# Patient Record
Sex: MALE | Race: BLACK OR AFRICAN AMERICAN | NOT HISPANIC OR LATINO | URBAN - METROPOLITAN AREA
[De-identification: names, ages, dates, MRNs, and addresses within clinical notes are randomized per-mention and may not be internally consistent; named-entity substitution may affect disease eponyms.]

---

## 2018-09-30 ENCOUNTER — EMERGENCY (EMERGENCY)
Facility: HOSPITAL | Age: 78
LOS: 1 days | Discharge: HOME | End: 2018-09-30
Attending: EMERGENCY MEDICINE
Payer: MEDICARE

## 2018-09-30 VITALS
DIASTOLIC BLOOD PRESSURE: 109 MMHG | HEART RATE: 54 BPM | OXYGEN SATURATION: 100 % | SYSTOLIC BLOOD PRESSURE: 137 MMHG | RESPIRATION RATE: 18 BRPM | TEMPERATURE: 99 F | WEIGHT: 147.05 LBS | HEIGHT: 67 IN
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
## 2018-09-30 DIAGNOSIS — T14.8XXA OTHER INJURY OF UNSPECIFIED BODY REGION, INITIAL ENCOUNTER: ICD-10-CM

## 2018-09-30 DIAGNOSIS — Z79.82 LONG TERM (CURRENT) USE OF ASPIRIN: ICD-10-CM

## 2018-09-30 DIAGNOSIS — M25.552 PAIN IN LEFT HIP: ICD-10-CM

## 2018-09-30 DIAGNOSIS — M25.532 PAIN IN LEFT WRIST: ICD-10-CM
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
## 2018-09-30 DIAGNOSIS — I10 ESSENTIAL (PRIMARY) HYPERTENSION: ICD-10-CM

## 2018-09-30 PROCEDURE — 73110 X-RAY EXAM OF WRIST: CPT | Mod: 26,LT

## 2018-09-30 PROCEDURE — 73502 X-RAY EXAM HIP UNI 2-3 VIEWS: CPT | Mod: 26,LT

## 2018-09-30 PROCEDURE — 99283 EMERGENCY DEPT VISIT LOW MDM: CPT

## 2018-09-30 RX ORDER — IBUPROFEN 200 MG
1 TABLET ORAL
Qty: 28 | Refills: 0 | OUTPATIENT
Start: 2018-09-30 | End: 2018-10-06

## 2018-09-30 NOTE — ED PROCEDURE NOTE - CPROC ED POST PROC CARE GUIDE1
Elevate the injured extremity as instructed./Instructed patient/caregiver regarding signs and symptoms of infection./Verbal/written post procedure instructions were given to patient/caregiver./Instructed patient/caregiver to follow-up with primary care physician./Keep the cast/splint/dressing clean and dry.

## 2018-09-30 NOTE — ED ADULT NURSE NOTE - NSIMPLEMENTINTERV_GEN_ALL_ED
Implemented All Fall with Harm Risk Interventions:  [place] to call system. Call bell, personal items and telephone within reach. Instruct patient to call for assistance. Room bathroom lighting operational. Non-slip footwear when patient is off stretcher. Physically safe environment: no spills, clutter or unnecessary equipment. Stretcher in lowest position, wheels locked, appropriate side rails in place. Provide visual cue, wrist band, yellow gown, etc. Monitor gait and stability. Monitor for mental status changes and reorient to person, place, and time. Review medications for side effects contributing to fall risk. Reinforce activity limits and safety measures with patient and family. Provide visual clues: red socks.
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
## 2018-11-29 ENCOUNTER — EMERGENCY (EMERGENCY)
Facility: HOSPITAL | Age: 78
LOS: 0 days | Discharge: HOME | End: 2018-11-29
Attending: EMERGENCY MEDICINE
Payer: MEDICARE

## 2018-11-29 VITALS
OXYGEN SATURATION: 99 % | HEART RATE: 62 BPM | HEIGHT: 67 IN | DIASTOLIC BLOOD PRESSURE: 65 MMHG | TEMPERATURE: 98 F | RESPIRATION RATE: 17 BRPM | WEIGHT: 145.95 LBS | SYSTOLIC BLOOD PRESSURE: 143 MMHG

## 2018-11-29 DIAGNOSIS — Z04.3 ENCOUNTER FOR EXAMINATION AND OBSERVATION FOLLOWING OTHER ACCIDENT: ICD-10-CM

## 2018-11-29 DIAGNOSIS — Z79.82 LONG TERM (CURRENT) USE OF ASPIRIN: ICD-10-CM

## 2018-11-29 DIAGNOSIS — M79.671 PAIN IN RIGHT FOOT: ICD-10-CM
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
## 2018-11-29 DIAGNOSIS — Z79.1 LONG TERM (CURRENT) USE OF NON-STEROIDAL ANTI-INFLAMMATORIES (NSAID): ICD-10-CM

## 2018-11-29 DIAGNOSIS — S92.354A NONDISPLACED FRACTURE OF FIFTH METATARSAL BONE, RIGHT FOOT, INITIAL ENCOUNTER FOR CLOSED FRACTURE: ICD-10-CM
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
## 2018-11-29 PROBLEM — I10 ESSENTIAL (PRIMARY) HYPERTENSION: Chronic | Status: ACTIVE | Noted: 2018-09-30

## 2018-11-29 PROCEDURE — 73630 X-RAY EXAM OF FOOT: CPT | Mod: 26,RT

## 2018-11-29 PROCEDURE — 28470 CLTX METATARSAL FX WO MNP EA: CPT | Mod: 54

## 2018-11-29 PROCEDURE — 99284 EMERGENCY DEPT VISIT MOD MDM: CPT | Mod: 25

## 2018-11-29 RX ORDER — ACETAMINOPHEN 500 MG
650 TABLET ORAL ONCE
Qty: 0 | Refills: 0 | Status: COMPLETED | OUTPATIENT
Start: 2018-11-29 | End: 2018-11-29

## 2018-11-29 RX ORDER — OXYCODONE AND ACETAMINOPHEN 5; 325 MG/1; MG/1
1 TABLET ORAL ONCE
Qty: 0 | Refills: 0 | Status: DISCONTINUED | OUTPATIENT
Start: 2018-11-29 | End: 2018-11-29

## 2018-11-29 RX ADMIN — Medication 650 MILLIGRAM(S): at 12:55

## 2018-11-29 RX ADMIN — Medication 650 MILLIGRAM(S): at 12:54

## 2018-11-29 NOTE — ED PROVIDER NOTE - MEDICAL DECISION MAKING DETAILS
patient pw foot pain sp fall. patient pw foot pain sp fall. xray shows 5th metatarsal fx. not [place] by location. will need to compress and have him follow up with podiatriy.

## 2018-11-29 NOTE — ED ADULT NURSE NOTE - NSIMPLEMENTINTERV_GEN_ALL_ED
Implemented All Fall with Harm Risk Interventions:  [place] to call system. Call bell, personal items and telephone within reach. Instruct patient to call for assistance. Room bathroom lighting operational. Non-slip footwear when patient is off stretcher. Physically safe environment: no spills, clutter or unnecessary equipment. Stretcher in lowest position, wheels locked, appropriate side rails in place. Provide visual cue, wrist band, yellow gown, etc. Monitor gait and stability. Monitor for mental status changes and reorient to person, place, and time. Review medications for side effects contributing to fall risk. Reinforce activity limits and safety measures with patient and family. Provide visual clues: red socks.

## 2018-11-29 NOTE — ED ADULT TRIAGE NOTE - CHIEF COMPLAINT QUOTE
pt states "  I had cataract surgery 6 weeks ago on my right eye and my vision in that eye is slightly blurry. However, last night I tripped and fall injuring my the lateral  right side of my foot and now I'm in pain.' PT DENIES HITTING HIS HEAD

## 2018-11-29 NOTE — ED PROVIDER NOTE - OBJECTIVE STATEMENT
Pertinent PMH/PSH/FHx/SHx and Review of Systems contained within:  78m hx htn pw right foot pain. patient notes he had a mech trip and fall due to decreased depth perception from cataract surgery. this caused him to the hit the right side of food on concrete. he has no fall or head injury. patient has swelling to the right outside of foot. he did not take anything for pain yet. there is no bleeding, redness, bruising, decreased rom.   Fh and Sh not otherwise contributory  ROS otherwise negative

## 2018-11-29 NOTE — ED PROVIDER NOTE - PHYSICAL EXAMINATION
Gen: Alert, NAD  Head: NC, AT   Eyes: PERRL, EOMI, normal lids/conjunctiva  ENT: normal hearing, patent oropharynx without erythema/exudate, uvula midline  Neck: supple, no tenderness, Trachea midline  Pulm: Bilateral BS, normal resp effort, no wheeze/stridor/retractions  CV: RRR, no M/R/G, 2+ radial and dp pulses bl, no edema  Abd: soft, NT/ND, +BS, no hepatosplenomegaly  Mskel: right lateral foot pain. no ctl spine ttp.   Skin: no rash, no bruising   Neuro: AAOx3, no sensory/motor deficits, CN 2-12 intact

## 2018-11-29 NOTE — ED ADULT NURSE NOTE - OBJECTIVE STATEMENT
patient A&Ox3 in no acute distress , c/o of R foot pain , patient had a fall last night , denied hitting head , denied LOC , denied difficulty breathing , denied chest pain denied headache denied N/V

## 2019-02-14 ENCOUNTER — INPATIENT (INPATIENT)
Facility: HOSPITAL | Age: 79
LOS: 7 days | Discharge: NURSING, REHAB OR LONG-TERM CARE | End: 2019-02-22
Attending: SURGERY | Admitting: SURGERY
Payer: MEDICARE

## 2019-02-14 ENCOUNTER — EMERGENCY (EMERGENCY)
Facility: HOSPITAL | Age: 79
LOS: 0 days | Discharge: ANOTHER HOSPITAL | End: 2019-02-14
Attending: EMERGENCY MEDICINE
Payer: MEDICARE

## 2019-02-14 ENCOUNTER — TRANSCRIPTION ENCOUNTER (OUTPATIENT)
Age: 79
End: 2019-02-14

## 2019-02-14 VITALS
OXYGEN SATURATION: 99 % | SYSTOLIC BLOOD PRESSURE: 152 MMHG | RESPIRATION RATE: 19 BRPM | HEIGHT: 67 IN | TEMPERATURE: 99 F | WEIGHT: 145.95 LBS | DIASTOLIC BLOOD PRESSURE: 97 MMHG | HEART RATE: 92 BPM

## 2019-02-14 VITALS
HEART RATE: 83 BPM | OXYGEN SATURATION: 96 % | TEMPERATURE: 98 F | DIASTOLIC BLOOD PRESSURE: 77 MMHG | SYSTOLIC BLOOD PRESSURE: 192 MMHG | RESPIRATION RATE: 18 BRPM

## 2019-02-14 DIAGNOSIS — I10 ESSENTIAL (PRIMARY) HYPERTENSION: ICD-10-CM

## 2019-02-14 DIAGNOSIS — M79.605 PAIN IN LEFT LEG: ICD-10-CM

## 2019-02-14 DIAGNOSIS — E78.5 HYPERLIPIDEMIA, UNSPECIFIED: ICD-10-CM

## 2019-02-14 DIAGNOSIS — I77.1 STRICTURE OF ARTERY: ICD-10-CM

## 2019-02-14 DIAGNOSIS — I99.8 OTHER DISORDER OF CIRCULATORY SYSTEM: ICD-10-CM

## 2019-02-14 DIAGNOSIS — I74.3 EMBOLISM AND THROMBOSIS OF ARTERIES OF THE LOWER EXTREMITIES: ICD-10-CM

## 2019-02-14 DIAGNOSIS — Z79.82 LONG TERM (CURRENT) USE OF ASPIRIN: ICD-10-CM

## 2019-02-14 DIAGNOSIS — Z79.899 OTHER LONG TERM (CURRENT) DRUG THERAPY: ICD-10-CM

## 2019-02-14 LAB
ALBUMIN SERPL ELPH-MCNC: 4 G/DL — SIGNIFICANT CHANGE UP (ref 3.3–5)
ALBUMIN SERPL ELPH-MCNC: 4.6 G/DL — SIGNIFICANT CHANGE UP (ref 3.3–5)
ALP SERPL-CCNC: 71 U/L — SIGNIFICANT CHANGE UP (ref 40–120)
ALP SERPL-CCNC: 72 U/L — SIGNIFICANT CHANGE UP (ref 40–120)
ALT FLD-CCNC: 20 U/L — SIGNIFICANT CHANGE UP (ref 4–41)
ALT FLD-CCNC: 23 U/L — SIGNIFICANT CHANGE UP (ref 12–78)
ANION GAP SERPL CALC-SCNC: 11 MMOL/L — SIGNIFICANT CHANGE UP (ref 5–17)
ANION GAP SERPL CALC-SCNC: 15 MMO/L — HIGH (ref 7–14)
APTT BLD: 36.3 SEC — SIGNIFICANT CHANGE UP (ref 28.5–37)
APTT BLD: > 200 SEC — CRITICAL HIGH (ref 27.5–36.3)
AST SERPL-CCNC: 28 U/L — SIGNIFICANT CHANGE UP (ref 15–37)
AST SERPL-CCNC: 29 U/L — SIGNIFICANT CHANGE UP (ref 4–40)
BASOPHILS # BLD AUTO: 0.03 K/UL — SIGNIFICANT CHANGE UP (ref 0–0.2)
BASOPHILS # BLD AUTO: 0.04 K/UL — SIGNIFICANT CHANGE UP (ref 0–0.2)
BASOPHILS NFR BLD AUTO: 0.4 % — SIGNIFICANT CHANGE UP (ref 0–2)
BASOPHILS NFR BLD AUTO: 0.5 % — SIGNIFICANT CHANGE UP (ref 0–2)
BILIRUB SERPL-MCNC: 0.5 MG/DL — SIGNIFICANT CHANGE UP (ref 0.2–1.2)
BILIRUB SERPL-MCNC: 0.6 MG/DL — SIGNIFICANT CHANGE UP (ref 0.2–1.2)
BLD GP AB SCN SERPL QL: NEGATIVE — SIGNIFICANT CHANGE UP
BLD GP AB SCN SERPL QL: SIGNIFICANT CHANGE UP
BUN SERPL-MCNC: 21 MG/DL — SIGNIFICANT CHANGE UP (ref 7–23)
BUN SERPL-MCNC: 24 MG/DL — HIGH (ref 7–23)
CALCIUM SERPL-MCNC: 8.9 MG/DL — SIGNIFICANT CHANGE UP (ref 8.5–10.1)
CALCIUM SERPL-MCNC: 9.4 MG/DL — SIGNIFICANT CHANGE UP (ref 8.4–10.5)
CHLORIDE SERPL-SCNC: 100 MMOL/L — SIGNIFICANT CHANGE UP (ref 98–107)
CHLORIDE SERPL-SCNC: 105 MMOL/L — SIGNIFICANT CHANGE UP (ref 96–108)
CO2 SERPL-SCNC: 22 MMOL/L — SIGNIFICANT CHANGE UP (ref 22–31)
CO2 SERPL-SCNC: 24 MMOL/L — SIGNIFICANT CHANGE UP (ref 22–31)
CREAT SERPL-MCNC: 1.21 MG/DL — SIGNIFICANT CHANGE UP (ref 0.5–1.3)
CREAT SERPL-MCNC: 1.37 MG/DL — HIGH (ref 0.5–1.3)
EOSINOPHIL # BLD AUTO: 0 K/UL — SIGNIFICANT CHANGE UP (ref 0–0.5)
EOSINOPHIL # BLD AUTO: 0.03 K/UL — SIGNIFICANT CHANGE UP (ref 0–0.5)
EOSINOPHIL NFR BLD AUTO: 0 % — SIGNIFICANT CHANGE UP (ref 0–6)
EOSINOPHIL NFR BLD AUTO: 0.4 % — SIGNIFICANT CHANGE UP (ref 0–6)
GLUCOSE SERPL-MCNC: 124 MG/DL — HIGH (ref 70–99)
GLUCOSE SERPL-MCNC: 134 MG/DL — HIGH (ref 70–99)
HCT VFR BLD CALC: 34.9 % — LOW (ref 39–50)
HCT VFR BLD CALC: 40.9 % — SIGNIFICANT CHANGE UP (ref 39–50)
HGB BLD-MCNC: 11.7 G/DL — LOW (ref 13–17)
HGB BLD-MCNC: 13.6 G/DL — SIGNIFICANT CHANGE UP (ref 13–17)
IMM GRANULOCYTES NFR BLD AUTO: 0.4 % — SIGNIFICANT CHANGE UP (ref 0–1.5)
IMM GRANULOCYTES NFR BLD AUTO: 0.4 % — SIGNIFICANT CHANGE UP (ref 0–1.5)
INR BLD: 1.22 RATIO — HIGH (ref 0.88–1.16)
INR BLD: 1.27 — HIGH (ref 0.88–1.17)
LACTATE SERPL-SCNC: 1.2 MMOL/L — SIGNIFICANT CHANGE UP (ref 0.7–2)
LYMPHOCYTES # BLD AUTO: 0.89 K/UL — LOW (ref 1–3.3)
LYMPHOCYTES # BLD AUTO: 1.07 K/UL — SIGNIFICANT CHANGE UP (ref 1–3.3)
LYMPHOCYTES # BLD AUTO: 11.5 % — LOW (ref 13–44)
LYMPHOCYTES # BLD AUTO: 14.2 % — SIGNIFICANT CHANGE UP (ref 13–44)
MCHC RBC-ENTMCNC: 30.2 PG — SIGNIFICANT CHANGE UP (ref 27–34)
MCHC RBC-ENTMCNC: 30.9 PG — SIGNIFICANT CHANGE UP (ref 27–34)
MCHC RBC-ENTMCNC: 33.3 % — SIGNIFICANT CHANGE UP (ref 32–36)
MCHC RBC-ENTMCNC: 33.5 GM/DL — SIGNIFICANT CHANGE UP (ref 32–36)
MCV RBC AUTO: 90.9 FL — SIGNIFICANT CHANGE UP (ref 80–100)
MCV RBC AUTO: 92.1 FL — SIGNIFICANT CHANGE UP (ref 80–100)
MONOCYTES # BLD AUTO: 0.33 K/UL — SIGNIFICANT CHANGE UP (ref 0–0.9)
MONOCYTES # BLD AUTO: 0.64 K/UL — SIGNIFICANT CHANGE UP (ref 0–0.9)
MONOCYTES NFR BLD AUTO: 4.3 % — SIGNIFICANT CHANGE UP (ref 2–14)
MONOCYTES NFR BLD AUTO: 8.5 % — SIGNIFICANT CHANGE UP (ref 2–14)
NEUTROPHILS # BLD AUTO: 5.7 K/UL — SIGNIFICANT CHANGE UP (ref 1.8–7.4)
NEUTROPHILS # BLD AUTO: 6.45 K/UL — SIGNIFICANT CHANGE UP (ref 1.8–7.4)
NEUTROPHILS NFR BLD AUTO: 76 % — SIGNIFICANT CHANGE UP (ref 43–77)
NEUTROPHILS NFR BLD AUTO: 83.4 % — HIGH (ref 43–77)
NRBC # BLD: 0 /100 WBCS — SIGNIFICANT CHANGE UP (ref 0–0)
NRBC # FLD: 0 K/UL — LOW (ref 25–125)
PLATELET # BLD AUTO: 146 K/UL — LOW (ref 150–400)
PLATELET # BLD AUTO: 163 K/UL — SIGNIFICANT CHANGE UP (ref 150–400)
PMV BLD: 10.1 FL — SIGNIFICANT CHANGE UP (ref 7–13)
POTASSIUM SERPL-MCNC: 3.8 MMOL/L — SIGNIFICANT CHANGE UP (ref 3.5–5.3)
POTASSIUM SERPL-MCNC: 4.4 MMOL/L — SIGNIFICANT CHANGE UP (ref 3.5–5.3)
POTASSIUM SERPL-SCNC: 3.8 MMOL/L — SIGNIFICANT CHANGE UP (ref 3.5–5.3)
POTASSIUM SERPL-SCNC: 4.4 MMOL/L — SIGNIFICANT CHANGE UP (ref 3.5–5.3)
PROT SERPL-MCNC: 7.3 G/DL — SIGNIFICANT CHANGE UP (ref 6–8.3)
PROT SERPL-MCNC: 7.8 GM/DL — SIGNIFICANT CHANGE UP (ref 6–8.3)
PROTHROM AB SERPL-ACNC: 13.7 SEC — HIGH (ref 10–12.9)
PROTHROM AB SERPL-ACNC: 14.2 SEC — HIGH (ref 9.8–13.1)
RBC # BLD: 3.79 M/UL — LOW (ref 4.2–5.8)
RBC # BLD: 4.5 M/UL — SIGNIFICANT CHANGE UP (ref 4.2–5.8)
RBC # FLD: 13.9 % — SIGNIFICANT CHANGE UP (ref 10.3–14.5)
RBC # FLD: 14 % — SIGNIFICANT CHANGE UP (ref 10.3–14.5)
RH IG SCN BLD-IMP: POSITIVE — SIGNIFICANT CHANGE UP
RH IG SCN BLD-IMP: POSITIVE — SIGNIFICANT CHANGE UP
SODIUM SERPL-SCNC: 137 MMOL/L — SIGNIFICANT CHANGE UP (ref 135–145)
SODIUM SERPL-SCNC: 140 MMOL/L — SIGNIFICANT CHANGE UP (ref 135–145)
WBC # BLD: 7.51 K/UL — SIGNIFICANT CHANGE UP (ref 3.8–10.5)
WBC # BLD: 7.73 K/UL — SIGNIFICANT CHANGE UP (ref 3.8–10.5)
WBC # FLD AUTO: 7.51 K/UL — SIGNIFICANT CHANGE UP (ref 3.8–10.5)
WBC # FLD AUTO: 7.73 K/UL — SIGNIFICANT CHANGE UP (ref 3.8–10.5)

## 2019-02-14 PROCEDURE — 99285 EMERGENCY DEPT VISIT HI MDM: CPT

## 2019-02-14 PROCEDURE — 71045 X-RAY EXAM CHEST 1 VIEW: CPT | Mod: 26

## 2019-02-14 PROCEDURE — 93926 LOWER EXTREMITY STUDY: CPT | Mod: 26,LT

## 2019-02-14 PROCEDURE — 75635 CT ANGIO ABDOMINAL ARTERIES: CPT | Mod: 26

## 2019-02-14 PROCEDURE — 99291 CRITICAL CARE FIRST HOUR: CPT

## 2019-02-14 RX ORDER — HEPARIN SODIUM 5000 [USP'U]/ML
2500 INJECTION INTRAVENOUS; SUBCUTANEOUS EVERY 6 HOURS
Qty: 0 | Refills: 0 | Status: DISCONTINUED | OUTPATIENT
Start: 2019-02-14 | End: 2019-02-14

## 2019-02-14 RX ORDER — HEPARIN SODIUM 5000 [USP'U]/ML
INJECTION INTRAVENOUS; SUBCUTANEOUS
Qty: 25000 | Refills: 0 | Status: DISCONTINUED | OUTPATIENT
Start: 2019-02-14 | End: 2019-02-14

## 2019-02-14 RX ORDER — SODIUM CHLORIDE 9 MG/ML
1000 INJECTION INTRAMUSCULAR; INTRAVENOUS; SUBCUTANEOUS
Qty: 0 | Refills: 0 | Status: DISCONTINUED | OUTPATIENT
Start: 2019-02-14 | End: 2019-02-15

## 2019-02-14 RX ORDER — HEPARIN SODIUM 5000 [USP'U]/ML
5500 INJECTION INTRAVENOUS; SUBCUTANEOUS EVERY 6 HOURS
Qty: 0 | Refills: 0 | Status: DISCONTINUED | OUTPATIENT
Start: 2019-02-14 | End: 2019-02-14

## 2019-02-14 RX ORDER — HEPARIN SODIUM 5000 [USP'U]/ML
5500 INJECTION INTRAVENOUS; SUBCUTANEOUS ONCE
Qty: 0 | Refills: 0 | Status: COMPLETED | OUTPATIENT
Start: 2019-02-14 | End: 2019-02-14

## 2019-02-14 RX ORDER — MORPHINE SULFATE 50 MG/1
2 CAPSULE, EXTENDED RELEASE ORAL ONCE
Qty: 0 | Refills: 0 | Status: DISCONTINUED | OUTPATIENT
Start: 2019-02-14 | End: 2019-02-14

## 2019-02-14 RX ORDER — ALTEPLASE 100 MG
1 KIT INTRAVENOUS
Qty: 25 | Refills: 0 | Status: DISCONTINUED | OUTPATIENT
Start: 2019-02-14 | End: 2019-02-15

## 2019-02-14 RX ORDER — HEPARIN SODIUM 5000 [USP'U]/ML
INJECTION INTRAVENOUS; SUBCUTANEOUS
Qty: 25000 | Refills: 0 | Status: DISCONTINUED | OUTPATIENT
Start: 2019-02-14 | End: 2019-02-15

## 2019-02-14 RX ADMIN — HEPARIN SODIUM 1200 UNIT(S)/HR: 5000 INJECTION INTRAVENOUS; SUBCUTANEOUS at 20:09

## 2019-02-14 RX ADMIN — HEPARIN SODIUM 5500 UNIT(S): 5000 INJECTION INTRAVENOUS; SUBCUTANEOUS at 18:37

## 2019-02-14 RX ADMIN — MORPHINE SULFATE 2 MILLIGRAM(S): 50 CAPSULE, EXTENDED RELEASE ORAL at 21:43

## 2019-02-14 RX ADMIN — HEPARIN SODIUM 1200 UNIT(S)/HR: 5000 INJECTION INTRAVENOUS; SUBCUTANEOUS at 18:41

## 2019-02-14 RX ADMIN — MORPHINE SULFATE 2 MILLIGRAM(S): 50 CAPSULE, EXTENDED RELEASE ORAL at 18:30

## 2019-02-14 RX ADMIN — MORPHINE SULFATE 2 MILLIGRAM(S): 50 CAPSULE, EXTENDED RELEASE ORAL at 21:58

## 2019-02-14 NOTE — ED PROVIDER NOTE - OBJECTIVE STATEMENT
79yo male with pmh HTN, HL, presents with cold, pulseless, pain, pallor left leg. 79yo male with pmh HTN, HL, h/o stent to left leg, presents with cold, pulseless, pain, pale left leg at 3pm s/p massage. no fever, cp, sob, palpitations.    ROS: No fever/chills. No photophobia/eye pain/changes in vision, No ear pain/sore throat/dysphagia, No chest pain/palpitations. No SOB/cough/stridor. No abdominal pain, N/V/D, no black/bloody bm. No dysuria/frequency/discharge, No headache. No Dizziness.  + leg pain  No numbness/tingling/weakness.

## 2019-02-14 NOTE — ED PROVIDER NOTE - ATTENDING CONTRIBUTION TO CARE
Dr. Barton: I have personally seen and examined this patient at the bedside. I have fully participated in the care of this patient. I have reviewed all pertinent clinical information, including history, physical exam, plan and the Resident's note and agree except as noted. HPI above as by me. PE above as by me. 78M h/o pvd with b/l fem pop stent DDX high clinical suspicion for ischemic left leg PLAN hep gtt, stat cta for operative plan DISPO OR tonight.

## 2019-02-14 NOTE — ED PROVIDER NOTE - NSBENEFITOFTRANSFER_ED_A_ED
Obtain Level of Care/Service Not Available at this Facility/Worsening of Condition, Death, or Disability if Patient Does Not Transfer

## 2019-02-14 NOTE — ED PROVIDER NOTE - PROGRESS NOTE DETAILS
d/w dr edwards, states to transfer pt. d/w miguel a, d/w transfer, dr molina accept pt for lights and sirens. henri at bedside doing sono

## 2019-02-14 NOTE — ED PROVIDER NOTE - LOCATION
Distal to left calf cool and pale compared to RLE. Left foot cold and decr sensation. DUsky nailbeds. Cap refill > 2 seconds

## 2019-02-14 NOTE — ED PROVIDER NOTE - PHYSICAL EXAMINATION
Gen: Alert, Well appearing. NAD    Head: NC, AT, PERRL, EOMI, normal lids/conjunctiva   ENT: Bilateral TM WNL, normal hearing, patent oropharynx without erythema/exudate, uvula midline  Neck: supple, no tenderness/meningismus/JVD   Pulm: Bilateral clear BS, normal resp effort, no wheeze/stridor/retractions  CV: RRR, no M/R/G, +dist pulses   Abd: soft, NT/ND, +BS, no guarding/rebound tenderness  Mskel: + pale, cold, left leg from distal thigh down to foot. cr very slow, no PT/DP pulses, no popliteal pulses, able to move left ankle/toes but with difficulty  Skin: no rash   Neuro: AAOx3, no sensory/motor deficits, CN 2-12 intact

## 2019-02-14 NOTE — H&P ADULT - ASSESSMENT
78M with acute limb ischemia of left lower extremity  - Admit to vascular surgery  - Booked and consented for OR  - Heparin gtt started when patient was at [facility]  - Seen and examined with fellow, Dr. Tacos Jacobo PGY 3  [id number]

## 2019-02-14 NOTE — ED CLERICAL - NS ED CLERK NOTE PRE-ARRIVAL INFORMATION; ADDITIONAL PRE-ARRIVAL INFORMATION
Left foot pain, cool, neg popliteal, tibial pulse after a massage. Positive femoral pulse.  Treated with MS, heparin-gtt at 12ml/hr started.  Accepted by vascular surgery.  Hx HTN, hyperlipidemia.

## 2019-02-14 NOTE — ED PROVIDER NOTE - CLINICAL SUMMARY MEDICAL DECISION MAKING FREE TEXT BOX
78M h/o pvd with b/l fem pop stent DDX high clinical suspicion for ischemic left leg PLAN hep gtt, stat cta for operative plan DISPO OR tonight

## 2019-02-14 NOTE — ED ADULT NURSE NOTE - NURSING ED PERIPHERAL VASCULAR DISTAL EXTREMITY DETAIL
Under satisfactory skin prep, catheter was inserted and the bladder was emptied of 50 cc of clear urine. 50 cc of freshly prepared BCG was then instilled into the bladder with no complications. He will return in 1 week for installation #2. left lower...

## 2019-02-14 NOTE — ED PROVIDER NOTE - OBJECTIVE STATEMENT
20:08 [place] att: 78M h/o bilateral fem pop bypass (left in 80s) transfer from [place] for cold left foot. Past few days sedentary. Today patient had a massage. Today approx 15:00 sudden onset left calf pain and cold left foot. At [place] pre-ops collected and heparin gtt started. Transfer to [facility] for Vascular eval. 20:00 Patient seen by [facility], request ct ap with run off to both legs. At present patient left calf pain, 6-7/10. PMH htn, hld, pvd with b/l stent, no cad stents, bph PSH MED asa 81 mg qd ALL x SMOKE PCP PHARMACY 20:08 [place] att: 78M h/o bilateral fem pop bypass (left in 80s) transfer from [place] for cold left foot. Past few days sedentary. Today patient had a massage. Today approx 15:00 sudden onset left calf pain and cold left foot. At [place] pre-ops collected and heparin gtt started. Transfer to [facility] for Vascular eval. 20:00 Patient received in ER room 13, hep gtt running. Patient seen by [facility] at ER bedside, request ct ap with run off to both legs, plan for OR. At present patient left calf pain, 6-7/10. PMH htn, hld, pvd with b/l stent, no cad stents, bph PSH MED asa 81 mg qd ALL x SMOKE PCP PHARMACY

## 2019-02-14 NOTE — H&P ADULT - NSHPLABSRESULTS_GEN_ALL_CORE
Vital Signs Last 24 Hrs  T(C): 36.7 (02-14-19 @ 21:42), Max: 37.1 (02-14-19 @ 17:37)  T(F): 98 (02-14-19 @ 21:42), Max: 98.7 (02-14-19 @ 17:37)  HR: 74 (02-14-19 @ 21:42) (72 - 92)  BP: 185/75 (02-14-19 @ 21:42) (152/97 - 192/77)  BP(mean): --  RR: 17 (02-14-19 @ 21:42) (17 - 19)  SpO2: 100% (02-14-19 @ 21:42) (96% - 100%)  I&O's Detail                          13.6   7.73  )-----------( 163      ( 14 Feb 2019 21:36 )             40.9     02-14    137  |  100  |  21  ----------------------------<  124<H>  4.4   |  22  |  1.21    Ca    9.4      14 Feb 2019 21:36    TPro  7.3  /  Alb  4.6  /  TBili  0.5  /  DBili  x   /  AST  29  /  ALT  20  /  AlkPhos  71  02-14    PT/INR - ( 14 Feb 2019 21:36 )   PT: 14.2 SEC;   INR: 1.27          PTT - ( 14 Feb 2019 21:36 )  PTT:> 200.0 SEC  CAPILLARY BLOOD GLUCOSE          MEDICATIONS  (STANDING):  alteplase    Infusion 1 mG/Hr (10 mL/Hr) IV Continuous <Continuous>  heparin  Infusion.  Unit(s)/Hr (12 mL/Hr) IV Continuous <Continuous>    MEDICATIONS  (PRN):  heparin  Injectable 5500 Unit(s) IV Push every 6 hours PRN For aPTT less than 40  heparin  Injectable 2500 Unit(s) IV Push every 6 hours PRN For aPTT between 40 - 57  < from: CT Angio Abd Aorta w/run-off w/ IV Cont (02.14.19 @ 21:02) >      Impression:    Abdomen/pelvis:  No acute emergent findings.    Right lower extremity: A right femoral-popliteal graft is patent with two   vessel runoff to the level of the foot. The right proximal anterior   tibial artery is occluded.    Left lower extremity: A left femoral popliteal graft is occluded. No flow   is visualized in the anterior tibial artery, posterior tibial artery and   peroneal artery on arterial phase imaging and Trickle flow is visualized   on delayed images predominantly within the anterior tibial and peroneal   arteries.    < end of copied text >

## 2019-02-14 NOTE — ED PROVIDER NOTE - PROGRESS NOTE DETAILS
[place] att: CT expedited with CT tech. [place] Cr from today 18:30 1.37 with GFR 48. Will go straight to CT. [place] att: Ptt> 200. Per protocol hep gtt held for one hour. CTA performed. Vascular paged to check on status of OR and admission.

## 2019-02-14 NOTE — H&P ADULT - HISTORY OF PRESENT ILLNESS
78M with prior history of bilateral lower extremity bypasses (pt does not remember details) done in 1980s presents today with LLE pain and mild sensory and motor loss since 3:15pm. He got a massage prior to the start of symptoms and then noticed that his foot felt cold and he had pain in his calf. He also felt that he could not move his toes very well. He has no complaints in his RLE.   He otherwise is ambulatory at home and denies any symptoms of claudication. He has HTN and HLD at home. He denies any history of atrial fibrillation. He is a current pack a day smoker and has done so for the past 25 years.

## 2019-02-14 NOTE — ED ADULT NURSE NOTE - CHIEF COMPLAINT QUOTE
pt states " my left leg hurts, it feels cold and I cant move the toes in that foot." pt states symptoms started at  3 am. denies trauma.

## 2019-02-14 NOTE — H&P ADULT - NSHPPHYSICALEXAM_GEN_ALL_CORE
Gen: WD, WN, in no acute distress.  Lungs: CTA B/L.  Cor: RRR, S1 S2, No M/G/R.  Abd: Soft, ND, NT,No HSM, +BS.  Neuro: A/Ox3. No focal deficit.  Vascular: bilateral femoral pulses palpable. RLE with palpable PT and DP signal. LLE with no DP/PT pulses or signals.

## 2019-02-14 NOTE — ED PROVIDER NOTE - NSRISKOFTRANSFER_ED_A_ED
Increased Pain/Transportation Risk (There is always a risk of traffic delays resulting in deterioration of condition.)

## 2019-02-14 NOTE — ED ADULT TRIAGE NOTE - CHIEF COMPLAINT QUOTE
pt states " my left leg hurts, it feels cold and I cant move the toes in that foot." pt states symptoms started at  3 am. denies trauma. pt states " my left leg hurts, it feels cold and I cant move the toes in that foot." pt states symptoms started at  3 am. denies trauma. right leg cool to touch. unable to palpate peripheral pulses. pt states " my left leg hurts, it feels cold and I cant move the toes in that foot." pt states symptoms started at  3 am. denies trauma. left leg cool to touch. unable to palpate peripheral pulses.

## 2019-02-14 NOTE — ED ADULT NURSE NOTE - NSIMPLEMENTINTERV_GEN_ALL_ED
Implemented All Fall Risk Interventions:  [place] to call system. Call bell, personal items and telephone within reach. Instruct patient to call for assistance. Room bathroom lighting operational. Non-slip footwear when patient is off stretcher. Physically safe environment: no spills, clutter or unnecessary equipment. Stretcher in lowest position, wheels locked, appropriate side rails in place. Provide visual cue, wrist band, yellow gown, etc. Monitor gait and stability. Monitor for mental status changes and reorient to person, place, and time. Review medications for side effects contributing to fall risk. Reinforce activity limits and safety measures with patient and family.

## 2019-02-14 NOTE — ED ADULT NURSE NOTE - OBJECTIVE STATEMENT
patient alert ox3 came in as transfer from [facility] with left leg pain and occlusion left femoral artery. patient was getting a massage and started to feel cold in his left leg. denies injury. h/o filter to left groin in the past. denies cp/sob. patient had a doppler that shows occluded femoral artery. patient came in with heparin drip started at 1200units per hour and as per EMS heparin was started at 1845 prior to transport. left leg feels cold, c/o pain to left calf with mild cyanosis of left toe nailbed noted. MD made aware. awaiting re eval by ER MD.

## 2019-02-15 LAB
ANION GAP SERPL CALC-SCNC: 13 MMO/L — SIGNIFICANT CHANGE UP (ref 7–14)
ANION GAP SERPL CALC-SCNC: 14 MMO/L — SIGNIFICANT CHANGE UP (ref 7–14)
APTT BLD: 38.5 SEC — HIGH (ref 27.5–36.3)
APTT BLD: 42.7 SEC — HIGH (ref 27.5–36.3)
BASE EXCESS BLDA CALC-SCNC: -1.6 MMOL/L — SIGNIFICANT CHANGE UP
BASOPHILS # BLD AUTO: 0.03 K/UL — SIGNIFICANT CHANGE UP (ref 0–0.2)
BASOPHILS NFR BLD AUTO: 0.4 % — SIGNIFICANT CHANGE UP (ref 0–2)
BUN SERPL-MCNC: 21 MG/DL — SIGNIFICANT CHANGE UP (ref 7–23)
BUN SERPL-MCNC: 26 MG/DL — HIGH (ref 7–23)
CA-I BLDA-SCNC: 1.19 MMOL/L — SIGNIFICANT CHANGE UP (ref 1.15–1.29)
CALCIUM SERPL-MCNC: 7.7 MG/DL — LOW (ref 8.4–10.5)
CALCIUM SERPL-MCNC: 8.2 MG/DL — LOW (ref 8.4–10.5)
CHLORIDE SERPL-SCNC: 103 MMOL/L — SIGNIFICANT CHANGE UP (ref 98–107)
CHLORIDE SERPL-SCNC: 104 MMOL/L — SIGNIFICANT CHANGE UP (ref 98–107)
CK MB BLD-MCNC: 1.7 — SIGNIFICANT CHANGE UP (ref 0–2.5)
CK MB BLD-MCNC: 7.65 NG/ML — HIGH (ref 1–6.6)
CK MB BLD-MCNC: 7.75 NG/ML — HIGH (ref 1–6.6)
CK SERPL-CCNC: 444 U/L — HIGH (ref 30–200)
CK SERPL-CCNC: 467 U/L — HIGH (ref 30–200)
CO2 SERPL-SCNC: 18 MMOL/L — LOW (ref 22–31)
CO2 SERPL-SCNC: 21 MMOL/L — LOW (ref 22–31)
CREAT SERPL-MCNC: 1.4 MG/DL — HIGH (ref 0.5–1.3)
CREAT SERPL-MCNC: 1.62 MG/DL — HIGH (ref 0.5–1.3)
EOSINOPHIL # BLD AUTO: 0.02 K/UL — SIGNIFICANT CHANGE UP (ref 0–0.5)
EOSINOPHIL NFR BLD AUTO: 0.3 % — SIGNIFICANT CHANGE UP (ref 0–6)
GLUCOSE BLDA-MCNC: 105 MG/DL — HIGH (ref 70–99)
GLUCOSE SERPL-MCNC: 112 MG/DL — HIGH (ref 70–99)
GLUCOSE SERPL-MCNC: 165 MG/DL — HIGH (ref 70–99)
HCO3 BLDA-SCNC: 23 MMOL/L — SIGNIFICANT CHANGE UP (ref 22–26)
HCT VFR BLD CALC: 24.6 % — LOW (ref 39–50)
HCT VFR BLD CALC: 25 % — LOW (ref 39–50)
HCT VFR BLD CALC: 29.5 % — LOW (ref 39–50)
HCT VFR BLDA CALC: 32.3 % — LOW (ref 39–51)
HGB BLD-MCNC: 8 G/DL — LOW (ref 13–17)
HGB BLD-MCNC: 8.3 G/DL — LOW (ref 13–17)
HGB BLD-MCNC: 9.7 G/DL — LOW (ref 13–17)
HGB BLDA-MCNC: 10.5 G/DL — LOW (ref 13–17)
IMM GRANULOCYTES NFR BLD AUTO: 0.3 % — SIGNIFICANT CHANGE UP (ref 0–1.5)
INR BLD: 1.35 — HIGH (ref 0.88–1.17)
LYMPHOCYTES # BLD AUTO: 1.62 K/UL — SIGNIFICANT CHANGE UP (ref 1–3.3)
LYMPHOCYTES # BLD AUTO: 23.2 % — SIGNIFICANT CHANGE UP (ref 13–44)
MAGNESIUM SERPL-MCNC: 1.7 MG/DL — SIGNIFICANT CHANGE UP (ref 1.6–2.6)
MCHC RBC-ENTMCNC: 30.2 PG — SIGNIFICANT CHANGE UP (ref 27–34)
MCHC RBC-ENTMCNC: 30.7 PG — SIGNIFICANT CHANGE UP (ref 27–34)
MCHC RBC-ENTMCNC: 30.9 PG — SIGNIFICANT CHANGE UP (ref 27–34)
MCHC RBC-ENTMCNC: 32.5 % — SIGNIFICANT CHANGE UP (ref 32–36)
MCHC RBC-ENTMCNC: 32.9 % — SIGNIFICANT CHANGE UP (ref 32–36)
MCHC RBC-ENTMCNC: 33.2 % — SIGNIFICANT CHANGE UP (ref 32–36)
MCV RBC AUTO: 92.8 FL — SIGNIFICANT CHANGE UP (ref 80–100)
MCV RBC AUTO: 92.9 FL — SIGNIFICANT CHANGE UP (ref 80–100)
MCV RBC AUTO: 93.4 FL — SIGNIFICANT CHANGE UP (ref 80–100)
MONOCYTES # BLD AUTO: 0.74 K/UL — SIGNIFICANT CHANGE UP (ref 0–0.9)
MONOCYTES NFR BLD AUTO: 10.6 % — SIGNIFICANT CHANGE UP (ref 2–14)
NEUTROPHILS # BLD AUTO: 4.56 K/UL — SIGNIFICANT CHANGE UP (ref 1.8–7.4)
NEUTROPHILS NFR BLD AUTO: 65.2 % — SIGNIFICANT CHANGE UP (ref 43–77)
NRBC # FLD: 0 K/UL — LOW (ref 25–125)
PCO2 BLDA: 47 MMHG — SIGNIFICANT CHANGE UP (ref 35–48)
PH BLDA: 7.33 PH — LOW (ref 7.35–7.45)
PHOSPHATE SERPL-MCNC: 4.2 MG/DL — SIGNIFICANT CHANGE UP (ref 2.5–4.5)
PLATELET # BLD AUTO: 124 K/UL — LOW (ref 150–400)
PLATELET # BLD AUTO: 136 K/UL — LOW (ref 150–400)
PLATELET # BLD AUTO: 148 K/UL — LOW (ref 150–400)
PMV BLD: 10.3 FL — SIGNIFICANT CHANGE UP (ref 7–13)
PMV BLD: 10.6 FL — SIGNIFICANT CHANGE UP (ref 7–13)
PMV BLD: 9.9 FL — SIGNIFICANT CHANGE UP (ref 7–13)
PO2 BLDA: 209 MMHG — HIGH (ref 83–108)
POTASSIUM BLDA-SCNC: 4.3 MMOL/L — SIGNIFICANT CHANGE UP (ref 3.4–4.5)
POTASSIUM SERPL-MCNC: 4.4 MMOL/L — SIGNIFICANT CHANGE UP (ref 3.5–5.3)
POTASSIUM SERPL-MCNC: 4.8 MMOL/L — SIGNIFICANT CHANGE UP (ref 3.5–5.3)
POTASSIUM SERPL-SCNC: 4.4 MMOL/L — SIGNIFICANT CHANGE UP (ref 3.5–5.3)
POTASSIUM SERPL-SCNC: 4.8 MMOL/L — SIGNIFICANT CHANGE UP (ref 3.5–5.3)
PROTHROM AB SERPL-ACNC: 15.1 SEC — HIGH (ref 9.8–13.1)
RBC # BLD: 2.65 M/UL — LOW (ref 4.2–5.8)
RBC # BLD: 2.69 M/UL — LOW (ref 4.2–5.8)
RBC # BLD: 3.16 M/UL — LOW (ref 4.2–5.8)
RBC # FLD: 14 % — SIGNIFICANT CHANGE UP (ref 10.3–14.5)
RBC # FLD: 14.1 % — SIGNIFICANT CHANGE UP (ref 10.3–14.5)
RBC # FLD: 14.3 % — SIGNIFICANT CHANGE UP (ref 10.3–14.5)
SAO2 % BLDA: 99 % — SIGNIFICANT CHANGE UP (ref 95–99)
SODIUM BLDA-SCNC: 134 MMOL/L — LOW (ref 136–146)
SODIUM SERPL-SCNC: 135 MMOL/L — SIGNIFICANT CHANGE UP (ref 135–145)
SODIUM SERPL-SCNC: 138 MMOL/L — SIGNIFICANT CHANGE UP (ref 135–145)
TROPONIN T, HIGH SENSITIVITY: 30 NG/L — SIGNIFICANT CHANGE UP (ref ?–14)
TROPONIN T, HIGH SENSITIVITY: 35 NG/L — SIGNIFICANT CHANGE UP (ref ?–14)
WBC # BLD: 10.23 K/UL — SIGNIFICANT CHANGE UP (ref 3.8–10.5)
WBC # BLD: 6.99 K/UL — SIGNIFICANT CHANGE UP (ref 3.8–10.5)
WBC # BLD: 9.89 K/UL — SIGNIFICANT CHANGE UP (ref 3.8–10.5)
WBC # FLD AUTO: 10.23 K/UL — SIGNIFICANT CHANGE UP (ref 3.8–10.5)
WBC # FLD AUTO: 6.99 K/UL — SIGNIFICANT CHANGE UP (ref 3.8–10.5)
WBC # FLD AUTO: 9.89 K/UL — SIGNIFICANT CHANGE UP (ref 3.8–10.5)

## 2019-02-15 PROCEDURE — 93010 ELECTROCARDIOGRAM REPORT: CPT | Mod: 76

## 2019-02-15 PROCEDURE — 36246 INS CATH ABD/L-EXT ART 2ND: CPT

## 2019-02-15 PROCEDURE — 35665 BPG ILIOFEMORAL: CPT

## 2019-02-15 PROCEDURE — 93306 TTE W/DOPPLER COMPLETE: CPT | Mod: 26

## 2019-02-15 PROCEDURE — 99233 SBSQ HOSP IP/OBS HIGH 50: CPT

## 2019-02-15 PROCEDURE — 34201 REMOVAL OF ARTERY CLOT: CPT | Mod: 59,LT

## 2019-02-15 RX ORDER — ACETAMINOPHEN 500 MG
650 TABLET ORAL EVERY 6 HOURS
Qty: 0 | Refills: 0 | Status: DISCONTINUED | OUTPATIENT
Start: 2019-02-15 | End: 2019-02-22

## 2019-02-15 RX ORDER — HYDRALAZINE HCL 50 MG
10 TABLET ORAL ONCE
Qty: 0 | Refills: 0 | Status: COMPLETED | OUTPATIENT
Start: 2019-02-15 | End: 2019-02-15

## 2019-02-15 RX ORDER — OXYCODONE HYDROCHLORIDE 5 MG/1
5 TABLET ORAL EVERY 6 HOURS
Qty: 0 | Refills: 0 | Status: DISCONTINUED | OUTPATIENT
Start: 2019-02-15 | End: 2019-02-20

## 2019-02-15 RX ORDER — TAMSULOSIN HYDROCHLORIDE 0.4 MG/1
0.4 CAPSULE ORAL AT BEDTIME
Qty: 0 | Refills: 0 | Status: DISCONTINUED | OUTPATIENT
Start: 2019-02-15 | End: 2019-02-22

## 2019-02-15 RX ORDER — HEPARIN SODIUM 5000 [USP'U]/ML
500 INJECTION INTRAVENOUS; SUBCUTANEOUS
Qty: 25000 | Refills: 0 | Status: DISCONTINUED | OUTPATIENT
Start: 2019-02-15 | End: 2019-02-15

## 2019-02-15 RX ORDER — HEPARIN SODIUM 5000 [USP'U]/ML
500 INJECTION INTRAVENOUS; SUBCUTANEOUS
Qty: 25000 | Refills: 0 | Status: DISCONTINUED | OUTPATIENT
Start: 2019-02-15 | End: 2019-02-17

## 2019-02-15 RX ORDER — OXYCODONE HYDROCHLORIDE 5 MG/1
10 TABLET ORAL EVERY 6 HOURS
Qty: 0 | Refills: 0 | Status: DISCONTINUED | OUTPATIENT
Start: 2019-02-15 | End: 2019-02-20

## 2019-02-15 RX ORDER — OXYCODONE HYDROCHLORIDE 5 MG/1
10 TABLET ORAL EVERY 6 HOURS
Qty: 0 | Refills: 0 | Status: DISCONTINUED | OUTPATIENT
Start: 2019-02-15 | End: 2019-02-15

## 2019-02-15 RX ORDER — VALACYCLOVIR 500 MG/1
1 TABLET, FILM COATED ORAL
Qty: 0 | Refills: 0 | COMMUNITY

## 2019-02-15 RX ORDER — FOLIC ACID 0.8 MG
1 TABLET ORAL
Qty: 0 | Refills: 0 | COMMUNITY

## 2019-02-15 RX ORDER — INFLUENZA VIRUS VACCINE 15; 15; 15; 15 UG/.5ML; UG/.5ML; UG/.5ML; UG/.5ML
0.5 SUSPENSION INTRAMUSCULAR ONCE
Qty: 0 | Refills: 0 | Status: DISCONTINUED | OUTPATIENT
Start: 2019-02-15 | End: 2019-02-22

## 2019-02-15 RX ORDER — CHLORHEXIDINE GLUCONATE 213 G/1000ML
1 SOLUTION TOPICAL
Qty: 0 | Refills: 0 | Status: DISCONTINUED | OUTPATIENT
Start: 2019-02-15 | End: 2019-02-18

## 2019-02-15 RX ORDER — METOPROLOL TARTRATE 50 MG
50 TABLET ORAL DAILY
Qty: 0 | Refills: 0 | Status: DISCONTINUED | OUTPATIENT
Start: 2019-02-15 | End: 2019-02-15

## 2019-02-15 RX ADMIN — Medication 10 MILLIGRAM(S): at 17:30

## 2019-02-15 RX ADMIN — TAMSULOSIN HYDROCHLORIDE 0.4 MILLIGRAM(S): 0.4 CAPSULE ORAL at 21:31

## 2019-02-15 RX ADMIN — Medication 650 MILLIGRAM(S): at 17:38

## 2019-02-15 RX ADMIN — Medication 650 MILLIGRAM(S): at 17:08

## 2019-02-15 RX ADMIN — SODIUM CHLORIDE 125 MILLILITER(S): 9 INJECTION INTRAMUSCULAR; INTRAVENOUS; SUBCUTANEOUS at 04:40

## 2019-02-15 NOTE — CONSULT NOTE ADULT - ASSESSMENT
ASSESSMENT:  78M with prior history of B/L lower extremity bypasses (pt does not remember details, imaging shows B/L fem-pop bypass grafts) done in 1980s presented yesterday 2/14/19 with LLE pain and mild sensory and motor loss since 3:15pm. He got a massage prior to the start of symptoms and then noticed that his foot felt cold and he had pain in his calf. He also felt that he could not move his toes very well. He has no complaints in his RLE. He otherwise is ambulatory at home and denies any symptoms of claudication. He has HTN and HLD at home. He denies any history of atrial fibrillation. He is a current pack a day smoker and has done so for the past 25 years. Pt found with occluded superficial femoral artery and occluded bypass graft (fem-pop graft) on arterial duplex. CT LE with run off shows patent fem-pop graft, right proximal ant-tib artery occlusion; left fem-pop graft occluded, no flow in ant-tib, post-tib, and peroneal arteries. Pt planned to go to OR for thrombolysis of LLE fem-pop graft but, instead underwent thrombectomized old PTFE fem-pop graft and then interposition bypass from common femoral to profunda with a 5 french sheath left in the right femoral artery. Pt received heparin bolus prior to case starting. SICU consulted for Q1 vascular checks.      PLAN:       Neurologic: Sedated postop, oxycodone 5/10 for pain mgmt    Respiratory: JAVY, IS, OOB as tolerated, oxygen supplementation as needed    Cardiovascular: restarted metoprolol 50 QD, otherwise JAVY    Gastrointestinal/Nutrition: Start clears later this morning    Renal/Genitourinary: NS at 125cc/hr. Can d/c'ed once tolerating diet. Replete electrolytes PRN, Restarted home flomax 0.4mg at bedtime    Hematologic: F/U post op labs, transfuse if <7,    - HEPARIN GTT @ 500units & SLOWLY TITRATE UP ONLY AFTER SHEATH IS REMOVED LATER THIS MORNING BY VASCULAR    Infectious Disease: JAVY, will monitor CBC    Lines/Tubes: Right radial A line, kimbrough    Endocrine: JAVY    Disposition: SICU    --------------------------------------------------------------------------------------    Critical Care Diagnoses: RLE arterial occlusion and occlusion of  bypass graft ASSESSMENT:  78M with prior history of B/L lower extremity bypasses (pt does not remember details, imaging shows B/L fem-pop bypass grafts) done in 1980s presented yesterday 2/14/19 with LLE pain and mild sensory and motor loss since 3:15pm. He got a massage prior to the start of symptoms and then noticed that his foot felt cold and he had pain in his calf. He also felt that he could not move his toes very well. He has no complaints in his RLE. He otherwise is ambulatory at home and denies any symptoms of claudication. He has HTN and HLD at home. He denies any history of atrial fibrillation. He is a current pack a day smoker and has done so for the past 25 years. Pt found with occluded superficial femoral artery and occluded bypass graft (fem-pop graft) on arterial duplex. CT LE with run off shows patent fem-pop graft, right proximal ant-tib artery occlusion; left fem-pop graft occluded, no flow in ant-tib, post-tib, and peroneal arteries. Pt planned to go to OR for thrombolysis of LLE fem-pop graft but, instead underwent thrombectomized old PTFE fem-pop graft and then interposition bypass from common femoral to profunda with a 5 french sheath left in the right femoral artery. Pt received heparin bolus prior to case starting. SICU consulted for Q1 vascular checks.      PLAN:       Neurologic: Sedated postop, oxycodone 5/10 for pain mgmt    Respiratory: JAVY, IS, OOB as tolerated, oxygen supplementation as needed    Cardiovascular: holding metoprolol 50 QD (home med)  - bradycardic, + T wave inversions on lateral leads, unknown baseline hx   - trending Abigail  - echo pending     Gastrointestinal/Nutrition: Start clears later this morning    Renal/Genitourinary: NS at 125cc/hr. Can d/c'ed once tolerating diet. Replete electrolytes PRN, Restarted home flomax 0.4mg at bedtime  - kimbrough in place, will d/c     Hematologic: F/U post op labs, transfuse if <7,    - HEPARIN GTT @ 500units; PTT 45   - watch out for compartment syndrome, q1 vascular checks   - SCDs on R leg OK   - downtrending H/H, will repeat CBC    Infectious Disease: JAVY, will monitor CBC  - afebrile     Lines/Tubes: Right radial A line, kimbrough    Endocrine: JAVY    Disposition: SICU    --------------------------------------------------------------------------------------    Critical Care Diagnoses: RLE arterial occlusion and occlusion of  bypass graft

## 2019-02-15 NOTE — CONSULT NOTE ADULT - SUBJECTIVE AND OBJECTIVE BOX
SICU Consultation Note/AM note  =====================================================  HPI: 78M with prior history of B/L lower extremity bypasses (pt does not remember details, imaging shows B/L fem-pop bypass grafts) done in 1980s presented yesterday 2/14/19 with LLE pain and mild sensory and motor loss since 3:15pm. He got a massage prior to the start of symptoms and then noticed that his foot felt cold and he had pain in his calf. He also felt that he could not move his toes very well. He has no complaints in his RLE. He otherwise is ambulatory at home and denies any symptoms of claudication. He has HTN and HLD at home. He denies any history of atrial fibrillation. He is a current pack a day smoker and has done so for the past 25 years. Pt found with occluded superficial femoral artery and occluded bypass graft (fem-pop graft) on arterial duplex.     A right femoral-popliteal graft is patent with two   vessel runoff to the level of the foot. The right proximal anterior tibial   artery is occluded.     Left lower extremity: A left femoral popliteal graft is occluded. No flow is   visualized in the anterior tibial artery, posterior tibial artery and   peroneal artery on arterial phase imaging and Trickle flow is visualized on   delayed images predominantly within the anterior tibial and peroneal   arteries.         Surgery Information  OR time:      EBL:       UOP:              IV Fluids:       Blood Products:             PAST MEDICAL & SURGICAL HISTORY:  HTN (hypertension)  No significant past surgical history    Home Meds:   Allergies:   Soc:   Advanced Directives: Presumed Full Code     ROS:    General: Non-Contributory  Skin/Breast: Non-Contributory  Ophthalmologic: Non-Contributory  ENMT: Non-Contributory  Respiratory and Thorax: Non-Contributory  Cardiovascular: Non-Contributory  Gastrointestinal: Non-Contributory  Genitourinary: Non-Contributory  Musculoskeletal: Non-Contributory  Neurological: Non-Contributory  Psychiatric: Non-Contributory  Hematology/Lymphatics: Non-Contributory  Endocrine: Non-Contributory  Allergic/Immunologic: Non-Contributory    CURRENT MEDICATIONS:   --------------------------------------------------------------------------------------  Neurologic Medications    Respiratory Medications    Cardiovascular Medications  metoprolol tartrate 50 milliGRAM(s) Oral daily  tamsulosin 0.4 milliGRAM(s) Oral at bedtime    Gastrointestinal Medications  sodium chloride 0.9%. 1000 milliLiter(s) IV Continuous <Continuous>    Genitourinary Medications    Hematologic/Oncologic Medications  alteplase    Infusion 1 mG/Hr IV Continuous <Continuous>  influenza   Vaccine 0.5 milliLiter(s) IntraMuscular once    Antimicrobial/Immunologic Medications    Endocrine/Metabolic Medications    Topical/Other Medications  chlorhexidine 4% Liquid 1 Application(s) Topical <User Schedule>    --------------------------------------------------------------------------------------    VITAL SIGNS, INS/OUTS (last 24 hours):  --------------------------------------------------------------------------------------  ((Insert SICU Vitals / Is+Os here)) ***  --------------------------------------------------------------------------------------    EXAM:  General/Neuro  RASS:   GCS:   Exam: Normal, NAD, alert, oriented x 3, no focal deficits. KARLIE  ***    Respiratory  Exam: Lungs clear to auscultation, Normal expansion/effort.  ***  [] Tracheostomy   [] Intubated  Mechanical Ventilation:     Cardiovascular  Exam: S1, S2.  Regular rate and rhythm.  Peripheral edema  ***  Cardiac Rhythm: Normal Sinus Rhythm  ECHO:     GI  Exam: Abdomen soft, Non-tender, Non-distended.  Gastrostomy / Jejunostomy tube in place.  Nasogastric tube in place.  Colostomy / Ileostomy.  ***  Wound:   ***  Current Diet:  NPO***      Tubes/Lines/Drains  ***  [x] Peripheral IV  [] Central Venous Line     	[] R	[] L	[] IJ	[] Fem	[] SC        Type:	    Date Placed:   [] Arterial Line		[] R	[] L	[] Fem	[] Rad	[] Ax	Date Placed:   [] PICC:         	[] Midline		[] Mediport           [] Urinary Catheter		Date Placed:     Extremities  Exam: Extremities warm, pink, well-perfused.        Derm:  Exam: Good skin turgor, no skin breakdown.      :   Exam: Castro catheter in place.     LABS  --------------------------------------------------------------------------------------  ((Insert SICU Labs here))***  --------------------------------------------------------------------------------------    OTHER LABS    IMAGING RESULTS      ASSESSMENT:  78y Male ***    PLAN:   Neurologic:   Respiratory:   Cardiovascular:   Gastrointestinal/Nutrition:   Renal/Genitourinary:   Hematologic:   Infectious Disease:   Lines/Tubes:  Endocrine:   Disposition:     --------------------------------------------------------------------------------------    Critical Care Diagnoses: SICU Consultation Note/AM note  =====================================================  HPI: 78M with prior history of B/L lower extremity bypasses (pt does not remember details, imaging shows B/L fem-pop bypass grafts) done in 1980s presented yesterday 2/14/19 with LLE pain and mild sensory and motor loss since 3:15pm. He got a massage prior to the start of symptoms and then noticed that his foot felt cold and he had pain in his calf. He also felt that he could not move his toes very well. He has no complaints in his RLE. He otherwise is ambulatory at home and denies any symptoms of claudication. He has HTN and HLD at home. He denies any history of atrial fibrillation. He is a current pack a day smoker and has done so for the past 25 years. Pt found with occluded superficial femoral artery and occluded bypass graft (fem-pop graft) on arterial duplex. CT LE with run off shows patent fem-pop graft, right proximal ant-tib artery occlusion; left fem-pop graft occluded, no flow in ant-tib, post-tib, and peroneal arteries. Pt planned to go to OR for thrombolysis of LLE fem-pop graft but, instead underwent thrombectomized old PTFE fem-pop graft and then interposition bypass from common femoral to profunda with a 5 [place] sheath left in the right femoral artery. Pt received heparin bolus prior to case starting. SICU consulted for Q1 vascular checks.    Surgery Information    EBL: 800cc       UOP:  400mL            IV Fluids: 2.4 L       Blood Products:      None      PAST MEDICAL & SURGICAL HISTORY:  HTN (hypertension)  No significant past surgical history    Home Meds:   Allergies:   Soc:   Advanced Directives: Presumed Full Code     ROS:    General: Non-Contributory  Skin/Breast: Non-Contributory  Ophthalmologic: Non-Contributory  ENMT: Non-Contributory  Respiratory and Thorax: Non-Contributory  Cardiovascular: Non-Contributory  Gastrointestinal: Non-Contributory  Genitourinary: Non-Contributory  Musculoskeletal: Non-Contributory  Neurological: Non-Contributory  Psychiatric: Non-Contributory  Hematology/Lymphatics: Non-Contributory  Endocrine: Non-Contributory  Allergic/Immunologic: Non-Contributory    CURRENT MEDICATIONS:   --------------------------------------------------------------------------------------  Neurologic Medications    Respiratory Medications    Cardiovascular Medications  metoprolol tartrate 50 milliGRAM(s) Oral daily  tamsulosin 0.4 milliGRAM(s) Oral at bedtime    Gastrointestinal Medications  sodium chloride 0.9%. 1000 milliLiter(s) IV Continuous <Continuous>    Genitourinary Medications    Hematologic/Oncologic Medications  alteplase    Infusion 1 mG/Hr IV Continuous <Continuous>  influenza   Vaccine 0.5 milliLiter(s) IntraMuscular once    Antimicrobial/Immunologic Medications    Endocrine/Metabolic Medications    Topical/Other Medications  chlorhexidine 4% Liquid 1 Application(s) Topical <User Schedule>    --------------------------------------------------------------------------------------    VITAL SIGNS, INS/OUTS (last 24 hours):  --------------------------------------------------------------------------------------  ((Insert SICU Vitals / Is+Os here)) ***  --------------------------------------------------------------------------------------    EXAM:  General/Neuro  RASS:   GCS:   Exam: Normal, NAD, alert, oriented x 3, no focal deficits. PERRLA  ***    Respiratory  Exam: Lungs clear to auscultation, Normal expansion/effort.  ***  [] Tracheostomy   [] Intubated  Mechanical Ventilation:     Cardiovascular  Exam: S1, S2.  Regular rate and rhythm.  Peripheral edema  ***  Cardiac Rhythm: Normal Sinus Rhythm  ECHO:     GI  Exam: Abdomen soft, Non-tender, Non-distended.  Gastrostomy / Jejunostomy tube in place.  Nasogastric tube in place.  Colostomy / Ileostomy.  ***  Wound:   ***  Current Diet:  NPO***      Tubes/Lines/Drains  ***  [x] Peripheral IV  [] Central Venous Line     	[] R	[] L	[] IJ	[] Fem	[] SC        Type:	    Date Placed:   [] Arterial Line		[] R	[] L	[] Fem	[] Rad	[] Ax	Date Placed:   [] PICC:         	[] Midline		[] Mediport           [] Urinary Catheter		Date Placed:     Extremities  Exam: Extremities warm, pink, well-perfused.        Derm:  Exam: Good skin turgor, no skin breakdown.      :   Exam: Castro catheter in place.     LABS  --------------------------------------------------------------------------------------  ((Insert SICU Labs here))***  --------------------------------------------------------------------------------------    OTHER LABS    IMAGING RESULTS      ASSESSMENT:  78y Male ***    PLAN:   Neurologic:   Respiratory:   Cardiovascular:   Gastrointestinal/Nutrition:   Renal/Genitourinary:   Hematologic:   Infectious Disease:   Lines/Tubes:  Endocrine:   Disposition:     --------------------------------------------------------------------------------------    Critical Care Diagnoses: SICU Consultation Note/AM note  =====================================================  HPI: 78M with prior history of B/L lower extremity bypasses (pt does not remember details, imaging shows B/L fem-pop bypass grafts) done in 1980s presented yesterday 2/14/19 with LLE pain and mild sensory and motor loss since 3:15pm. He got a massage prior to the start of symptoms and then noticed that his foot felt cold and he had pain in his calf. He also felt that he could not move his toes very well. He has no complaints in his RLE. He otherwise is ambulatory at home and denies any symptoms of claudication. He has HTN and HLD at home. He denies any history of atrial fibrillation. He is a current pack a day smoker and has done so for the past 25 years. Pt found with occluded superficial femoral artery and occluded bypass graft (fem-pop graft) on arterial duplex. CT LE with run off shows patent fem-pop graft, right proximal ant-tib artery occlusion; left fem-pop graft occluded, no flow in ant-tib, post-tib, and peroneal arteries. Pt planned to go to OR for thrombolysis of LLE fem-pop graft but, instead underwent thrombectomized old PTFE fem-pop graft and then interposition bypass from common femoral to profunda with a 5 [place] sheath left in the right femoral artery. Pt received heparin bolus prior to case starting. SICU consulted for Q1 vascular checks.    Surgery Information    EBL: 800cc       UOP:  400mL            IV Fluids: 2.4 L       Blood Products:      None      PAST MEDICAL & SURGICAL HISTORY:  HTN (hypertension)  No significant past surgical history    Home Meds:   Allergies:   Soc:   Advanced Directives: Presumed Full Code     Pt sedated, unable to answer ROS questions. Pertinent hx was received from chart    CURRENT MEDICATIONS:   --------------------------------------------------------------------------------------  Neurologic Medications    Respiratory Medications    Cardiovascular Medications  metoprolol tartrate 50 milliGRAM(s) Oral daily  tamsulosin 0.4 milliGRAM(s) Oral at bedtime    Gastrointestinal Medications  sodium chloride 0.9%. 1000 milliLiter(s) IV Continuous <Continuous>    Genitourinary Medications    Hematologic/Oncologic Medications  alteplase    Infusion 1 mG/Hr IV Continuous <Continuous>  influenza   Vaccine 0.5 milliLiter(s) IntraMuscular once    Antimicrobial/Immunologic Medications    Endocrine/Metabolic Medications    Topical/Other Medications  chlorhexidine 4% Liquid 1 Application(s) Topical <User Schedule>    --------------------------------------------------------------------------------------  ICU Vital Signs Last 24 Hrs  T(C): 36.7 (15 Feb 2019 04:15), Max: 37.1 (14 Feb 2019 17:37)  T(F): 98.1 (15 Feb 2019 04:15), Max: 98.7 (14 Feb 2019 17:37)  HR: 58 (15 Feb 2019 05:00) (58 - 92)  BP: 114/57 (15 Feb 2019 04:45) (114/57 - 192/77)  BP(mean): 70 (15 Feb 2019 04:45) (70 - 77)  ABP: 113/52 (15 Feb 2019 05:00) (113/52 - 136/57)  ABP(mean): 73 (15 Feb 2019 05:00) (73 - 85)  RR: 11 (15 Feb 2019 05:00) (10 - 19)  SpO2: 100% (15 Feb 2019 05:00) (96% - 100%)    --------------------------------------------------------------------------------------  I&O's Detail    14 Feb 2019 07:01  -  15 Feb 2019 05:21  --------------------------------------------------------  IN:    sodium chloride 0.9%.: 375 mL  Total IN: 375 mL    OUT:    Indwelling Catheter - Urethral: 275 mL  Total OUT: 275 mL    Total NET: 100 mL    --------------------------------------------------------------------------------------    EXAM:  General/Neuro  Exam: Sedated    Respiratory  Exam: Lungs clear to auscultation, Normal expansion/effort.      Cardiovascular  Exam: S1, S2.  Regular rate and rhythm. Cardiac Rhythm: Normal Sinus Rhythm    GI  Exam: Abdomen soft, Non-tender, Non-distended. Current Diet:  NPO    Tubes/Lines/Drains  Right radial A line, kimbrough  [x] Peripheral IV    Extremities  Exam: palpable left popliteal artery, palpable/+doppler signal left DP pulse, +doppler signal left peroneal artery. Dusky toes noted on left. Left foot cool to touch, reduced cap refill. Left groin with moderately saturated dressing (serosanguinous) and right groin with 5 [place] sheath still in place     :   Exam: Kimbrough catheter in place.     LABS  --------------------------------------------------------------------------------------  CBC (02-14 @ 21:36)                              13.6                           7.73    )----------------(  163        83.4<H>% Neutrophils, 11.5<L>% Lymphocytes, ANC: 6.45                                40.9    CBC (02-14 @ 18:14)                              11.7<L>                         7.51    )----------------(  146<L>     76.0  % Neutrophils, 14.2  % Lymphocytes, ANC: 5.70                                34.9<L>    BMP (02-14 @ 21:36)             137     |  100     |  21    		Ca++ --      Ca 9.4                ---------------------------------( 124<H>		Mg --                 4.4     |  22      |  1.21  			Ph --      BMP (02-14 @ 18:14)             140     |  105     |  24<H> 		Ca++ --      Ca 8.9                ---------------------------------( 134<H>		Mg --                 3.8     |  24      |  1.37<H>			Ph --        LFTs (02-14 @ 21:36)      TPro 7.3 / Alb 4.6 / TBili 0.5 / DBili -- / AST 29 / ALT 20 / AlkPhos 71  LFTs (02-14 @ 18:14)      TPro 7.8 / Alb 4.0 / TBili 0.6 / DBili -- / AST 28 / ALT 23 / AlkPhos 72    Coags (02-14 @ 21:36)  aPTT > 200.0<HH> / INR 1.27<H> / PT 14.2<H>  Coags (02-14 @ 18:14)  aPTT 36.3 / INR 1.22<H> / PT 13.7<H>      ABG (02-15 @ 02:41)     7.33<L> / 47 / 209<H> / 23 / -1.6 / 99.0%     Lactate:    ABG (02-14 @ 18:14)      /  /  /  /  / %     Lactate:  1.2      --------------------------------------------------------------------------------------    CXR pending    ASSESSMENT:  78y Male ***    PLAN:   Neurologic:   Respiratory:   Cardiovascular:   Gastrointestinal/Nutrition:   Renal/Genitourinary:   Hematologic:   Infectious Disease:   Lines/Tubes:  Endocrine:   Disposition:     --------------------------------------------------------------------------------------    Critical Care Diagnoses: SICU Consultation Note/AM note  =====================================================  HPI: 78M with prior history of B/L lower extremity bypasses (pt does not remember details, imaging shows B/L fem-pop bypass grafts) done in 1980s presented yesterday 2/14/19 with LLE pain and mild sensory and motor loss since 3:15pm. He got a massage prior to the start of symptoms and then noticed that his foot felt cold and he had pain in his calf. He also felt that he could not move his toes very well. He has no complaints in his RLE. He otherwise is ambulatory at home and denies any symptoms of claudication. He has HTN and HLD at home. He denies any history of atrial fibrillation. He is a current pack a day smoker and has done so for the past 25 years. Pt found with occluded superficial femoral artery and occluded bypass graft (fem-pop graft) on arterial duplex. CT LE with run off shows patent fem-pop graft, right proximal ant-tib artery occlusion; left fem-pop graft occluded, no flow in ant-tib, post-tib, and peroneal arteries. Pt planned to go to OR for thrombolysis of LLE fem-pop graft but, instead underwent thrombectomized old PTFE fem-pop graft and then interposition bypass from common femoral to profunda with a 5 french sheath left in the right femoral artery. Pt received heparin bolus prior to case starting. SICU consulted for Q1 vascular checks.    Surgery Information    EBL: 800cc       UOP:  400mL            IV Fluids: 2.4 L       Blood Products:      None      PAST MEDICAL & SURGICAL HISTORY:  HTN (hypertension)  No significant past surgical history    Home Meds:   Allergies:   Soc:   Advanced Directives: Presumed Full Code     Pt sedated, unable to answer ROS questions. Pertinent hx was received from chart    CURRENT MEDICATIONS:   --------------------------------------------------------------------------------------  Neurologic Medications    Respiratory Medications    Cardiovascular Medications  metoprolol tartrate 50 milliGRAM(s) Oral daily  tamsulosin 0.4 milliGRAM(s) Oral at bedtime    Gastrointestinal Medications  sodium chloride 0.9%. 1000 milliLiter(s) IV Continuous <Continuous>    Genitourinary Medications    Hematologic/Oncologic Medications  alteplase    Infusion 1 mG/Hr IV Continuous <Continuous>  influenza   Vaccine 0.5 milliLiter(s) IntraMuscular once    Antimicrobial/Immunologic Medications    Endocrine/Metabolic Medications    Topical/Other Medications  chlorhexidine 4% Liquid 1 Application(s) Topical <User Schedule>    --------------------------------------------------------------------------------------  ICU Vital Signs Last 24 Hrs  T(C): 36.7 (15 Feb 2019 04:15), Max: 37.1 (14 Feb 2019 17:37)  T(F): 98.1 (15 Feb 2019 04:15), Max: 98.7 (14 Feb 2019 17:37)  HR: 58 (15 Feb 2019 05:00) (58 - 92)  BP: 114/57 (15 Feb 2019 04:45) (114/57 - 192/77)  BP(mean): 70 (15 Feb 2019 04:45) (70 - 77)  ABP: 113/52 (15 Feb 2019 05:00) (113/52 - 136/57)  ABP(mean): 73 (15 Feb 2019 05:00) (73 - 85)  RR: 11 (15 Feb 2019 05:00) (10 - 19)  SpO2: 100% (15 Feb 2019 05:00) (96% - 100%)    --------------------------------------------------------------------------------------  I&O's Detail    14 Feb 2019 07:01  -  15 Feb 2019 05:21  --------------------------------------------------------  IN:    sodium chloride 0.9%.: 375 mL  Total IN: 375 mL    OUT:    Indwelling Catheter - Urethral: 275 mL  Total OUT: 275 mL    Total NET: 100 mL    --------------------------------------------------------------------------------------    EXAM:  General/Neuro  Exam: Sedated    Respiratory  Exam: Lungs clear to auscultation, Normal expansion/effort.      Cardiovascular  Exam: S1, S2.  Regular rate and rhythm. Cardiac Rhythm: Normal Sinus Rhythm    GI  Exam: Abdomen soft, Non-tender, Non-distended. Current Diet:  NPO    Tubes/Lines/Drains  Right radial A line, kimbrough  [x] Peripheral IV    Extremities  Exam: palpable left popliteal artery, palpable/+doppler signal left DP pulse, +doppler signal left peroneal artery. Dusky toes noted on left. Left foot cool to touch, reduced cap refill. Left groin with moderately saturated dressing (serosanguinous) and right groin with 5 french sheath still in place     :   Exam: Kimbrough catheter in place.     LABS  --------------------------------------------------------------------------------------  CBC (02-14 @ 21:36)                              13.6                           7.73    )----------------(  163        83.4<H>% Neutrophils, 11.5<L>% Lymphocytes, ANC: 6.45                                40.9    CBC (02-14 @ 18:14)                              11.7<L>                         7.51    )----------------(  146<L>     76.0  % Neutrophils, 14.2  % Lymphocytes, ANC: 5.70                                34.9<L>    BMP (02-14 @ 21:36)             137     |  100     |  21    		Ca++ --      Ca 9.4                ---------------------------------( 124<H>		Mg --                 4.4     |  22      |  1.21  			Ph --      BMP (02-14 @ 18:14)             140     |  105     |  24<H> 		Ca++ --      Ca 8.9                ---------------------------------( 134<H>		Mg --                 3.8     |  24      |  1.37<H>			Ph --        LFTs (02-14 @ 21:36)      TPro 7.3 / Alb 4.6 / TBili 0.5 / DBili -- / AST 29 / ALT 20 / AlkPhos 71  LFTs (02-14 @ 18:14)      TPro 7.8 / Alb 4.0 / TBili 0.6 / DBili -- / AST 28 / ALT 23 / AlkPhos 72    Coags (02-14 @ 21:36)  aPTT > 200.0<HH> / INR 1.27<H> / PT 14.2<H>  Coags (02-14 @ 18:14)  aPTT 36.3 / INR 1.22<H> / PT 13.7<H>      ABG (02-15 @ 02:41)     7.33<L> / 47 / 209<H> / 23 / -1.6 / 99.0%     Lactate:    ABG (02-14 @ 18:14)      /  /  /  /  / %     Lactate:  1.2      --------------------------------------------------------------------------------------    CXR pending    ASSESSMENT:  78M with prior history of B/L lower extremity bypasses (pt does not remember details, imaging shows B/L fem-pop bypass grafts) done in 1980s presented yesterday 2/14/19 with LLE pain and mild sensory and motor loss since 3:15pm. He got a massage prior to the start of symptoms and then noticed that his foot felt cold and he had pain in his calf. He also felt that he could not move his toes very well. He has no complaints in his RLE. He otherwise is ambulatory at home and denies any symptoms of claudication. He has HTN and HLD at home. He denies any history of atrial fibrillation. He is a current pack a day smoker and has done so for the past 25 years. Pt found with occluded superficial femoral artery and occluded bypass graft (fem-pop graft) on arterial duplex. CT LE with run off shows patent fem-pop graft, right proximal ant-tib artery occlusion; left fem-pop graft occluded, no flow in ant-tib, post-tib, and peroneal arteries. Pt planned to go to OR for thrombolysis of LLE fem-pop graft but, instead underwent thrombectomized old PTFE fem-pop graft and then interposition bypass from common femoral to profunda with a 5 french sheath left in the right femoral artery. Pt received heparin bolus prior to case starting. SICU consulted for Q1 vascular checks.      PLAN:   Vascular to see pt in AM to remove sheath in right femoral artery  Neurologic: Sedated postop, oxycodone 5/10 for pain mgmt  Respiratory: JAVY, IS, OOB as tolerated, oxygen supplementation as needed  Cardiovascular: restarted metoprolol 50 QD, otherwise JAVY  Gastrointestinal/Nutrition: Start clears later this morning  Renal/Genitourinary: NS at 125cc/hr. Can d/c'ed once tolerating diet. Replete electrolytes PRN, Restarted home flomax 0.4mg at bedtime  Hematologic: F/U post op labs, transfuse if <7, START HEPARIN GTT @ 500units & SLOWLY TITRATE UP ONLY AFTER SHEATH IS REMOVED LATER THIS MORNING BY VASCULAR  Infectious Disease: JAVY, will monitor CBC  Lines/Tubes: Right radial A line, kimbrough  Endocrine: JAVY  Disposition: SICU    --------------------------------------------------------------------------------------    Critical Care Diagnoses: RLE arterial occlusion and occlusion of  bypass graft SICU Consultation Note/AM note  =====================================================  HPI: 78M with prior history of B/L lower extremity bypasses (pt does not remember details, imaging shows B/L fem-pop bypass grafts) done in 1980s presented yesterday 2/14/19 with LLE pain and mild sensory and motor loss since 3:15pm. He got a massage prior to the start of symptoms and then noticed that his foot felt cold and he had pain in his calf. He also felt that he could not move his toes very well. He has no complaints in his RLE. He otherwise is ambulatory at home and denies any symptoms of claudication. He has HTN and HLD at home. He denies any history of atrial fibrillation. He is a current pack a day smoker and has done so for the past 25 years. Pt found with occluded superficial femoral artery and occluded bypass graft (fem-pop graft) on arterial duplex. CT LE with run off shows patent fem-pop graft, right proximal ant-tib artery occlusion; left fem-pop graft occluded, no flow in ant-tib, post-tib, and peroneal arteries. Pt planned to go to OR for thrombolysis of LLE fem-pop graft but, instead underwent thrombectomized old PTFE fem-pop graft and then interposition bypass from common femoral to profunda with a 5 [place] sheath left in the right femoral artery. Pt received heparin bolus prior to case starting. SICU consulted for Q1 vascular checks.    Surgery Information    EBL: 800cc       UOP:  400mL            IV Fluids: 2.4 L       Blood Products:      None      PAST MEDICAL & SURGICAL HISTORY:  HTN (hypertension)  No significant past surgical history    Home Meds:   Allergies:   Soc:   Advanced Directives: Presumed Full Code     Pt sedated, unable to answer ROS questions. Pertinent hx was received from chart    CURRENT MEDICATIONS:   --------------------------------------------------------------------------------------  Neurologic Medications    Respiratory Medications    Cardiovascular Medications  metoprolol tartrate 50 milliGRAM(s) Oral daily  tamsulosin 0.4 milliGRAM(s) Oral at bedtime    Gastrointestinal Medications  sodium chloride 0.9%. 1000 milliLiter(s) IV Continuous <Continuous>    Genitourinary Medications    Hematologic/Oncologic Medications  alteplase    Infusion 1 mG/Hr IV Continuous <Continuous>  influenza   Vaccine 0.5 milliLiter(s) IntraMuscular once    Antimicrobial/Immunologic Medications    Endocrine/Metabolic Medications    Topical/Other Medications  chlorhexidine 4% Liquid 1 Application(s) Topical <User Schedule>    --------------------------------------------------------------------------------------  ICU Vital Signs Last 24 Hrs  T(C): 36.7 (15 Feb 2019 04:15), Max: 37.1 (14 Feb 2019 17:37)  T(F): 98.1 (15 Feb 2019 04:15), Max: 98.7 (14 Feb 2019 17:37)  HR: 58 (15 Feb 2019 05:00) (58 - 92)  BP: 114/57 (15 Feb 2019 04:45) (114/57 - 192/77)  BP(mean): 70 (15 Feb 2019 04:45) (70 - 77)  ABP: 113/52 (15 Feb 2019 05:00) (113/52 - 136/57)  ABP(mean): 73 (15 Feb 2019 05:00) (73 - 85)  RR: 11 (15 Feb 2019 05:00) (10 - 19)  SpO2: 100% (15 Feb 2019 05:00) (96% - 100%)    --------------------------------------------------------------------------------------  I&O's Detail    14 Feb 2019 07:01  -  15 Feb 2019 05:21  --------------------------------------------------------  IN:    sodium chloride 0.9%.: 375 mL  Total IN: 375 mL    OUT:    Indwelling Catheter - Urethral: 275 mL  Total OUT: 275 mL    Total NET: 100 mL    --------------------------------------------------------------------------------------    EXAM:  General/Neuro  Exam: Sedated    Respiratory  Exam: Lungs clear to auscultation, Normal expansion/effort.      Cardiovascular  Exam: S1, S2.  Regular rate and rhythm. Cardiac Rhythm: Normal Sinus Rhythm    GI  Exam: Abdomen soft, Non-tender, Non-distended. Current Diet:  NPO    Tubes/Lines/Drains  Right radial A line, kimbrough  [x] Peripheral IV    Extremities  Exam: palpable left popliteal artery, palpable/+doppler signal left DP pulse, +doppler signal left peroneal artery. Dusky toes noted on left. Left foot cool to touch, reduced cap refill. Left groin with moderately saturated dressing (serosanguinous) and right groin with 5 [place] sheath still in place     :   Exam: Kimbrough catheter in place.     LABS  --------------------------------------------------------------------------------------  CBC (02-14 @ 21:36)                              13.6                           7.73    )----------------(  163        83.4<H>% Neutrophils, 11.5<L>% Lymphocytes, ANC: 6.45                                40.9    CBC (02-14 @ 18:14)                              11.7<L>                         7.51    )----------------(  146<L>     76.0  % Neutrophils, 14.2  % Lymphocytes, ANC: 5.70                                34.9<L>    BMP (02-14 @ 21:36)             137     |  100     |  21    		Ca++ --      Ca 9.4                ---------------------------------( 124<H>		Mg --                 4.4     |  22      |  1.21  			Ph --      BMP (02-14 @ 18:14)             140     |  105     |  24<H> 		Ca++ --      Ca 8.9                ---------------------------------( 134<H>		Mg --                 3.8     |  24      |  1.37<H>			Ph --        LFTs (02-14 @ 21:36)      TPro 7.3 / Alb 4.6 / TBili 0.5 / DBili -- / AST 29 / ALT 20 / AlkPhos 71  LFTs (02-14 @ 18:14)      TPro 7.8 / Alb 4.0 / TBili 0.6 / DBili -- / AST 28 / ALT 23 / AlkPhos 72    Coags (02-14 @ 21:36)  aPTT > 200.0<HH> / INR 1.27<H> / PT 14.2<H>  Coags (02-14 @ 18:14)  aPTT 36.3 / INR 1.22<H> / PT 13.7<H>      ABG (02-15 @ 02:41)     7.33<L> / 47 / 209<H> / 23 / -1.6 / 99.0%     Lactate:    ABG (02-14 @ 18:14)      /  /  /  /  / %     Lactate:  1.2      --------------------------------------------------------------------------------------    CXR pending

## 2019-02-15 NOTE — PROGRESS NOTE ADULT - SUBJECTIVE AND OBJECTIVE BOX
Surgery Progress Note      Subjective: Patient seen and examined. Pt was found with occluded superficial femoral artery and occluded bypass graft (fem-pop graft) on arterial duplex. CT LE with run off shows patent fem-pop graft, right proximal ant-tib artery occlusion; left fem-pop graft occluded, no flow in ant-tib, post-tib, and peroneal arteries. Pt planned to go to OR for thrombolysis of LLE fem-pop graft but, instead underwent thrombectomized old PTFE fem-pop graft and then interposition bypass from common femoral to profunda with a 5 [place] sheath left in the right femoral artery. Pt received heparin bolus prior to case starting.    T(C): 36.7 (02-15-19 @ 04:15), Max: 37.1 (02-14-19 @ 17:37)  HR: 58 (02-15-19 @ 05:00) (58 - 92)  BP: 114/57 (02-15-19 @ 04:45) (114/57 - 192/77)  RR: 11 (02-15-19 @ 05:00) (10 - 19)  SpO2: 100% (02-15-19 @ 05:00) (96% - 100%)      02-14-19 @ 07:01  -  02-15-19 @ 05:58  --------------------------------------------------------  IN: 375 mL / OUT: 275 mL / NET: 100 mL        Physical Exam:   palpable left popliteal artery, palpable/+doppler signal left DP pulse, +doppler signal left peroneal artery. Dusky toes noted on left. Left foot cool to touch, reduced cap refill. Left groin with moderately saturated dressing (serosanguinous) and right groin with 5 [place]ian sheath still in place     Labs:                          13.6   7.73  )-----------( 163      ( 14 Feb 2019 21:36 )             40.9     02-14    137  |  100  |  21  ----------------------------<  124<H>  4.4   |  22  |  1.21    Ca    9.4      14 Feb 2019 21:36    TPro  7.3  /  Alb  4.6  /  TBili  0.5  /  DBili  x   /  AST  29  /  ALT  20  /  AlkPhos  71  02-14      Medications:     alteplase    Infusion 1 mG/Hr IV Continuous <Continuous>  chlorhexidine 4% Liquid 1 Application(s) Topical <User Schedule>  influenza   Vaccine 0.5 milliLiter(s) IntraMuscular once  metoprolol tartrate 50 milliGRAM(s) Oral daily  oxyCODONE    IR 5 milliGRAM(s) Oral every 6 hours PRN  oxyCODONE    IR 10 milliGRAM(s) Oral every 6 hours PRN  sodium chloride 0.9%. 1000 milliLiter(s) IV Continuous <Continuous>  tamsulosin 0.4 milliGRAM(s) Oral at bedtime      Radiographs: No new imaging

## 2019-02-15 NOTE — PROGRESS NOTE ADULT - ASSESSMENT
ASSESSMENT:  78M with prior history of B/L lower extremity bypasses (pt does not remember details, imaging shows B/L fem-pop bypass grafts) done in 1980s presented yesterday 2/14/19 with LLE pain and mild sensory and motor loss since 3:15pm. He got a massage prior to the start of symptoms and then noticed that his foot felt cold and he had pain in his calf. He also felt that he could not move his toes very well. He has no complaints in his RLE. He otherwise is ambulatory at home and denies any symptoms of claudication. He has HTN and HLD at home. He denies any history of atrial fibrillation. He is a current pack a day smoker and has done so for the past 25 years. Pt found with occluded superficial femoral artery and occluded bypass graft (fem-pop graft) on arterial duplex. CT LE with run off shows patent fem-pop graft, right proximal ant-tib artery occlusion; left fem-pop graft occluded, no flow in ant-tib, post-tib, and peroneal arteries. Pt planned to go to OR for thrombolysis of LLE fem-pop graft but, instead underwent thrombectomized old PTFE fem-pop graft and then interposition bypass from common femoral to profunda with a 5 french sheath left in the right femoral artery. Pt received heparin bolus prior to case starting. SICU consulted for Q1 vascular checks.      PLAN:       Neurologic: Sedated postop, oxycodone 5/10 for pain mgmt    Respiratory: JAVY, IS, OOB as tolerated, oxygen supplementation as needed    Cardiovascular: holding metoprolol 50 QD (home med)  - bradycardic, + T wave inversions on lateral leads, unknown baseline hx   - trending Abigail  - echo pending     Gastrointestinal/Nutrition: Start clears later this morning    Renal/Genitourinary: NS at 125cc/hr. Can d/c'ed once tolerating diet. Replete electrolytes PRN, Restarted home flomax 0.4mg at bedtime  - kimbrough in place, will d/c     Hematologic: F/U post op labs, transfuse if <7,    - HEPARIN GTT @ 500units; PTT 45   - watch out for compartment syndrome, q1 vascular checks   - SCDs on R leg OK   - downtrending H/H, will repeat CBC    Infectious Disease: JAVY, will monitor CBC  - afebrile     Lines/Tubes: Right radial A line, kimbrough    Endocrine: JAVY    Disposition: SICU    --------------------------------------------------------------------------------------    Critical Care Diagnoses: RLE arterial occlusion and occlusion of  bypass graft

## 2019-02-15 NOTE — PROGRESS NOTE ADULT - ASSESSMENT
78M s/p thrombectomized old PTFE fem-pop graft and then interposition bypass from common femoral to profunda with a 5 [place]ian sheath left in the right femoral artery for RLE arterial occlusion and occlusion of  bypass graft    - remove sheath from R femoral  - pain control  - appreciate excellent SICU care

## 2019-02-15 NOTE — PROGRESS NOTE ADULT - SUBJECTIVE AND OBJECTIVE BOX
HPI: 78M with prior history of B/L lower extremity bypasses (pt does not remember details, imaging shows B/L fem-pop bypass grafts) done in 1980s presented yesterday 2/14/19 with LLE pain and mild sensory and motor loss since 3:15pm. He got a massage prior to the start of symptoms and then noticed that his foot felt cold and he had pain in his calf. He also felt that he could not move his toes very well. He has no complaints in his RLE. He otherwise is ambulatory at home and denies any symptoms of claudication. He has HTN and HLD at home. He denies any history of atrial fibrillation. He is a current pack a day smoker and has done so for the past 25 years. Pt found with occluded superficial femoral artery and occluded bypass graft (fem-pop graft) on arterial duplex. CT LE with run off shows patent fem-pop graft, right proximal ant-tib artery occlusion; left fem-pop graft occluded, no flow in ant-tib, post-tib, and peroneal arteries. Pt planned to go to OR for thrombolysis of LLE fem-pop graft but, instead underwent thrombectomized old PTFE fem-pop graft and then interposition bypass from common femoral to profunda with a 5 Citizen of [place] sheath left in the right femoral artery. Pt received heparin bolus prior to case starting. SICU consulted for Q1 vascular checks.    SUBJECTIVE/ROS:  [x] A ten-point review of systems was otherwise negative except as noted.  [ ] Due to altered mental status/intubation, subjective information were not able to be obtained from the patient. History was obtained, to the extent possible, from review of the chart and collateral sources of information.    NEURO  RASS:     GCS:     CAM ICU:  Exam: alert & oriented  Meds: oxyCODONE    IR 5 milliGRAM(s) Oral every 6 hours PRN Moderate Pain (4 - 6)  oxyCODONE    IR 10 milliGRAM(s) Oral every 6 hours PRN Severe Pain (7 - 10)    [x] Adequacy of sedation and pain control has been assessed and adjusted      RESPIRATORY  RR: 23 (02-15-19 @ 13:00) (10 - 23)  SpO2: 100% (02-15-19 @ 13:00) (96% - 100%)  Wt(kg): --  Exam: unlabored, clear to auscultation bilaterally  Mechanical Ventilation:   ABG - ( 15 Feb 2019 02:41 )  pH: 7.33  /  pCO2: 47    /  pO2: 209   / HCO3: 23    / Base Excess: -1.6  /  SaO2: 99.0    Lactate: x                [ ] Extubation Readiness Assessed  Meds:       CARDIOVASCULAR  HR: 74 (02-15-19 @ 13:00) (44 - 92)  BP: 114/57 (02-15-19 @ 04:45) (114/57 - 192/77)  BP(mean): 70 (02-15-19 @ 04:45) (70 - 77)  ABP: 119/45 (02-15-19 @ 13:00) (111/48 - 156/63)  ABP(mean): 69 (02-15-19 @ 13:00) (68 - 96)  Wt(kg): --  CVP(cm H2O): --    Lactate, Blood: 1.2 mmol/L (02-14 @ 18:14)    Exam:  Cardiac Rhythm:  Perfusion     [ ]Adequate   [ ]Inadequate  Mentation   [ ]Normal       [ ]Reduced  Extremities  [ ]Warm         [ ]Cool  Volume Status [ ]Hypervolemic [ ]Euvolemic [ ]Hypovolemic  Meds: tamsulosin 0.4 milliGRAM(s) Oral at bedtime        GI/NUTRITION  Exam:  Diet:  Meds:     GENITOURINARY  I&O's Detail    02-14 @ 07:01  -  02-15 @ 07:00  --------------------------------------------------------  IN:    sodium chloride 0.9%.: 500 mL  Total IN: 500 mL    OUT:    Indwelling Catheter - Urethral: 375 mL  Total OUT: 375 mL    Total NET: 125 mL      02-15 @ 07:01  -  02-15 @ 13:15  --------------------------------------------------------  IN:    heparin Infusion: 20 mL    heparin Infusion: 5 mL    sodium chloride 0.9%.: 250 mL  Total IN: 275 mL    OUT:    Indwelling Catheter - Urethral: 170 mL  Total OUT: 170 mL    Total NET: 105 mL        Weight (kg): 66.224 (02-14 @ 20:05), 66.2 (02-14 @ 17:37)  02-15    138  |  104  |  21  ----------------------------<  112<H>  4.4   |  21<L>  |  1.40<H>    Ca    8.2<L>      15 Feb 2019 05:46    TPro  7.3  /  Alb  4.6  /  TBili  0.5  /  DBili  x   /  AST  29  /  ALT  20  /  AlkPhos  71  02-14    [ ] Castro catheter, indication: N/A  Meds: sodium chloride 0.9%. 1000 milliLiter(s) IV Continuous <Continuous>        HEMATOLOGIC  Meds: heparin  Infusion 500 Unit(s)/Hr IV Continuous <Continuous>    [x] VTE Prophylaxis                        9.7    6.99  )-----------( 148      ( 15 Feb 2019 05:46 )             29.5     PT/INR - ( 15 Feb 2019 05:46 )   PT: 15.1 SEC;   INR: 1.35          PTT - ( 14 Feb 2019 21:36 )  PTT:> 200.0 SEC  Transfusion     [ ] PRBC   [ ] Platelets   [ ] FFP   [ ] Cryoprecipitate      INFECTIOUS DISEASES  T(C): 35.8 (02-15-19 @ 12:00), Max: 37.1 (02-14-19 @ 17:37)  Wt(kg): --  WBC Count: 6.99 K/uL (02-15 @ 05:46)  WBC Count: 7.73 K/uL (02-14 @ 21:36)  WBC Count: 7.51 K/uL (02-14 @ 18:14)    Recent Cultures:    Meds: influenza   Vaccine 0.5 milliLiter(s) IntraMuscular once        ENDOCRINE  Capillary Blood Glucose    Meds:       ACCESS DEVICES:  [ ] Peripheral IV  [ ] Central Venous Line	[ ] R	[ ] L	[ ] IJ	        [ ] Fem	[ ] SC	Placed:   [ ] Arterial Line		        [ ] R	[ ] L	[ ] Fem	[ ] Rad	[ ] Ax	Placed:   [ ] PICC:					[ ] Mediport  [ ] Urinary Catheter, Date Placed:   [ ] Necessity of urinary, arterial, and venous catheters discussed    OTHER MEDICATIONS:  chlorhexidine 4% Liquid 1 Application(s) Topical <User Schedule>

## 2019-02-16 LAB
ANION GAP SERPL CALC-SCNC: 13 MMO/L — SIGNIFICANT CHANGE UP (ref 7–14)
APTT BLD: 38.4 SEC — HIGH (ref 27.5–36.3)
APTT BLD: 40.2 SEC — HIGH (ref 27.5–36.3)
APTT BLD: 66.4 SEC — HIGH (ref 27.5–36.3)
APTT BLD: 67.6 SEC — HIGH (ref 27.5–36.3)
BUN SERPL-MCNC: 32 MG/DL — HIGH (ref 7–23)
CALCIUM SERPL-MCNC: 7.9 MG/DL — LOW (ref 8.4–10.5)
CHLORIDE SERPL-SCNC: 102 MMOL/L — SIGNIFICANT CHANGE UP (ref 98–107)
CHLORIDE UR-SCNC: < 20 MMOL/L — SIGNIFICANT CHANGE UP
CK SERPL-CCNC: 551 U/L — HIGH (ref 30–200)
CO2 SERPL-SCNC: 20 MMOL/L — LOW (ref 22–31)
CREAT ?TM UR-MCNC: 208.3 MG/DL — SIGNIFICANT CHANGE UP
CREAT SERPL-MCNC: 1.96 MG/DL — HIGH (ref 0.5–1.3)
GLUCOSE SERPL-MCNC: 129 MG/DL — HIGH (ref 70–99)
HCT VFR BLD CALC: 22.2 % — LOW (ref 39–50)
HCT VFR BLD CALC: 26.5 % — LOW (ref 39–50)
HCT VFR BLD CALC: 27.6 % — LOW (ref 39–50)
HGB BLD-MCNC: 7.5 G/DL — LOW (ref 13–17)
HGB BLD-MCNC: 9 G/DL — LOW (ref 13–17)
HGB BLD-MCNC: 9.3 G/DL — LOW (ref 13–17)
MAGNESIUM SERPL-MCNC: 1.8 MG/DL — SIGNIFICANT CHANGE UP (ref 1.6–2.6)
MCHC RBC-ENTMCNC: 29.9 PG — SIGNIFICANT CHANGE UP (ref 27–34)
MCHC RBC-ENTMCNC: 30.1 PG — SIGNIFICANT CHANGE UP (ref 27–34)
MCHC RBC-ENTMCNC: 30.6 PG — SIGNIFICANT CHANGE UP (ref 27–34)
MCHC RBC-ENTMCNC: 33.7 % — SIGNIFICANT CHANGE UP (ref 32–36)
MCHC RBC-ENTMCNC: 33.8 % — SIGNIFICANT CHANGE UP (ref 32–36)
MCHC RBC-ENTMCNC: 34 % — SIGNIFICANT CHANGE UP (ref 32–36)
MCV RBC AUTO: 88 FL — SIGNIFICANT CHANGE UP (ref 80–100)
MCV RBC AUTO: 89.3 FL — SIGNIFICANT CHANGE UP (ref 80–100)
MCV RBC AUTO: 90.6 FL — SIGNIFICANT CHANGE UP (ref 80–100)
NRBC # FLD: 0 K/UL — LOW (ref 25–125)
PHOSPHATE SERPL-MCNC: 3.5 MG/DL — SIGNIFICANT CHANGE UP (ref 2.5–4.5)
PLATELET # BLD AUTO: 130 K/UL — LOW (ref 150–400)
PLATELET # BLD AUTO: 134 K/UL — LOW (ref 150–400)
PLATELET # BLD AUTO: 136 K/UL — LOW (ref 150–400)
PMV BLD: 10 FL — SIGNIFICANT CHANGE UP (ref 7–13)
PMV BLD: 10.1 FL — SIGNIFICANT CHANGE UP (ref 7–13)
PMV BLD: 10.4 FL — SIGNIFICANT CHANGE UP (ref 7–13)
POTASSIUM SERPL-MCNC: 4.4 MMOL/L — SIGNIFICANT CHANGE UP (ref 3.5–5.3)
POTASSIUM SERPL-SCNC: 4.4 MMOL/L — SIGNIFICANT CHANGE UP (ref 3.5–5.3)
POTASSIUM UR-SCNC: 67.4 MMOL/L — SIGNIFICANT CHANGE UP
RBC # BLD: 2.45 M/UL — LOW (ref 4.2–5.8)
RBC # BLD: 3.01 M/UL — LOW (ref 4.2–5.8)
RBC # BLD: 3.09 M/UL — LOW (ref 4.2–5.8)
RBC # FLD: 14.1 % — SIGNIFICANT CHANGE UP (ref 10.3–14.5)
RBC # FLD: 15.8 % — HIGH (ref 10.3–14.5)
RBC # FLD: 16.5 % — HIGH (ref 10.3–14.5)
SODIUM SERPL-SCNC: 135 MMOL/L — SIGNIFICANT CHANGE UP (ref 135–145)
SODIUM UR-SCNC: < 20 MMOL/L — SIGNIFICANT CHANGE UP
WBC # BLD: 12.67 K/UL — HIGH (ref 3.8–10.5)
WBC # BLD: 12.73 K/UL — HIGH (ref 3.8–10.5)
WBC # BLD: 9.86 K/UL — SIGNIFICANT CHANGE UP (ref 3.8–10.5)
WBC # FLD AUTO: 12.67 K/UL — HIGH (ref 3.8–10.5)
WBC # FLD AUTO: 12.73 K/UL — HIGH (ref 3.8–10.5)
WBC # FLD AUTO: 9.86 K/UL — SIGNIFICANT CHANGE UP (ref 3.8–10.5)

## 2019-02-16 PROCEDURE — 93010 ELECTROCARDIOGRAM REPORT: CPT

## 2019-02-16 PROCEDURE — 99233 SBSQ HOSP IP/OBS HIGH 50: CPT

## 2019-02-16 RX ORDER — SODIUM CHLORIDE 9 MG/ML
1000 INJECTION, SOLUTION INTRAVENOUS
Qty: 0 | Refills: 0 | Status: DISCONTINUED | OUTPATIENT
Start: 2019-02-16 | End: 2019-02-17

## 2019-02-16 RX ORDER — METOPROLOL TARTRATE 50 MG
25 TABLET ORAL
Qty: 0 | Refills: 0 | Status: DISCONTINUED | OUTPATIENT
Start: 2019-02-16 | End: 2019-02-22

## 2019-02-16 RX ORDER — HYDRALAZINE HCL 50 MG
10 TABLET ORAL ONCE
Qty: 0 | Refills: 0 | Status: COMPLETED | OUTPATIENT
Start: 2019-02-16 | End: 2019-02-16

## 2019-02-16 RX ORDER — METOPROLOL TARTRATE 50 MG
2.5 TABLET ORAL EVERY 6 HOURS
Qty: 0 | Refills: 0 | Status: DISCONTINUED | OUTPATIENT
Start: 2019-02-16 | End: 2019-02-16

## 2019-02-16 RX ORDER — AMLODIPINE BESYLATE 2.5 MG/1
10 TABLET ORAL DAILY
Qty: 0 | Refills: 0 | Status: DISCONTINUED | OUTPATIENT
Start: 2019-02-16 | End: 2019-02-22

## 2019-02-16 RX ORDER — POTASSIUM PHOSPHATE, MONOBASIC POTASSIUM PHOSPHATE, DIBASIC 236; 224 MG/ML; MG/ML
15 INJECTION, SOLUTION INTRAVENOUS ONCE
Qty: 0 | Refills: 0 | Status: DISCONTINUED | OUTPATIENT
Start: 2019-02-16 | End: 2019-02-16

## 2019-02-16 RX ADMIN — Medication 10 MILLIGRAM(S): at 06:12

## 2019-02-16 RX ADMIN — Medication 650 MILLIGRAM(S): at 11:30

## 2019-02-16 RX ADMIN — Medication 25 MILLIGRAM(S): at 18:32

## 2019-02-16 RX ADMIN — CHLORHEXIDINE GLUCONATE 1 APPLICATION(S): 213 SOLUTION TOPICAL at 07:00

## 2019-02-16 RX ADMIN — Medication 650 MILLIGRAM(S): at 12:30

## 2019-02-16 RX ADMIN — Medication 2.5 MILLIGRAM(S): at 12:15

## 2019-02-16 RX ADMIN — HEPARIN SODIUM 7 UNIT(S)/HR: 5000 INJECTION INTRAVENOUS; SUBCUTANEOUS at 10:30

## 2019-02-16 RX ADMIN — SODIUM CHLORIDE 50 MILLILITER(S): 9 INJECTION, SOLUTION INTRAVENOUS at 20:00

## 2019-02-16 RX ADMIN — TAMSULOSIN HYDROCHLORIDE 0.4 MILLIGRAM(S): 0.4 CAPSULE ORAL at 21:32

## 2019-02-16 RX ADMIN — HEPARIN SODIUM 8 UNIT(S)/HR: 5000 INJECTION INTRAVENOUS; SUBCUTANEOUS at 17:45

## 2019-02-16 RX ADMIN — AMLODIPINE BESYLATE 10 MILLIGRAM(S): 2.5 TABLET ORAL at 15:30

## 2019-02-16 NOTE — PROGRESS NOTE ADULT - SUBJECTIVE AND OBJECTIVE BOX
HPI: 78M with prior history of B/L lower extremity bypasses (pt does not remember details, imaging shows B/L fem-pop bypass grafts) done in 1980s presented yesterday 2/14/19 with LLE pain and mild sensory and motor loss since 3:15pm. He got a massage prior to the start of symptoms and then noticed that his foot felt cold and he had pain in his calf. He also felt that he could not move his toes very well. He has no complaints in his RLE. He otherwise is ambulatory at home and denies any symptoms of claudication. He has HTN and HLD at home. He denies any history of atrial fibrillation. He is a current pack a day smoker and has done so for the past 25 years. Pt found with occluded superficial femoral artery and occluded bypass graft (fem-pop graft) on arterial duplex. CT LE with run off shows patent fem-pop graft, right proximal ant-tib artery occlusion; left fem-pop graft occluded, no flow in ant-tib, post-tib, and peroneal arteries. Pt planned to go to OR for thrombolysis of LLE fem-pop graft but, instead underwent thrombectomized old PTFE fem-pop graft and then interposition bypass from common femoral to profunda with a 5 [place] sheath left in the right femoral artery. Pt received heparin bolus prior to case starting. SICU consulted for Q1 vascular checks.  INTERVAL EVENTS: started on hep gtt. Abnormal T wave inversions on EKG, normal Echo (EF 61%), slight H/H drop, no obvious signs of bleeding (no obvious hematoma at groin site), compartments soft; failed TOV (straight cath x 1, DTV @ 5am)    SUBJECTIVE/ROS:  [x] A ten-point review of systems was otherwise negative except as noted.  [ ] Due to altered mental status/intubation, subjective information were not able to be obtained from the patient. History was obtained, to the extent possible, from review of the chart and collateral sources of information.    NEURO  RASS:     GCS:     CAM ICU:  Exam: alert & oriented  Meds: oxyCODONE    IR 5 milliGRAM(s) Oral every 6 hours PRN Moderate Pain (4 - 6)  oxyCODONE    IR 10 milliGRAM(s) Oral every 6 hours PRN Severe Pain (7 - 10)    [x] Adequacy of sedation and pain control has been assessed and adjusted      RESPIRATORY  RR: 23 (02-15-19 @ 13:00) (10 - 23)  SpO2: 100% (02-15-19 @ 13:00) (96% - 100%)  Wt(kg): --  Exam: unlabored, clear to auscultation bilaterally  Mechanical Ventilation:   ABG - ( 15 Feb 2019 02:41 )  pH: 7.33  /  pCO2: 47    /  pO2: 209   / HCO3: 23    / Base Excess: -1.6  /  SaO2: 99.0    Lactate: x          [ ] Extubation Readiness Assessed  Meds:       CARDIOVASCULAR  HR: 74 (02-15-19 @ 13:00) (44 - 92)  BP: 114/57 (02-15-19 @ 04:45) (114/57 - 192/77)  BP(mean): 70 (02-15-19 @ 04:45) (70 - 77)  ABP: 119/45 (02-15-19 @ 13:00) (111/48 - 156/63)  ABP(mean): 69 (02-15-19 @ 13:00) (68 - 96)  Wt(kg): --  CVP(cm H2O): --    Lactate, Blood: 1.2 mmol/L (02-14 @ 18:14)    Exam:  Cardiac Rhythm:  Perfusion    [x]Adequate   [ ]Inadequate  Mentation   [x]Normal       [ ]Reduced  Extremities  [x]Warm         [ ]Cool  Volume Status [ ]Hypervolemic [ ]Euvolemic [ ]Hypovolemic  Meds: tamsulosin 0.4 milliGRAM(s) Oral at bedtime        GI/NUTRITION  Exam: soft, NT, ND  Diet: regular  Meds:     GENITOURINARY  I&O's Detail    02-14 @ 07:01  -  02-15 @ 07:00  --------------------------------------------------------  IN:    sodium chloride 0.9%.: 500 mL  Total IN: 500 mL    OUT:    Indwelling Catheter - Urethral: 375 mL  Total OUT: 375 mL    Total NET: 125 mL      02-15 @ 07:01  -  02-15 @ 13:15  --------------------------------------------------------  IN:    heparin Infusion: 20 mL    heparin Infusion: 5 mL    sodium chloride 0.9%.: 250 mL  Total IN: 275 mL    OUT:    Indwelling Catheter - Urethral: 170 mL  Total OUT: 170 mL    Total NET: 105 mL        Weight (kg): 66.224 (02-14 @ 20:05), 66.2 (02-14 @ 17:37)  02-15    138  |  104  |  21  ----------------------------<  112<H>  4.4   |  21<L>  |  1.40<H>    Ca    8.2<L>      15 Feb 2019 05:46    TPro  7.3  /  Alb  4.6  /  TBili  0.5  /  DBili  x   /  AST  29  /  ALT  20  /  AlkPhos  71  02-14    [ ] Castro catheter, indication: N/A  Meds: sodium chloride 0.9%. 1000 milliLiter(s) IV Continuous <Continuous>        HEMATOLOGIC  Meds: heparin  Infusion 500 Unit(s)/Hr IV Continuous <Continuous>    [x] VTE Prophylaxis                        9.7    6.99  )-----------( 148      ( 15 Feb 2019 05:46 )             29.5     PT/INR - ( 15 Feb 2019 05:46 )   PT: 15.1 SEC;   INR: 1.35          PTT - ( 14 Feb 2019 21:36 )  PTT:> 200.0 SEC  Transfusion     [ ] PRBC   [ ] Platelets   [ ] FFP   [ ] Cryoprecipitate      INFECTIOUS DISEASES  T(C): 35.8 (02-15-19 @ 12:00), Max: 37.1 (02-14-19 @ 17:37)  Wt(kg): --  WBC Count: 6.99 K/uL (02-15 @ 05:46)  WBC Count: 7.73 K/uL (02-14 @ 21:36)  WBC Count: 7.51 K/uL (02-14 @ 18:14)    Recent Cultures:    Meds: influenza   Vaccine 0.5 milliLiter(s) IntraMuscular once    ENDOCRINE  Capillary Blood Glucose    Meds:       ACCESS DEVICES:  [x] Peripheral IV  [ ] Central Venous Line	[ ] R	[ ] L	[ ] IJ	        [ ] Fem	[ ] SC	Placed:   [ ] Arterial Line		        [ ] R	[ ] L	[ ] Fem	[ ] Rad	[ ] Ax	Placed:   [ ] PICC:					[ ] Mediport  [ ] Urinary Catheter, Date Placed:   [ ] Necessity of urinary, arterial, and venous catheters discussed    OTHER MEDICATIONS:  chlorhexidine 4% Liquid 1 Application(s) Topical <User Schedule> HPI: 78M with prior history of B/L lower extremity bypasses (pt does not remember details, imaging shows B/L fem-pop bypass grafts) done in 1980s presented yesterday 2/14/19 with LLE pain and mild sensory and motor loss since 3:15pm. He got a massage prior to the start of symptoms and then noticed that his foot felt cold and he had pain in his calf. He also felt that he could not move his toes very well. He has no complaints in his RLE. He otherwise is ambulatory at home and denies any symptoms of claudication. He has HTN and HLD at home. He denies any history of atrial fibrillation. He is a current pack a day smoker and has done so for the past 25 years. Pt found with occluded superficial femoral artery and occluded bypass graft (fem-pop graft) on arterial duplex. CT LE with run off shows patent fem-pop graft, right proximal ant-tib artery occlusion; left fem-pop graft occluded, no flow in ant-tib, post-tib, and peroneal arteries. Pt planned to go to OR for thrombolysis of LLE fem-pop graft but, instead underwent thrombectomized old PTFE fem-pop graft and then interposition bypass from common femoral to profunda with a 5 [place] sheath left in the right femoral artery. Pt received heparin bolus prior to case starting. SICU consulted for Q1 vascular checks.  INTERVAL EVENTS: started on hep gtt. Abnormal T wave inversions on EKG, normal Echo (EF 61%), slight H/H drop, no obvious signs of bleeding (no obvious hematoma at groin site), compartments soft; failed TOV (straight cath x 1, DTV @ 5am), given 1 u PRBCs for downtrending H/h    SUBJECTIVE/ROS:  [x] A ten-point review of systems was otherwise negative except as noted.  [ ] Due to altered mental status/intubation, subjective information were not able to be obtained from the patient. History was obtained, to the extent possible, from review of the chart and collateral sources of information.    NEURO  RASS:     GCS:     CAM ICU:  Exam: alert & oriented  Meds: oxyCODONE    IR 5 milliGRAM(s) Oral every 6 hours PRN Moderate Pain (4 - 6)  oxyCODONE    IR 10 milliGRAM(s) Oral every 6 hours PRN Severe Pain (7 - 10)    [x] Adequacy of sedation and pain control has been assessed and adjusted      RESPIRATORY  RR: 23 (02-15-19 @ 13:00) (10 - 23)  SpO2: 100% (02-15-19 @ 13:00) (96% - 100%)  Wt(kg): --  Exam: unlabored, clear to auscultation bilaterally  Mechanical Ventilation:   ABG - ( 15 Feb 2019 02:41 )  pH: 7.33  /  pCO2: 47    /  pO2: 209   / HCO3: 23    / Base Excess: -1.6  /  SaO2: 99.0    Lactate: x          [ ] Extubation Readiness Assessed  Meds:       CARDIOVASCULAR  HR: 74 (02-15-19 @ 13:00) (44 - 92)  BP: 114/57 (02-15-19 @ 04:45) (114/57 - 192/77)  BP(mean): 70 (02-15-19 @ 04:45) (70 - 77)  ABP: 119/45 (02-15-19 @ 13:00) (111/48 - 156/63)  ABP(mean): 69 (02-15-19 @ 13:00) (68 - 96)  Wt(kg): --  CVP(cm H2O): --    Lactate, Blood: 1.2 mmol/L (02-14 @ 18:14)    Exam:  Cardiac Rhythm:  Perfusion    [x]Adequate   [ ]Inadequate  Mentation   [x]Normal       [ ]Reduced  Extremities  [x]Warm         [ ]Cool  Volume Status [ ]Hypervolemic [ ]Euvolemic [ ]Hypovolemic  Meds: tamsulosin 0.4 milliGRAM(s) Oral at bedtime        GI/NUTRITION  Exam: soft, NT, ND  Diet: regular  Meds:     GENITOURINARY  I&O's Detail    02-14 @ 07:01  -  02-15 @ 07:00  --------------------------------------------------------  IN:    sodium chloride 0.9%.: 500 mL  Total IN: 500 mL    OUT:    Indwelling Catheter - Urethral: 375 mL  Total OUT: 375 mL    Total NET: 125 mL      02-15 @ 07:01  -  02-15 @ 13:15  --------------------------------------------------------  IN:    heparin Infusion: 20 mL    heparin Infusion: 5 mL    sodium chloride 0.9%.: 250 mL  Total IN: 275 mL    OUT:    Indwelling Catheter - Urethral: 170 mL  Total OUT: 170 mL    Total NET: 105 mL        Weight (kg): 66.224 (02-14 @ 20:05), 66.2 (02-14 @ 17:37)  02-15    138  |  104  |  21  ----------------------------<  112<H>  4.4   |  21<L>  |  1.40<H>    Ca    8.2<L>      15 Feb 2019 05:46    TPro  7.3  /  Alb  4.6  /  TBili  0.5  /  DBili  x   /  AST  29  /  ALT  20  /  AlkPhos  71  02-14    [ ] Castro catheter, indication: N/A  Meds: sodium chloride 0.9%. 1000 milliLiter(s) IV Continuous <Continuous>        HEMATOLOGIC  Meds: heparin  Infusion 500 Unit(s)/Hr IV Continuous <Continuous>    [x] VTE Prophylaxis                        9.7    6.99  )-----------( 148      ( 15 Feb 2019 05:46 )             29.5     PT/INR - ( 15 Feb 2019 05:46 )   PT: 15.1 SEC;   INR: 1.35          PTT - ( 14 Feb 2019 21:36 )  PTT:> 200.0 SEC  Transfusion     [ ] PRBC   [ ] Platelets   [ ] FFP   [ ] Cryoprecipitate      INFECTIOUS DISEASES  T(C): 35.8 (02-15-19 @ 12:00), Max: 37.1 (02-14-19 @ 17:37)  Wt(kg): --  WBC Count: 6.99 K/uL (02-15 @ 05:46)  WBC Count: 7.73 K/uL (02-14 @ 21:36)  WBC Count: 7.51 K/uL (02-14 @ 18:14)    Recent Cultures:    Meds: influenza   Vaccine 0.5 milliLiter(s) IntraMuscular once    ENDOCRINE  Capillary Blood Glucose    Meds:       ACCESS DEVICES:  [x] Peripheral IV  [ ] Central Venous Line	[ ] R	[ ] L	[ ] IJ	        [ ] Fem	[ ] SC	Placed:   [ ] Arterial Line		        [ ] R	[ ] L	[ ] Fem	[ ] Rad	[ ] Ax	Placed:   [ ] PICC:					[ ] Mediport  [ ] Urinary Catheter, Date Placed:   [ ] Necessity of urinary, arterial, and venous catheters discussed    OTHER MEDICATIONS:  chlorhexidine 4% Liquid 1 Application(s) Topical <User Schedule> HPI: 78M with prior history of B/L lower extremity bypasses (pt does not remember details, imaging shows B/L fem-pop bypass grafts) done in 1980s presented yesterday 2/14/19 with LLE pain and mild sensory and motor loss since 3:15pm. He got a massage prior to the start of symptoms and then noticed that his foot felt cold and he had pain in his calf. He also felt that he could not move his toes very well. He has no complaints in his RLE. He otherwise is ambulatory at home and denies any symptoms of claudication. He has HTN and HLD at home. He denies any history of atrial fibrillation. He is a current pack a day smoker and has done so for the past 25 years. Pt found with occluded superficial femoral artery and occluded bypass graft (fem-pop graft) on arterial duplex. CT LE with run off shows patent fem-pop graft, right proximal ant-tib artery occlusion; left fem-pop graft occluded, no flow in ant-tib, post-tib, and peroneal arteries. Pt planned to go to OR for thrombolysis of LLE fem-pop graft but, instead underwent thrombectomized old PTFE fem-pop graft and then interposition bypass from common femoral to profunda with a 5 [place] sheath left in the right femoral artery. Pt received heparin bolus prior to case starting. SICU consulted for Q1 vascular checks.  INTERVAL EVENTS: started on hep gtt. Abnormal T wave inversions on EKG, Echo showed mild-moderate aortic regurgitation and an EF of 61%, slight H/H drop, no obvious signs of bleeding (no obvious hematoma at groin site), compartments soft; failed TOV (straight cath x 1, DTV @ 5am), given 1 u PRBCs for downtrending H/h, patient went into afib with RVR (he does not have a documented history of it)    SUBJECTIVE/ROS:  [x] A ten-point review of systems was otherwise negative except as noted.  [ ] Due to altered mental status/intubation, subjective information were not able to be obtained from the patient. History was obtained, to the extent possible, from review of the chart and collateral sources of information.    NEURO  RASS:     GCS:     CAM ICU:  Exam: alert & oriented  Meds: oxyCODONE    IR 5 milliGRAM(s) Oral every 6 hours PRN Moderate Pain (4 - 6)  oxyCODONE    IR 10 milliGRAM(s) Oral every 6 hours PRN Severe Pain (7 - 10)    [x] Adequacy of sedation and pain control has been assessed and adjusted      RESPIRATORY  RR: 23 (02-15-19 @ 13:00) (10 - 23)  SpO2: 100% (02-15-19 @ 13:00) (96% - 100%)  Wt(kg): --  Exam: unlabored, clear to auscultation bilaterally  Mechanical Ventilation:   ABG - ( 15 Feb 2019 02:41 )  pH: 7.33  /  pCO2: 47    /  pO2: 209   / HCO3: 23    / Base Excess: -1.6  /  SaO2: 99.0    Lactate: x          [ ] Extubation Readiness Assessed  Meds:       CARDIOVASCULAR  HR: 74 (02-15-19 @ 13:00) (44 - 92)  BP: 114/57 (02-15-19 @ 04:45) (114/57 - 192/77)  BP(mean): 70 (02-15-19 @ 04:45) (70 - 77)  ABP: 119/45 (02-15-19 @ 13:00) (111/48 - 156/63)  ABP(mean): 69 (02-15-19 @ 13:00) (68 - 96)  Wt(kg): --  CVP(cm H2O): --    Lactate, Blood: 1.2 mmol/L (02-14 @ 18:14)    Exam:  Cardiac Rhythm:  Perfusion    [x]Adequate   [ ]Inadequate  Mentation   [x]Normal       [ ]Reduced  Extremities  [x]Warm         [ ]Cool  Volume Status [ ]Hypervolemic [ ]Euvolemic [ ]Hypovolemic  Meds: tamsulosin 0.4 milliGRAM(s) Oral at bedtime        GI/NUTRITION  Exam: soft, NT, ND  Diet: regular  Meds:     GENITOURINARY  I&O's Detail    02-14 @ 07:01  -  02-15 @ 07:00  --------------------------------------------------------  IN:    sodium chloride 0.9%.: 500 mL  Total IN: 500 mL    OUT:    Indwelling Catheter - Urethral: 375 mL  Total OUT: 375 mL    Total NET: 125 mL      02-15 @ 07:01  -  02-15 @ 13:15  --------------------------------------------------------  IN:    heparin Infusion: 20 mL    heparin Infusion: 5 mL    sodium chloride 0.9%.: 250 mL  Total IN: 275 mL    OUT:    Indwelling Catheter - Urethral: 170 mL  Total OUT: 170 mL    Total NET: 105 mL        Weight (kg): 66.224 (02-14 @ 20:05), 66.2 (02-14 @ 17:37)  02-15    138  |  104  |  21  ----------------------------<  112<H>  4.4   |  21<L>  |  1.40<H>    Ca    8.2<L>      15 Feb 2019 05:46    TPro  7.3  /  Alb  4.6  /  TBili  0.5  /  DBili  x   /  AST  29  /  ALT  20  /  AlkPhos  71  02-14    [ ] Castro catheter, indication: N/A  Meds: sodium chloride 0.9%. 1000 milliLiter(s) IV Continuous <Continuous>        HEMATOLOGIC  Meds: heparin  Infusion 500 Unit(s)/Hr IV Continuous <Continuous>    [x] VTE Prophylaxis                        9.7    6.99  )-----------( 148      ( 15 Feb 2019 05:46 )             29.5     PT/INR - ( 15 Feb 2019 05:46 )   PT: 15.1 SEC;   INR: 1.35          PTT - ( 14 Feb 2019 21:36 )  PTT:> 200.0 SEC  Transfusion     [ ] PRBC   [ ] Platelets   [ ] FFP   [ ] Cryoprecipitate      INFECTIOUS DISEASES  T(C): 35.8 (02-15-19 @ 12:00), Max: 37.1 (02-14-19 @ 17:37)  Wt(kg): --  WBC Count: 6.99 K/uL (02-15 @ 05:46)  WBC Count: 7.73 K/uL (02-14 @ 21:36)  WBC Count: 7.51 K/uL (02-14 @ 18:14)    Recent Cultures:    Meds: influenza   Vaccine 0.5 milliLiter(s) IntraMuscular once    ENDOCRINE  Capillary Blood Glucose    Meds:       ACCESS DEVICES:  [x] Peripheral IV  [ ] Central Venous Line	[ ] R	[ ] L	[ ] IJ	        [ ] Fem	[ ] SC	Placed:   [ ] Arterial Line		        [ ] R	[ ] L	[ ] Fem	[ ] Rad	[ ] Ax	Placed:   [ ] PICC:					[ ] Mediport  [ ] Urinary Catheter, Date Placed:   [ ] Necessity of urinary, arterial, and venous catheters discussed    OTHER MEDICATIONS:  chlorhexidine 4% Liquid 1 Application(s) Topical <User Schedule> HPI: 78M with prior history of B/L lower extremity bypasses (pt does not remember details, imaging shows B/L fem-pop bypass grafts) done in 1980s presented yesterday 2/14/19 with LLE pain and mild sensory and motor loss since 3:15pm. He got a massage prior to the start of symptoms and then noticed that his foot felt cold and he had pain in his calf. He also felt that he could not move his toes very well. He has no complaints in his RLE. He otherwise is ambulatory at home and denies any symptoms of claudication. He has HTN and HLD at home. He denies any history of atrial fibrillation. He is a current pack a day smoker and has done so for the past 25 years. Pt found with occluded superficial femoral artery and occluded bypass graft (fem-pop graft) on arterial duplex. CT LE with run off shows patent fem-pop graft, right proximal ant-tib artery occlusion; left fem-pop graft occluded, no flow in ant-tib, post-tib, and peroneal arteries. Pt planned to go to OR for thrombolysis of LLE fem-pop graft but, instead underwent thrombectomized old PTFE fem-pop graft and then interposition bypass from common femoral to profunda with a 5 [place] sheath left in the right femoral artery. Pt received heparin bolus prior to case starting. SICU consulted for Q1 vascular checks.  INTERVAL EVENTS: started on hep gtt. Abnormal T wave inversions on EKG, Echo showed mild-moderate aortic regurgitation and an EF of 61%, slight H/H drop, no obvious signs of bleeding (no obvious hematoma at groin site), compartments soft; failed TOV (straight cath x 1, DTV @ 5am), given 1 u PRBCs for downtrending H/h, patient went into afib with RVR (he does not have a documented history of it)    SUBJECTIVE/ROS:  [x] A ten-point review of systems was otherwise negative except as noted.  [ ] Due to altered mental status/intubation, subjective information were not able to be obtained from the patient. History was obtained, to the extent possible, from review of the chart and collateral sources of information.    NEURO  RASS:     GCS:     CAM ICU:  Exam: alert & oriented  Meds: oxyCODONE    IR 5 milliGRAM(s) Oral every 6 hours PRN Moderate Pain (4 - 6)  oxyCODONE    IR 10 milliGRAM(s) Oral every 6 hours PRN Severe Pain (7 - 10)    [x] Adequacy of sedation and pain control has been assessed and adjusted      RESPIRATORY  RR: 18 (16 Feb 2019 10:00) (18 - 25)  SpO2: 99% (16 Feb 2019 10:00) (97% - 100%)    Exam: unlabored, clear to auscultation bilaterally  Mechanical Ventilation:   ABG - ( 15 Feb 2019 02:41 )  pH: 7.33  /  pCO2: 47    /  pO2: 209   / HCO3: 23    / Base Excess: -1.6  /  SaO2: 99.0    Lactate: x          [ ] Extubation Readiness Assessed  Meds:       CARDIOVASCULAR  HR: 94 (16 Feb 2019 10:00) (66 - 103)  BP: --  BP(mean): --  ABP: 164/53 (16 Feb 2019 10:00) (119/45 - 176/63)  ABP(mean): 86 (16 Feb 2019 10:00) (69 - 99)    Lactate, Blood: 1.2 mmol/L (02-14 @ 18:14)    Exam:  Cardiac Rhythm:  Perfusion    [x]Adequate   [ ]Inadequate  Mentation   [x]Normal       [ ]Reduced  Extremities  [x]Warm         [ ]Cool  Volume Status [ ]Hypervolemic [ ]Euvolemic [ ]Hypovolemic  Meds: tamsulosin 0.4 milliGRAM(s) Oral at bedtime        GI/NUTRITION  Exam: soft, NT, ND  Diet: regular  Meds:     GENITOURINARY  I&O's Detail      02-15 @ 07:01  -  02-16 @ 07:00  --------------------------------------------------------  IN:    heparin Infusion: 90 mL    heparin Infusion: 5 mL    Oral Fluid: 620 mL    sodium chloride 0.9%: 350 mL  Total IN: 1065 mL    OUT:    Indwelling Catheter - Urethral: 170 mL    Post-Void Residual per Intermittent Catheterization: 600 mL  Total OUT: 770 mL    Total NET: 295 mL      02-16 @ 07:01  -  02-16 @ 12:38  --------------------------------------------------------  IN:    heparin Infusion: 22 mL    lactated ringers.: 200 mL  Total IN: 222 mL    OUT:    Voided: 50 mL  Total OUT: 50 mL    Total NET: 172 mL    Weight (kg): 66.224 (02-14 @ 20:05), 66.2 (02-14 @ 17:37)    BMP (02-16 @ 02:45)             135     |  102     |  32<H> 		Ca++ --      Ca 7.9<L>             ---------------------------------( 129<H>		Mg 1.8                4.4     |  20<L>   |  1.96<H>			Ph 3.5     BMP (02-15 @ 17:40)             135     |  103     |  26<H> 		Ca++ --      Ca 7.7<L>             ---------------------------------( 165<H>		Mg 1.7                4.8     |  18<L>   |  1.62<H>			Ph 4.2           ABG (02-15 @ 02:41)     7.33<L> / 47 / 209<H> / 23 / -1.6 / 99.0%     Lactate:           [ ] Castro catheter, indication: N/A  Meds: sodium chloride 0.9%. 1000 milliLiter(s) IV Continuous <Continuous>        HEMATOLOGIC  Meds: heparin  Infusion 500 Unit(s)/Hr IV Continuous <Continuous>    [x] VTE Prophylaxis  CBC (02-16 @ 10:00)                              9.3<L>                         12.73<H>  )----------------(  134<L>     --    % Neutrophils, --    % Lymphocytes, ANC: --                                  27.6<L>              CBC (02-16 @ 02:45)                              7.5<L>                         9.86    )----------------(  130<L>     --    % Neutrophils, --    % Lymphocytes, ANC: --                                  22.2<L>                Coags (02-16 @ 10:00)  aPTT 38.4<H> / INR -- / PT --  Coags (02-16 @ 02:45)  aPTT 40.2<H> / INR -- / PT --    INFECTIOUS DISEASES  T(C): 35.8 (02-15-19 @ 12:00), Max: 37.1 (02-14-19 @ 17:37)  Wt(kg): --  WBC Count: 6.99 K/uL (02-15 @ 05:46)  WBC Count: 7.73 K/uL (02-14 @ 21:36)  WBC Count: 7.51 K/uL (02-14 @ 18:14)    Recent Cultures:    Meds: influenza   Vaccine 0.5 milliLiter(s) IntraMuscular once    ENDOCRINE  Capillary Blood Glucose    Meds:       ACCESS DEVICES:  [x] Peripheral IV  [ ] Central Venous Line	[ ] R	[ ] L	[ ] IJ	        [ ] Fem	[ ] SC	Placed:   [ ] Arterial Line		        [ ] R	[ ] L	[ ] Fem	[ ] Rad	[ ] Ax	Placed:   [ ] PICC:					[ ] Mediport  [ ] Urinary Catheter, Date Placed:   [ ] Necessity of urinary, arterial, and venous catheters discussed    OTHER MEDICATIONS:  chlorhexidine 4% Liquid 1 Application(s) Topical <User Schedule>

## 2019-02-16 NOTE — PROGRESS NOTE ADULT - ASSESSMENT
78M s/p thrombectomized old PTFE fem-pop graft and then interposition bypass from common femoral to profunda with a 5 [place] sheath left in the right femoral artery for RLE arterial occlusion and occlusion of  bypass graft    - continue heparin gtt  - trend h/h  - pain control  - appreciate excellent SICU care

## 2019-02-16 NOTE — PROCEDURE NOTE - NSURITECHNIQUE_GU_A_CORE
Proper hand hygiene was performed/Sterile gloves were worn for the duration of the procedure/A sterile drape was used to cover all adjacent areas/The catheter was secured with a securement device (e.g. StatLock)/All applicable medical record documentation is completed/The catheter was appropriately lubricated/The collection bag is below the level of the patient and urinary bladder/The urinary drainage system is closed at the end of the procedure

## 2019-02-16 NOTE — PROGRESS NOTE ADULT - SUBJECTIVE AND OBJECTIVE BOX
Surgery Progress Note      Subjective: Patient seen and examined. Heparin gtt started. Failed TOV -- required straight cath.     T(C): 37.1 (02-16-19 @ 00:00), Max: 37.1 (02-15-19 @ 16:00)  HR: 93 (02-16-19 @ 01:00) (44 - 93)  BP: 114/57 (02-15-19 @ 04:45) (114/57 - 114/57)  RR: 21 (02-16-19 @ 01:00) (10 - 25)  SpO2: 99% (02-16-19 @ 01:00) (99% - 100%)      02-14-19 @ 07:01  -  02-15-19 @ 07:00  --------------------------------------------------------  IN: 500 mL / OUT: 375 mL / NET: 125 mL    02-15-19 @ 07:01  -  02-16-19 @ 04:19  --------------------------------------------------------  IN: 1060 mL / OUT: 470 mL / NET: 590 mL        Physical Exam:   palpable left popliteal artery, palpable/+doppler signal left DP pulse, +doppler signal left peroneal artery. Dusky toes noted on left. Left foot cool to touch, reduced cap refill. Left groin with moderately saturated dressing (serosanguinous) and right groin with 5 Sinhala sheath still in place     Labs:                          7.5    9.86  )-----------( 130      ( 16 Feb 2019 02:45 )             22.2     02-16    135  |  102  |  32<H>  ----------------------------<  129<H>  4.4   |  20<L>  |  1.96<H>    Ca    7.9<L>      16 Feb 2019 02:45  Phos  3.5     02-16  Mg     1.8     02-16    TPro  7.3  /  Alb  4.6  /  TBili  0.5  /  DBili  x   /  AST  29  /  ALT  20  /  AlkPhos  71  02-14      Medications:     acetaminophen   Tablet .. 650 milliGRAM(s) Oral every 6 hours PRN  chlorhexidine 4% Liquid 1 Application(s) Topical <User Schedule>  heparin  Infusion 500 Unit(s)/Hr IV Continuous <Continuous>  influenza   Vaccine 0.5 milliLiter(s) IntraMuscular once  oxyCODONE    IR 5 milliGRAM(s) Oral every 6 hours PRN  oxyCODONE    IR 10 milliGRAM(s) Oral every 6 hours PRN  potassium phosphate IVPB 15 milliMole(s) IV Intermittent once  tamsulosin 0.4 milliGRAM(s) Oral at bedtime      Radiographs: No new imaging

## 2019-02-16 NOTE — CONSULT NOTE ADULT - ASSESSMENT
78M with PAD, s/p remote B/L fempop bypass, and HTN presents with leg pain, now s/p thrombolysis and bypass with evidence of new PAF and tachy/katey syndrome.    D/W Dr. Jurado, cardiac electrophysiologist: EKGs as noted, continue to monitor. Continue Heparin gtt for A/C and metoprolol for rate and rhythm control. Will consider possible PPM pre discharge. If patient develops persistent bradycardia can hold AVN blocking agents.

## 2019-02-16 NOTE — CONSULT NOTE ADULT - SUBJECTIVE AND OBJECTIVE BOX
Date of Admission: 2019    CHIEF COMPLAINT: L leg pain    HISTORY OF PRESENT ILLNESS: 78M with PAD, s/p B/L fem/pop bypasses (remote), former smoker, and HTN presents for L leg pain and is now s/p thrombolysis and LLE bypass. While recovering in SICU telemetry concerning for sinus bradycardia and then ? afib with RVR to 130 bpm. Patient remained hemodynamically stable. Lopressor 2.5 mg IV X 1 given with good effect and patient has remained in NSR with occ APCs. Currently on Heparin gtt post op. Electrophysiology is consulted for new PAF and possible tachy/katey.      Allergies    No Known Allergies    Intolerances    	    MEDICATIONS:  amLODIPine   Tablet 10 milliGRAM(s) Oral daily  heparin  Infusion 500 Unit(s)/Hr IV Continuous <Continuous>  metoprolol tartrate 25 milliGRAM(s) Oral two times a day  tamsulosin 0.4 milliGRAM(s) Oral at bedtime        acetaminophen   Tablet .. 650 milliGRAM(s) Oral every 6 hours PRN  oxyCODONE    IR 5 milliGRAM(s) Oral every 6 hours PRN  oxyCODONE    IR 10 milliGRAM(s) Oral every 6 hours PRN        chlorhexidine 4% Liquid 1 Application(s) Topical <User Schedule>  influenza   Vaccine 0.5 milliLiter(s) IntraMuscular once  lactated ringers. 1000 milliLiter(s) IV Continuous <Continuous>      PAST MEDICAL & SURGICAL HISTORY:  HTN (hypertension)  Remote B/L fempop bypass      FAMILY HISTORY:  No pertinent family history in first degree relatives      SOCIAL HISTORY:    Smoker  former  Alcohol denies  Drugs denies      REVIEW OF SYSTEMS:  See HPI. Otherwise, 10 point ROS done and otherwise negative.    PHYSICAL EXAM:  T(C): 36.7 (19 @ 08:00), Max: 37.1 (02-15-19 @ 16:00)  HR: 81 (19 @ 13:00) (66 - 112)  BP: --  RR: 25 (19 @ 13:00) (18 - 30)  SpO2: 97% (19 @ 13:00) (97% - 100%)  Wt(kg): --  I&O's Summary    15 Feb 2019 07:01  -  2019 07:00  --------------------------------------------------------  IN: 1065 mL / OUT: 770 mL / NET: 295 mL    2019 07:01  -  2019 15:41  --------------------------------------------------------  IN: 243 mL / OUT: 150 mL / NET: 93 mL        Appearance: Normal	  HEENT:   Normal oral mucosa, PERRL, EOMI	  Cardiovascular: S1S2 irreg  Respiratory: CTA ant  Psychiatry: A & O x 3, Mood & affect appropriate  Gastrointestinal:  Soft, Non-tender, + BS	  Skin: No rashes, No ecchymoses, No cyanosis	  Neurologic: Non-focal  Extremities: Normal range of motion, No clubbing, cyanosis or edema      LABS:	 	                        9.3    12.73 )-----------( 134      ( 2019 10:00 )             27.6       02-16    135  |  102  |  32<H>  ----------------------------<  129<H>  4.4   |  20<L>  |  1.96<H>  02-15    135  |  103  |  26<H>  ----------------------------<  165<H>  4.8   |  18<L>  |  1.62<H>    Ca    7.9<L>      2019 02:45  Ca    7.7<L>      15 Feb 2019 17:40  Phos  3.5     -16  Phos  4.2     02-15  Mg     1.8     02-16  Mg     1.7     -15    TPro  7.3  /  Alb  4.6  /  TBili  0.5  /  DBili  x   /  AST  29  /  ALT  20  /  AlkPhos  71  14  TPro  7.8  /  Alb  4.0  /  TBili  0.6  /  DBili  x   /  AST  28  /  ALT  23  /  AlkPhos  72  02-14      CARDIAC MARKERS:  Troponin T, High Sensitivity: 30 ng/L (02-15-19 @ 08:58)  Troponin T, High Sensitivity: 35 ng/L (02-15-19 @ 05:46)    Creatine Kinase, Serum: 551 u/L ( @ 02:45)  Creatine Kinase, Serum: 467 u/L (02-15 @ 08:58)  Creatine Kinase, Serum: 444 u/L (02-15 @ 05:46)    CKMB: 7.65 ng/mL (02-15 @ 08:58)  CKMB: 7.75 ng/mL (02-15 @ 05:46)    CKMB Relative Index: 1.7 (02-15 @ 05:46)    TELEMETRY: 	NSR with occ APCs      EC. sinus bradycardia/arrhythmia  2. NSR with occ APCs and LVH  3. atrial fibrillation with VR to 130s      PREVIOUS DIAGNOSTIC TESTING:    [ ] Echocardiogram:  Patient name: GIANNI MURPHY  YOB: 1940   Age: 78 (M)   MR#: [id number]  Study Date: 2/15/2019  [facility]  Study quality: Technically Fair  Referring Physician: Mack Landers MD  Blood Pressure:159/69 mmHg  Height: 170 cm  Weight: 66 kg  BSA: 1.8 m2  ------------------------------------------------------------------------  PROCEDURE: Transthoracic echocardiogram with 2-D, M-Mode  and complete spectral and color flow Doppler.  INDICATION: Abnormal electrocardiogram (ECG) (EKG) (R94.31)  ------------------------------------------------------------------------  DIMENSIONS:  Dimensions:     Normal Values:  LA:     3.5 cm    2.0 - 4.0 cm  Ao:     3.2 cm    2.0 - 3.8 cm  SEPTUM: 0.7 cm    0.6- 1.2 cm  PWT:    0.7 cm    0.6 - 1.1 cm  LVIDd:  4.6 cm    3.0 - 5.6 cm  LVIDs:  3.1 cm    1.8 - 4.0 cm  Derived Variables:  LVMI: 56 g/m2  RWT: 0.30  Fractional short: 33 %  Ejection Fraction (Teicholtz): 61 %  ------------------------------------------------------------------------  OBSERVATIONS:  Mitral Valve: Mitral annular calcification, otherwise  normal mitral valve. Minimal mitral regurgitation.  Aortic Root: Normal aortic root.  Aortic Valve: Aortic valve leaflet morphology not well  visualized. Mild-moderate aortic regurgitation.  Left Atrium: Normal left atrium.  Left Ventricle: Endocardium not well visualized; grossly  normal left ventricular systolic function. Normal left  ventricular internal dimensions and wall thicknesses.  Right Heart: Normal right atrium. The right ventricle is  not well visualized; grossly normal right ventricular  systolic function. Normal tricuspid valve. Minimal  tricuspid regurgitation. Normal pulmonic valve.  Pericardium/PleuraNormal pericardium with no pericardial  effusion.  ------------------------------------------------------------------------  CONCLUSIONS:  1. Mitral annular calcification, otherwise normal mitral  valve. Minimal mitral regurgitation.  2. Aortic valve leaflet morphology not well visualized.  Mild-moderate aortic regurgitation.  3. Normal left ventricular internal dimensions and wall  thicknesses.  4. Endocardium not well visualized; grossly normal left  ventricular systolic function.  5. The right ventricle is not well visualized; grossly  normal right ventricular systolic function.  ------------------------------------------------------------------------  Confirmed on  2/15/2019 - 18:31:17 by Reyes Ruby M.D.  ------------------------------------------------------------------------

## 2019-02-16 NOTE — PROCEDURE NOTE - NSPROCDETAILS_GEN_ALL_CORE
a urinary catheter insertion kit was used for all insertion materials/sterile technique, indwelling urinary device inserted

## 2019-02-16 NOTE — PROGRESS NOTE ADULT - ASSESSMENT
ASSESSMENT:  78M with prior history of B/L lower extremity bypasses (pt does not remember details, imaging shows B/L fem-pop bypass grafts) done in 1980s presented yesterday 2/14/19 with LLE pain and mild sensory and motor loss since 3:15pm. He got a massage prior to the start of symptoms and then noticed that his foot felt cold and he had pain in his calf. He also felt that he could not move his toes very well. He has no complaints in his RLE. He otherwise is ambulatory at home and denies any symptoms of claudication. He has HTN and HLD at home. He denies any history of atrial fibrillation. He is a current pack a day smoker and has done so for the past 25 years. Pt found with occluded superficial femoral artery and occluded bypass graft (fem-pop graft) on arterial duplex. CT LE with run off shows patent fem-pop graft, right proximal ant-tib artery occlusion; left fem-pop graft occluded, no flow in ant-tib, post-tib, and peroneal arteries. Pt planned to go to OR for thrombolysis of LLE fem-pop graft but, instead underwent thrombectomized old PTFE fem-pop graft and then interposition bypass from common femoral to profunda with a 5 french sheath left in the right femoral artery. Pt received heparin bolus prior to case starting. SICU consulted for Q1 vascular checks.      PLAN:       Neurologic: Sedated postop, oxycodone 5/10 for pain mgmt    Respiratory: JAVY, IS, OOB as tolerated, oxygen supplementation as needed    Cardiovascular: holding metoprolol 50 QD (home med)  - bradycardic, + T wave inversions on lateral leads, unknown baseline hx   - trending Abigail    Gastrointestinal/Nutrition: Start clears later this morning    Renal/Genitourinary: NS at 125cc/hr. Can d/c'ed once tolerating diet. Replete electrolytes PRN, Restarted home flomax 0.4mg at bedtime  - TOV @ 5am    Hematologic: F/U post op labs, transfuse if <7,    - HEPARIN GTT @ 500units; PTT 45   - watch out for compartment syndrome, q1 vascular checks   - SCDs on R leg OK   - downtrending H/H, will repeat CBC    Infectious Disease: JAVY, will monitor CBC  - afebrile     Lines/Tubes: Right radial A line, kimbrough    Endocrine: JAVY    Disposition: SICU    --------------------------------------------------------------------------------------    Critical Care Diagnoses: RLE arterial occlusion and occlusion of  bypass graft ASSESSMENT:  78M with prior history of B/L lower extremity bypasses (pt does not remember details, imaging shows B/L fem-pop bypass grafts) done in 1980s presented yesterday 2/14/19 with LLE pain and mild sensory and motor loss since 3:15pm. He got a massage prior to the start of symptoms and then noticed that his foot felt cold and he had pain in his calf. He also felt that he could not move his toes very well. He has no complaints in his RLE. He otherwise is ambulatory at home and denies any symptoms of claudication. He has HTN and HLD at home. He denies any history of atrial fibrillation. He is a current pack a day smoker and has done so for the past 25 years. Pt found with occluded superficial femoral artery and occluded bypass graft (fem-pop graft) on arterial duplex. CT LE with run off shows patent fem-pop graft, right proximal ant-tib artery occlusion; left fem-pop graft occluded, no flow in ant-tib, post-tib, and peroneal arteries. Pt planned to go to OR for thrombolysis of LLE fem-pop graft but, instead underwent thrombectomized old PTFE fem-pop graft and then interposition bypass from common femoral to profunda with a 5 french sheath left in the right femoral artery. Pt received heparin bolus prior to case starting. SICU consulted for Q1 vascular checks.      PLAN:       Neurologic: Sedated postop, oxycodone 5/10 for pain mgmt    Respiratory: JAVY, IS, OOB as tolerated, oxygen supplementation as needed    Cardiovascular: holding metoprolol 50 QD (home med)  - bradycardic, + T wave inversions on lateral leads, unknown baseline hx   - trending Abigail    Gastrointestinal/Nutrition: Start clears later this morning    Renal/Genitourinary: NS at 125cc/hr. Can d/c'ed once tolerating diet. Replete electrolytes PRN, Restarted home flomax 0.4mg at bedtime  - TOV @ 5am    Hematologic: F/U post op labs, transfuse if <7,    - HEPARIN GTT @ 500units; PTT 45   - watch out for compartment syndrome, q1 vascular checks   - SCDs on R leg OK   - downtrending H/H, f/u post-transfusion CBC    Infectious Disease: JAVY, will monitor CBC  - afebrile     Lines/Tubes: Right radial A line, kimbrough    Endocrine: JAVY    Disposition: SICU    --------------------------------------------------------------------------------------    Critical Care Diagnoses: RLE arterial occlusion and occlusion of  bypass graft ASSESSMENT:  78M with prior history of B/L lower extremity bypasses (pt does not remember details, imaging shows B/L fem-pop bypass grafts) done in 1980s presented yesterday 2/14/19 with LLE pain and mild sensory and motor loss since 3:15pm. He got a massage prior to the start of symptoms and then noticed that his foot felt cold and he had pain in his calf. He also felt that he could not move his toes very well. He has no complaints in his RLE. He otherwise is ambulatory at home and denies any symptoms of claudication. He has HTN and HLD at home. He denies any history of atrial fibrillation. He is a current pack a day smoker and has done so for the past 25 years. Pt found with occluded superficial femoral artery and occluded bypass graft (fem-pop graft) on arterial duplex. CT LE with run off shows patent fem-pop graft, right proximal ant-tib artery occlusion; left fem-pop graft occluded, no flow in ant-tib, post-tib, and peroneal arteries. Pt planned to go to OR for thrombolysis of LLE fem-pop graft but, instead underwent thrombectomized old PTFE fem-pop graft and then interposition bypass from common femoral to profunda with a 5 french sheath left in the right femoral artery. Pt received heparin bolus prior to case starting. SICU consulted for Q1 vascular checks.      PLAN:       Neurologic: Sedated postop, oxycodone 5/10 for pain mgmt    Respiratory: JAVY, IS, OOB as tolerated, oxygen supplementation as needed    Cardiovascular:   - add lopressor 5mg q6   - cardiology consult for arrhythmia   - bradycardic, + T wave inversions on lateral leads, unknown baseline hx   - trending Abigail    Gastrointestinal/Nutrition: Start clears later this morning    Renal/Genitourinary: NS at 125cc/hr. Can d/c'ed once tolerating diet. Replete electrolytes PRN, Restarted home flomax 0.4mg at bedtime  - TOV @ 5am    Hematologic: F/U post op labs, transfuse if <7,    - HEPARIN GTT @ 500units; PTT 45   - watch out for compartment syndrome, q1 vascular checks   - SCDs on R leg OK   - downtrending H/H, f/u post-transfusion CBC    Infectious Disease: JAVY, will monitor CBC  - afebrile     Lines/Tubes: Right radial A line, kimbrough    Endocrine: JAVY    Disposition: SICU    --------------------------------------------------------------------------------------    Critical Care Diagnoses: RLE arterial occlusion and occlusion of  bypass graft ASSESSMENT:  78M with prior history of B/L lower extremity bypasses (pt does not remember details, imaging shows B/L fem-pop bypass grafts) done in 1980s presented yesterday 2/14/19 with LLE pain and mild sensory and motor loss since 3:15pm. He got a massage prior to the start of symptoms and then noticed that his foot felt cold and he had pain in his calf. He also felt that he could not move his toes very well. He has no complaints in his RLE. He otherwise is ambulatory at home and denies any symptoms of claudication. He has HTN and HLD at home. He denies any history of atrial fibrillation. He is a current pack a day smoker and has done so for the past 25 years. Pt found with occluded superficial femoral artery and occluded bypass graft (fem-pop graft) on arterial duplex. CT LE with run off shows patent fem-pop graft, right proximal ant-tib artery occlusion; left fem-pop graft occluded, no flow in ant-tib, post-tib, and peroneal arteries. Pt planned to go to OR for thrombolysis of LLE fem-pop graft but, instead underwent thrombectomized old PTFE fem-pop graft and then interposition bypass from common femoral to profunda with a 5 french sheath left in the right femoral artery. Pt received heparin bolus prior to case starting. SICU consulted for Q1 vascular checks.      PLAN:       Neurologic: Sedated postop, oxycodone 5/10 for pain mgmt    Respiratory: JAVY, IS, OOB as tolerated, oxygen supplementation as needed    Cardiovascular:   - add lopressor 5mg q6   - cardiology consult for arrhythmia   - bradycardic, + T wave inversions on lateral leads, unknown baseline hx   - trending Abigail    Gastrointestinal/Nutrition: Regular diet    Renal/Genitourinary: NS at 125cc/hr. Can d/c'ed once tolerating diet. Replete electrolytes PRN, Restarted home flomax 0.4mg at bedtime  - TOV @ 1 pm    Hematologic: F/U post op labs, transfuse if <7,    - HEPARIN GTT @ 500units; PTT 45 Increased to 700 with PTT goal of 60-80  - watch out for compartment syndrome, q1 vascular checks   - SCDs on R leg OK   - downtrending H/H, f/u post-transfusion CBC    Infectious Disease: JAVY, will monitor CBC  - afebrile     Lines/Tubes: Right radial A line, kimbrough    Endocrine: JAVY    Disposition: SICU    --------------------------------------------------------------------------------------    Critical Care Diagnoses: RLE arterial occlusion and occlusion of  bypass graft

## 2019-02-17 LAB
ANION GAP SERPL CALC-SCNC: 11 MMO/L — SIGNIFICANT CHANGE UP (ref 7–14)
APTT BLD: 61.4 SEC — HIGH (ref 27.5–36.3)
BUN SERPL-MCNC: 29 MG/DL — HIGH (ref 7–23)
CA-I BLD-SCNC: 1.05 MMOL/L — SIGNIFICANT CHANGE UP (ref 1.03–1.23)
CALCIUM SERPL-MCNC: 7.9 MG/DL — LOW (ref 8.4–10.5)
CHLORIDE SERPL-SCNC: 103 MMOL/L — SIGNIFICANT CHANGE UP (ref 98–107)
CK SERPL-CCNC: 285 U/L — HIGH (ref 30–200)
CO2 SERPL-SCNC: 21 MMOL/L — LOW (ref 22–31)
CREAT SERPL-MCNC: 1.52 MG/DL — HIGH (ref 0.5–1.3)
GLUCOSE SERPL-MCNC: 124 MG/DL — HIGH (ref 70–99)
HCT VFR BLD CALC: 20.7 % — CRITICAL LOW (ref 39–50)
HCT VFR BLD CALC: 22.1 % — LOW (ref 39–50)
HCT VFR BLD CALC: 24.3 % — LOW (ref 39–50)
HCT VFR BLD CALC: 26.5 % — LOW (ref 39–50)
HGB BLD-MCNC: 7 G/DL — CRITICAL LOW (ref 13–17)
HGB BLD-MCNC: 7.6 G/DL — LOW (ref 13–17)
HGB BLD-MCNC: 8.3 G/DL — LOW (ref 13–17)
HGB BLD-MCNC: 9.1 G/DL — LOW (ref 13–17)
INR BLD: 1.17 — SIGNIFICANT CHANGE UP (ref 0.88–1.17)
MAGNESIUM SERPL-MCNC: 2 MG/DL — SIGNIFICANT CHANGE UP (ref 1.6–2.6)
MCHC RBC-ENTMCNC: 30.2 PG — SIGNIFICANT CHANGE UP (ref 27–34)
MCHC RBC-ENTMCNC: 30.3 PG — SIGNIFICANT CHANGE UP (ref 27–34)
MCHC RBC-ENTMCNC: 30.4 PG — SIGNIFICANT CHANGE UP (ref 27–34)
MCHC RBC-ENTMCNC: 30.5 PG — SIGNIFICANT CHANGE UP (ref 27–34)
MCHC RBC-ENTMCNC: 33.8 % — SIGNIFICANT CHANGE UP (ref 32–36)
MCHC RBC-ENTMCNC: 34.2 % — SIGNIFICANT CHANGE UP (ref 32–36)
MCHC RBC-ENTMCNC: 34.3 % — SIGNIFICANT CHANGE UP (ref 32–36)
MCHC RBC-ENTMCNC: 34.4 % — SIGNIFICANT CHANGE UP (ref 32–36)
MCV RBC AUTO: 87.7 FL — SIGNIFICANT CHANGE UP (ref 80–100)
MCV RBC AUTO: 88.7 FL — SIGNIFICANT CHANGE UP (ref 80–100)
MCV RBC AUTO: 88.9 FL — SIGNIFICANT CHANGE UP (ref 80–100)
MCV RBC AUTO: 90 FL — SIGNIFICANT CHANGE UP (ref 80–100)
NRBC # FLD: 0 K/UL — LOW (ref 25–125)
PHOSPHATE SERPL-MCNC: 2.9 MG/DL — SIGNIFICANT CHANGE UP (ref 2.5–4.5)
PLATELET # BLD AUTO: 100 K/UL — LOW (ref 150–400)
PLATELET # BLD AUTO: 114 K/UL — LOW (ref 150–400)
PLATELET # BLD AUTO: 116 K/UL — LOW (ref 150–400)
PLATELET # BLD AUTO: 127 K/UL — LOW (ref 150–400)
PMV BLD: 10.4 FL — SIGNIFICANT CHANGE UP (ref 7–13)
PMV BLD: 10.4 FL — SIGNIFICANT CHANGE UP (ref 7–13)
PMV BLD: 10.6 FL — SIGNIFICANT CHANGE UP (ref 7–13)
PMV BLD: 10.7 FL — SIGNIFICANT CHANGE UP (ref 7–13)
POTASSIUM SERPL-MCNC: 4.3 MMOL/L — SIGNIFICANT CHANGE UP (ref 3.5–5.3)
POTASSIUM SERPL-SCNC: 4.3 MMOL/L — SIGNIFICANT CHANGE UP (ref 3.5–5.3)
PROTHROM AB SERPL-ACNC: 13.1 SEC — SIGNIFICANT CHANGE UP (ref 9.8–13.1)
RBC # BLD: 2.3 M/UL — LOW (ref 4.2–5.8)
RBC # BLD: 2.52 M/UL — LOW (ref 4.2–5.8)
RBC # BLD: 2.74 M/UL — LOW (ref 4.2–5.8)
RBC # BLD: 2.98 M/UL — LOW (ref 4.2–5.8)
RBC # FLD: 15 % — HIGH (ref 10.3–14.5)
RBC # FLD: 15.6 % — HIGH (ref 10.3–14.5)
RBC # FLD: 16.2 % — HIGH (ref 10.3–14.5)
RBC # FLD: 16.3 % — HIGH (ref 10.3–14.5)
SODIUM SERPL-SCNC: 135 MMOL/L — SIGNIFICANT CHANGE UP (ref 135–145)
WBC # BLD: 10.77 K/UL — HIGH (ref 3.8–10.5)
WBC # BLD: 11.1 K/UL — HIGH (ref 3.8–10.5)
WBC # BLD: 11.22 K/UL — HIGH (ref 3.8–10.5)
WBC # BLD: 11.73 K/UL — HIGH (ref 3.8–10.5)
WBC # FLD AUTO: 10.77 K/UL — HIGH (ref 3.8–10.5)
WBC # FLD AUTO: 11.1 K/UL — HIGH (ref 3.8–10.5)
WBC # FLD AUTO: 11.22 K/UL — HIGH (ref 3.8–10.5)
WBC # FLD AUTO: 11.73 K/UL — HIGH (ref 3.8–10.5)

## 2019-02-17 PROCEDURE — 99291 CRITICAL CARE FIRST HOUR: CPT

## 2019-02-17 PROCEDURE — 74176 CT ABD & PELVIS W/O CONTRAST: CPT | Mod: 26

## 2019-02-17 PROCEDURE — 93010 ELECTROCARDIOGRAM REPORT: CPT

## 2019-02-17 RX ORDER — SODIUM CHLORIDE 9 MG/ML
1000 INJECTION, SOLUTION INTRAVENOUS
Qty: 0 | Refills: 0 | Status: DISCONTINUED | OUTPATIENT
Start: 2019-02-17 | End: 2019-02-18

## 2019-02-17 RX ORDER — OXYBUTYNIN CHLORIDE 5 MG
10 TABLET ORAL DAILY
Qty: 0 | Refills: 0 | Status: DISCONTINUED | OUTPATIENT
Start: 2019-02-17 | End: 2019-02-17

## 2019-02-17 RX ORDER — HEPARIN SODIUM 5000 [USP'U]/ML
800 INJECTION INTRAVENOUS; SUBCUTANEOUS
Qty: 25000 | Refills: 0 | Status: DISCONTINUED | OUTPATIENT
Start: 2019-02-17 | End: 2019-02-17

## 2019-02-17 RX ORDER — OXYBUTYNIN CHLORIDE 5 MG
10 TABLET ORAL DAILY
Qty: 0 | Refills: 0 | Status: DISCONTINUED | OUTPATIENT
Start: 2019-02-17 | End: 2019-02-22

## 2019-02-17 RX ADMIN — TAMSULOSIN HYDROCHLORIDE 0.4 MILLIGRAM(S): 0.4 CAPSULE ORAL at 21:19

## 2019-02-17 RX ADMIN — Medication 25 MILLIGRAM(S): at 05:55

## 2019-02-17 RX ADMIN — AMLODIPINE BESYLATE 10 MILLIGRAM(S): 2.5 TABLET ORAL at 05:54

## 2019-02-17 RX ADMIN — Medication 10 MILLIGRAM(S): at 18:38

## 2019-02-17 RX ADMIN — CHLORHEXIDINE GLUCONATE 1 APPLICATION(S): 213 SOLUTION TOPICAL at 07:00

## 2019-02-17 RX ADMIN — Medication 25 MILLIGRAM(S): at 18:38

## 2019-02-17 NOTE — PROGRESS NOTE ADULT - SUBJECTIVE AND OBJECTIVE BOX
SICU AM PROGRESS NOTE:  INTERVAL EVENTS: received 1 unit PRBCs for downtrending H/H. Episode of bradycardia and then PAF to 130's. Pt received 2.5 mg Lopressor x1 with resolution.  EP consulted due to concern for tachybrady syndrome. Pt already on full AC with therapeutic PTT.  Coude kimbrough placed for urinary retention. No acute events overnight.     HPI: 78M with prior history of B/L lower extremity bypasses (pt does not remember details, imaging shows B/L fem-pop bypass grafts) done in 1980s presented yesterday 2/14/19 with LLE pain and mild sensory and motor loss since 3:15pm. He got a massage prior to the start of symptoms and then noticed that his foot felt cold and he had pain in his calf. He also felt that he could not move his toes very well. He has no complaints in his RLE. He otherwise is ambulatory at home and denies any symptoms of claudication. He has HTN and HLD at home. He denies any history of atrial fibrillation. He is a current pack a day smoker and has done so for the past 25 years. Pt found with occluded superficial femoral artery and occluded bypass graft (fem-pop graft) on arterial duplex. CT LE with run off shows patent fem-pop graft, right proximal ant-tib artery occlusion; left fem-pop graft occluded, no flow in ant-tib, post-tib, and peroneal arteries. Pt planned to go to OR for thrombolysis of LLE fem-pop graft but, instead underwent thrombectomized old PTFE fem-pop graft and then interposition bypass from common femoral to profunda with a 5 Israeli sheath left in the right femoral artery. Pt received heparin bolus prior to case starting. SICU consulted for Q1 vascular checks.    SUBJECTIVE/ROS:  [x] A ten-point review of systems was otherwise negative except as noted.  [ ] Due to altered mental status/intubation, subjective information were not able to be obtained from the patient. History was obtained, to the extent possible, from review of the chart and collateral sources of information.      VITALS:  T(C): 36.9 (02-17-19 @ 00:00), Max: 36.9 (02-16-19 @ 16:00)  HR: 80 (02-17-19 @ 00:00) (79 - 112)  BP: 147/51 (02-17-19 @ 00:00) (134/49 - 147/51)  BP(mean): 71 (02-17-19 @ 00:00) (69 - 71)  RR: 24 (02-17-19 @ 00:00) (18 - 32)  SpO2: 100% (02-17-19 @ 00:00) (96% - 100%)  Wt(kg): --  CAPILLARY BLOOD GLUCOSE      NEURO  RASS:     GCS:     CAM ICU:  Exam: alert & oriented  Meds: oxyCODONE    IR 5 milliGRAM(s) Oral every 6 hours PRN Moderate Pain (4 - 6)  oxyCODONE    IR 10 milliGRAM(s) Oral every 6 hours PRN Severe Pain (7 - 10)    [x] Adequacy of sedation and pain control has been assessed and adjusted      RESPIRATORY  Exam: unlabored, clear to auscultation bilaterally       CARDIOVASCULAR  Exam:   Cardiac Rhythm: NSR with PAC's on tele   Meds: tamsulosin 0.4 milliGRAM(s) Oral at bedtime    GI/NUTRITION  Exam: soft, NT, ND  Diet: regular  Meds:     GENITOURINARY  02-15 @ 07:01  -  02-16 @ 07:00  --------------------------------------------------------  IN:    heparin Infusion: 5 mL    heparin Infusion: 90 mL    Oral Fluid: 620 mL    sodium chloride 0.9%: 350 mL  Total IN: 1065 mL    OUT:    Indwelling Catheter - Urethral: 170 mL    Post-Void Residual per Intermittent Catheterization: 600 mL  Total OUT: 770 mL    Total NET: 295 mL      02-16 @ 07:01  -  02-17 @ 01:57  --------------------------------------------------------  IN:    heparin Infusion: 126 mL    lactated ringers.: 700 mL  Total IN: 826 mL    OUT:    Indwelling Catheter - Urethral: 440 mL    Voided: 175 mL  Total OUT: 615 mL    Total NET: 211 mL    Kimbrough catheter  Meds: sodium chloride 0.9%. 1000 milliLiter(s) IV Continuous <Continuous>        HEMATOLOGIC  Meds: heparin  Infusion 500 Unit(s)/Hr IV Continuous <Continuous>  [x] VTE Prophylaxis    INFECTIOUS DISEASES  Recent Cultures:    Meds: influenza   Vaccine 0.5 milliLiter(s) IntraMuscular once    ENDOCRINE  Capillary Blood Glucose    Meds:       ACCESS DEVICES:  [x] Peripheral IV  [ ] Central Venous Line	[ ] R	[ ] L	[ ] IJ	        [ ] Fem	[ ] SC	Placed:   [ ] Arterial Line		        [ ] R	[ ] L	[ ] Fem	[ ] Rad	[ ] Ax	Placed:   [ ] PICC:					[ ] Mediport  [ ] Urinary Catheter, Date Placed:   [ ] Necessity of urinary, arterial, and venous catheters discussed    OTHER MEDICATIONS:  chlorhexidine 4% Liquid 1 Application(s) Topical <User Schedule>    LABS:    CBC (02-16 @ 16:50)                              9.0<L>                         12.67<H>  )----------------(  136<L>     --    % Neutrophils, --    % Lymphocytes, ANC: --                                  26.5<L>  CBC (02-16 @ 10:00)                              9.3<L>                         12.73<H>  )----------------(  134<L>     --    % Neutrophils, --    % Lymphocytes, ANC: --                                  27.6<L>    BMP (02-16 @ 02:45)             135     |  102     |  32<H> 		Ca++ --      Ca 7.9<L>             ---------------------------------( 129<H>		Mg 1.8                4.4     |  20<L>   |  1.96<H>			Ph 3.5     BMP (02-15 @ 17:40)             135     |  103     |  26<H> 		Ca++ --      Ca 7.7<L>             ---------------------------------( 165<H>		Mg 1.7                4.8     |  18<L>   |  1.62<H>			Ph 4.2         Coags (02-16 @ 23:20)  aPTT 67.6<H> / INR -- / PT --  Coags (02-16 @ 16:50)  aPTT 66.4<H> / INR -- / PT --    Cardiac Markers (02-16 @ 02:45)     Trop: -- -- / CKMB: -- / CK: 551

## 2019-02-17 NOTE — PROGRESS NOTE ADULT - SUBJECTIVE AND OBJECTIVE BOX
Surgery Progress Note      Subjective: Patient seen and examined. received 1 unit PRBCs for downtrending H/H. Episode of bradycardia and then PAF to 130's - received 2.5 mg Lopressor x1 with resolution.  EP consulted due to concern for tachybrady syndrome. Castro replaced for urinary retention     T(C): 36.9 (02-17-19 @ 04:00), Max: 36.9 (02-16-19 @ 16:00)  HR: 84 (02-17-19 @ 06:00) (79 - 112)  BP: 134/47 (02-17-19 @ 02:00) (134/47 - 147/51)  RR: 23 (02-17-19 @ 06:00) (18 - 32)  SpO2: 97% (02-17-19 @ 06:00) (96% - 100%)      02-15-19 @ 07:01  -  02-16-19 @ 07:00  --------------------------------------------------------  IN: 1065 mL / OUT: 770 mL / NET: 295 mL    02-16-19 @ 07:01  -  02-17-19 @ 06:52  --------------------------------------------------------  IN: 1182 mL / OUT: 890 mL / NET: 292 mL        Physical Exam:   palpable left popliteal artery, palpable/+doppler signal left DP pulse, +doppler signal left peroneal artery. Dusky toes noted on left. Left foot cool to touch, reduced cap refill. Left groin with moderately saturated dressing (serosanguinous) and right groin with 5 [place] sheath still in place       Labs:                          7.6    11.73 )-----------( 116      ( 17 Feb 2019 02:30 )             22.1     02-17    135  |  103  |  29<H>  ----------------------------<  124<H>  4.3   |  21<L>  |  1.52<H>    Ca    7.9<L>      17 Feb 2019 02:30  Phos  2.9     02-17  Mg     2.0     02-17        Medications:     acetaminophen   Tablet .. 650 milliGRAM(s) Oral every 6 hours PRN  amLODIPine   Tablet 10 milliGRAM(s) Oral daily  chlorhexidine 4% Liquid 1 Application(s) Topical <User Schedule>  heparin  Infusion. 800 Unit(s)/Hr IV Continuous <Continuous>  influenza   Vaccine 0.5 milliLiter(s) IntraMuscular once  lactated ringers. 1000 milliLiter(s) IV Continuous <Continuous>  metoprolol tartrate 25 milliGRAM(s) Oral two times a day  oxyCODONE    IR 5 milliGRAM(s) Oral every 6 hours PRN  oxyCODONE    IR 10 milliGRAM(s) Oral every 6 hours PRN  tamsulosin 0.4 milliGRAM(s) Oral at bedtime      Radiographs: No new imaging

## 2019-02-17 NOTE — PROGRESS NOTE ADULT - ASSESSMENT
78M s/p thrombectomized old PTFE fem-pop graft and then interposition bypass from common femoral to profunda with a 5 [place] sheath left in the right femoral artery for RLE arterial occlusion and occlusion of  bypass graft    - continue heparin gtt  - trend h/h  - pain control  - appreciate EP recommendations  - appreciate excellent SICU care 78M s/p thrombectomized old PTFE fem-pop graft and then interposition bypass from common femoral to profunda with a 5 [place]uv[place] sheath left in the right femoral artery for RLE arterial occlusion and occlusion of  bypass graft    - continue heparin gtt  - trend h/h  - pain control  - CT a/p to r/o RP hematoma  - appreciate EP recommendations  - appreciate excellent SICU care

## 2019-02-17 NOTE — PROGRESS NOTE ADULT - ASSESSMENT
ASSESSMENT:  78M with prior history of B/L lower extremity bypasses (pt does not remember details, imaging shows B/L fem-pop bypass grafts) done in 1980s presented yesterday 2/14/19 with LLE pain and mild sensory and motor loss since 3:15pm. He got a massage prior to the start of symptoms and then noticed that his foot felt cold and he had pain in his calf. He also felt that he could not move his toes very well. He has no complaints in his RLE. He otherwise is ambulatory at home and denies any symptoms of claudication. He has HTN and HLD at home. He denies any history of atrial fibrillation. He is a current pack a day smoker and has done so for the past 25 years. Pt found with occluded superficial femoral artery and occluded bypass graft (fem-pop graft) on arterial duplex. CT LE with run off shows patent fem-pop graft, right proximal ant-tib artery occlusion; left fem-pop graft occluded, no flow in ant-tib, post-tib, and peroneal arteries. Pt planned to go to OR for thrombolysis of LLE fem-pop graft but, instead underwent thrombectomized old PTFE fem-pop graft and then interposition bypass from common femoral to profunda with a 5 french sheath left in the right femoral artery. Pt received heparin bolus prior to case starting. SICU consulted for Q1 vascular checks.      PLAN:       Neurologic:   -continue pain control with current regimen of oxycodone 5/10     Respiratory:   -oxygen supplementation as needed    Cardiovascular:   -? tachy/katey syndrome; metoprolol 25 mg BID added and home amlodipine restarted   - cardiology rec.'s possible PPM prior to d/c  - bradycardic, + T wave inversions on lateral leads, unknown baseline hx   - trending Abigail- all negative     Gastrointestinal/Nutrition: Regular diet    Renal/Genitourinary:   -Restarted home flomax 0.4mg at bedtime  - coude kimbrough catheter inserted for urinary retention     Hematologic: transfuse if <7,    - HEPARIN GTT @ 800units- pTT therapeutic   - watch out for compartment syndrome, q1 vascular checks   - SCDs on R leg OK   - continue to monitor h/h    Infectious Disease: JAVY, will monitor CBC  - afebrile     Lines/Tubes: Right radial A line, kimbrough    Endocrine: JAVY    Disposition: SICU    --------------------------------------------------------------------------------------    Critical Care Diagnoses: RLE arterial occlusion and occlusion of  bypass graft ASSESSMENT:  78M with prior history of B/L lower extremity bypasses (pt does not remember details, imaging shows B/L fem-pop bypass grafts) done in 1980s presented yesterday 2/14/19 with LLE pain and mild sensory and motor loss since 3:15pm. He got a massage prior to the start of symptoms and then noticed that his foot felt cold and he had pain in his calf. He also felt that he could not move his toes very well. He has no complaints in his RLE. He otherwise is ambulatory at home and denies any symptoms of claudication. He has HTN and HLD at home. He denies any history of atrial fibrillation. He is a current pack a day smoker and has done so for the past 25 years. Pt found with occluded superficial femoral artery and occluded bypass graft (fem-pop graft) on arterial duplex. CT LE with run off shows patent fem-pop graft, right proximal ant-tib artery occlusion; left fem-pop graft occluded, no flow in ant-tib, post-tib, and peroneal arteries. Pt planned to go to OR for thrombolysis of LLE fem-pop graft but, instead underwent thrombectomized old PTFE fem-pop graft and then interposition bypass from common femoral to profunda with a 5 french sheath left in the right femoral artery. Pt received heparin bolus prior to case starting. SICU consulted for Q1 vascular checks.      PLAN:       Neurologic:   -continue pain control with current regimen of oxycodone 5/10     Respiratory:   -oxygen supplementation as needed  - OOB     Cardiovascular: now w/ mobitz type 2 heart block  -? tachy/katey syndrome; c/w metoprolol 25 mg BID and home amlodipine  - cardiology rec.'s possible PPM prior to d/c  - bradycardic, + T wave inversions on lateral leads, unknown baseline hx   - Abigail- all negative     Gastrointestinal/Nutrition: Regular diet, tolerating     Renal/Genitourinary:   - home flomax 0.4mg at bedtime  - coude kimbrough catheter   - restarted home oxybutynin     Hematologic: transfuse if <7,    - HEPARIN GTT @ 800units- pTT therapeutic   - watch out for compartment syndrome, q1 vascular checks   - SCDs on R leg OK   - continue to monitor h/h q8h, downtrending but no obvious signs of bleeding   - CTA&P (noncontrast) r/o retroperitoneal bleed     Infectious Disease: JAVY, will monitor CBC  - afebrile     Lines/Tubes: Right radial A line, luis e    Endocrine: JAVY    Disposition: SICU    --------------------------------------------------------------------------------------    Critical Care Diagnoses: RLE arterial occlusion and occlusion of  bypass graft, acute blood loss anemia, s/p thrombectomy, risk of malnutrition,

## 2019-02-18 LAB
ANION GAP SERPL CALC-SCNC: 13 MMO/L — SIGNIFICANT CHANGE UP (ref 7–14)
APTT BLD: 31.9 SEC — SIGNIFICANT CHANGE UP (ref 27.5–36.3)
BASOPHILS # BLD AUTO: 0.02 K/UL — SIGNIFICANT CHANGE UP (ref 0–0.2)
BASOPHILS NFR BLD AUTO: 0.2 % — SIGNIFICANT CHANGE UP (ref 0–2)
BLD GP AB SCN SERPL QL: NEGATIVE — SIGNIFICANT CHANGE UP
BUN SERPL-MCNC: 30 MG/DL — HIGH (ref 7–23)
CALCIUM SERPL-MCNC: 7.6 MG/DL — LOW (ref 8.4–10.5)
CHLORIDE SERPL-SCNC: 101 MMOL/L — SIGNIFICANT CHANGE UP (ref 98–107)
CO2 SERPL-SCNC: 21 MMOL/L — LOW (ref 22–31)
CREAT SERPL-MCNC: 1.5 MG/DL — HIGH (ref 0.5–1.3)
EOSINOPHIL # BLD AUTO: 0 K/UL — SIGNIFICANT CHANGE UP (ref 0–0.5)
EOSINOPHIL NFR BLD AUTO: 0 % — SIGNIFICANT CHANGE UP (ref 0–6)
GLUCOSE SERPL-MCNC: 122 MG/DL — HIGH (ref 70–99)
HCT VFR BLD CALC: 24.2 % — LOW (ref 39–50)
HCT VFR BLD CALC: 24.7 % — LOW (ref 39–50)
HCT VFR BLD CALC: 27 % — LOW (ref 39–50)
HGB BLD-MCNC: 8.5 G/DL — LOW (ref 13–17)
HGB BLD-MCNC: 8.5 G/DL — LOW (ref 13–17)
HGB BLD-MCNC: 9.2 G/DL — LOW (ref 13–17)
IMM GRANULOCYTES NFR BLD AUTO: 0.4 % — SIGNIFICANT CHANGE UP (ref 0–1.5)
INR BLD: 1.05 — SIGNIFICANT CHANGE UP (ref 0.88–1.17)
LYMPHOCYTES # BLD AUTO: 1.23 K/UL — SIGNIFICANT CHANGE UP (ref 1–3.3)
LYMPHOCYTES # BLD AUTO: 13.2 % — SIGNIFICANT CHANGE UP (ref 13–44)
MAGNESIUM SERPL-MCNC: 2.2 MG/DL — SIGNIFICANT CHANGE UP (ref 1.6–2.6)
MCHC RBC-ENTMCNC: 30.2 PG — SIGNIFICANT CHANGE UP (ref 27–34)
MCHC RBC-ENTMCNC: 30.6 PG — SIGNIFICANT CHANGE UP (ref 27–34)
MCHC RBC-ENTMCNC: 30.7 PG — SIGNIFICANT CHANGE UP (ref 27–34)
MCHC RBC-ENTMCNC: 34.1 % — SIGNIFICANT CHANGE UP (ref 32–36)
MCHC RBC-ENTMCNC: 34.4 % — SIGNIFICANT CHANGE UP (ref 32–36)
MCHC RBC-ENTMCNC: 35.1 % — SIGNIFICANT CHANGE UP (ref 32–36)
MCV RBC AUTO: 87.4 FL — SIGNIFICANT CHANGE UP (ref 80–100)
MCV RBC AUTO: 87.9 FL — SIGNIFICANT CHANGE UP (ref 80–100)
MCV RBC AUTO: 89.7 FL — SIGNIFICANT CHANGE UP (ref 80–100)
MONOCYTES # BLD AUTO: 1.24 K/UL — HIGH (ref 0–0.9)
MONOCYTES NFR BLD AUTO: 13.3 % — SIGNIFICANT CHANGE UP (ref 2–14)
NEUTROPHILS # BLD AUTO: 6.8 K/UL — SIGNIFICANT CHANGE UP (ref 1.8–7.4)
NEUTROPHILS NFR BLD AUTO: 72.9 % — SIGNIFICANT CHANGE UP (ref 43–77)
NRBC # FLD: 0.02 K/UL — LOW (ref 25–125)
PHOSPHATE SERPL-MCNC: 2.2 MG/DL — LOW (ref 2.5–4.5)
PLATELET # BLD AUTO: 103 K/UL — LOW (ref 150–400)
PLATELET # BLD AUTO: 95 K/UL — LOW (ref 150–400)
PLATELET # BLD AUTO: 97 K/UL — LOW (ref 150–400)
PMV BLD: 10 FL — SIGNIFICANT CHANGE UP (ref 7–13)
PMV BLD: 10.6 FL — SIGNIFICANT CHANGE UP (ref 7–13)
PMV BLD: 10.6 FL — SIGNIFICANT CHANGE UP (ref 7–13)
POTASSIUM SERPL-MCNC: 3.8 MMOL/L — SIGNIFICANT CHANGE UP (ref 3.5–5.3)
POTASSIUM SERPL-SCNC: 3.8 MMOL/L — SIGNIFICANT CHANGE UP (ref 3.5–5.3)
PROTHROM AB SERPL-ACNC: 11.7 SEC — SIGNIFICANT CHANGE UP (ref 9.8–13.1)
RBC # BLD: 2.77 M/UL — LOW (ref 4.2–5.8)
RBC # BLD: 2.81 M/UL — LOW (ref 4.2–5.8)
RBC # BLD: 3.01 M/UL — LOW (ref 4.2–5.8)
RBC # FLD: 15.1 % — HIGH (ref 10.3–14.5)
RBC # FLD: 15.2 % — HIGH (ref 10.3–14.5)
RBC # FLD: 15.8 % — HIGH (ref 10.3–14.5)
RH IG SCN BLD-IMP: POSITIVE — SIGNIFICANT CHANGE UP
SODIUM SERPL-SCNC: 135 MMOL/L — SIGNIFICANT CHANGE UP (ref 135–145)
WBC # BLD: 10.5 K/UL — SIGNIFICANT CHANGE UP (ref 3.8–10.5)
WBC # BLD: 9.33 K/UL — SIGNIFICANT CHANGE UP (ref 3.8–10.5)
WBC # BLD: 9.95 K/UL — SIGNIFICANT CHANGE UP (ref 3.8–10.5)
WBC # FLD AUTO: 10.5 K/UL — SIGNIFICANT CHANGE UP (ref 3.8–10.5)
WBC # FLD AUTO: 9.33 K/UL — SIGNIFICANT CHANGE UP (ref 3.8–10.5)
WBC # FLD AUTO: 9.95 K/UL — SIGNIFICANT CHANGE UP (ref 3.8–10.5)

## 2019-02-18 PROCEDURE — 99233 SBSQ HOSP IP/OBS HIGH 50: CPT

## 2019-02-18 RX ORDER — HEPARIN SODIUM 5000 [USP'U]/ML
5 INJECTION INTRAVENOUS; SUBCUTANEOUS
Qty: 25000 | Refills: 0 | Status: DISCONTINUED | OUTPATIENT
Start: 2019-02-18 | End: 2019-02-18

## 2019-02-18 RX ORDER — CLOPIDOGREL BISULFATE 75 MG/1
300 TABLET, FILM COATED ORAL ONCE
Qty: 0 | Refills: 0 | Status: DISCONTINUED | OUTPATIENT
Start: 2019-02-18 | End: 2019-02-18

## 2019-02-18 RX ORDER — HEPARIN SODIUM 5000 [USP'U]/ML
5000 INJECTION INTRAVENOUS; SUBCUTANEOUS EVERY 8 HOURS
Qty: 0 | Refills: 0 | Status: DISCONTINUED | OUTPATIENT
Start: 2019-02-18 | End: 2019-02-19

## 2019-02-18 RX ADMIN — HEPARIN SODIUM 5000 UNIT(S): 5000 INJECTION INTRAVENOUS; SUBCUTANEOUS at 13:47

## 2019-02-18 RX ADMIN — HEPARIN SODIUM 5000 UNIT(S): 5000 INJECTION INTRAVENOUS; SUBCUTANEOUS at 21:40

## 2019-02-18 RX ADMIN — Medication 10 MILLIGRAM(S): at 11:44

## 2019-02-18 RX ADMIN — TAMSULOSIN HYDROCHLORIDE 0.4 MILLIGRAM(S): 0.4 CAPSULE ORAL at 21:40

## 2019-02-18 RX ADMIN — CHLORHEXIDINE GLUCONATE 1 APPLICATION(S): 213 SOLUTION TOPICAL at 10:00

## 2019-02-18 RX ADMIN — Medication 25 MILLIGRAM(S): at 06:40

## 2019-02-18 RX ADMIN — AMLODIPINE BESYLATE 10 MILLIGRAM(S): 2.5 TABLET ORAL at 06:40

## 2019-02-18 RX ADMIN — Medication 25 MILLIGRAM(S): at 17:39

## 2019-02-18 NOTE — PROGRESS NOTE ADULT - ASSESSMENT
78M s/p thrombectomized old PTFE left leg fem-pop graft and then left leg interposition bypass from common femoral to profunda with a 5 [place]ian sheath left in the right femoral artery for RLE arterial occlusion and occlusion of bypass graft. Arabic sheath on right side then removed 2hrs post-op. C/b H&H drop and CT scan on 2/17 with 7.3 cm left-sided retroperitoneal hematoma so heparin gtt was subsequently stopped and pt given 2u pRBC with good post-transfusion response.    - Continue to hold heparin drip at this time. C/w monitoring H&H.  - Urinary retention: coude replaced on 2/17. Continue flomax & home meds, continue kimbrough for now.  - Pain control.  - Appreciate EP recommendations.  - Appreciate excellent SICU care.

## 2019-02-18 NOTE — CHART NOTE - NSCHARTNOTEFT_GEN_A_CORE
GENERAL SURGERY FLOOR TRANSFER NOTE    78y Male transferred to floor from SICU    SUBJECTIVE:   78M s/p thrombectomized old PTFE left leg fem-pop graft and then left leg interposition bypass from common femoral to profunda with a 5 [place] sheath left in the right femoral artery for RLE arterial occlusion and occlusion of bypass graft. Swazi sheath on right side then removed 2hrs post-op. C/b H&H drop and CT scan on 2/17 with 7.3 cm left-sided retroperitoneal hematoma so heparin gtt was subsequently stopped and pt given 2u pRBC with good post-transfusion response. Pt has been HDS. Transferred to the floor.       OBJECTIVE:    T(C): 36.9 (02-18-19 @ 18:38), Max: 37.5 (02-18-19 @ 08:00)  HR: 83 (02-18-19 @ 18:38) (69 - 91)  BP: 146/48 (02-18-19 @ 18:38) (99/46 - 146/48)  RR: 24 (02-18-19 @ 18:38) (19 - 29)  SpO2: 96% (02-18-19 @ 18:38) (94% - 98%)  Wt(kg): --      I&O's Summary    17 Feb 2019 07:01  -  18 Feb 2019 07:00  --------------------------------------------------------  IN: 1240 mL / OUT: 855 mL / NET: 385 mL    18 Feb 2019 07:01  -  18 Feb 2019 19:14  --------------------------------------------------------  IN: 150 mL / OUT: 480 mL / NET: -330 mL                              9.2    10.50 )-----------( 103      ( 18 Feb 2019 12:00 )             27.0       02-18    135  |  101  |  30<H>  ----------------------------<  122<H>  3.8   |  21<L>  |  1.50<H>    Ca    7.6<L>      18 Feb 2019 00:00  Phos  2.2     02-18  Mg     2.2     02-18        MEDICATIONS  (STANDING):  amLODIPine   Tablet 10 milliGRAM(s) Oral daily  heparin  Injectable 5000 Unit(s) SubCutaneous every 8 hours  influenza   Vaccine 0.5 milliLiter(s) IntraMuscular once  metoprolol tartrate 25 milliGRAM(s) Oral two times a day  oxybutynin XL 10 milliGRAM(s) Oral daily  tamsulosin 0.4 milliGRAM(s) Oral at bedtime    MEDICATIONS  (PRN):  acetaminophen   Tablet .. 650 milliGRAM(s) Oral every 6 hours PRN Mild Pain (1 - 3)  oxyCODONE    IR 5 milliGRAM(s) Oral every 6 hours PRN Moderate Pain (4 - 6)  oxyCODONE    IR 10 milliGRAM(s) Oral every 6 hours PRN Severe Pain (7 - 10)        Objective:  Exam:  Gen: alert, oriented, in NAD  Cards: regular  Resp: non-labored breathing  Pulses: palpable left popliteal artery, +doppler signal left DP, +doppler signal left peroneal artery. Dusky toes noted on left. Left foot cool to touch, reduced cap refill. Left groin with moderately saturated dressing (serosanguinous).  Right side: palp PT.    ASSESSMENT     78M s/p thrombectomized old PTFE left leg fem-pop graft and then left leg interposition bypass from common femoral to profunda with a 5 [place] sheath left in the right femoral artery for RLE arterial occlusion and occlusion of bypass graft. Swazi sheath on right side then removed 2hrs post-op. C/b H&H drop and CT scan on 2/17 with 7.3 cm left-sided retroperitoneal hematoma so heparin gtt was subsequently stopped and pt given 2u pRBC with good post-transfusion response.    - Continue to hold heparin drip at this time. C/w monitoring H&H.  - Urinary retention: coude replaced on 2/17. Continue flomax & home meds, continue kimbrough for now.  - Pain control  - Regular diet  - Appreciate EP recommendations.  - F/u am labs  - DVT ppx - SCD on RLE     Team C [id number]

## 2019-02-18 NOTE — PROGRESS NOTE ADULT - ASSESSMENT
ASSESSMENT:  78M with prior history of B/L lower extremity bypasses (pt does not remember details, imaging shows B/L fem-pop bypass grafts) done in 1980s presented yesterday 2/14/19 with LLE pain and mild sensory and motor loss since 3:15pm. He got a massage prior to the start of symptoms and then noticed that his foot felt cold and he had pain in his calf. He also felt that he could not move his toes very well. He has no complaints in his RLE. He otherwise is ambulatory at home and denies any symptoms of claudication. He has HTN and HLD at home. He denies any history of atrial fibrillation. He is a current pack a day smoker and has done so for the past 25 years. Pt found with occluded superficial femoral artery and occluded bypass graft (fem-pop graft) on arterial duplex. CT LE with run off shows patent fem-pop graft, right proximal ant-tib artery occlusion; left fem-pop graft occluded, no flow in ant-tib, post-tib, and peroneal arteries. Pt planned to go to OR for thrombolysis of LLE fem-pop graft but, instead underwent thrombectomized old PTFE fem-pop graft and then interposition bypass from common femoral to profunda with a 5 french sheath left in the right femoral artery. Pt received heparin bolus prior to case starting. SICU consulted for Q1 vascular checks.      PLAN:       Neurologic:   -continue pain control with current regimen of oxycodone 5/10     Respiratory:   -oxygen supplementation as needed  - OOB     Cardiovascular: now w/ mobitz type 2 heart block  -? tachy/katey syndrome; c/w metoprolol 25 mg BID and home amlodipine  - cardiology rec.'s possible PPM prior to d/c  - bradycardic, + T wave inversions on lateral leads, unknown baseline hx   - Abigail- all negative     Gastrointestinal/Nutrition: Regular diet, tolerating     Renal/Genitourinary:   - home flomax 0.4mg at bedtime  - coude kimbrough catheter   - restarted home oxybutynin     Hematologic: transfuse if <7,    - HEPARIN GTT @ 800units- pTT therapeutic   - watch out for compartment syndrome, q1 vascular checks   - SCDs on R leg OK   - continue to monitor h/h q8h, downtrending but no obvious signs of bleeding   - CTA&P (noncontrast) r/o retroperitoneal bleed     Infectious Disease: JAVY, will monitor CBC  - afebrile     Lines/Tubes: d/c Right radial A lineluis e    Endocrine: JAVY    Disposition: SICU    --------------------------------------------------------------------------------------    Critical Care Diagnoses: RLE arterial occlusion and occlusion of  bypass graft, acute blood loss anemia, s/p thrombectomy, risk of malnutrition,

## 2019-02-18 NOTE — PROGRESS NOTE ADULT - SUBJECTIVE AND OBJECTIVE BOX
Vascular Surgery Progress Note:    24hr events per SICU: received 1 unit PRBC for downtrending H/H. Episode of bradycardia and then PAF to 130's. Pt received 2.5 mg Lopressor x1 with resolution. EP consulted due to concern for tachybrady syndrome. Pt already on full AC with therapeutic PTT. Coude kimbrough placed for urinary retention. CT scan with 7.3 cm left-sided retroperitoneal hematoma so heparin gtt was stopped and pt given 2u pRBC with good post-transfusion response. No acute events overnight.     Subjective:  No acute events overnight.  Patient examined at bedside.  No new c/o.     Objective:  Exam:  Gen: alert, oriented, in NAD  Cards: regular  Resp: non-labored breathing  Pulses: palpable left popliteal artery, +doppler signal left DP, +doppler signal left peroneal artery. Dusky toes noted on left. Left foot cool to touch, reduced cap refill. Left groin with moderately saturated dressing (serosanguinous).  Right side: palp PT.    MEDICATIONS  (STANDING):  amLODIPine   Tablet 10 milliGRAM(s) Oral daily  chlorhexidine 4% Liquid 1 Application(s) Topical <User Schedule>  heparin  Injectable 5000 Unit(s) SubCutaneous every 8 hours  influenza   Vaccine 0.5 milliLiter(s) IntraMuscular once  metoprolol tartrate 25 milliGRAM(s) Oral two times a day  oxybutynin XL 10 milliGRAM(s) Oral daily  tamsulosin 0.4 milliGRAM(s) Oral at bedtime    MEDICATIONS  (PRN):  acetaminophen   Tablet .. 650 milliGRAM(s) Oral every 6 hours PRN Mild Pain (1 - 3)  oxyCODONE    IR 5 milliGRAM(s) Oral every 6 hours PRN Moderate Pain (4 - 6)  oxyCODONE    IR 10 milliGRAM(s) Oral every 6 hours PRN Severe Pain (7 - 10)      Vital Signs Last 24 Hrs  T(C): 37.3 (18 Feb 2019 12:00), Max: 37.5 (18 Feb 2019 08:00)  T(F): 99.2 (18 Feb 2019 12:00), Max: 99.5 (18 Feb 2019 08:00)  HR: 77 (18 Feb 2019 13:00) (69 - 88)  BP: 115/49 (18 Feb 2019 13:00) (115/49 - 138/80)  BP(mean): 64 (18 Feb 2019 13:00) (64 - 95)  RR: 29 (18 Feb 2019 13:00) (19 - 29)  SpO2: 95% (18 Feb 2019 13:00) (94% - 98%)    I&O's Detail    17 Feb 2019 07:01  -  18 Feb 2019 07:00  --------------------------------------------------------  IN:    dextrose 5% + sodium chloride 0.45%.: 1050 mL    heparin  Infusion.: 40 mL    lactated ringers.: 150 mL  Total IN: 1240 mL    OUT:    Indwelling Catheter - Urethral: 855 mL  Total OUT: 855 mL    Total NET: 385 mL      18 Feb 2019 07:01  -  18 Feb 2019 13:17  --------------------------------------------------------  IN:    dextrose 5% + sodium chloride 0.45%.: 150 mL  Total IN: 150 mL    OUT:    Indwelling Catheter - Urethral: 305 mL  Total OUT: 305 mL    Total NET: -155 mL          LABS:                        9.2    10.50 )-----------( 103      ( 18 Feb 2019 12:00 )             27.0     02-18    135  |  101  |  30<H>  ----------------------------<  122<H>  3.8   |  21<L>  |  1.50<H>    Ca    7.6<L>      18 Feb 2019 00:00  Phos  2.2     02-18  Mg     2.2     02-18      PT/INR - ( 18 Feb 2019 00:00 )   PT: 11.7 SEC;   INR: 1.05          PTT - ( 18 Feb 2019 00:00 )  PTT:31.9 SEC Vascular Surgery Progress Note:    24hr events per SICU: received 1 unit PRBC for downtrending H/H. Episode of bradycardia and then PAF to 130's. Pt received 2.5 mg Lopressor x1 with resolution. EP consulted due to concern for tachybrady syndrome. Pt already on full AC with therapeutic PTT. Coude kimbrough placed for urinary retention. CT scan with 7.3 cm left-sided retroperitoneal hematoma so heparin gtt was stopped and pt given 2u pRBC with good post-transfusion response. No acute events overnight.     Subjective:  Patient examined at bedside.  No new c/o.     Objective:  Exam:  Gen: alert, oriented, in NAD  Cards: regular  Resp: non-labored breathing  Pulses: palpable left popliteal artery, +doppler signal left DP, +doppler signal left peroneal artery. Dusky toes noted on left. Left foot cool to touch, reduced cap refill. Left groin with moderately saturated dressing (serosanguinous).  Right side: palp PT.    MEDICATIONS  (STANDING):  amLODIPine   Tablet 10 milliGRAM(s) Oral daily  chlorhexidine 4% Liquid 1 Application(s) Topical <User Schedule>  heparin  Injectable 5000 Unit(s) SubCutaneous every 8 hours  influenza   Vaccine 0.5 milliLiter(s) IntraMuscular once  metoprolol tartrate 25 milliGRAM(s) Oral two times a day  oxybutynin XL 10 milliGRAM(s) Oral daily  tamsulosin 0.4 milliGRAM(s) Oral at bedtime    MEDICATIONS  (PRN):  acetaminophen   Tablet .. 650 milliGRAM(s) Oral every 6 hours PRN Mild Pain (1 - 3)  oxyCODONE    IR 5 milliGRAM(s) Oral every 6 hours PRN Moderate Pain (4 - 6)  oxyCODONE    IR 10 milliGRAM(s) Oral every 6 hours PRN Severe Pain (7 - 10)      Vital Signs Last 24 Hrs  T(C): 37.3 (18 Feb 2019 12:00), Max: 37.5 (18 Feb 2019 08:00)  T(F): 99.2 (18 Feb 2019 12:00), Max: 99.5 (18 Feb 2019 08:00)  HR: 77 (18 Feb 2019 13:00) (69 - 88)  BP: 115/49 (18 Feb 2019 13:00) (115/49 - 138/80)  BP(mean): 64 (18 Feb 2019 13:00) (64 - 95)  RR: 29 (18 Feb 2019 13:00) (19 - 29)  SpO2: 95% (18 Feb 2019 13:00) (94% - 98%)    I&O's Detail    17 Feb 2019 07:01  -  18 Feb 2019 07:00  --------------------------------------------------------  IN:    dextrose 5% + sodium chloride 0.45%.: 1050 mL    heparin  Infusion.: 40 mL    lactated ringers.: 150 mL  Total IN: 1240 mL    OUT:    Indwelling Catheter - Urethral: 855 mL  Total OUT: 855 mL    Total NET: 385 mL      18 Feb 2019 07:01  -  18 Feb 2019 13:17  --------------------------------------------------------  IN:    dextrose 5% + sodium chloride 0.45%.: 150 mL  Total IN: 150 mL    OUT:    Indwelling Catheter - Urethral: 305 mL  Total OUT: 305 mL    Total NET: -155 mL          LABS:                        9.2    10.50 )-----------( 103      ( 18 Feb 2019 12:00 )             27.0     02-18    135  |  101  |  30<H>  ----------------------------<  122<H>  3.8   |  21<L>  |  1.50<H>    Ca    7.6<L>      18 Feb 2019 00:00  Phos  2.2     02-18  Mg     2.2     02-18      PT/INR - ( 18 Feb 2019 00:00 )   PT: 11.7 SEC;   INR: 1.05          PTT - ( 18 Feb 2019 00:00 )  PTT:31.9 SEC

## 2019-02-18 NOTE — PROGRESS NOTE ADULT - SUBJECTIVE AND OBJECTIVE BOX
SICU Daily Progress Note  =====================================================  Interval/Overnight Events:     ***    POD #          	SICU Day #    ***    HPI:  ((insert from previous note)) ***    PMH:   ***    Allergies: No Known Allergies      MEDICATIONS:   --------------------------------------------------------------------------------------  Neurologic Medications  acetaminophen   Tablet .. 650 milliGRAM(s) Oral every 6 hours PRN Mild Pain (1 - 3)  oxyCODONE    IR 5 milliGRAM(s) Oral every 6 hours PRN Moderate Pain (4 - 6)  oxyCODONE    IR 10 milliGRAM(s) Oral every 6 hours PRN Severe Pain (7 - 10)    Respiratory Medications    Cardiovascular Medications  amLODIPine   Tablet 10 milliGRAM(s) Oral daily  metoprolol tartrate 25 milliGRAM(s) Oral two times a day  tamsulosin 0.4 milliGRAM(s) Oral at bedtime    Gastrointestinal Medications  dextrose 5% + sodium chloride 0.45%. 1000 milliLiter(s) IV Continuous <Continuous>    Genitourinary Medications  oxybutynin XL 10 milliGRAM(s) Oral daily    Hematologic/Oncologic Medications  influenza   Vaccine 0.5 milliLiter(s) IntraMuscular once    Antimicrobial/Immunologic Medications    Endocrine/Metabolic Medications    Topical/Other Medications  chlorhexidine 4% Liquid 1 Application(s) Topical <User Schedule>    --------------------------------------------------------------------------------------    VITAL SIGNS, INS/OUTS (last 24 hours):  --------------------------------------------------------------------------------------  ((Insert SICU Vitals/Is+Os here))***  --------------------------------------------------------------------------------------    EXAM  NEUROLOGY  RASS:   	GCS:    Exam: Normal, NAD, alert, oriented x3, no focal deficits. ***    HEENT  Exam: Normocephalic, atraumatic, EOMI.  ***    RESPIRATORY  Exam: Lungs clear to auscultation, Normal expansion/effort. ***  Mechanical Ventilation:     CARDIOVASCULAR  Exam: S1, S2.  Regular rate and rhythm.   ***    GI/NUTRITION  Exam: Abdomen soft, Non-tender, Non-distended.  ***  Wound:  ***  Current Diet:  NPO***    VASCULAR  Exam: Extremities warm, pink, well-perfused. ***    MUSCULOSKELETAL  Exam: All extremities moving spontaneously without limitations. ***    SKIN  Exam: Good skin turgor, no skin breakdown. ***    METABOLIC/FLUIDS/ELECTROLYTES  dextrose 5% + sodium chloride 0.45%. 1000 milliLiter(s) IV Continuous <Continuous>      HEMATOLOGIC  [x] VTE Prophylaxis:   Transfusions:	[] PRBC	[] Platelets		[] FFP	[] Cryoprecipitate    INFECTIOUS DISEASE  Antimicrobials/Immunologic Medications:  influenza   Vaccine 0.5 milliLiter(s) IntraMuscular once    Day #      of     ***    Tubes/Lines/Drains  ***  [x] Peripheral IV  [] Central Venous Line     	[] R	[] L	[] IJ	[] Fem	[] SC	Date Placed:   [] Arterial Line		[] R	[] L	[] Fem	[] Rad	[] Ax	Date Placed:   [] PICC		[] Midline		[] Mediport  [] Urinary Catheter		Date Placed:   [x] Necessity of urinary, arterial, and venous catheters discussed    LABS  --------------------------------------------------------------------------------------  ((Insert SICU Labs here))***  --------------------------------------------------------------------------------------    OTHER LABORATORY:     IMAGING STUDIES:   CXR:     ASSESSMENT:  78y Male    ((insert from previous))***    PLAN:   ***  Neurologic:   Respiratory:   Cardiovascular:   Gastrointestinal/Nutrition:   Renal/Genitourinary:   Hematologic:   Infectious Disease:   Tubes/Lines/Drains:   Endocrine:   Disposition:     --------------------------------------------------------------------------------------    Critical Care Diagnoses: SICU Daily Progress Note  =====================================================  Interval/Overnight Events:    no acute events overnight      HPI: 78M with prior history of B/L lower extremity bypasses (pt does not remember details, imaging shows B/L fem-pop bypass grafts) done in 1980s presented yesterday 2/14/19 with LLE pain and mild sensory and motor loss since 3:15pm. He got a massage prior to the start of symptoms and then noticed that his foot felt cold and he had pain in his calf. He also felt that he could not move his toes very well. He has no complaints in his RLE. He otherwise is ambulatory at home and denies any symptoms of claudication. He has HTN and HLD at home. He denies any history of atrial fibrillation. He is a current pack a day smoker and has done so for the past 25 years. Pt found with occluded superficial femoral artery and occluded bypass graft (fem-pop graft) on arterial duplex. CT LE with run off shows patent fem-pop graft, right proximal ant-tib artery occlusion; left fem-pop graft occluded, no flow in ant-tib, post-tib, and peroneal arteries. Pt planned to go to OR for thrombolysis of LLE fem-pop graft but, instead underwent thrombectomized old PTFE fem-pop graft and then interposition bypass from common femoral to profunda with a 5 Faroese sheath left in the right femoral artery. Pt received heparin bolus prior to case starting. SICU consulted for Q1 vascular checks.    PMH:   ***    Allergies: No Known Allergies      MEDICATIONS:   --------------------------------------------------------------------------------------  Neurologic Medications  acetaminophen   Tablet .. 650 milliGRAM(s) Oral every 6 hours PRN Mild Pain (1 - 3)  oxyCODONE    IR 5 milliGRAM(s) Oral every 6 hours PRN Moderate Pain (4 - 6)  oxyCODONE    IR 10 milliGRAM(s) Oral every 6 hours PRN Severe Pain (7 - 10)    Respiratory Medications    Cardiovascular Medications  amLODIPine   Tablet 10 milliGRAM(s) Oral daily  metoprolol tartrate 25 milliGRAM(s) Oral two times a day  tamsulosin 0.4 milliGRAM(s) Oral at bedtime    Gastrointestinal Medications  dextrose 5% + sodium chloride 0.45%. 1000 milliLiter(s) IV Continuous <Continuous>    Genitourinary Medications  oxybutynin XL 10 milliGRAM(s) Oral daily    Hematologic/Oncologic Medications  influenza   Vaccine 0.5 milliLiter(s) IntraMuscular once    Antimicrobial/Immunologic Medications    Endocrine/Metabolic Medications    Topical/Other Medications  chlorhexidine 4% Liquid 1 Application(s) Topical <User Schedule>    --------------------------------------------------------------------------------------    VITAL SIGNS, INS/OUTS (last 24 hours):  --------------------------------------------------------------------------------------  T(C): 37.3 (02-18-19 @ 12:00), Max: 37.5 (02-18-19 @ 08:00)  HR: 81 (02-18-19 @ 15:00) (69 - 87)  BP: 99/46 (02-18-19 @ 15:00) (99/46 - 138/80)  BP(mean): 56 (02-18-19 @ 15:00) (56 - 95)  ABP: 123/72 (02-18-19 @ 12:00) (123/72 - 173/60)  ABP(mean): 109 (02-18-19 @ 12:00) (63 - 109)  RR: 28 (02-18-19 @ 15:00) (19 - 29)  SpO2: 97% (02-18-19 @ 15:00) (94% - 98%)  Wt(kg): --  CVP(mm Hg): --  CI: --  CAPILLARY BLOOD GLUCOSE       N/A      02-17 @ 07:01  -  02-18 @ 07:00  --------------------------------------------------------  IN:    dextrose 5% + sodium chloride 0.45%.: 1050 mL    heparin  Infusion.: 40 mL    lactated ringers.: 150 mL  Total IN: 1240 mL    OUT:    Indwelling Catheter - Urethral: 855 mL  Total OUT: 855 mL    Total NET: 385 mL      02-18 @ 07:01  -  02-18 @ 15:50  --------------------------------------------------------  IN:    dextrose 5% + sodium chloride 0.45%.: 150 mL  Total IN: 150 mL    OUT:    Indwelling Catheter - Urethral: 305 mL  Total OUT: 305 mL    Total NET: -155 mL          --------------------------------------------------------------------------------------    EXAM  NEUROLOGY  RASS:   	GCS:    Exam: Normal, NAD, alert, oriented x3, no focal deficits. ***    HEENT  Exam: Normocephalic, atraumatic, EOMI.  ***    RESPIRATORY  Exam: Lungs clear to auscultation, Normal expansion/effort. ***  Mechanical Ventilation:     CARDIOVASCULAR  Exam: S1, S2.  Regular rate and rhythm.   ***    GI/NUTRITION  Exam: Abdomen soft, Non-tender, Non-distended.  ***  Wound:  ***  Current Diet:  NPO***    VASCULAR  Exam: Extremities warm, pink, well-perfused. ***    MUSCULOSKELETAL  Exam: All extremities moving spontaneously without limitations. ***    SKIN  Exam: Good skin turgor, no skin breakdown. ***    METABOLIC/FLUIDS/ELECTROLYTES  dextrose 5% + sodium chloride 0.45%. 1000 milliLiter(s) IV Continuous <Continuous>      HEMATOLOGIC  [x] VTE Prophylaxis:   Transfusions:	[] PRBC	[] Platelets		[] FFP	[] Cryoprecipitate    INFECTIOUS DISEASE  Antimicrobials/Immunologic Medications:  influenza   Vaccine 0.5 milliLiter(s) IntraMuscular once    Day #      of     ***    Tubes/Lines/Drains  ***  [x] Peripheral IV  [] Central Venous Line     	[] R	[] L	[] IJ	[] Fem	[] SC	Date Placed:   [] Arterial Line		[] R	[] L	[] Fem	[] Rad	[] Ax	Date Placed:   [] PICC		[] Midline		[] Mediport  [] Urinary Catheter		Date Placed:   [x] Necessity of urinary, arterial, and venous catheters discussed    LABS  --------------------------------------------------------------------------------------                                            9.2                   Neurophils% (auto):   x      (02-18 @ 12:00):    10.50)-----------(103          Lymphocytes% (auto):  x                                             27.0                   Eosinphils% (auto):   x        Manual%: Neutrophils x    ; Lymphocytes x    ; Eosinophils x    ; Bands%: x    ; Blasts x          02-18    135  |  101  |  30<H>  ----------------------------<  122<H>  3.8   |  21<L>  |  1.50<H>    Ca    7.6<L>      18 Feb 2019 00:00  Phos  2.2     02-18  Mg     2.2     02-18      ( 02-18 @ 00:00 )   PT: 11.7 SEC;   INR: 1.05   aPTT: 31.9 SEC        RECENT CULTURES:      --------------------------------------------------------------------------------------    OTHER LABORATORY:     IMAGING STUDIES:   CXR:

## 2019-02-19 LAB
ANION GAP SERPL CALC-SCNC: 12 MMO/L — SIGNIFICANT CHANGE UP (ref 7–14)
APTT BLD: 31.6 SEC — SIGNIFICANT CHANGE UP (ref 27.5–36.3)
APTT BLD: 39.3 SEC — HIGH (ref 27.5–36.3)
BUN SERPL-MCNC: 25 MG/DL — HIGH (ref 7–23)
CALCIUM SERPL-MCNC: 7.9 MG/DL — LOW (ref 8.4–10.5)
CHLORIDE SERPL-SCNC: 106 MMOL/L — SIGNIFICANT CHANGE UP (ref 98–107)
CO2 SERPL-SCNC: 22 MMOL/L — SIGNIFICANT CHANGE UP (ref 22–31)
CREAT SERPL-MCNC: 1.31 MG/DL — HIGH (ref 0.5–1.3)
GLUCOSE SERPL-MCNC: 99 MG/DL — SIGNIFICANT CHANGE UP (ref 70–99)
HCT VFR BLD CALC: 25.1 % — LOW (ref 39–50)
HGB BLD-MCNC: 8.6 G/DL — LOW (ref 13–17)
INR BLD: 1.05 — SIGNIFICANT CHANGE UP (ref 0.88–1.17)
MAGNESIUM SERPL-MCNC: 2.2 MG/DL — SIGNIFICANT CHANGE UP (ref 1.6–2.6)
MCHC RBC-ENTMCNC: 30.7 PG — SIGNIFICANT CHANGE UP (ref 27–34)
MCHC RBC-ENTMCNC: 34.3 % — SIGNIFICANT CHANGE UP (ref 32–36)
MCV RBC AUTO: 89.6 FL — SIGNIFICANT CHANGE UP (ref 80–100)
NRBC # FLD: 0.02 K/UL — LOW (ref 25–125)
PHOSPHATE SERPL-MCNC: 2 MG/DL — LOW (ref 2.5–4.5)
PLATELET # BLD AUTO: 134 K/UL — LOW (ref 150–400)
PMV BLD: 10.1 FL — SIGNIFICANT CHANGE UP (ref 7–13)
POTASSIUM SERPL-MCNC: 3.8 MMOL/L — SIGNIFICANT CHANGE UP (ref 3.5–5.3)
POTASSIUM SERPL-SCNC: 3.8 MMOL/L — SIGNIFICANT CHANGE UP (ref 3.5–5.3)
PROTHROM AB SERPL-ACNC: 11.7 SEC — SIGNIFICANT CHANGE UP (ref 9.8–13.1)
RBC # BLD: 2.8 M/UL — LOW (ref 4.2–5.8)
RBC # FLD: 15.1 % — HIGH (ref 10.3–14.5)
SODIUM SERPL-SCNC: 140 MMOL/L — SIGNIFICANT CHANGE UP (ref 135–145)
WBC # BLD: 7.81 K/UL — SIGNIFICANT CHANGE UP (ref 3.8–10.5)
WBC # FLD AUTO: 7.81 K/UL — SIGNIFICANT CHANGE UP (ref 3.8–10.5)

## 2019-02-19 PROCEDURE — 99233 SBSQ HOSP IP/OBS HIGH 50: CPT

## 2019-02-19 PROCEDURE — 93010 ELECTROCARDIOGRAM REPORT: CPT

## 2019-02-19 RX ORDER — HEPARIN SODIUM 5000 [USP'U]/ML
500 INJECTION INTRAVENOUS; SUBCUTANEOUS
Qty: 25000 | Refills: 0 | Status: DISCONTINUED | OUTPATIENT
Start: 2019-02-19 | End: 2019-02-20

## 2019-02-19 RX ADMIN — Medication 10 MILLIGRAM(S): at 11:25

## 2019-02-19 RX ADMIN — HEPARIN SODIUM 5 UNIT(S)/HR: 5000 INJECTION INTRAVENOUS; SUBCUTANEOUS at 10:34

## 2019-02-19 RX ADMIN — Medication 25 MILLIGRAM(S): at 06:02

## 2019-02-19 RX ADMIN — Medication 25 MILLIGRAM(S): at 17:59

## 2019-02-19 RX ADMIN — TAMSULOSIN HYDROCHLORIDE 0.4 MILLIGRAM(S): 0.4 CAPSULE ORAL at 21:51

## 2019-02-19 RX ADMIN — HEPARIN SODIUM 7 UNIT(S)/HR: 5000 INJECTION INTRAVENOUS; SUBCUTANEOUS at 18:01

## 2019-02-19 RX ADMIN — AMLODIPINE BESYLATE 10 MILLIGRAM(S): 2.5 TABLET ORAL at 11:25

## 2019-02-19 RX ADMIN — HEPARIN SODIUM 5000 UNIT(S): 5000 INJECTION INTRAVENOUS; SUBCUTANEOUS at 06:02

## 2019-02-19 RX ADMIN — Medication 63.75 MILLIMOLE(S): at 10:35

## 2019-02-19 NOTE — PROGRESS NOTE ADULT - ASSESSMENT
1M s/p thrombectomized old PTFE left leg fem-pop graft and then left leg interposition bypass from common femoral to profunda with a 5 Azeri sheath left in the right femoral artery for RLE arterial occlusion and occlusion of bypass graft. Wallisian sheath on right side then removed 2hrs post-op. C/b H&H drop and CT scan on 2/17 with 7.3 cm left-sided retroperitoneal hematoma so heparin gtt was subsequently stopped and pt given 2u pRBC with good post-transfusion response.    - Continue to hold heparin drip at this time. C/w monitoring H&H  - Urinary retention: coude replaced on 2/17. Continue flomax & home meds, continue kimbrough for now  - urology consult for kimbrough plan  - heparin drip restart - start at 500, titrate slowly  - Pain control  - Appreciate EP recommendations 1M s/p thrombectomized old PTFE left leg fem-pop graft and then left leg interposition bypass from common femoral to profunda with a 5 Luxembourgish sheath left in the right femoral artery for RLE arterial occlusion and occlusion of bypass graft. Green[place]ic sheath on right side then removed 2hrs post-op. C/b H&H drop and CT scan on 2/17 with 7.3 cm left-sided retroperitoneal hematoma so heparin gtt was subsequently stopped and pt given 2u pRBC with good post-transfusion response.    - C/w monitoring H&H  - Urinary retention: coude replaced on 2/17. Continue flomax & home meds, continue kimbrough for now  - urology consult for kimbrough plan  - heparin drip restart - start at 500, titrate slowly  - Pain control  - Appreciate EP recommendations

## 2019-02-19 NOTE — CONSULT NOTE ADULT - SUBJECTIVE AND OBJECTIVE BOX
78 male + smoker with peripheral artery disease of  bilateral lower extremities s/p remote B/L fem-pop bypass, with pain s/p thrombectomy of old fem-pop bypass graft and interposition bypass from common femoral to profunda in right femoral artery c/b left sided retroperitoneal hematoma with drop in H/H requiring 2 units PRBC's.  Urology consulted for urinary retention following surgery and penile pain with kimbrough. Patient states he has had trouble urinating in the past but states he was able to urinate to completion. Patient denies hematuria, fevers, dysuria, or flank pain. Patient previously on flomax for BPH and LUTS.  Patient states he has seen a urologist once multiple years prior.       MEDICATIONS  (STANDING):  amLODIPine   Tablet 10 milliGRAM(s) Oral daily  heparin  Infusion 500 Unit(s)/Hr (5 mL/Hr) IV Continuous <Continuous>  influenza   Vaccine 0.5 milliLiter(s) IntraMuscular once  metoprolol tartrate 25 milliGRAM(s) Oral two times a day  oxybutynin XL 10 milliGRAM(s) Oral daily  tamsulosin 0.4 milliGRAM(s) Oral at bedtime    MEDICATIONS  (PRN):  acetaminophen   Tablet .. 650 milliGRAM(s) Oral every 6 hours PRN Mild Pain (1 - 3)  oxyCODONE    IR 5 milliGRAM(s) Oral every 6 hours PRN Moderate Pain (4 - 6)  oxyCODONE    IR 10 milliGRAM(s) Oral every 6 hours PRN Severe Pain (7 - 10)    FAMILY HISTORY:  No pertinent family history in first degree relatives    Allergies    No Known Allergies    Intolerances      SOCIAL HISTORY:   Tobacco hx:    REVIEW OF SYSTEMS: Pertinent positives and negatives as stated in HPI, otherwise negative    Vital signs  T(C): 36.6, Max: 37.4 (02-18 @ 16:00)  HR: 71  BP: 126/43  SpO2: 99%    Output    02-18-19 @ 07:01  -  02-19-19 @ 07:00  --------------------------------------------------------  IN: 150 mL / OUT: 1180 mL / NET: -1030 mL    02-19-19 @ 07:01  -  02-19-19 @ 14:32  --------------------------------------------------------  IN: 0 mL / OUT: 325 mL / NET: -325 mL      UOP    Physical Exam  Gen: NAD  : Kimbrough draining clear yellow urine.     LABS:          02-19 @ 06:00    WBC 7.81  / Hct 25.1  / SCr 1.31     02-18 @ 12:00    WBC 10.50 / Hct 27.0  / SCr --       02-19    140  |  106  |  25<H>  ----------------------------<  99  3.8   |  22  |  1.31<H>    Ca    7.9<L>      19 Feb 2019 06:00  Phos  2.0     02-19  Mg     2.2     02-19      PT/INR - ( 19 Feb 2019 06:00 )   PT: 11.7 SEC;   INR: 1.05          PTT - ( 19 Feb 2019 06:00 )  PTT:31.6 SEC

## 2019-02-19 NOTE — PROGRESS NOTE ADULT - SUBJECTIVE AND OBJECTIVE BOX
Surgery Progress Note      Subjective: Patient seen and examined. No acute events overnight. Transferred to floor from SICU yesterday.   States that he is urinating around his kimbrough    T(C): 36.9 (02-19-19 @ 05:46), Max: 37.4 (02-18-19 @ 16:00)  HR: 70 (02-19-19 @ 05:46) (67 - 91)  BP: 133/45 (02-19-19 @ 05:46) (99/46 - 146/48)  RR: 18 (02-19-19 @ 05:46) (18 - 29)  SpO2: 96% (02-19-19 @ 05:46) (94% - 97%)      02-18-19 @ 07:01  -  02-19-19 @ 07:00  --------------------------------------------------------  IN: 150 mL / OUT: 1180 mL / NET: -1030 mL        Physical Exam:   Pulses: palpable left popliteal artery, +doppler signal left DP, +doppler signal left peroneal artery. Dusky toes noted on left. Left foot cool to touch, reduced cap refill. Left groin with moderately saturated dressing (serosanguinous).  Right side: palp PT    Labs:    CBC Full  -  ( 19 Feb 2019 06:00 )  WBC Count : 7.81 K/uL  Hemoglobin : 8.6 g/dL  Hematocrit : 25.1 %  Platelet Count - Automated : 134 K/uL  Mean Cell Volume : 89.6 fL  Mean Cell Hemoglobin : 30.7 pg  Mean Cell Hemoglobin Concentration : 34.3 %  Auto Neutrophil # : x  Auto Lymphocyte # : x  Auto Monocyte # : x  Auto Eosinophil # : x  Auto Basophil # : x  Auto Neutrophil % : x  Auto Lymphocyte % : x  Auto Monocyte % : x  Auto Eosinophil % : x  Auto Basophil % : x    02-19    140  |  106  |  25<H>  ----------------------------<  99  3.8   |  22  |  1.31<H>    Ca    7.9<L>      19 Feb 2019 06:00  Phos  2.0     02-19  Mg     2.2     02-19        Medications:     acetaminophen   Tablet .. 650 milliGRAM(s) Oral every 6 hours PRN  amLODIPine   Tablet 10 milliGRAM(s) Oral daily  heparin  Infusion 500 Unit(s)/Hr IV Continuous <Continuous>  influenza   Vaccine 0.5 milliLiter(s) IntraMuscular once  metoprolol tartrate 25 milliGRAM(s) Oral two times a day  oxybutynin XL 10 milliGRAM(s) Oral daily  oxyCODONE    IR 5 milliGRAM(s) Oral every 6 hours PRN  oxyCODONE    IR 10 milliGRAM(s) Oral every 6 hours PRN  sodium phosphate IVPB 15 milliMole(s) IV Intermittent once  tamsulosin 0.4 milliGRAM(s) Oral at bedtime      Radiographs: No new imaging

## 2019-02-19 NOTE — CONSULT NOTE ADULT - ASSESSMENT
Pt is a 79 yo male with urinary retention following surgery with prior BPH.  - Continue flomax  - OOB and ambulate  - TOV if increased ambulation while inpatient, if not plan for discharge with kimbrough to follow up with Dr. Mays in office.  - 2% lidocaine jelly to tip of penis for cather burning.

## 2019-02-19 NOTE — PHYSICAL THERAPY INITIAL EVALUATION ADULT - ACTIVE RANGE OF MOTION EXAMINATION, REHAB EVAL
left hip fleixon 0-90, knee flexion 0-90, ankle wfl/bilateral upper extremity Active ROM was WFL (within functional limits)/Right LE Active ROM was WFL (within functional limits)

## 2019-02-19 NOTE — PROGRESS NOTE ADULT - ATTENDING COMMENTS
78M with prior history of B/L lower extremity bypasses (pt does not remember details, imaging shows B/L fem-pop bypass grafts) done in 1980s presented yesterday 2/14/19 with LLE pain and mild sensory and motor loss since 3:15pm. He got a massage prior to the start of symptoms and then noticed that his foot felt cold and he had pain in his calf. He also felt that he could not move his toes very well. He has no complaints in his RLE. He otherwise is ambulatory at home and denies any symptoms of claudication. He has HTN and HLD at home. He denies any history of atrial fibrillation. He is a current pack a day smoker and has done so for the past 25 years. Pt found with occluded superficial femoral artery and occluded bypass graft (fem-pop graft) on arterial duplex. CT LE with run off shows patent fem-pop graft, right proximal ant-tib artery occlusion; left fem-pop graft occluded, no flow in ant-tib, post-tib, and peroneal arteries. Pt planned to go to OR for thrombolysis of LLE fem-pop graft but, instead underwent thrombectomized old PTFE fem-pop graft and then interposition bypass from common femoral to profunda with a 5 french sheath left in the right femoral artery. Pt received heparin bolus prior to case starting. SICU consulted for Q1 vascular checks.      PLAN:       Neurologic: Sedated postop, oxycodone 5/10 for pain mgmt    Respiratory: JAVY, IS, OOB as tolerated, oxygen supplementation as needed    Cardiovascular: holding metoprolol 50 QD (home med)  - bradycardic, + T wave inversions on lateral leads, unknown baseline hx   - trending Abigail  - echo pending     Gastrointestinal/Nutrition: Start clears later this morning    Renal/Genitourinary: NS at 125cc/hr. Can d/c'ed once tolerating diet. Replete electrolytes PRN, Restarted home flomax 0.4mg at bedtime  - kimbrough in place, will d/c     Hematologic: F/U post op labs, transfuse if <7,    - HEPARIN GTT @ 500units; PTT 45   - watch out for compartment syndrome, q1 vascular checks   - SCDs on R leg OK   - downtrending H/H, will repeat CBC    Infectious Disease: JAVY, will monitor CBC  - afebrile     Lines/Tubes: Right radial A line, kimbrough    Endocrine: JAVY    Disposition: SICU    --------------------------------------------------------------------------------------    Critical Care Diagnoses: RLE arterial occlusion and occlusion of  bypass graft   The patient is a critical care patient with life threatening hemodynamic and metabolic instability in SICU.  I have personally interviewed when possible and examined the patient, reviewed data and laboratory tests/x-rays and all pertinent electronic images.  I was physically present for the key portions of the evaluation and management (E/M) service provided.   The SICU team has a constant risk benefit analyzes discussion with the primary team, all consultants, House Staff and PA's on all decisions.  These diagnoses are unrelated to the surgical procedure noted above.  I meet with family if needed to get further history, discuss the case and make care decisions for this patient who might not be able to participate.  Time involved in performance of separately billable procedures was not counted toward my critical care time. There is no overlap.  I spent 55-75 minutes of critical care time for the diagnoses, assessment, plan and interventions.
78M with prior history of B/L lower extremity bypasses (pt does not remember details, imaging shows B/L fem-pop bypass grafts) done in 1980s presented yesterday 2/14/19 with LLE pain and mild sensory and motor loss since 3:15pm. He got a massage prior to the start of symptoms and then noticed that his foot felt cold and he had pain in his calf. He also felt that he could not move his toes very well. He has no complaints in his RLE. He otherwise is ambulatory at home and denies any symptoms of claudication. He has HTN and HLD at home. He denies any history of atrial fibrillation. He is a current pack a day smoker and has done so for the past 25 years. Pt found with occluded superficial femoral artery and occluded bypass graft (fem-pop graft) on arterial duplex. CT LE with run off shows patent fem-pop graft, right proximal ant-tib artery occlusion; left fem-pop graft occluded, no flow in ant-tib, post-tib, and peroneal arteries. Pt planned to go to OR for thrombolysis of LLE fem-pop graft but, instead underwent thrombectomized old PTFE fem-pop graft and then interposition bypass from common femoral to profunda with a 5 french sheath left in the right femoral artery. Pt received heparin bolus prior to case starting. SICU consulted for Q1 vascular checks.  PLAN:     neurologic:   -continue pain control with current regimen of oxycodone 5/10     Respiratory:   -oxygen supplementation as needed  - OOB     Cardiovascular: now w/ mobitz type 2 heart block  -? tachy/katey syndrome; c/w metoprolol 25 mg BID and home amlodipine  - cardiology rec.'s possible PPM prior to d/c  - bradycardic, + T wave inversions on lateral leads, unknown baseline hx   - Abigail- all negative     Gastrointestinal/Nutrition: Regular diet, tolerating     Renal/Genitourinary:   - home flomax 0.4mg at bedtime  - coude kimbrough catheter   - restarted home oxybutynin     Hematologic: transfuse if <7,    - HEPARIN GTT @ 800units- pTT therapeutic   - watch out for compartment syndrome, q1 vascular checks   - SCDs on R leg OK   - continue to monitor h/h q8h, downtrending but no obvious signs of bleeding   - CTA&P (noncontrast) r/o retroperitoneal bleed     Infectious Disease: JAVY, will monitor CBC  - afebrile     Lines/Tubes: Right radial A line, kimbrough    Endocrine: JAVY    Disposition: SICU      Critical Care Diagnoses: RLE arterial occlusion and occlusion of  bypass graft, acute blood loss anemia, s/p thrombectomy, risk of malnutrition,   The patient is a critical care patient with life threatening hemodynamic and metabolic instability in SICU.  I have personally interviewed when possible and examined the patient, reviewed data and laboratory tests/x-rays and all pertinent electronic images.  I was physically present for the key portions of the evaluation and management (E/M) service provided.   The SICU team has a constant risk benefit analyzes discussion with the primary team, all consultants, House Staff and PA's on all decisions.  These diagnoses are unrelated to the surgical procedure noted above.  I meet with family if needed to get further history, discuss the case and make care decisions for this patient who might not be able to participate.  Time involved in performance of separately billable procedures was not counted toward my critical care time. There is no overlap.  I spent 55-75 minutes of critical care time for the diagnoses, assessment, plan and interventions.
foot well perfused, pts main concern now is urination.    no signs active bleeding, will monitor on low dose heparin
78M with prior history of B/L lower extremity bypasses (pt does not remember details, imaging shows B/L fem-pop bypass grafts) done in 1980s presented yesterday 2/14/19 with LLE pain and mild sensory and motor loss since 3:15pm. He got a massage prior to the start of symptoms and then noticed that his foot felt cold and he had pain in his calf. He also felt that he could not move his toes very well. He has no complaints in his RLE. He otherwise is ambulatory at home and denies any symptoms of claudication. He has HTN and HLD at home. He denies any history of atrial fibrillation. He is a current pack a day smoker and has done so for the past 25 years. Pt found with occluded superficial femoral artery and occluded bypass graft (fem-pop graft) on arterial duplex. CT LE with run off shows patent fem-pop graft, right proximal ant-tib artery occlusion; left fem-pop graft occluded, no flow in ant-tib, post-tib, and peroneal arteries. Pt planned to go to OR for thrombolysis of LLE fem-pop graft but, instead underwent thrombectomized old PTFE fem-pop graft and then interposition bypass from common femoral to profunda with a 5 french sheath left in the right femoral artery. Pt received heparin bolus prior to case starting. SICU consulted for Q1 vascular checks.      PLAN:       Neurologic: Sedated postop, oxycodone 5/10 for pain mgmt    Respiratory: JAVY, IS, OOB as tolerated, oxygen supplementation as needed    Cardiovascular:   - add lopressor 5mg q6   - cardiology consult for arrhythmia   - bradycardic, + T wave inversions on lateral leads, unknown baseline hx   - trending Abigail    Gastrointestinal/Nutrition: Start clears later this morning    Renal/Genitourinary: NS at 125cc/hr. Can d/c'ed once tolerating diet. Replete electrolytes PRN, Restarted home flomax 0.4mg at bedtime  - TOV @ 5am    Hematologic: F/U post op labs, transfuse if <7,    - HEPARIN GTT @ 500units; PTT 45   - watch out for compartment syndrome, q1 vascular checks   - SCDs on R leg OK   - downtrending H/H, f/u post-transfusion CBC    Infectious Disease: JAVY, will monitor CBC  - afebrile     Lines/Tubes: Right radial A line, kimbrough    Endocrine: JAVY    Disposition: SICU    Critical Care Diagnoses: RLE arterial occlusion and occlusion of  bypass graft.  The patient is a critical care patient with life threatening hemodynamic and metabolic instability in SICU.  I have personally interviewed when possible and examined the patient, reviewed data and laboratory tests/x-rays and all pertinent electronic images.  I was physically present for the key portions of the evaluation and management (E/M) service provided.   The SICU team has a constant risk benefit analyzes discussion with the primary team, all consultants, House Staff and PA's on all decisions.  These diagnoses are unrelated to the surgical procedure noted above.  I meet with family if needed to get further history, discuss the case and make care decisions for this patient who might not be able to participate.  Time involved in performance of separately billable procedures was not counted toward my critical care time. There is no overlap.  I spent 55-75 minutes of critical care time for the diagnoses, assessment, plan and interventions.

## 2019-02-19 NOTE — PROGRESS NOTE ADULT - ASSESSMENT
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    D/W Dr. Jurado, cardiac electrophysiologist: EKGs as noted, continue to monitor. Continue Heparin gtt for A/C and metoprolol for rate and rhythm control. Will consider possible PPM pre discharge. If patient develops persistent bradycardia can hold AVN blocking agents. 78 male + smoker with peripheral artery disease of  bilateral lower extremities s/p remote B/L fem-pop bypass, and hypertension presents with left leg pain   s/p thrombectomy of old fem-pop bypass graft and interposition bypass from common femoral to profunda in right femoral artery c/b left sided retroperitoneal hematoma with drop in H/H requiring 2 units PRBC's. Initially heparin off but now restarted with no signs of active bleeding. Patient only complains of urinary retention (has kimbrough). Pain well controlled with current medications.  EP called on 2/16 for new onset atrial fibrillation with RVR (130's).  Treated with Lopressor 2.5mg IV with ventricular rate drop to the 30-40's.   D/W Dr. Jurado, cardiac electrophysiologist: EKGs as noted, continue to monitor. Continue Heparin gtt for A/C and metoprolol for rate and rhythm control. Will consider possible PPM pre discharge. If patient develops persistent bradycardia can hold AVN blocking agents. 78 male + smoker with peripheral artery disease of  bilateral lower extremities s/p remote B/L fem-pop bypass, and hypertension presents with left leg pain   s/p thrombectomy of old fem-pop bypass graft and interposition bypass from common femoral to profunda in right femoral artery c/b left sided retroperitoneal hematoma with drop in H/H requiring 2 units PRBC's. Initially heparin off but now restarted with no signs of active bleeding. Patient only complains of urinary retention (has kimbrough). Pain well controlled with current medications.  EP called on 2/16 for new onset atrial fibrillation with RVR (130's).  Treated with Lopressor 2.5mg IV with ventricular rate drop to the 30-40's.   D/W Dr. Jurado, cardiac electrophysiologist: EKGs as noted, continue to monitor. Continue Heparin gtt for A/C and metoprolol for rate and rhythm control.  If patient develops persistent bradycardia can hold AVN blocking agents.   Discussed ILR vs Cardionet monitor upon discharge to document AFib burden.  Patient reluctant to get ILR at this time.  Will speak with him again when closer to discharge.

## 2019-02-19 NOTE — PROGRESS NOTE ADULT - SUBJECTIVE AND OBJECTIVE BOX
Patient feeling better. Denies chest pain, shortness of breath, palpitations or lightheadedness.   OOB to bathroom without dizziness.     Vital Signs Last 24 Hrs  T(C): 36.5 (19 Feb 2019 10:30), Max: 37.4 (18 Feb 2019 16:00)  T(F): 97.7 (19 Feb 2019 10:30), Max: 99.3 (18 Feb 2019 16:00)  HR: 64 (19 Feb 2019 10:30) (64 - 91)  BP: 125/50 (19 Feb 2019 10:30) (99/46 - 146/48)  BP(mean): 95 (18 Feb 2019 17:00) (56 - 95)  RR: 16 (19 Feb 2019 10:30) (16 - 29)  SpO2: 97% (19 Feb 2019 10:30) (94% - 97%)      EKG:  None available  from today  Telemetry:  not on tele  MEDICATIONS  (STANDING):  amLODIPine   Tablet 10 milliGRAM(s) Oral daily  heparin  Infusion 500 Unit(s)/Hr (5 mL/Hr) IV Continuous <Continuous>  influenza   Vaccine 0.5 milliLiter(s) IntraMuscular once  metoprolol tartrate 25 milliGRAM(s) Oral two times a day  oxybutynin XL 10 milliGRAM(s) Oral daily  tamsulosin 0.4 milliGRAM(s) Oral at bedtime    MEDICATIONS  (PRN):  acetaminophen   Tablet .. 650 milliGRAM(s) Oral every 6 hours PRN Mild Pain (1 - 3)  oxyCODONE    IR 5 milliGRAM(s) Oral every 6 hours PRN Moderate Pain (4 - 6)  oxyCODONE    IR 10 milliGRAM(s) Oral every 6 hours PRN Severe Pain (7 - 10)          Physical exam:   Gen- well developed.  Well nourished. NAD  Resp- clear to auscultation.  NO wheezing, rales or rhonchi  CV- S1 anf S2 RRR. Grade 1/6 systolic murmur  ABD- soft + bowel sounds  LLQ tenderness ot palpation  EXT- left thigh dressing dry and intact.  Left leg/foot warm to touch.  Neuro- grossly nonfocal                            8.6    7.81  )-----------( 134      ( 19 Feb 2019 06:00 )             25.1     PT/INR - ( 19 Feb 2019 06:00 )   PT: 11.7 SEC;   INR: 1.05          PTT - ( 19 Feb 2019 06:00 )  PTT:31.6 SEC  02-19    140  |  106  |  25<H>  ----------------------------<  99  3.8   |  22  |  1.31<H>    Ca    7.9<L>      19 Feb 2019 06:00  Phos  2.0     02-19  Mg     2.2     02-19      CARDIAC MARKERS ( 17 Feb 2019 14:05 )  x     / x     / 285 u/L / x     / x

## 2019-02-20 ENCOUNTER — TRANSCRIPTION ENCOUNTER (OUTPATIENT)
Age: 79
End: 2019-02-20

## 2019-02-20 LAB
ANION GAP SERPL CALC-SCNC: 12 MMO/L — SIGNIFICANT CHANGE UP (ref 7–14)
APTT BLD: 52.6 SEC — HIGH (ref 27.5–36.3)
APTT BLD: 55.6 SEC — HIGH (ref 27.5–36.3)
BUN SERPL-MCNC: 22 MG/DL — SIGNIFICANT CHANGE UP (ref 7–23)
CALCIUM SERPL-MCNC: 7.8 MG/DL — LOW (ref 8.4–10.5)
CHLORIDE SERPL-SCNC: 105 MMOL/L — SIGNIFICANT CHANGE UP (ref 98–107)
CO2 SERPL-SCNC: 22 MMOL/L — SIGNIFICANT CHANGE UP (ref 22–31)
CREAT SERPL-MCNC: 1.17 MG/DL — SIGNIFICANT CHANGE UP (ref 0.5–1.3)
GLUCOSE SERPL-MCNC: 102 MG/DL — HIGH (ref 70–99)
HCT VFR BLD CALC: 24.3 % — LOW (ref 39–50)
HGB BLD-MCNC: 8.1 G/DL — LOW (ref 13–17)
INR BLD: 1.08 — SIGNIFICANT CHANGE UP (ref 0.88–1.17)
MAGNESIUM SERPL-MCNC: 2.3 MG/DL — SIGNIFICANT CHANGE UP (ref 1.6–2.6)
MCHC RBC-ENTMCNC: 30.8 PG — SIGNIFICANT CHANGE UP (ref 27–34)
MCHC RBC-ENTMCNC: 33.3 % — SIGNIFICANT CHANGE UP (ref 32–36)
MCV RBC AUTO: 92.4 FL — SIGNIFICANT CHANGE UP (ref 80–100)
NRBC # FLD: 0.03 K/UL — LOW (ref 25–125)
PHOSPHATE SERPL-MCNC: 2.5 MG/DL — SIGNIFICANT CHANGE UP (ref 2.5–4.5)
PLATELET # BLD AUTO: 164 K/UL — SIGNIFICANT CHANGE UP (ref 150–400)
PMV BLD: 10 FL — SIGNIFICANT CHANGE UP (ref 7–13)
POTASSIUM SERPL-MCNC: 3.5 MMOL/L — SIGNIFICANT CHANGE UP (ref 3.5–5.3)
POTASSIUM SERPL-SCNC: 3.5 MMOL/L — SIGNIFICANT CHANGE UP (ref 3.5–5.3)
PROTHROM AB SERPL-ACNC: 12.3 SEC — SIGNIFICANT CHANGE UP (ref 9.8–13.1)
RBC # BLD: 2.63 M/UL — LOW (ref 4.2–5.8)
RBC # FLD: 14.5 % — SIGNIFICANT CHANGE UP (ref 10.3–14.5)
SODIUM SERPL-SCNC: 139 MMOL/L — SIGNIFICANT CHANGE UP (ref 135–145)
WBC # BLD: 8.15 K/UL — SIGNIFICANT CHANGE UP (ref 3.8–10.5)
WBC # FLD AUTO: 8.15 K/UL — SIGNIFICANT CHANGE UP (ref 3.8–10.5)

## 2019-02-20 PROCEDURE — 99232 SBSQ HOSP IP/OBS MODERATE 35: CPT

## 2019-02-20 RX ORDER — HEPARIN SODIUM 5000 [USP'U]/ML
1000 INJECTION INTRAVENOUS; SUBCUTANEOUS
Qty: 25000 | Refills: 0 | Status: DISCONTINUED | OUTPATIENT
Start: 2019-02-20 | End: 2019-02-20

## 2019-02-20 RX ORDER — CLOPIDOGREL BISULFATE 75 MG/1
75 TABLET, FILM COATED ORAL DAILY
Qty: 0 | Refills: 0 | Status: DISCONTINUED | OUTPATIENT
Start: 2019-02-20 | End: 2019-02-22

## 2019-02-20 RX ORDER — OXYCODONE HYDROCHLORIDE 5 MG/1
10 TABLET ORAL EVERY 6 HOURS
Qty: 0 | Refills: 0 | Status: DISCONTINUED | OUTPATIENT
Start: 2019-02-20 | End: 2019-02-22

## 2019-02-20 RX ORDER — ASPIRIN/CALCIUM CARB/MAGNESIUM 324 MG
81 TABLET ORAL DAILY
Qty: 0 | Refills: 0 | Status: DISCONTINUED | OUTPATIENT
Start: 2019-02-20 | End: 2019-02-22

## 2019-02-20 RX ORDER — POTASSIUM PHOSPHATE, MONOBASIC POTASSIUM PHOSPHATE, DIBASIC 236; 224 MG/ML; MG/ML
15 INJECTION, SOLUTION INTRAVENOUS ONCE
Qty: 0 | Refills: 0 | Status: COMPLETED | OUTPATIENT
Start: 2019-02-20 | End: 2019-02-20

## 2019-02-20 RX ORDER — CLOPIDOGREL BISULFATE 75 MG/1
75 TABLET, FILM COATED ORAL DAILY
Qty: 0 | Refills: 0 | Status: DISCONTINUED | OUTPATIENT
Start: 2019-02-20 | End: 2019-02-20

## 2019-02-20 RX ORDER — ASPIRIN/CALCIUM CARB/MAGNESIUM 324 MG
81 TABLET ORAL DAILY
Qty: 0 | Refills: 0 | Status: DISCONTINUED | OUTPATIENT
Start: 2019-02-20 | End: 2019-02-20

## 2019-02-20 RX ORDER — HEPARIN SODIUM 5000 [USP'U]/ML
900 INJECTION INTRAVENOUS; SUBCUTANEOUS
Qty: 25000 | Refills: 0 | Status: DISCONTINUED | OUTPATIENT
Start: 2019-02-20 | End: 2019-02-20

## 2019-02-20 RX ORDER — OXYCODONE HYDROCHLORIDE 5 MG/1
5 TABLET ORAL EVERY 6 HOURS
Qty: 0 | Refills: 0 | Status: DISCONTINUED | OUTPATIENT
Start: 2019-02-20 | End: 2019-02-22

## 2019-02-20 RX ADMIN — HEPARIN SODIUM 8 UNIT(S)/HR: 5000 INJECTION INTRAVENOUS; SUBCUTANEOUS at 00:34

## 2019-02-20 RX ADMIN — POTASSIUM PHOSPHATE, MONOBASIC POTASSIUM PHOSPHATE, DIBASIC 62.5 MILLIMOLE(S): 236; 224 INJECTION, SOLUTION INTRAVENOUS at 12:23

## 2019-02-20 RX ADMIN — HEPARIN SODIUM 9 UNIT(S)/HR: 5000 INJECTION INTRAVENOUS; SUBCUTANEOUS at 08:57

## 2019-02-20 RX ADMIN — Medication 81 MILLIGRAM(S): at 10:38

## 2019-02-20 RX ADMIN — Medication 25 MILLIGRAM(S): at 17:56

## 2019-02-20 RX ADMIN — Medication 25 MILLIGRAM(S): at 05:07

## 2019-02-20 RX ADMIN — HEPARIN SODIUM 7 UNIT(S)/HR: 5000 INJECTION INTRAVENOUS; SUBCUTANEOUS at 00:25

## 2019-02-20 RX ADMIN — HEPARIN SODIUM 9 UNIT(S)/HR: 5000 INJECTION INTRAVENOUS; SUBCUTANEOUS at 08:24

## 2019-02-20 RX ADMIN — AMLODIPINE BESYLATE 10 MILLIGRAM(S): 2.5 TABLET ORAL at 05:07

## 2019-02-20 RX ADMIN — Medication 10 MILLIGRAM(S): at 11:06

## 2019-02-20 RX ADMIN — CLOPIDOGREL BISULFATE 75 MILLIGRAM(S): 75 TABLET, FILM COATED ORAL at 10:38

## 2019-02-20 RX ADMIN — TAMSULOSIN HYDROCHLORIDE 0.4 MILLIGRAM(S): 0.4 CAPSULE ORAL at 21:34

## 2019-02-20 NOTE — PROVIDER CONTACT NOTE (OTHER) - ASSESSMENT
Pt awake, alert and oriented x3. Pt denies complain of chest pain or discomfort. Vital signs /39, P62, R18, T99.7, O2sat 96% on room air.

## 2019-02-20 NOTE — DISCHARGE NOTE ADULT - CARE PLAN
Principal Discharge DX:	Limb ischemia  Goal:	You went to the OR for a bypass and and thrombectomy  Assessment and plan of treatment:	Follow up with Dr. Landers in one week, call for an appointment. Please follow up with your primary care physician regarding your hospitalization. Do not drive while taking pain medications

## 2019-02-20 NOTE — DISCHARGE NOTE ADULT - ADDITIONAL INSTRUCTIONS
FOLLOW UP:    Electrophysiology- 30 day Cardionet monitor will be ordered as an outpatient after discharge. Pt will place monitor on at home after rehab. Follow up with EP Dr. Jurado on April 4 at 1:30pm. Oncology Building 4th. floor at [facility] [phone].

## 2019-02-20 NOTE — DISCHARGE NOTE ADULT - PATIENT PORTAL LINK FT
You can access the [facility] Patient Portal, offered by [facility], by registering with the following website: [email or web address]

## 2019-02-20 NOTE — PROGRESS NOTE ADULT - SUBJECTIVE AND OBJECTIVE BOX
Patient is a 79y old  Male who presents with a chief complaint of ischemic LLE (20 Feb 2019 00:25)  Patient denies CP, SOB or palpitations.      PAST MEDICAL & SURGICAL HISTORY:  No pertinent past medical history  HTN (hypertension)  No significant past surgical history      MEDICATIONS  (STANDING):  amLODIPine   Tablet 10 milliGRAM(s) Oral daily  aspirin  chewable 81 milliGRAM(s) Oral daily  clopidogrel Tablet 75 milliGRAM(s) Oral daily  influenza   Vaccine 0.5 milliLiter(s) IntraMuscular once  metoprolol tartrate 25 milliGRAM(s) Oral two times a day  oxybutynin XL 10 milliGRAM(s) Oral daily  tamsulosin 0.4 milliGRAM(s) Oral at bedtime    MEDICATIONS  (PRN):  acetaminophen   Tablet .. 650 milliGRAM(s) Oral every 6 hours PRN Mild Pain (1 - 3)  oxyCODONE    IR 5 milliGRAM(s) Oral every 6 hours PRN Moderate Pain (4 - 6)  oxyCODONE    IR 10 milliGRAM(s) Oral every 6 hours PRN Severe Pain (7 - 10)            Vital Signs Last 24 Hrs  T(C): 36.8 (20 Feb 2019 10:18), Max: 37.3 (19 Feb 2019 21:16)  T(F): 98.2 (20 Feb 2019 10:18), Max: 99.1 (19 Feb 2019 21:16)  HR: 67 (20 Feb 2019 10:18) (63 - 81)  BP: 116/46 (20 Feb 2019 10:18) (116/46 - 136/52)  BP(mean): --  RR: 16 (20 Feb 2019 10:18) (16 - 18)  SpO2: 98% (20 Feb 2019 10:18) (97% - 99%)            INTERPRETATION OF TELEMETRY: not on telemetry monitoring    ECG:        LABS:                        8.1    8.15  )-----------( 164      ( 20 Feb 2019 06:30 )             24.3     02-20    139  |  105  |  22  ----------------------------<  102<H>  3.5   |  22  |  1.17    Ca    7.8<L>      20 Feb 2019 06:30  Phos  2.5     02-20  Mg     2.3     02-20          PT/INR - ( 20 Feb 2019 06:30 )   PT: 12.3 SEC;   INR: 1.08          PTT - ( 20 Feb 2019 06:30 )  PTT:55.6 SEC      BNP  RADIOLOGY & ADDITIONAL STUDIES:      PHYSICAL EXAM:    GENERAL: In no apparent distress, well nourished, and hydrated.  NECK: Supple and normal thyroid.  No JVD or carotid bruit.  Carotid pulse is 2+ bilaterally.  HEART: Regular rate and rhythm; 2/6 systolic murmurs, no rubs, or gallops.  PULMONARY: Clear to auscultation and perfusion.  No rales, wheezing, or rhonchi bilaterally.  ABDOMEN: Soft, Nontender, Nondistended; Bowel sounds present  EXTREMITIES:  2+ Peripheral Pulses, No clubbing, cyanosis, or edema  L groin site dry intact   NEUROLOGICAL: Grossly nonfocal

## 2019-02-20 NOTE — DISCHARGE NOTE ADULT - HOSPITAL COURSE
78M with prior history of bilateral lower extremity bypasses (pt does not remember details) done in 1980s presents today with LLE pain and mild sensory and motor loss since 3:15pm. He got a massage prior to the start of symptoms and then noticed that his foot felt cold and he had pain in his calf. He also felt that he could not move his toes very well. He has no complaints in his RLE.   He otherwise is ambulatory at home and denies any symptoms of claudication. He has HTN and HLD at home. He denies any history of atrial fibrillation. He is a current pack a day smoker and has done so for the past 25 years.  Pt found with occluded superficial femoral artery and occluded bypass graft (fem-pop graft) on arterial duplex. CT LE with run off shows patent fem-pop graft, right proximal ant-tib artery occlusion; left fem-pop graft occluded, no flow in ant-tib, post-tib, and peroneal arteries. Pt planned to go to OR for thrombolysis of LLE fem-pop graft but, instead underwent thrombectomized old PTFE fem-pop graft and then interposition bypass from common femoral to profunda with a 5 Italian sheath left in the right femoral artery. Pt received heparin bolus prior to case starting. SICU consulted for Q1 vascular checks.  Patient found to be bradycardic while monitored in the SICU  Echo performed: 1. Mitral annular calcification, otherwise normal mitral valve. Minimal mitral regurgitation.  2. Aortic valve leaflet morphology not well visualized. Mild-moderate aortic regurgitation.  3. Normal left ventricular internal dimensions and wall thicknesses.  4. Endocardium not well visualized; grossly normal left ventricular systolic function.  5. The right ventricle is not well visualized; grossly normal right ventricular systolic function.    EP consulted: evidence of new PAF and tachy/katey syndrome. Heparin gtt cont. for AC and metoprolol prn for rate control.   Hg/Hct dropped and a ct was ordered to r/o a bleed.  CT abd/pelvis official read:   Extraperitoneal/retroperitoneal hematoma in the left pelvis extending into the left groin and surrounding the left vascular stents. Greatest dimension measures up to 7.3 cm. Evaluation for pseudoaneurysm and graft patency is limited on noncontrast examination.  Overlying soft tissue swelling and edema involving the visualized portions of the left lower extremity.    Heparin gtt was stopped. Patient was transfused 2upRBCs with appropriate response and CBCs were monitored and were stable. Patient was transferred from the SICU to the floor.  On 2/19 heparin gtt was restarted.  On 2/20 heparin gtt was stopped and patient was started on plavix.  EP arranging 30 days of cardionet upon discharge.   During hospitalization patient failed TOV and urology was consulted.

## 2019-02-20 NOTE — PROGRESS NOTE ADULT - SUBJECTIVE AND OBJECTIVE BOX
Surgery Progress Note      Subjective: Patient seen and examined. No acute events overnight.     T(C): 37.3 (02-19-19 @ 21:16), Max: 37.3 (02-19-19 @ 21:16)  HR: 63 (02-19-19 @ 21:16) (63 - 81)  BP: 121/42 (02-19-19 @ 21:16) (119/51 - 133/45)  RR: 17 (02-19-19 @ 21:16) (16 - 18)  SpO2: 97% (02-19-19 @ 21:16) (96% - 99%)      02-18-19 @ 07:01  -  02-19-19 @ 07:00  --------------------------------------------------------  IN: 150 mL / OUT: 1180 mL / NET: -1030 mL    02-19-19 @ 07:01  -  02-20-19 @ 00:25  --------------------------------------------------------  IN: 37 mL / OUT: 625 mL / NET: -588 mL        Physical Exam:     Pulses: palpable left popliteal artery, +doppler signal left DP, +doppler signal left peroneal artery. Dusky toes noted on left. Left foot cool to touch, reduced cap refill. Left groin with moderately saturated dressing (serosanguinous).  Right side: palp PT      Labs:                          8.6    7.81  )-----------( 134      ( 19 Feb 2019 06:00 )             25.1     02-19    140  |  106  |  25<H>  ----------------------------<  99  3.8   |  22  |  1.31<H>    Ca    7.9<L>      19 Feb 2019 06:00  Phos  2.0     02-19  Mg     2.2     02-19        Medications:     acetaminophen   Tablet .. 650 milliGRAM(s) Oral every 6 hours PRN  amLODIPine   Tablet 10 milliGRAM(s) Oral daily  heparin  Infusion 500 Unit(s)/Hr IV Continuous <Continuous>  influenza   Vaccine 0.5 milliLiter(s) IntraMuscular once  metoprolol tartrate 25 milliGRAM(s) Oral two times a day  oxybutynin XL 10 milliGRAM(s) Oral daily  oxyCODONE    IR 5 milliGRAM(s) Oral every 6 hours PRN  oxyCODONE    IR 10 milliGRAM(s) Oral every 6 hours PRN  tamsulosin 0.4 milliGRAM(s) Oral at bedtime      Radiographs: No new imaging

## 2019-02-20 NOTE — DISCHARGE NOTE ADULT - MEDICATION SUMMARY - MEDICATIONS TO TAKE
I will START or STAY ON the medications listed below when I get home from the hospital:    aspirin 81 mg oral tablet  -- 1 tab(s) by mouth once a day  -- Indication: For Limb ischemia    acetaminophen 325 mg oral tablet  -- 2 tab(s) by mouth every 6 hours, As needed, Mild Pain (1 - 3)  -- Indication: For Pain    ramipril 10 mg oral tablet  -- orally once a day  -- Indication: For Home    tamsulosin 0.4 mg oral capsule  -- 1 cap(s) by mouth once a day  -- Indication: For home    simvastatin 20 mg oral tablet  -- 1 tab(s) by mouth once a day (at bedtime)  -- Indication: For Home    clopidogrel 75 mg oral tablet  -- 1 tab(s) by mouth once a day  -- Indication: For Limb ischemia    Metoprolol Tartrate 50 mg oral tablet  -- orally once a day  -- Indication: For Home    amLODIPine  -- 20 milligram(s) by mouth once a day  -- Indication: For Home    oxybutynin 10 mg/24 hr oral tablet, extended release  -- 1 tab(s) by mouth once a day  -- Indication: For Home

## 2019-02-20 NOTE — DISCHARGE NOTE ADULT - CONDITIONS AT DISCHARGE
pt alert & oriented. denies pain/discomfort. discharge paperwork/information given to pt. pt verbalized understanding. pt verbalized understanding. stable for discharge at this time.

## 2019-02-20 NOTE — DISCHARGE NOTE ADULT - PLAN OF CARE
You went to the OR for a bypass and and thrombectomy Follow up with Dr. Landers in one week, call for an appointment. Please follow up with your primary care physician regarding your hospitalization. Do not drive while taking pain medications

## 2019-02-20 NOTE — DISCHARGE NOTE ADULT - CARE PROVIDER_API CALL
Mack Landers (MD)  Surgery; Vascular Surgery  [address] [address] [place] [address]  Phone: [phone]  Fax: [phone]  Follow Up Time:

## 2019-02-20 NOTE — PROGRESS NOTE ADULT - ASSESSMENT
79M s/p thrombectomized old PTFE left leg fem-pop graft and then left leg interposition bypass from common femoral to profunda with a 5 German sheath left in the right femoral artery for RLE arterial occlusion and occlusion of bypass graft. Maori sheath on right side then removed 2hrs post-op. C/b H&H drop and CT scan on 2/17 with 7.3 cm left-sided retroperitoneal hematoma so heparin gtt was subsequently stopped and pt given 2u pRBC with good post-transfusion response.    - C/w monitoring H&H  - urology recs: - TOV if increased ambulation while inpatient, if not plan for discharge with kimbrough to follow up with Dr. Mays in office. 2% lidocaine jelly to tip of penis for cather burning.  - heparin drip restart - start at 500, titrate slowly  - Pain control  - Appreciate EP recommendations  - PT: rehab

## 2019-02-20 NOTE — PROGRESS NOTE ADULT - ASSESSMENT
78 male + smoker with peripheral artery disease of  bilateral lower extremities s/p remote B/L fem-pop bypass, and hypertension presents with left leg pain   s/p thrombectomy of old fem-pop bypass graft and interposition bypass from common femoral to profunda in right femoral artery c/b left sided retroperitoneal hematoma with drop in H/H requiring 2 units PRBC's. Initially heparin off but now restarted with no signs of active bleeding. Patient only complains of urinary retention (has kimbrough). Pain well controlled with current medications.  EP called on 2/16 for new onset atrial fibrillation with RVR (130's).  Treated with Lopressor 2.5mg IV with ventricular rate drop to the 30-40's. Bradycardia resolved.  Patient currently tolerating los dose beta-blocker.  He is not on Heparin gtt at present time.    Discussed potential benefit of ILR for long term detection of PAF/PAF burden,  patient declined ILR.  However, he is interested to have short term cardiac monitoring with Cardionet monitor upon discharge to PAF detection.   -Will arrange 30 days Cardionet upon discharge   -Continue Metoprolol 25 mg BID as tolerated  -Will follow up

## 2019-02-21 PROBLEM — Z00.00 ENCOUNTER FOR PREVENTIVE HEALTH EXAMINATION: Status: ACTIVE | Noted: 2019-02-21

## 2019-02-21 LAB
ANION GAP SERPL CALC-SCNC: 11 MMO/L — SIGNIFICANT CHANGE UP (ref 7–14)
BUN SERPL-MCNC: 23 MG/DL — SIGNIFICANT CHANGE UP (ref 7–23)
CALCIUM SERPL-MCNC: 8 MG/DL — LOW (ref 8.4–10.5)
CHLORIDE SERPL-SCNC: 106 MMOL/L — SIGNIFICANT CHANGE UP (ref 98–107)
CO2 SERPL-SCNC: 23 MMOL/L — SIGNIFICANT CHANGE UP (ref 22–31)
CREAT SERPL-MCNC: 1.21 MG/DL — SIGNIFICANT CHANGE UP (ref 0.5–1.3)
GLUCOSE SERPL-MCNC: 90 MG/DL — SIGNIFICANT CHANGE UP (ref 70–99)
HCT VFR BLD CALC: 23.8 % — LOW (ref 39–50)
HGB BLD-MCNC: 8 G/DL — LOW (ref 13–17)
MAGNESIUM SERPL-MCNC: 2.2 MG/DL — SIGNIFICANT CHANGE UP (ref 1.6–2.6)
MCHC RBC-ENTMCNC: 30.7 PG — SIGNIFICANT CHANGE UP (ref 27–34)
MCHC RBC-ENTMCNC: 33.6 % — SIGNIFICANT CHANGE UP (ref 32–36)
MCV RBC AUTO: 91.2 FL — SIGNIFICANT CHANGE UP (ref 80–100)
NRBC # FLD: 0.05 K/UL — LOW (ref 25–125)
PHOSPHATE SERPL-MCNC: 3 MG/DL — SIGNIFICANT CHANGE UP (ref 2.5–4.5)
PLATELET # BLD AUTO: 168 K/UL — SIGNIFICANT CHANGE UP (ref 150–400)
PMV BLD: 10.1 FL — SIGNIFICANT CHANGE UP (ref 7–13)
POTASSIUM SERPL-MCNC: 3.5 MMOL/L — SIGNIFICANT CHANGE UP (ref 3.5–5.3)
POTASSIUM SERPL-SCNC: 3.5 MMOL/L — SIGNIFICANT CHANGE UP (ref 3.5–5.3)
RBC # BLD: 2.61 M/UL — LOW (ref 4.2–5.8)
RBC # FLD: 14.7 % — HIGH (ref 10.3–14.5)
SODIUM SERPL-SCNC: 140 MMOL/L — SIGNIFICANT CHANGE UP (ref 135–145)
WBC # BLD: 8.54 K/UL — SIGNIFICANT CHANGE UP (ref 3.8–10.5)
WBC # FLD AUTO: 8.54 K/UL — SIGNIFICANT CHANGE UP (ref 3.8–10.5)

## 2019-02-21 RX ADMIN — AMLODIPINE BESYLATE 10 MILLIGRAM(S): 2.5 TABLET ORAL at 06:17

## 2019-02-21 RX ADMIN — Medication 25 MILLIGRAM(S): at 06:17

## 2019-02-21 RX ADMIN — Medication 10 MILLIGRAM(S): at 12:22

## 2019-02-21 RX ADMIN — Medication 81 MILLIGRAM(S): at 12:23

## 2019-02-21 RX ADMIN — Medication 25 MILLIGRAM(S): at 17:34

## 2019-02-21 RX ADMIN — CLOPIDOGREL BISULFATE 75 MILLIGRAM(S): 75 TABLET, FILM COATED ORAL at 12:23

## 2019-02-21 RX ADMIN — TAMSULOSIN HYDROCHLORIDE 0.4 MILLIGRAM(S): 0.4 CAPSULE ORAL at 22:18

## 2019-02-21 NOTE — PROGRESS NOTE ADULT - SUBJECTIVE AND OBJECTIVE BOX
Vascular Surgery Progress Note:    Subjective:  No acute events overnight.  Patient examined at bedside.  No new c/o.    Objective:  Exam:  Gen: alert, oriented, in NAD  Cards: regular  Resp: non-labored breathing  Pulses: palpable left popliteal artery, +doppler signal left DP, +doppler signal left peroneal artery. Dusky toes noted on left. Left foot cool to touch, reduced cap refill. Left groin with moderately saturated dressing (serosanguinous).  Right side: palp PT    MEDICATIONS  (STANDING):  amLODIPine   Tablet 10 milliGRAM(s) Oral daily  aspirin  chewable 81 milliGRAM(s) Oral daily  clopidogrel Tablet 75 milliGRAM(s) Oral daily  influenza   Vaccine 0.5 milliLiter(s) IntraMuscular once  metoprolol tartrate 25 milliGRAM(s) Oral two times a day  oxybutynin XL 10 milliGRAM(s) Oral daily  tamsulosin 0.4 milliGRAM(s) Oral at bedtime    MEDICATIONS  (PRN):  acetaminophen   Tablet .. 650 milliGRAM(s) Oral every 6 hours PRN Mild Pain (1 - 3)  oxyCODONE    IR 5 milliGRAM(s) Oral every 6 hours PRN Moderate Pain (4 - 6)  oxyCODONE    IR 10 milliGRAM(s) Oral every 6 hours PRN Severe Pain (7 - 10)      Vital Signs Last 24 Hrs  T(C): 36.7 (21 Feb 2019 14:35), Max: 37.6 (20 Feb 2019 20:54)  T(F): 98.1 (21 Feb 2019 14:35), Max: 99.7 (20 Feb 2019 20:54)  HR: 72 (21 Feb 2019 14:35) (62 - 76)  BP: 138/55 (21 Feb 2019 14:35) (111/39 - 140/59)  BP(mean): --  RR: 18 (21 Feb 2019 14:35) (18 - 19)  SpO2: 100% (21 Feb 2019 14:35) (96% - 100%)    I&O's Detail    20 Feb 2019 07:01  -  21 Feb 2019 07:00  --------------------------------------------------------  IN:  Total IN: 0 mL    OUT:    Indwelling Catheter - Urethral: 1850 mL  Total OUT: 1850 mL    Total NET: -1850 mL      21 Feb 2019 07:01  -  21 Feb 2019 14:58  --------------------------------------------------------  IN:  Total IN: 0 mL    OUT:    Indwelling Catheter - Urethral: 225 mL  Total OUT: 225 mL    Total NET: -225 mL          LABS:                        8.0    8.54  )-----------( 168      ( 21 Feb 2019 05:45 )             23.8     02-21    140  |  106  |  23  ----------------------------<  90  3.5   |  23  |  1.21    Ca    8.0<L>      21 Feb 2019 05:45  Phos  3.0     02-21  Mg     2.2     02-21      PT/INR - ( 20 Feb 2019 06:30 )   PT: 12.3 SEC;   INR: 1.08          PTT - ( 20 Feb 2019 06:30 )  PTT:55.6 SEC

## 2019-02-21 NOTE — CHART NOTE - NSCHARTNOTEFT_GEN_A_CORE
30 days Cardionet monitor will be ordered as an outpatient after discharge.  Follow up with ADAN Jurado on April 4 at 1:30pm. Oncology Building 4th. floor at [facility] [phone]

## 2019-02-21 NOTE — PROGRESS NOTE ADULT - ASSESSMENT
79M s/p thrombectomized old PTFE left leg fem-pop graft and then left leg interposition bypass from common femoral to profunda with a 5 Yakut sheath left in the right femoral artery for RLE arterial occlusion and occlusion of bypass graft. Wolof sheath on right side then removed 2hrs post-op. C/b H&H drop and CT scan on 2/17 with 7.3 cm left-sided retroperitoneal hematoma so heparin gtt was subsequently stopped and pt given 2u pRBC with good post-transfusion response.    - C/w monitoring H&H.  - urology recs: - TOV if increased ambulation while inpatient, if not plan for discharge with kimbrough to follow up with Dr. Mays in office. 2% lidocaine jelly to tip of penis for catheter burning.  - Hep gtt d/c'd, now on ASA/plavix.  - Pain control.  - Appreciate EP recommendations - will get cardiac monitor sent to home for after rehab, will f/u with EP outpt.    - PT: rehab. Working on dispo planning.

## 2019-02-22 VITALS
RESPIRATION RATE: 18 BRPM | OXYGEN SATURATION: 100 % | TEMPERATURE: 98 F | DIASTOLIC BLOOD PRESSURE: 42 MMHG | HEART RATE: 83 BPM | SYSTOLIC BLOOD PRESSURE: 139 MMHG

## 2019-02-22 LAB
ANION GAP SERPL CALC-SCNC: 11 MMO/L — SIGNIFICANT CHANGE UP (ref 7–14)
BUN SERPL-MCNC: 26 MG/DL — HIGH (ref 7–23)
CALCIUM SERPL-MCNC: 8.1 MG/DL — LOW (ref 8.4–10.5)
CHLORIDE SERPL-SCNC: 109 MMOL/L — HIGH (ref 98–107)
CO2 SERPL-SCNC: 22 MMOL/L — SIGNIFICANT CHANGE UP (ref 22–31)
CREAT SERPL-MCNC: 1.26 MG/DL — SIGNIFICANT CHANGE UP (ref 0.5–1.3)
GLUCOSE SERPL-MCNC: 91 MG/DL — SIGNIFICANT CHANGE UP (ref 70–99)
HCT VFR BLD CALC: 24.5 % — LOW (ref 39–50)
HGB BLD-MCNC: 8 G/DL — LOW (ref 13–17)
MAGNESIUM SERPL-MCNC: 2.3 MG/DL — SIGNIFICANT CHANGE UP (ref 1.6–2.6)
MCHC RBC-ENTMCNC: 30.2 PG — SIGNIFICANT CHANGE UP (ref 27–34)
MCHC RBC-ENTMCNC: 32.7 % — SIGNIFICANT CHANGE UP (ref 32–36)
MCV RBC AUTO: 92.5 FL — SIGNIFICANT CHANGE UP (ref 80–100)
NRBC # FLD: 0.04 K/UL — LOW (ref 25–125)
PHOSPHATE SERPL-MCNC: 2.8 MG/DL — SIGNIFICANT CHANGE UP (ref 2.5–4.5)
PLATELET # BLD AUTO: 210 K/UL — SIGNIFICANT CHANGE UP (ref 150–400)
PMV BLD: 9.9 FL — SIGNIFICANT CHANGE UP (ref 7–13)
POTASSIUM SERPL-MCNC: 3.7 MMOL/L — SIGNIFICANT CHANGE UP (ref 3.5–5.3)
POTASSIUM SERPL-SCNC: 3.7 MMOL/L — SIGNIFICANT CHANGE UP (ref 3.5–5.3)
RBC # BLD: 2.65 M/UL — LOW (ref 4.2–5.8)
RBC # FLD: 14.7 % — HIGH (ref 10.3–14.5)
SODIUM SERPL-SCNC: 142 MMOL/L — SIGNIFICANT CHANGE UP (ref 135–145)
WBC # BLD: 8.5 K/UL — SIGNIFICANT CHANGE UP (ref 3.8–10.5)
WBC # FLD AUTO: 8.5 K/UL — SIGNIFICANT CHANGE UP (ref 3.8–10.5)

## 2019-02-22 RX ORDER — ACETAMINOPHEN 500 MG
2 TABLET ORAL
Qty: 0 | Refills: 0 | DISCHARGE
Start: 2019-02-22

## 2019-02-22 RX ORDER — POTASSIUM CHLORIDE 20 MEQ
20 PACKET (EA) ORAL
Qty: 0 | Refills: 0 | Status: DISCONTINUED | OUTPATIENT
Start: 2019-02-22 | End: 2019-02-22

## 2019-02-22 RX ORDER — CLOPIDOGREL BISULFATE 75 MG/1
1 TABLET, FILM COATED ORAL
Qty: 0 | Refills: 0 | DISCHARGE
Start: 2019-02-22

## 2019-02-22 RX ORDER — POTASSIUM CHLORIDE 20 MEQ
20 PACKET (EA) ORAL EVERY 4 HOURS
Qty: 0 | Refills: 0 | Status: COMPLETED | OUTPATIENT
Start: 2019-02-22 | End: 2019-02-22

## 2019-02-22 RX ADMIN — Medication 10 MILLIGRAM(S): at 11:26

## 2019-02-22 RX ADMIN — Medication 25 MILLIGRAM(S): at 17:29

## 2019-02-22 RX ADMIN — AMLODIPINE BESYLATE 10 MILLIGRAM(S): 2.5 TABLET ORAL at 05:43

## 2019-02-22 RX ADMIN — CLOPIDOGREL BISULFATE 75 MILLIGRAM(S): 75 TABLET, FILM COATED ORAL at 11:25

## 2019-02-22 RX ADMIN — Medication 20 MILLIEQUIVALENT(S): at 15:23

## 2019-02-22 RX ADMIN — Medication 20 MILLIEQUIVALENT(S): at 11:00

## 2019-02-22 RX ADMIN — Medication 81 MILLIGRAM(S): at 11:25

## 2019-02-22 RX ADMIN — Medication 25 MILLIGRAM(S): at 05:43

## 2019-02-22 NOTE — DIETITIAN INITIAL EVALUATION ADULT. - PERTINENT LABORATORY DATA
02-22 Na 142 mmol/L Glu 91 mg/dL K+ 3.7 mmol/L Cr 1.26 mg/dL BUN 26 mg/dL<H> Phos 2.8 mg/dL Alb n/a   PAB n/a   Hgb 8.0 g/dL<L> Hct 24.5 %<L> HgA1C n/a    Glucose, Serum: 91 mg/dL

## 2019-02-22 NOTE — PROGRESS NOTE ADULT - PROVIDER SPECIALTY LIST ADULT
Electrophysiology
Electrophysiology
SICU
Vascular Surgery

## 2019-02-22 NOTE — DIETITIAN INITIAL EVALUATION ADULT. - PERTINENT MEDS FT
acetaminophen   Tablet .. PRN  amLODIPine   Tablet  aspirin  chewable  clopidogrel Tablet  influenza   Vaccine  metoprolol tartrate  oxybutynin XL  oxyCODONE    IR PRN  oxyCODONE    IR PRN  potassium chloride   Powder  tamsulosin

## 2019-02-22 NOTE — PROGRESS NOTE ADULT - SUBJECTIVE AND OBJECTIVE BOX
Patient is s/p thrombectomy of old L fem-pop bypass with interposition bypass from CFA to profunda  Post-op drop in H/H secondary to left sided retroperitoneal hematoma; s/p 2u PRBC with good response  No new complaints  Able to walk to bathroom and is OOB to chair  On ASA and plavix    Continue to monitor H/H  Uro and EP recs appreciated  Awaiting d/c to rehab, patient is anxious about discharge   Follow up with Dr. Landers in 2 weeks after discharge  Patient given card to call for appointment    Will discuss with Dr. Landers

## 2019-02-22 NOTE — PROGRESS NOTE ADULT - SUBJECTIVE AND OBJECTIVE BOX
Surgery Progress Note      Subjective: Patient seen and examined. No acute events overnight.     T(C): 36.7 (02-22-19 @ 05:39), Max: 37.4 (02-21-19 @ 22:26)  HR: 67 (02-22-19 @ 05:39) (60 - 72)  BP: 142/64 (02-22-19 @ 05:39) (122/51 - 142/64)  RR: 18 (02-22-19 @ 05:39) (16 - 18)  SpO2: 97% (02-22-19 @ 05:39) (96% - 100%)      02-21-19 @ 07:01  -  02-22-19 @ 07:00  --------------------------------------------------------  IN: 0 mL / OUT: 1225 mL / NET: -1225 mL        Physical Exam:   General: NAD  palpable left popliteal artery, +doppler signal left DP, +doppler signal left peroneal artery. Dusky toes noted on left. Left foot cool to touch, reduced cap refill. Left groin with moderately saturated dressing (serosanguinous).  Right side: palp PT    Labs:                          8.0    8.50  )-----------( 210      ( 22 Feb 2019 05:02 )             24.5     02-22    142  |  109<H>  |  26<H>  ----------------------------<  91  3.7   |  22  |  1.26    Ca    8.1<L>      22 Feb 2019 05:02  Phos  2.8     02-22  Mg     2.3     02-22        Medications:     acetaminophen   Tablet .. 650 milliGRAM(s) Oral every 6 hours PRN  amLODIPine   Tablet 10 milliGRAM(s) Oral daily  aspirin  chewable 81 milliGRAM(s) Oral daily  clopidogrel Tablet 75 milliGRAM(s) Oral daily  influenza   Vaccine 0.5 milliLiter(s) IntraMuscular once  metoprolol tartrate 25 milliGRAM(s) Oral two times a day  oxybutynin XL 10 milliGRAM(s) Oral daily  oxyCODONE    IR 5 milliGRAM(s) Oral every 6 hours PRN  oxyCODONE    IR 10 milliGRAM(s) Oral every 6 hours PRN  potassium chloride   Powder 20 milliEquivalent(s) Oral every 4 hours  tamsulosin 0.4 milliGRAM(s) Oral at bedtime      Radiographs: No new imaging

## 2019-02-22 NOTE — PROGRESS NOTE ADULT - ASSESSMENT
79M s/p thrombectomized old PTFE left leg fem-pop graft and then left leg interposition bypass from common femoral to profunda with a 5 Greenlandic sheath left in the right femoral artery for RLE arterial occlusion and occlusion of bypass graft. Upper sorbian sheath on right side then removed 2hrs post-op. C/b H&H drop and CT scan on 2/17 with 7.3 cm left-sided retroperitoneal hematoma so heparin gtt was subsequently stopped and pt given 2u pRBC with good post-transfusion response.    - C/w monitoring H&H  - urology recs: - TOV if increased ambulation while inpatient, if not plan for discharge with luis e to follow up with Dr. Mays in office. 2% lidocaine jelly to tip of penis for catheter burning  - ASA/plavix  - Pain control  - Appreciate EP recommendations - will get cardiac monitor sent to home for after rehab, will f/u with EP outpt  - PT: rehab. Working on dispo planning

## 2019-02-22 NOTE — DIETITIAN INITIAL EVALUATION ADULT. - ORAL INTAKE PTA
good/Patient reports eating well prior to admission. Family reports patient has always had a good appetite.

## 2019-02-22 NOTE — DIETITIAN INITIAL EVALUATION ADULT. - ENERGY NEEDS
Height (cm): 170.18 (15 Feb 2019 15:00)  Weight (kg): 66.2 (15 Feb 2019 15:00)  BMI (kg/m2): 22.9 (15 Feb 2019 15:00)  IBW: 67kg +/-10%  Skin: surgical incision, left and right groin

## 2019-02-22 NOTE — DIETITIAN INITIAL EVALUATION ADULT. - OTHER INFO
Initial Dietitian Evaluation 2/2 to extended length of stay. Per chart review patient with medical history of bilateral lower extremity bypasses and HTN admitted for ischemic left lower extremity. Patient s/p thrombectomy of old L fem-pop bypass with interposition bypass from CFA to profunda. Patient reports good appetite and PO intake prior to admission which continues in-house. NKFA. No chewing/swallowing difficulties. No GI distress (nausea/vomiting/diarrhea/constipation) noted at this time. Patient reports UBW of 146# (66kg). Varying weights recorded in-house, 70kg (2/21), 66kg (2/15). Patient tolerating diet and eating well at meal times. Patient drinking Ensure Clear supplement to optimize Kcal, protein intake. Encouraged PO intake as tolerated.

## 2019-02-22 NOTE — PROGRESS NOTE ADULT - REASON FOR ADMISSION
ischemic LLE

## 2019-02-28 ENCOUNTER — OUTPATIENT (OUTPATIENT)
Dept: OUTPATIENT SERVICES | Facility: HOSPITAL | Age: 79
LOS: 1 days | Discharge: HOME | End: 2019-02-28
Payer: MEDICARE

## 2019-02-28 ENCOUNTER — INPATIENT (INPATIENT)
Facility: HOSPITAL | Age: 79
LOS: 10 days | Discharge: NURSING, REHAB OR LONG-TERM CARE | End: 2019-03-11
Attending: INTERNAL MEDICINE | Admitting: INTERNAL MEDICINE
Payer: MEDICARE

## 2019-02-28 VITALS
OXYGEN SATURATION: 97 % | DIASTOLIC BLOOD PRESSURE: 62 MMHG | HEART RATE: 53 BPM | RESPIRATION RATE: 16 BRPM | WEIGHT: 138.67 LBS | SYSTOLIC BLOOD PRESSURE: 119 MMHG | TEMPERATURE: 98 F

## 2019-02-28 DIAGNOSIS — I82.409 ACUTE EMBOLISM AND THROMBOSIS OF UNSPECIFIED DEEP VEINS OF UNSPECIFIED LOWER EXTREMITY: ICD-10-CM

## 2019-02-28 PROCEDURE — 93971 EXTREMITY STUDY: CPT | Mod: 26

## 2019-02-28 PROCEDURE — 99285 EMERGENCY DEPT VISIT HI MDM: CPT | Mod: 25

## 2019-02-28 PROCEDURE — 99053 MED SERV 10PM-8AM 24 HR FAC: CPT

## 2019-02-28 NOTE — ED ADULT TRIAGE NOTE - CHIEF COMPLAINT QUOTE
sent from [facility] + DVT LLE, R- groin incision s/p thrombectomy 13th as per Raissa MORRIS

## 2019-03-01 DIAGNOSIS — I10 ESSENTIAL (PRIMARY) HYPERTENSION: ICD-10-CM

## 2019-03-01 DIAGNOSIS — N40.0 BENIGN PROSTATIC HYPERPLASIA WITHOUT LOWER URINARY TRACT SYMPTOMS: ICD-10-CM

## 2019-03-01 DIAGNOSIS — E78.00 PURE HYPERCHOLESTEROLEMIA, UNSPECIFIED: ICD-10-CM

## 2019-03-01 DIAGNOSIS — I73.9 PERIPHERAL VASCULAR DISEASE, UNSPECIFIED: ICD-10-CM

## 2019-03-01 DIAGNOSIS — I82.402 ACUTE EMBOLISM AND THROMBOSIS OF UNSPECIFIED DEEP VEINS OF LEFT LOWER EXTREMITY: ICD-10-CM

## 2019-03-01 LAB
ALBUMIN SERPL ELPH-MCNC: 2.5 G/DL — LOW (ref 3.3–5)
ALP SERPL-CCNC: 59 U/L — SIGNIFICANT CHANGE UP (ref 40–120)
ALT FLD-CCNC: 24 U/L — SIGNIFICANT CHANGE UP (ref 12–78)
ANION GAP SERPL CALC-SCNC: 9 MMOL/L — SIGNIFICANT CHANGE UP (ref 5–17)
APTT BLD: 142 SEC — CRITICAL HIGH (ref 27.5–36.3)
APTT BLD: 34.6 SEC — SIGNIFICANT CHANGE UP (ref 28.5–37)
APTT BLD: 62.7 SEC — HIGH (ref 27.5–36.3)
AST SERPL-CCNC: 14 U/L — LOW (ref 15–37)
BASOPHILS # BLD AUTO: 0.06 K/UL — SIGNIFICANT CHANGE UP (ref 0–0.2)
BASOPHILS NFR BLD AUTO: 0.7 % — SIGNIFICANT CHANGE UP (ref 0–2)
BILIRUB SERPL-MCNC: 0.4 MG/DL — SIGNIFICANT CHANGE UP (ref 0.2–1.2)
BUN SERPL-MCNC: 31 MG/DL — HIGH (ref 7–23)
CALCIUM SERPL-MCNC: 7.9 MG/DL — LOW (ref 8.5–10.1)
CHLORIDE SERPL-SCNC: 110 MMOL/L — HIGH (ref 96–108)
CO2 SERPL-SCNC: 26 MMOL/L — SIGNIFICANT CHANGE UP (ref 22–31)
CREAT SERPL-MCNC: 1.49 MG/DL — HIGH (ref 0.5–1.3)
EOSINOPHIL # BLD AUTO: 0.18 K/UL — SIGNIFICANT CHANGE UP (ref 0–0.5)
EOSINOPHIL NFR BLD AUTO: 2.2 % — SIGNIFICANT CHANGE UP (ref 0–6)
GLUCOSE SERPL-MCNC: 99 MG/DL — SIGNIFICANT CHANGE UP (ref 70–99)
HCT VFR BLD CALC: 26.9 % — LOW (ref 39–50)
HCT VFR BLD CALC: 27.3 % — LOW (ref 39–50)
HCT VFR BLD CALC: 29.5 % — LOW (ref 39–50)
HGB BLD-MCNC: 8.6 G/DL — LOW (ref 13–17)
HGB BLD-MCNC: 8.7 G/DL — LOW (ref 13–17)
HGB BLD-MCNC: 9.1 G/DL — LOW (ref 13–17)
IMM GRANULOCYTES NFR BLD AUTO: 0.5 % — SIGNIFICANT CHANGE UP (ref 0–1.5)
INR BLD: 1.16 RATIO — SIGNIFICANT CHANGE UP (ref 0.88–1.16)
LIDOCAIN IGE QN: 150 U/L — SIGNIFICANT CHANGE UP (ref 73–393)
LYMPHOCYTES # BLD AUTO: 2.23 K/UL — SIGNIFICANT CHANGE UP (ref 1–3.3)
LYMPHOCYTES # BLD AUTO: 26.8 % — SIGNIFICANT CHANGE UP (ref 13–44)
MCHC RBC-ENTMCNC: 30.2 PG — SIGNIFICANT CHANGE UP (ref 27–34)
MCHC RBC-ENTMCNC: 30.8 GM/DL — LOW (ref 32–36)
MCHC RBC-ENTMCNC: 30.9 PG — SIGNIFICANT CHANGE UP (ref 27–34)
MCHC RBC-ENTMCNC: 30.9 PG — SIGNIFICANT CHANGE UP (ref 27–34)
MCHC RBC-ENTMCNC: 31.9 GM/DL — LOW (ref 32–36)
MCHC RBC-ENTMCNC: 32 GM/DL — SIGNIFICANT CHANGE UP (ref 32–36)
MCV RBC AUTO: 96.8 FL — SIGNIFICANT CHANGE UP (ref 80–100)
MCV RBC AUTO: 96.8 FL — SIGNIFICANT CHANGE UP (ref 80–100)
MCV RBC AUTO: 98 FL — SIGNIFICANT CHANGE UP (ref 80–100)
MONOCYTES # BLD AUTO: 0.9 K/UL — SIGNIFICANT CHANGE UP (ref 0–0.9)
MONOCYTES NFR BLD AUTO: 10.8 % — SIGNIFICANT CHANGE UP (ref 2–14)
NEUTROPHILS # BLD AUTO: 4.9 K/UL — SIGNIFICANT CHANGE UP (ref 1.8–7.4)
NEUTROPHILS NFR BLD AUTO: 59 % — SIGNIFICANT CHANGE UP (ref 43–77)
NRBC # BLD: 0 /100 WBCS — SIGNIFICANT CHANGE UP (ref 0–0)
PLATELET # BLD AUTO: 387 K/UL — SIGNIFICANT CHANGE UP (ref 150–400)
PLATELET # BLD AUTO: 389 K/UL — SIGNIFICANT CHANGE UP (ref 150–400)
PLATELET # BLD AUTO: 401 K/UL — HIGH (ref 150–400)
POTASSIUM SERPL-MCNC: 4.5 MMOL/L — SIGNIFICANT CHANGE UP (ref 3.5–5.3)
POTASSIUM SERPL-SCNC: 4.5 MMOL/L — SIGNIFICANT CHANGE UP (ref 3.5–5.3)
PROT SERPL-MCNC: 6 GM/DL — SIGNIFICANT CHANGE UP (ref 6–8.3)
PROTHROM AB SERPL-ACNC: 13.1 SEC — HIGH (ref 10–12.9)
RBC # BLD: 2.78 M/UL — LOW (ref 4.2–5.8)
RBC # BLD: 2.82 M/UL — LOW (ref 4.2–5.8)
RBC # BLD: 3.01 M/UL — LOW (ref 4.2–5.8)
RBC # FLD: 15.4 % — HIGH (ref 10.3–14.5)
RBC # FLD: 15.4 % — HIGH (ref 10.3–14.5)
RBC # FLD: 15.5 % — HIGH (ref 10.3–14.5)
SODIUM SERPL-SCNC: 145 MMOL/L — SIGNIFICANT CHANGE UP (ref 135–145)
WBC # BLD: 10.13 K/UL — SIGNIFICANT CHANGE UP (ref 3.8–10.5)
WBC # BLD: 11.21 K/UL — HIGH (ref 3.8–10.5)
WBC # BLD: 8.31 K/UL — SIGNIFICANT CHANGE UP (ref 3.8–10.5)
WBC # FLD AUTO: 10.13 K/UL — SIGNIFICANT CHANGE UP (ref 3.8–10.5)
WBC # FLD AUTO: 11.21 K/UL — HIGH (ref 3.8–10.5)
WBC # FLD AUTO: 8.31 K/UL — SIGNIFICANT CHANGE UP (ref 3.8–10.5)

## 2019-03-01 RX ORDER — SIMVASTATIN 20 MG/1
20 TABLET, FILM COATED ORAL AT BEDTIME
Qty: 0 | Refills: 0 | Status: DISCONTINUED | OUTPATIENT
Start: 2019-03-01 | End: 2019-03-11

## 2019-03-01 RX ORDER — ACETAMINOPHEN 500 MG
650 TABLET ORAL EVERY 6 HOURS
Qty: 0 | Refills: 0 | Status: DISCONTINUED | OUTPATIENT
Start: 2019-03-01 | End: 2019-03-11

## 2019-03-01 RX ORDER — HEPARIN SODIUM 5000 [USP'U]/ML
5000 INJECTION INTRAVENOUS; SUBCUTANEOUS ONCE
Qty: 0 | Refills: 0 | Status: COMPLETED | OUTPATIENT
Start: 2019-03-01 | End: 2019-03-01

## 2019-03-01 RX ORDER — TAMSULOSIN HYDROCHLORIDE 0.4 MG/1
0.4 CAPSULE ORAL AT BEDTIME
Qty: 0 | Refills: 0 | Status: DISCONTINUED | OUTPATIENT
Start: 2019-03-01 | End: 2019-03-11

## 2019-03-01 RX ORDER — HEPARIN SODIUM 5000 [USP'U]/ML
900 INJECTION INTRAVENOUS; SUBCUTANEOUS
Qty: 25000 | Refills: 0 | Status: DISCONTINUED | OUTPATIENT
Start: 2019-03-01 | End: 2019-03-03

## 2019-03-01 RX ORDER — METOPROLOL TARTRATE 50 MG
50 TABLET ORAL
Qty: 0 | Refills: 0 | Status: DISCONTINUED | OUTPATIENT
Start: 2019-03-01 | End: 2019-03-07

## 2019-03-01 RX ORDER — HEPARIN SODIUM 5000 [USP'U]/ML
5000 INJECTION INTRAVENOUS; SUBCUTANEOUS EVERY 6 HOURS
Qty: 0 | Refills: 0 | Status: DISCONTINUED | OUTPATIENT
Start: 2019-03-01 | End: 2019-03-03

## 2019-03-01 RX ORDER — HEPARIN SODIUM 5000 [USP'U]/ML
2500 INJECTION INTRAVENOUS; SUBCUTANEOUS EVERY 6 HOURS
Qty: 0 | Refills: 0 | Status: DISCONTINUED | OUTPATIENT
Start: 2019-03-01 | End: 2019-03-03

## 2019-03-01 RX ORDER — AMLODIPINE BESYLATE 2.5 MG/1
10 TABLET ORAL DAILY
Qty: 0 | Refills: 0 | Status: DISCONTINUED | OUTPATIENT
Start: 2019-03-01 | End: 2019-03-11

## 2019-03-01 RX ORDER — HEPARIN SODIUM 5000 [USP'U]/ML
2500 INJECTION INTRAVENOUS; SUBCUTANEOUS EVERY 6 HOURS
Qty: 0 | Refills: 0 | Status: DISCONTINUED | OUTPATIENT
Start: 2019-03-01 | End: 2019-03-01

## 2019-03-01 RX ORDER — HEPARIN SODIUM 5000 [USP'U]/ML
INJECTION INTRAVENOUS; SUBCUTANEOUS
Qty: 25000 | Refills: 0 | Status: DISCONTINUED | OUTPATIENT
Start: 2019-03-01 | End: 2019-03-01

## 2019-03-01 RX ORDER — HEPARIN SODIUM 5000 [USP'U]/ML
5000 INJECTION INTRAVENOUS; SUBCUTANEOUS EVERY 6 HOURS
Qty: 0 | Refills: 0 | Status: DISCONTINUED | OUTPATIENT
Start: 2019-03-01 | End: 2019-03-01

## 2019-03-01 RX ORDER — CLOPIDOGREL BISULFATE 75 MG/1
75 TABLET, FILM COATED ORAL DAILY
Qty: 0 | Refills: 0 | Status: DISCONTINUED | OUTPATIENT
Start: 2019-03-01 | End: 2019-03-11

## 2019-03-01 RX ORDER — ASPIRIN/CALCIUM CARB/MAGNESIUM 324 MG
81 TABLET ORAL DAILY
Qty: 0 | Refills: 0 | Status: DISCONTINUED | OUTPATIENT
Start: 2019-03-01 | End: 2019-03-03

## 2019-03-01 RX ORDER — LISINOPRIL 2.5 MG/1
40 TABLET ORAL DAILY
Qty: 0 | Refills: 0 | Status: DISCONTINUED | OUTPATIENT
Start: 2019-03-01 | End: 2019-03-11

## 2019-03-01 RX ADMIN — HEPARIN SODIUM 1100 UNIT(S)/HR: 5000 INJECTION INTRAVENOUS; SUBCUTANEOUS at 02:36

## 2019-03-01 RX ADMIN — Medication 81 MILLIGRAM(S): at 12:47

## 2019-03-01 RX ADMIN — Medication 650 MILLIGRAM(S): at 17:22

## 2019-03-01 RX ADMIN — Medication 650 MILLIGRAM(S): at 07:09

## 2019-03-01 RX ADMIN — Medication 650 MILLIGRAM(S): at 23:31

## 2019-03-01 RX ADMIN — TAMSULOSIN HYDROCHLORIDE 0.4 MILLIGRAM(S): 0.4 CAPSULE ORAL at 21:01

## 2019-03-01 RX ADMIN — Medication 650 MILLIGRAM(S): at 13:15

## 2019-03-01 RX ADMIN — Medication 650 MILLIGRAM(S): at 05:28

## 2019-03-01 RX ADMIN — AMLODIPINE BESYLATE 10 MILLIGRAM(S): 2.5 TABLET ORAL at 05:28

## 2019-03-01 RX ADMIN — HEPARIN SODIUM 900 UNIT(S)/HR: 5000 INJECTION INTRAVENOUS; SUBCUTANEOUS at 19:04

## 2019-03-01 RX ADMIN — HEPARIN SODIUM 5000 UNIT(S): 5000 INJECTION INTRAVENOUS; SUBCUTANEOUS at 02:35

## 2019-03-01 RX ADMIN — CLOPIDOGREL BISULFATE 75 MILLIGRAM(S): 75 TABLET, FILM COATED ORAL at 12:43

## 2019-03-01 RX ADMIN — HEPARIN SODIUM 900 UNIT(S)/HR: 5000 INJECTION INTRAVENOUS; SUBCUTANEOUS at 12:28

## 2019-03-01 RX ADMIN — Medication 650 MILLIGRAM(S): at 12:44

## 2019-03-01 RX ADMIN — SIMVASTATIN 20 MILLIGRAM(S): 20 TABLET, FILM COATED ORAL at 21:01

## 2019-03-01 RX ADMIN — LISINOPRIL 40 MILLIGRAM(S): 2.5 TABLET ORAL at 05:27

## 2019-03-01 NOTE — ED ADULT NURSE NOTE - CHIEF COMPLAINT QUOTE
sent from [facility] + DVT LLE, R- groin incision s/p thrombectomy 13th as per Raissa MORRIS

## 2019-03-01 NOTE — ED ADULT NURSE NOTE - ED STAT RN HANDOFF DETAILS
Handoff given to Steven RN. pt lying in bed comfortably no distress noted Heparin drip in progress no complications noted to site.

## 2019-03-01 NOTE — H&P ADULT - HISTORY OF PRESENT ILLNESS
· HPI : Pt is a 80 yo gentleman with a pmhx of HTN, HL, peripheral vascular disease w l. fem/pop stent sp recent thrombectomy for arterial occlusion who presents to the ED with L. leg swelling and confirmed venous DVT on L. leg from [facility] rehab. Pt says he has swelling at site for last several days. Denies any chest pain, sob , abdominal pain or other discomfort.

## 2019-03-01 NOTE — H&P ADULT - PMH
Benign prostatic hyperplasia, unspecified whether lower urinary tract symptoms present    HTN (hypertension)    Hypercholesterolemia    PAD (peripheral artery disease)

## 2019-03-01 NOTE — H&P ADULT - NSHPPHYSICALEXAM_GEN_ALL_CORE
GENERAL: NAD, well-groomed, well-developed  HEAD:  Atraumatic, Normocephalic  EYES: EOMI, PERRLA, conjunctiva and sclera clear  ENMT:  Moist mucous membranes, Good dentition, No lesions  NECK: Supple, No JVD, Normal thyroid  NERVOUS SYSTEM:  Alert & Oriented X3, Good concentration; Motor Strength 5/5 B/L upper and lower extremities; DTRs 2+ intact and symmetric  CHEST/LUNG: Clear to percussion bilaterally; No rales, rhonchi, wheezing, or rubs  HEART: Regular rate and rhythm; No murmurs, rubs, or gallops  ABDOMEN: Soft, Nontender, Nondistended; Bowel sounds present  EXTREMITIES:  1+ Peripheral Pulses, No clubbing, cyanosis, Swelling left lower ext. No calf tenderness  LYMPH: No lymphadenopathy noted  SKIN: No rashes or lesions      Vital Signs Last 24 Hrs  T(C): 37 (01 Mar 2019 06:40), Max: 37.1 (01 Mar 2019 05:21)  T(F): 98.6 (01 Mar 2019 06:40), Max: 98.7 (01 Mar 2019 05:21)  HR: 52 (01 Mar 2019 06:40) (51 - 68)  BP: 126/46 (01 Mar 2019 06:40) (119/62 - 170/45)  BP(mean): --  RR: 17 (01 Mar 2019 05:21) (15 - 17)  SpO2: 96% (01 Mar 2019 05:21) (96% - 97%)

## 2019-03-01 NOTE — H&P ADULT - ASSESSMENT
IMPROVE VTE Individual Risk Assessment    RISK                                                                Points    [  ] Previous VTE                                                  3    [  ] Thrombophilia                                               2    [  ] Lower limb paralysis                                      2        (unable to hold up >15 seconds)      [  ] Current Cancer                                              2         (within 6 months)    [  ] Immobilization > 24 hrs                                1    [  ] ICU/CCU stay > 24 hours                              1    [  ] Age > 60                                                      1    IMPROVE VTE Score _____2____ On A/C

## 2019-03-01 NOTE — ED PROVIDER NOTE - OBJECTIVE STATEMENT
Pt is a 78 yo gentleman with a pmhx of HTN, HL, peripheral vascular disease w l. fem/pop stent sp recent thrombectomy for arterial occlusion who presents to the ED with L. leg swelling and confirmed venous DVT on L. leg from [facility] rehab. Pt says he has swelling at site for last several days. Denies any chest pain, sob , abdominal pain or other discomfort.

## 2019-03-01 NOTE — H&P ADULT - NSHPLABSRESULTS_GEN_ALL_CORE
9.1    10.13 )-----------( 401      ( 01 Mar 2019 08:44 )             29.5     03-01    145  |  110<H>  |  31<H>  ----------------------------<  99  4.5   |  26  |  1.49<H>    Ca    7.9<L>      01 Mar 2019 00:10    TPro  6.0  /  Alb  2.5<L>  /  TBili  0.4  /  DBili  x   /  AST  14<L>  /  ALT  24  /  AlkPhos  59  03-01    PT/INR - ( 01 Mar 2019 00:10 )   PT: 13.1 sec;   INR: 1.16 ratio         PTT - ( 01 Mar 2019 08:44 )  PTT:142.0 sec    CAPILLARY BLOOD GLUCOSE                Urine Culture:      Blood Culture:    lab          < from: US Duplex Venous Lower Ext Ltd, Left (02.28.19 @ 18:37) >      Deep venous thrombosis of the left femoral vein, above the knee. Findings   discussed with Dr. Das at 9:30 p.m. on February 20, 2019 with RBV  Evaluation of the left groin was obscured by overlying bandage.      < end of copied text >

## 2019-03-02 LAB
APTT BLD: 69.7 SEC — HIGH (ref 27.5–36.3)
HCT VFR BLD CALC: 28.7 % — LOW (ref 39–50)
HGB BLD-MCNC: 9.1 G/DL — LOW (ref 13–17)
MCHC RBC-ENTMCNC: 30.4 PG — SIGNIFICANT CHANGE UP (ref 27–34)
MCHC RBC-ENTMCNC: 31.7 GM/DL — LOW (ref 32–36)
MCV RBC AUTO: 96 FL — SIGNIFICANT CHANGE UP (ref 80–100)
NRBC # BLD: 0 /100 WBCS — SIGNIFICANT CHANGE UP (ref 0–0)
PLATELET # BLD AUTO: 402 K/UL — HIGH (ref 150–400)
RBC # BLD: 2.99 M/UL — LOW (ref 4.2–5.8)
RBC # FLD: 15.4 % — HIGH (ref 10.3–14.5)
WBC # BLD: 8.68 K/UL — SIGNIFICANT CHANGE UP (ref 3.8–10.5)
WBC # FLD AUTO: 8.68 K/UL — SIGNIFICANT CHANGE UP (ref 3.8–10.5)

## 2019-03-02 RX ADMIN — LISINOPRIL 40 MILLIGRAM(S): 2.5 TABLET ORAL at 06:37

## 2019-03-02 RX ADMIN — Medication 81 MILLIGRAM(S): at 12:33

## 2019-03-02 RX ADMIN — Medication 650 MILLIGRAM(S): at 18:06

## 2019-03-02 RX ADMIN — AMLODIPINE BESYLATE 10 MILLIGRAM(S): 2.5 TABLET ORAL at 06:37

## 2019-03-02 RX ADMIN — TAMSULOSIN HYDROCHLORIDE 0.4 MILLIGRAM(S): 0.4 CAPSULE ORAL at 21:35

## 2019-03-02 RX ADMIN — Medication 650 MILLIGRAM(S): at 12:33

## 2019-03-02 RX ADMIN — Medication 650 MILLIGRAM(S): at 13:10

## 2019-03-02 RX ADMIN — Medication 50 MILLIGRAM(S): at 06:37

## 2019-03-02 RX ADMIN — Medication 650 MILLIGRAM(S): at 01:03

## 2019-03-02 RX ADMIN — SIMVASTATIN 20 MILLIGRAM(S): 20 TABLET, FILM COATED ORAL at 21:35

## 2019-03-02 RX ADMIN — Medication 650 MILLIGRAM(S): at 07:19

## 2019-03-02 RX ADMIN — Medication 650 MILLIGRAM(S): at 06:37

## 2019-03-02 RX ADMIN — HEPARIN SODIUM 900 UNIT(S)/HR: 5000 INJECTION INTRAVENOUS; SUBCUTANEOUS at 02:00

## 2019-03-02 RX ADMIN — CLOPIDOGREL BISULFATE 75 MILLIGRAM(S): 75 TABLET, FILM COATED ORAL at 12:33

## 2019-03-02 NOTE — PROGRESS NOTE ADULT - ASSESSMENT
IMPROVE VTE Individual Risk Assessment    RISK                                                                Points    [  ] Previous VTE                                                  3    [  ] Thrombophilia                                               2    [  ] Lower limb paralysis                                      2        (unable to hold up >15 seconds)      [  ] Current Cancer                                              2         (within 6 months)    [  ] Immobilization > 24 hrs                                1    [  ] ICU/CCU stay > 24 hours                              1    [  ] Age > 60                                                      1    IMPROVE VTE Score _____2____ On A/C    03802/19 : Asymptomatic. Leg edema left resolved. Continue IV Heparin.

## 2019-03-02 NOTE — PROGRESS NOTE ADULT - SUBJECTIVE AND OBJECTIVE BOX
Patient is a 79y old  Male who presents with a chief complaint of DVT Left leg (01 Mar 2019 10:11)      INTERVAL HPI/OVERNIGHT EVENTS:    MEDICATIONS  (STANDING):  acetaminophen   Tablet .. 650 milliGRAM(s) Oral every 6 hours  amLODIPine   Tablet 10 milliGRAM(s) Oral daily  aspirin enteric coated 81 milliGRAM(s) Oral daily  clopidogrel Tablet 75 milliGRAM(s) Oral daily  heparin  Infusion. 900 Unit(s)/Hr (9 mL/Hr) IV Continuous <Continuous>  lisinopril 40 milliGRAM(s) Oral daily  metoprolol tartrate 50 milliGRAM(s) Oral two times a day  simvastatin 20 milliGRAM(s) Oral at bedtime  tamsulosin 0.4 milliGRAM(s) Oral at bedtime    MEDICATIONS  (PRN):  heparin  Injectable 5000 Unit(s) IV Push every 6 hours PRN For aPTT less than 40  heparin  Injectable 2500 Unit(s) IV Push every 6 hours PRN For aPTT between 40 - 57      Allergies    No Known Allergies    Intolerances        REVIEW OF SYSTEMS:  CONSTITUTIONAL: No fever, weight loss, or fatigue  EYES: No eye pain, visual disturbances, or discharge  ENMT:  No difficulty hearing, tinnitus, vertigo; No sinus or throat pain  NECK: No pain or stiffness  BREASTS: No pain, masses, or nipple discharge  RESPIRATORY: No cough, wheezing, chills or hemoptysis; No shortness of breath  CARDIOVASCULAR: No chest pain, palpitations, dizziness, or leg swelling  GASTROINTESTINAL: No abdominal or epigastric pain. No nausea, vomiting, or hematemesis; No diarrhea or constipation. No melena or hematochezia.  GENITOURINARY: No dysuria, frequency, hematuria, or incontinence  NEUROLOGICAL: No headaches, memory loss, loss of strength, numbness, or tremors  SKIN: No itching, burning, rashes, or lesions   LYMPH NODES: No enlarged glands  ENDOCRINE: No heat or cold intolerance; No hair loss  MUSCULOSKELETAL: No joint pain or swelling; No muscle, back, or extremity pain  PSYCHIATRIC: No depression, anxiety, mood swings, or difficulty sleeping  HEME/LYMPH: No easy bruising, or bleeding gums  ALLERGY AND IMMUNOLOGIC: No hives or eczema    Vital Signs Last 24 Hrs  T(C): 36.1 (02 Mar 2019 05:38), Max: 36.8 (01 Mar 2019 16:01)  T(F): 97 (02 Mar 2019 05:38), Max: 98.2 (01 Mar 2019 16:01)  HR: 60 (02 Mar 2019 05:38) (54 - 60)  BP: 155/74 (02 Mar 2019 05:38) (118/56 - 155/74)  BP(mean): --  RR: 18 (02 Mar 2019 05:38) (18 - 18)  SpO2: 98% (02 Mar 2019 05:38) (98% - 98%)    PHYSICAL EXAM:  GENERAL: NAD, well-groomed, well-developed  HEAD:  Atraumatic, Normocephalic  EYES: EOMI, PERRL, conjunctiva and sclera clear  ENMT:  Moist mucous membranes, Good dentition, No lesions  NECK: Supple, No JVD, Normal thyroid  NERVOUS SYSTEM:  Alert & Oriented X3, Good concentration; Motor Strength 5/5 B/L upper and lower extremities; DTRs 2+ intact and symmetric  CHEST/LUNG: Clear to percussion bilaterally; No rales, rhonchi, wheezing, or rubs  HEART: Regular rate and rhythm; No murmurs, rubs, or gallops  ABDOMEN: Soft, Nontender, Nondistended; Bowel sounds present  EXTREMITIES:  2+ Peripheral Pulses, No clubbing, cyanosis,  Left leg edema resolved.  LYMPH: No lymphadenopathy noted  SKIN: No rashes or lesions    LABS:                        9.1    8.68  )-----------( 402      ( 02 Mar 2019 08:04 )             28.7     03-01    145  |  110<H>  |  31<H>  ----------------------------<  99  4.5   |  26  |  1.49<H>    Ca    7.9<L>      01 Mar 2019 00:10    TPro  6.0  /  Alb  2.5<L>  /  TBili  0.4  /  DBili  x   /  AST  14<L>  /  ALT  24  /  AlkPhos  59  03-01    PT/INR - ( 01 Mar 2019 00:10 )   PT: 13.1 sec;   INR: 1.16 ratio         PTT - ( 02 Mar 2019 01:22 )  PTT:69.7 sec    CAPILLARY BLOOD GLUCOSE    RADIOLOGY & ADDITIONAL TESTS:  < from: US Duplex Venous Lower Ext Ltd, Left (02.28.19 @ 18:37) >    Deep venous thrombosis of the left femoral vein, above the knee. Findings   discussed with Dr. Das at 9:30 p.m. on February 20, 2019 with RBV  Evaluation of the left groin was obscured by overlying bandage.      < end of copied text >    Imaging Personally Reviewed:  [ ] YES  [ ] NO    Consultant(s) Notes Reviewed:  [ ] YES  [ ] NO    Care Discussed with Consultants/Other Providers [ ] YES  [ ] NO    PROBLEMS:  ACUTE DEEP VEIN THROMBOSIS OF LEFT LOWER EXTREMITY  LEFT LOWER LEG DEEP VEIN THROMBOSIS  Acute deep vein thrombosis (DVT) of left lower extremity, unspecified vein  Hypercholesterolemia  Benign prostatic hyperplasia, unspecified whether lower urinary tract symptoms present  Essential hypertension  PAD (peripheral artery disease)  Acute deep vein thrombosis (DVT) of left lower extremity, unspecified vein      Care discussed with family,         [  ]   yes  [  ]  No    imp:    stable[ ]    unstable[  ]     improving [   ]       unchanged  [  ]                Plans:  Continue present plans  [  ]               New consult [  ]   specialty  .......               order harriet[  ]    test name.                  Discharge Planning  [  ]

## 2019-03-03 LAB
APTT BLD: 80 SEC — HIGH (ref 27.5–36.3)
HCT VFR BLD CALC: 28.6 % — LOW (ref 39–50)
HGB BLD-MCNC: 9.2 G/DL — LOW (ref 13–17)
MCHC RBC-ENTMCNC: 31.1 PG — SIGNIFICANT CHANGE UP (ref 27–34)
MCHC RBC-ENTMCNC: 32.2 GM/DL — SIGNIFICANT CHANGE UP (ref 32–36)
MCV RBC AUTO: 96.6 FL — SIGNIFICANT CHANGE UP (ref 80–100)
NRBC # BLD: 0 /100 WBCS — SIGNIFICANT CHANGE UP (ref 0–0)
PLATELET # BLD AUTO: 394 K/UL — SIGNIFICANT CHANGE UP (ref 150–400)
RBC # BLD: 2.96 M/UL — LOW (ref 4.2–5.8)
RBC # FLD: 15.6 % — HIGH (ref 10.3–14.5)
WBC # BLD: 6.96 K/UL — SIGNIFICANT CHANGE UP (ref 3.8–10.5)
WBC # FLD AUTO: 6.96 K/UL — SIGNIFICANT CHANGE UP (ref 3.8–10.5)

## 2019-03-03 RX ORDER — APIXABAN 2.5 MG/1
10 TABLET, FILM COATED ORAL ONCE
Qty: 0 | Refills: 0 | Status: COMPLETED | OUTPATIENT
Start: 2019-03-03 | End: 2019-03-03

## 2019-03-03 RX ORDER — APIXABAN 2.5 MG/1
10 TABLET, FILM COATED ORAL
Qty: 0 | Refills: 0 | Status: COMPLETED | OUTPATIENT
Start: 2019-03-03 | End: 2019-03-09

## 2019-03-03 RX ADMIN — LISINOPRIL 40 MILLIGRAM(S): 2.5 TABLET ORAL at 05:42

## 2019-03-03 RX ADMIN — Medication 650 MILLIGRAM(S): at 00:55

## 2019-03-03 RX ADMIN — Medication 50 MILLIGRAM(S): at 17:48

## 2019-03-03 RX ADMIN — Medication 650 MILLIGRAM(S): at 05:42

## 2019-03-03 RX ADMIN — Medication 650 MILLIGRAM(S): at 10:54

## 2019-03-03 RX ADMIN — AMLODIPINE BESYLATE 10 MILLIGRAM(S): 2.5 TABLET ORAL at 05:42

## 2019-03-03 RX ADMIN — TAMSULOSIN HYDROCHLORIDE 0.4 MILLIGRAM(S): 0.4 CAPSULE ORAL at 22:36

## 2019-03-03 RX ADMIN — Medication 650 MILLIGRAM(S): at 11:05

## 2019-03-03 RX ADMIN — CLOPIDOGREL BISULFATE 75 MILLIGRAM(S): 75 TABLET, FILM COATED ORAL at 10:54

## 2019-03-03 RX ADMIN — Medication 650 MILLIGRAM(S): at 17:48

## 2019-03-03 RX ADMIN — HEPARIN SODIUM 900 UNIT(S)/HR: 5000 INJECTION INTRAVENOUS; SUBCUTANEOUS at 08:59

## 2019-03-03 RX ADMIN — Medication 81 MILLIGRAM(S): at 10:54

## 2019-03-03 RX ADMIN — Medication 650 MILLIGRAM(S): at 00:13

## 2019-03-03 RX ADMIN — SIMVASTATIN 20 MILLIGRAM(S): 20 TABLET, FILM COATED ORAL at 22:36

## 2019-03-03 RX ADMIN — Medication 650 MILLIGRAM(S): at 06:15

## 2019-03-03 RX ADMIN — APIXABAN 10 MILLIGRAM(S): 2.5 TABLET, FILM COATED ORAL at 22:36

## 2019-03-03 RX ADMIN — APIXABAN 10 MILLIGRAM(S): 2.5 TABLET, FILM COATED ORAL at 12:07

## 2019-03-03 NOTE — PROGRESS NOTE ADULT - SUBJECTIVE AND OBJECTIVE BOX
Patient is a 79y old  Male who presents with a chief complaint of DVT Left leg (02 Mar 2019 12:19)      INTERVAL HPI/OVERNIGHT EVENTS:    MEDICATIONS  (STANDING):  acetaminophen   Tablet .. 650 milliGRAM(s) Oral every 6 hours  amLODIPine   Tablet 10 milliGRAM(s) Oral daily  aspirin enteric coated 81 milliGRAM(s) Oral daily  clopidogrel Tablet 75 milliGRAM(s) Oral daily  heparin  Infusion. 900 Unit(s)/Hr (9 mL/Hr) IV Continuous <Continuous>  lisinopril 40 milliGRAM(s) Oral daily  metoprolol tartrate 50 milliGRAM(s) Oral two times a day  simvastatin 20 milliGRAM(s) Oral at bedtime  tamsulosin 0.4 milliGRAM(s) Oral at bedtime    MEDICATIONS  (PRN):  heparin  Injectable 5000 Unit(s) IV Push every 6 hours PRN For aPTT less than 40  heparin  Injectable 2500 Unit(s) IV Push every 6 hours PRN For aPTT between 40 - 57      Allergies    No Known Allergies    Intolerances        REVIEW OF SYSTEMS:  CONSTITUTIONAL: No fever, weight loss, or fatigue  EYES: No eye pain, visual disturbances, or discharge  ENMT:  No difficulty hearing, tinnitus, vertigo; No sinus or throat pain  NECK: No pain or stiffness  BREASTS: No pain, masses, or nipple discharge  RESPIRATORY: No cough, wheezing, chills or hemoptysis; No shortness of breath  CARDIOVASCULAR: No chest pain, palpitations, dizziness, or leg swelling  GASTROINTESTINAL: No abdominal or epigastric pain. No nausea, vomiting, or hematemesis; No diarrhea or constipation. No melena or hematochezia.  GENITOURINARY: No dysuria, frequency, hematuria, or incontinence  NEUROLOGICAL: No headaches, memory loss, loss of strength, numbness, or tremors  SKIN: No itching, burning, rashes, or lesions   LYMPH NODES: No enlarged glands  ENDOCRINE: No heat or cold intolerance; No hair loss  MUSCULOSKELETAL: No joint pain or swelling; No muscle, back, or extremity pain  PSYCHIATRIC: No depression, anxiety, mood swings, or difficulty sleeping  HEME/LYMPH: No easy bruising, or bleeding gums  ALLERGY AND IMMUNOLOGIC: No hives or eczema    Vital Signs Last 24 Hrs  T(C): 36.7 (03 Mar 2019 10:34), Max: 36.8 (02 Mar 2019 20:44)  T(F): 98 (03 Mar 2019 10:34), Max: 98.2 (02 Mar 2019 20:44)  HR: 52 (03 Mar 2019 10:34) (51 - 54)  BP: 136/46 (03 Mar 2019 10:34) (127/55 - 160/56)  BP(mean): --  RR: 19 (03 Mar 2019 10:34) (17 - 19)  SpO2: 98% (03 Mar 2019 10:34) (96% - 98%)    PHYSICAL EXAM:  GENERAL: NAD, well-groomed, well-developed  HEAD:  Atraumatic, Normocephalic  EYES: EOMI, PERRL, conjunctiva and sclera clear  ENMT:  Moist mucous membranes, Good dentition, No lesions  NECK: Supple, No JVD, Normal thyroid  NERVOUS SYSTEM:  Alert & Oriented X3, Good concentration; Motor Strength 5/5 B/L upper and lower extremities; DTRs 2+ intact and symmetric  CHEST/LUNG: Clear to percussion bilaterally; No rales, rhonchi, wheezing, or rubs  HEART: Regular rate and rhythm; No murmurs, rubs, or gallops  ABDOMEN: Soft, Nontender, Nondistended; Bowel sounds present  EXTREMITIES:  2+ Peripheral Pulses, No clubbing, cyanosis, or edema  LYMPH: No lymphadenopathy noted  SKIN: No rashes or lesions    LABS:                        9.2    6.96  )-----------( 394      ( 03 Mar 2019 07:43 )             28.6           PTT - ( 03 Mar 2019 07:43 )  PTT:80.0 sec    CAPILLARY BLOOD GLUCOSE                        RADIOLOGY & ADDITIONAL TESTS:    Imaging Personally Reviewed:  [ ] YES  [ ] NO    Consultant(s) Notes Reviewed:  [ ] YES  [ ] NO    Care Discussed with Consultants/Other Providers [ ] YES  [ ] NO    PROBLEMS:  ACUTE DEEP VEIN THROMBOSIS OF LEFT LOWER EXTREMITY  LEFT LOWER LEG DEEP VEIN THROMBOSIS  Acute deep vein thrombosis (DVT) of left lower extremity, unspecified vein  Hypercholesterolemia  Benign prostatic hyperplasia, unspecified whether lower urinary tract symptoms present  Essential hypertension  PAD (peripheral artery disease)  Acute deep vein thrombosis (DVT) of left lower extremity, unspecified vein      Care discussed with family,         [  ]   yes  [  ]  No    imp:    stable[ ]    unstable[  ]     improving [   ]       unchanged  [  ]                Plans:  Continue present plans  [  ]               New consult [  ]   specialty  .......               order harriet[  ]    test name.                  Discharge Planning  [  ]

## 2019-03-03 NOTE — PROGRESS NOTE ADULT - ASSESSMENT
IMPROVE VTE Individual Risk Assessment    RISK                                                                Points    [  ] Previous VTE                                                  3    [  ] Thrombophilia                                               2    [  ] Lower limb paralysis                                      2        (unable to hold up >15 seconds)      [  ] Current Cancer                                              2         (within 6 months)    [  ] Immobilization > 24 hrs                                1    [  ] ICU/CCU stay > 24 hours                              1    [  ] Age > 60                                                      1    IMPROVE VTE Score _____2____ On A/C    03/82/19 : Asymptomatic. Leg edema left resolved. Continue IV Heparin.   03/03/19 : Pt. alert, asymptomatic. Left leg non tender. edems resolved. Start on Eliquis, Dc/C Heparin. Discharge planning for AM.

## 2019-03-04 LAB
APTT BLD: 40.9 SEC — HIGH (ref 28.5–37)
HCT VFR BLD CALC: 30.8 % — LOW (ref 39–50)
HGB BLD-MCNC: 9.6 G/DL — LOW (ref 13–17)
MCHC RBC-ENTMCNC: 30.1 PG — SIGNIFICANT CHANGE UP (ref 27–34)
MCHC RBC-ENTMCNC: 31.2 GM/DL — LOW (ref 32–36)
MCV RBC AUTO: 96.6 FL — SIGNIFICANT CHANGE UP (ref 80–100)
NRBC # BLD: 0 /100 WBCS — SIGNIFICANT CHANGE UP (ref 0–0)
PLATELET # BLD AUTO: 385 K/UL — SIGNIFICANT CHANGE UP (ref 150–400)
RBC # BLD: 3.19 M/UL — LOW (ref 4.2–5.8)
RBC # FLD: 15.4 % — HIGH (ref 10.3–14.5)
WBC # BLD: 6.73 K/UL — SIGNIFICANT CHANGE UP (ref 3.8–10.5)
WBC # FLD AUTO: 6.73 K/UL — SIGNIFICANT CHANGE UP (ref 3.8–10.5)

## 2019-03-04 RX ADMIN — CLOPIDOGREL BISULFATE 75 MILLIGRAM(S): 75 TABLET, FILM COATED ORAL at 11:41

## 2019-03-04 RX ADMIN — AMLODIPINE BESYLATE 10 MILLIGRAM(S): 2.5 TABLET ORAL at 06:30

## 2019-03-04 RX ADMIN — Medication 50 MILLIGRAM(S): at 17:53

## 2019-03-04 RX ADMIN — TAMSULOSIN HYDROCHLORIDE 0.4 MILLIGRAM(S): 0.4 CAPSULE ORAL at 22:59

## 2019-03-04 RX ADMIN — Medication 650 MILLIGRAM(S): at 11:40

## 2019-03-04 RX ADMIN — Medication 650 MILLIGRAM(S): at 00:05

## 2019-03-04 RX ADMIN — APIXABAN 10 MILLIGRAM(S): 2.5 TABLET, FILM COATED ORAL at 10:17

## 2019-03-04 RX ADMIN — Medication 650 MILLIGRAM(S): at 06:29

## 2019-03-04 RX ADMIN — Medication 650 MILLIGRAM(S): at 00:27

## 2019-03-04 RX ADMIN — SIMVASTATIN 20 MILLIGRAM(S): 20 TABLET, FILM COATED ORAL at 22:59

## 2019-03-04 RX ADMIN — APIXABAN 10 MILLIGRAM(S): 2.5 TABLET, FILM COATED ORAL at 22:59

## 2019-03-04 RX ADMIN — LISINOPRIL 40 MILLIGRAM(S): 2.5 TABLET ORAL at 06:30

## 2019-03-04 NOTE — PHYSICAL THERAPY INITIAL EVALUATION ADULT - ADDITIONAL COMMENTS
Patient report lives alone in apartment, with 3 stairs to enter with + handrails. ambulates with out assistive device.

## 2019-03-04 NOTE — PROGRESS NOTE ADULT - SUBJECTIVE AND OBJECTIVE BOX
Patient is a 79y old  Male who presents with a chief complaint of DVT Left leg (03 Mar 2019 11:02)      INTERVAL HPI/OVERNIGHT EVENTS:    MEDICATIONS  (STANDING):  acetaminophen   Tablet .. 650 milliGRAM(s) Oral every 6 hours  amLODIPine   Tablet 10 milliGRAM(s) Oral daily  apixaban 10 milliGRAM(s) Oral <User Schedule>  clopidogrel Tablet 75 milliGRAM(s) Oral daily  lisinopril 40 milliGRAM(s) Oral daily  metoprolol tartrate 50 milliGRAM(s) Oral two times a day  simvastatin 20 milliGRAM(s) Oral at bedtime  tamsulosin 0.4 milliGRAM(s) Oral at bedtime    MEDICATIONS  (PRN):      Allergies    No Known Allergies    Intolerances        REVIEW OF SYSTEMS:  CONSTITUTIONAL: No fever, weight loss, or fatigue  EYES: No eye pain, visual disturbances, or discharge  ENMT:  No difficulty hearing, tinnitus, vertigo; No sinus or throat pain  NECK: No pain or stiffness  BREASTS: No pain, masses, or nipple discharge  RESPIRATORY: No cough, wheezing, chills or hemoptysis; No shortness of breath  CARDIOVASCULAR: No chest pain, palpitations, dizziness, or leg swelling  GASTROINTESTINAL: No abdominal or epigastric pain. No nausea, vomiting, or hematemesis; No diarrhea or constipation. No melena or hematochezia.  GENITOURINARY: No dysuria, frequency, hematuria, or incontinence  NEUROLOGICAL: No headaches, memory loss, loss of strength, numbness, or tremors  SKIN: No itching, burning, rashes, or lesions   LYMPH NODES: No enlarged glands  ENDOCRINE: No heat or cold intolerance; No hair loss  MUSCULOSKELETAL: No joint pain or swelling; No muscle, back, or extremity pain  PSYCHIATRIC: No depression, anxiety, mood swings, or difficulty sleeping  HEME/LYMPH: No easy bruising, or bleeding gums  ALLERGY AND IMMUNOLOGIC: No hives or eczema    Vital Signs Last 24 Hrs  T(C): 36.4 (04 Mar 2019 05:56), Max: 36.8 (03 Mar 2019 17:17)  T(F): 97.6 (04 Mar 2019 05:56), Max: 98.3 (03 Mar 2019 17:17)  HR: 54 (04 Mar 2019 05:56) (54 - 57)  BP: 152/61 (04 Mar 2019 05:56) (114/44 - 152/61)  BP(mean): --  RR: 18 (04 Mar 2019 05:56) (18 - 19)  SpO2: 97% (04 Mar 2019 05:56) (97% - 98%)    PHYSICAL EXAM:  GENERAL: NAD, well-groomed, well-developed  HEAD:  Atraumatic, Normocephalic  EYES: EOMI, PERRL, conjunctiva and sclera clear  ENMT:  Moist mucous membranes, Good dentition, No lesions  NECK: Supple, No JVD, Normal thyroid  NERVOUS SYSTEM:  Alert & Oriented X3, Good concentration; Motor Strength 5/5 B/L upper and lower extremities; DTRs 2+ intact and symmetric  CHEST/LUNG: Clear to percussion bilaterally; No rales, rhonchi, wheezing, or rubs  HEART: Regular rate and rhythm; No murmurs, rubs, or gallops  ABDOMEN: Soft, Nontender, Nondistended; Bowel sounds present  EXTREMITIES:  2+ Peripheral Pulses, No clubbing, cyanosis, or edema  LYMPH: No lymphadenopathy noted  SKIN: No rashes or lesions    LABS:                        9.6    6.73  )-----------( 385      ( 04 Mar 2019 07:53 )             30.8           PTT - ( 04 Mar 2019 07:53 )  PTT:40.9 sec    CAPILLARY BLOOD GLUCOSE                        RADIOLOGY & ADDITIONAL TESTS:    Imaging Personally Reviewed:  [ ] YES  [ ] NO    Consultant(s) Notes Reviewed:  [ ] YES  [ ] NO    Care Discussed with Consultants/Other Providers [ ] YES  [ ] NO    PROBLEMS:  ACUTE DEEP VEIN THROMBOSIS OF LEFT LOWER EXTREMITY  LEFT LOWER LEG DEEP VEIN THROMBOSIS  Acute deep vein thrombosis (DVT) of left lower extremity, unspecified vein  Hypercholesterolemia  Benign prostatic hyperplasia, unspecified whether lower urinary tract symptoms present  Essential hypertension  PAD (peripheral artery disease)  Acute deep vein thrombosis (DVT) of left lower extremity, unspecified vein      Care discussed with family,         [  ]   yes  [  ]  No    imp:    stable[ ]    unstable[  ]     improving [   ]       unchanged  [  ]                Plans:  Continue present plans  [  ]               New consult [  ]   specialty  .......               order harriet[  ]    test name.                  Discharge Planning  [  ]

## 2019-03-04 NOTE — CONSULT NOTE ADULT - SUBJECTIVE AND OBJECTIVE BOX
Vascular Attending:    3-4-19 The pt is seen and examined and the events are noted, Pt had left lE arterial thrombectomy recently and has DVT left leg and RX with AC. Pt says He feels much better ,the swelling is less and the pt has no pain in the left foot or toes.    HPI:  · HPI : Pt is a 78 yo gentleman with a pmhx of HTN, HL, peripheral vascular disease w l. fem/pop stent sp recent thrombectomy for arterial occlusion who presents to the ED with L. leg swelling and confirmed venous DVT on L. leg from [facility] rehab. Pt says he has swelling at site for last several days. Denies any chest pain, sob , abdominal pain or other discomfort. (01 Mar 2019 10:11)      PAST MEDICAL & SURGICAL HISTORY:  Benign prostatic hyperplasia, unspecified whether lower urinary tract symptoms present  Hypercholesterolemia  PAD (peripheral artery disease)  HTN (hypertension)  No significant past surgical history        MEDICATIONS  (STANDING):  acetaminophen   Tablet .. 650 milliGRAM(s) Oral every 6 hours  amLODIPine   Tablet 10 milliGRAM(s) Oral daily  apixaban 10 milliGRAM(s) Oral <User Schedule>  clopidogrel Tablet 75 milliGRAM(s) Oral daily  lisinopril 40 milliGRAM(s) Oral daily  metoprolol tartrate 50 milliGRAM(s) Oral two times a day  simvastatin 20 milliGRAM(s) Oral at bedtime  tamsulosin 0.4 milliGRAM(s) Oral at bedtime    MEDICATIONS  (PRN):      Allergies    No Known Allergies    Intolerances        SOCIAL HISTORY:      Vital Signs Last 24 Hrs  T(C): 36.4 (04 Mar 2019 05:56), Max: 36.8 (03 Mar 2019 17:17)  T(F): 97.6 (04 Mar 2019 05:56), Max: 98.3 (03 Mar 2019 17:17)  HR: 60 (04 Mar 2019 14:07) (54 - 60)  BP: 152/61 (04 Mar 2019 05:56) (114/44 - 152/61)  BP(mean): --  RR: 18 (04 Mar 2019 14:07) (18 - 19)  SpO2: 95% (04 Mar 2019 14:07) (95% - 98%)    P/E:-  Awke and alert and no pain or no distress.  CAROTIDS:- Bilateral carotids with no Bruits. No scars of previous catheterisation.  UPPER EXTREMITIES:- Bilateral radial artery pulses are normal and no ischemia of the Hands. No edema of the arms.  ABDOMEN:- No pulsatile mass in the abdomen and no ascites.  LOWER EXTREMITIES:- Left groin has incision consistent with Thrombo embolectomy and the left leg with edema of the thigh and the left leg, and foot. No cellulitis, The left foot is warm and no pedal pulses but dopplers in DP and PT.  Wounds:- The pt has following wounds and measurements.      [ ]1+ [ ]doppler      LABS:                        9.6    6.73  )-----------( 385      ( 04 Mar 2019 07:53 )             30.8           PTT - ( 04 Mar 2019 07:53 )  PTT:40.9 sec      RADIOLOGY & ADDITIONAL STUDIES    EXAM:  US DPLX LWR EXT VEINS LTD LT                            PROCEDURE DATE:  02/28/2019          INTERPRETATION:  CLINICAL INFORMATION: pain and swelling    COMPARISON: None available.    TECHNIQUE: Duplex sonography of the LEFT LOWER extremity with color and   spectral Doppler, with and without compression.      FINDINGS:    The groin could not be assessed due to overlying bandage.  There is normal compressibility of the left common femoral, and popliteal   veins. There is deep venous thrombosis of the femoral vein. The   contralateral common femoral vein is patent.    There is subcutaneous edema.    IMPRESSION:     Deep venous thrombosis of the left femoral vein, above the knee. Findings   discussed with Dr. Das at 9:30 p.m. on February 20, 2019 with [facility]  Evaluation of the left groin was obscured by overlying bandage.                SERAFIN ROMERO M.D, ATTENDING RADIOLOGIST  This document has been electronically signed. Feb 28 2019  9:28PM              Impression and Plan: DVT left fem vein, no arterial ischemia, would adv to cont the Ac and ok to tranfer to the Rehab, The Ac to be cont for at least 6 to 8 months and maybe longer.

## 2019-03-04 NOTE — PROGRESS NOTE ADULT - ASSESSMENT
IMPROVE VTE Individual Risk Assessment    RISK                                                                Points    [  ] Previous VTE                                                  3    [  ] Thrombophilia                                               2    [  ] Lower limb paralysis                                      2        (unable to hold up >15 seconds)      [  ] Current Cancer                                              2         (within 6 months)    [  ] Immobilization > 24 hrs                                1    [  ] ICU/CCU stay > 24 hours                              1    [  ] Age > 60                                                      1    IMPROVE VTE Score _____2____ On A/C    03/82/19 : Asymptomatic. Leg edema left resolved. Continue IV Heparin.   03/03/19 : Pt. alert, asymptomatic. Left leg non tender. edems resolved. Start on Eliquis, Dc/C Heparin. Discharge planning for AM.  03/04/ 19 : Asymptomatic. On Eliquis now. PT evaluation. Discharge planning.

## 2019-03-05 LAB
HCT VFR BLD CALC: 32.4 % — LOW (ref 39–50)
HGB BLD-MCNC: 10 G/DL — LOW (ref 13–17)
MCHC RBC-ENTMCNC: 29.9 PG — SIGNIFICANT CHANGE UP (ref 27–34)
MCHC RBC-ENTMCNC: 30.9 GM/DL — LOW (ref 32–36)
MCV RBC AUTO: 97 FL — SIGNIFICANT CHANGE UP (ref 80–100)
NRBC # BLD: 0 /100 WBCS — SIGNIFICANT CHANGE UP (ref 0–0)
PLATELET # BLD AUTO: 386 K/UL — SIGNIFICANT CHANGE UP (ref 150–400)
RBC # BLD: 3.34 M/UL — LOW (ref 4.2–5.8)
RBC # FLD: 15.2 % — HIGH (ref 10.3–14.5)
WBC # BLD: 6.58 K/UL — SIGNIFICANT CHANGE UP (ref 3.8–10.5)
WBC # FLD AUTO: 6.58 K/UL — SIGNIFICANT CHANGE UP (ref 3.8–10.5)

## 2019-03-05 RX ADMIN — APIXABAN 10 MILLIGRAM(S): 2.5 TABLET, FILM COATED ORAL at 09:24

## 2019-03-05 RX ADMIN — APIXABAN 10 MILLIGRAM(S): 2.5 TABLET, FILM COATED ORAL at 22:02

## 2019-03-05 RX ADMIN — SIMVASTATIN 20 MILLIGRAM(S): 20 TABLET, FILM COATED ORAL at 22:02

## 2019-03-05 RX ADMIN — Medication 50 MILLIGRAM(S): at 17:26

## 2019-03-05 RX ADMIN — CLOPIDOGREL BISULFATE 75 MILLIGRAM(S): 75 TABLET, FILM COATED ORAL at 11:40

## 2019-03-05 RX ADMIN — TAMSULOSIN HYDROCHLORIDE 0.4 MILLIGRAM(S): 0.4 CAPSULE ORAL at 22:02

## 2019-03-05 RX ADMIN — AMLODIPINE BESYLATE 10 MILLIGRAM(S): 2.5 TABLET ORAL at 06:25

## 2019-03-05 RX ADMIN — LISINOPRIL 40 MILLIGRAM(S): 2.5 TABLET ORAL at 06:25

## 2019-03-05 NOTE — PROGRESS NOTE ADULT - ASSESSMENT
IMPROVE VTE Individual Risk Assessment    RISK                                                                Points    [  ] Previous VTE                                                  3    [  ] Thrombophilia                                               2    [  ] Lower limb paralysis                                      2        (unable to hold up >15 seconds)      [  ] Current Cancer                                              2         (within 6 months)    [  ] Immobilization > 24 hrs                                1    [  ] ICU/CCU stay > 24 hours                              1    [  ] Age > 60                                                      1    IMPROVE VTE Score _____2____ On A/C    03/82/19 : Asymptomatic. Leg edema left resolved. Continue IV Heparin.   03/03/19 : Pt. alert, asymptomatic. Left leg non tender. edems resolved. Start on Eliquis, Dc/C Heparin. Discharge planning for AM.  03/04/ 19 : Asymptomatic. On Eliquis now. PT evaluation. Discharge planning.  03/05/19 : Asymptomatic. Vascular consult noted. Continue Eliquis for 6-9 months. Rehab when the bed is available.

## 2019-03-05 NOTE — PROGRESS NOTE ADULT - SUBJECTIVE AND OBJECTIVE BOX
Patient is a 79y old  Male who presents with a chief complaint of DVT Left leg (04 Mar 2019 16:51)      INTERVAL HPI/OVERNIGHT EVENTS:    MEDICATIONS  (STANDING):  acetaminophen   Tablet .. 650 milliGRAM(s) Oral every 6 hours  amLODIPine   Tablet 10 milliGRAM(s) Oral daily  apixaban 10 milliGRAM(s) Oral <User Schedule>  clopidogrel Tablet 75 milliGRAM(s) Oral daily  lisinopril 40 milliGRAM(s) Oral daily  metoprolol tartrate 50 milliGRAM(s) Oral two times a day  simvastatin 20 milliGRAM(s) Oral at bedtime  tamsulosin 0.4 milliGRAM(s) Oral at bedtime    MEDICATIONS  (PRN):      Allergies    No Known Allergies    Intolerances        REVIEW OF SYSTEMS:  CONSTITUTIONAL: No fever, weight loss, or fatigue  EYES: No eye pain, visual disturbances, or discharge  ENMT:  No difficulty hearing, tinnitus, vertigo; No sinus or throat pain  NECK: No pain or stiffness  BREASTS: No pain, masses, or nipple discharge  RESPIRATORY: No cough, wheezing, chills or hemoptysis; No shortness of breath  CARDIOVASCULAR: No chest pain, palpitations, dizziness, or leg swelling  GASTROINTESTINAL: No abdominal or epigastric pain. No nausea, vomiting, or hematemesis; No diarrhea or constipation. No melena or hematochezia.  GENITOURINARY: No dysuria, frequency, hematuria, or incontinence  NEUROLOGICAL: No headaches, memory loss, loss of strength, numbness, or tremors  SKIN: No itching, burning, rashes, or lesions   LYMPH NODES: No enlarged glands  ENDOCRINE: No heat or cold intolerance; No hair loss  MUSCULOSKELETAL: No joint pain or swelling; No muscle, back, or extremity pain  PSYCHIATRIC: No depression, anxiety, mood swings, or difficulty sleeping  HEME/LYMPH: No easy bruising, or bleeding gums  ALLERGY AND IMMUNOLOGIC: No hives or eczema    Vital Signs Last 24 Hrs  T(C): 36.6 (05 Mar 2019 05:10), Max: 37.1 (04 Mar 2019 16:56)  T(F): 97.9 (05 Mar 2019 05:10), Max: 98.7 (04 Mar 2019 16:56)  HR: 52 (05 Mar 2019 05:52) (52 - 77)  BP: 125/52 (05 Mar 2019 05:52) (120/45 - 132/42)  BP(mean): --  RR: 17 (05 Mar 2019 05:10) (17 - 18)  SpO2: 96% (05 Mar 2019 05:10) (95% - 96%)    PHYSICAL EXAM:  GENERAL: NAD, well-groomed, well-developed  HEAD:  Atraumatic, Normocephalic  EYES: EOMI, PERRL, conjunctiva and sclera clear  ENMT:  Moist mucous membranes, Good dentition, No lesions  NECK: Supple, No JVD, Normal thyroid  NERVOUS SYSTEM:  Alert & Oriented X3, Good concentration; Motor Strength 5/5 B/L upper and lower extremities; DTRs 2+ intact and symmetric  CHEST/LUNG: Clear to percussion bilaterally; No rales, rhonchi, wheezing, or rubs  HEART: Regular rate and rhythm; No murmurs, rubs, or gallops  ABDOMEN: Soft, Nontender, Nondistended; Bowel sounds present  EXTREMITIES:  2+ Peripheral Pulses, No clubbing, cyanosis, or edema  LYMPH: No lymphadenopathy noted  SKIN: No rashes or lesions    LABS:                        10.0   6.58  )-----------( 386      ( 05 Mar 2019 08:03 )             32.4           PTT - ( 04 Mar 2019 07:53 )  PTT:40.9 sec    CAPILLARY BLOOD GLUCOSE                        RADIOLOGY & ADDITIONAL TESTS:    Imaging Personally Reviewed:  [ ] YES  [ ] NO    Consultant(s) Notes Reviewed:  [ ] YES  [ ] NO    Care Discussed with Consultants/Other Providers [ ] YES  [ ] NO    PROBLEMS:  ACUTE DEEP VEIN THROMBOSIS OF LEFT LOWER EXTREMITY  LEFT LOWER LEG DEEP VEIN THROMBOSIS  Acute deep vein thrombosis (DVT) of left lower extremity, unspecified vein  Hypercholesterolemia  Benign prostatic hyperplasia, unspecified whether lower urinary tract symptoms present  Essential hypertension  PAD (peripheral artery disease)  Acute deep vein thrombosis (DVT) of left lower extremity, unspecified vein      Care discussed with family,         [  ]   yes  [  ]  No    imp:    stable[ ]    unstable[  ]     improving [   ]       unchanged  [  ]                Plans:  Continue present plans  [  ]               New consult [  ]   specialty  .......               order harriet[  ]    test name.                  Discharge Planning  [  ]

## 2019-03-06 LAB
HCT VFR BLD CALC: 32.3 % — LOW (ref 39–50)
HGB BLD-MCNC: 10.1 G/DL — LOW (ref 13–17)
MCHC RBC-ENTMCNC: 30.1 PG — SIGNIFICANT CHANGE UP (ref 27–34)
MCHC RBC-ENTMCNC: 31.3 GM/DL — LOW (ref 32–36)
MCV RBC AUTO: 96.1 FL — SIGNIFICANT CHANGE UP (ref 80–100)
NRBC # BLD: 0 /100 WBCS — SIGNIFICANT CHANGE UP (ref 0–0)
PLATELET # BLD AUTO: 373 K/UL — SIGNIFICANT CHANGE UP (ref 150–400)
RBC # BLD: 3.36 M/UL — LOW (ref 4.2–5.8)
RBC # FLD: 15.2 % — HIGH (ref 10.3–14.5)
WBC # BLD: 5.47 K/UL — SIGNIFICANT CHANGE UP (ref 3.8–10.5)
WBC # FLD AUTO: 5.47 K/UL — SIGNIFICANT CHANGE UP (ref 3.8–10.5)

## 2019-03-06 RX ADMIN — Medication 50 MILLIGRAM(S): at 06:18

## 2019-03-06 RX ADMIN — Medication 650 MILLIGRAM(S): at 12:00

## 2019-03-06 RX ADMIN — APIXABAN 10 MILLIGRAM(S): 2.5 TABLET, FILM COATED ORAL at 11:22

## 2019-03-06 RX ADMIN — Medication 650 MILLIGRAM(S): at 11:20

## 2019-03-06 RX ADMIN — TAMSULOSIN HYDROCHLORIDE 0.4 MILLIGRAM(S): 0.4 CAPSULE ORAL at 22:17

## 2019-03-06 RX ADMIN — SIMVASTATIN 20 MILLIGRAM(S): 20 TABLET, FILM COATED ORAL at 22:17

## 2019-03-06 RX ADMIN — APIXABAN 10 MILLIGRAM(S): 2.5 TABLET, FILM COATED ORAL at 22:17

## 2019-03-06 RX ADMIN — LISINOPRIL 40 MILLIGRAM(S): 2.5 TABLET ORAL at 06:18

## 2019-03-06 RX ADMIN — AMLODIPINE BESYLATE 10 MILLIGRAM(S): 2.5 TABLET ORAL at 06:18

## 2019-03-06 RX ADMIN — CLOPIDOGREL BISULFATE 75 MILLIGRAM(S): 75 TABLET, FILM COATED ORAL at 11:22

## 2019-03-06 NOTE — PROGRESS NOTE ADULT - ASSESSMENT
IMPROVE VTE Individual Risk Assessment    RISK                                                                Points    [  ] Previous VTE                                                  3    [  ] Thrombophilia                                               2    [  ] Lower limb paralysis                                      2        (unable to hold up >15 seconds)      [  ] Current Cancer                                              2         (within 6 months)    [  ] Immobilization > 24 hrs                                1    [  ] ICU/CCU stay > 24 hours                              1    [  ] Age > 60                                                      1    IMPROVE VTE Score _____2____ On A/C    03/82/19 : Asymptomatic. Leg edema left resolved. Continue IV Heparin.   03/03/19 : Pt. alert, asymptomatic. Left leg non tender. edems resolved. Start on Eliquis, Dc/C Heparin. Discharge planning for AM.  03/04/ 19 : Asymptomatic. On Eliquis now. PT evaluation. Discharge planning.  03/05/19 : Asymptomatic. Vascular consult noted. Continue Eliquis for 6-9 months. Rehab when the bed is available.  03/06/19 : Asymptomatic. To obtain from [facility] [place] for STR. Continue Eliquis.

## 2019-03-06 NOTE — PROGRESS NOTE ADULT - SUBJECTIVE AND OBJECTIVE BOX
Patient is a 79y old  Male who presents with a chief complaint of DVT Left leg (05 Mar 2019 10:59)      INTERVAL HPI/OVERNIGHT EVENTS:    MEDICATIONS  (STANDING):  acetaminophen   Tablet .. 650 milliGRAM(s) Oral every 6 hours  amLODIPine   Tablet 10 milliGRAM(s) Oral daily  apixaban 10 milliGRAM(s) Oral <User Schedule>  clopidogrel Tablet 75 milliGRAM(s) Oral daily  lisinopril 40 milliGRAM(s) Oral daily  metoprolol tartrate 50 milliGRAM(s) Oral two times a day  simvastatin 20 milliGRAM(s) Oral at bedtime  tamsulosin 0.4 milliGRAM(s) Oral at bedtime    MEDICATIONS  (PRN):      Allergies    No Known Allergies    Intolerances        REVIEW OF SYSTEMS:  CONSTITUTIONAL: No fever, weight loss, or fatigue  EYES: No eye pain, visual disturbances, or discharge  ENMT:  No difficulty hearing, tinnitus, vertigo; No sinus or throat pain  NECK: No pain or stiffness  BREASTS: No pain, masses, or nipple discharge  RESPIRATORY: No cough, wheezing, chills or hemoptysis; No shortness of breath  CARDIOVASCULAR: No chest pain, palpitations, dizziness, or leg swelling  GASTROINTESTINAL: No abdominal or epigastric pain. No nausea, vomiting, or hematemesis; No diarrhea or constipation. No melena or hematochezia.  GENITOURINARY: No dysuria, frequency, hematuria, or incontinence  NEUROLOGICAL: No headaches, memory loss, loss of strength, numbness, or tremors  SKIN: No itching, burning, rashes, or lesions   LYMPH NODES: No enlarged glands  ENDOCRINE: No heat or cold intolerance; No hair loss  MUSCULOSKELETAL: No joint pain or swelling; No muscle, back, or extremity pain  PSYCHIATRIC: No depression, anxiety, mood swings, or difficulty sleeping  HEME/LYMPH: No easy bruising, or bleeding gums  ALLERGY AND IMMUNOLOGIC: No hives or eczema    Vital Signs Last 24 Hrs  T(C): 36.3 (06 Mar 2019 06:00), Max: 36.5 (06 Mar 2019 01:07)  T(F): 97.3 (06 Mar 2019 06:00), Max: 97.7 (06 Mar 2019 01:07)  HR: 51 (06 Mar 2019 06:00) (51 - 57)  BP: 131/57 (06 Mar 2019 06:00) (123/82 - 131/57)  BP(mean): --  RR: 18 (06 Mar 2019 06:00) (16 - 18)  SpO2: 97% (06 Mar 2019 06:00) (96% - 98%)    PHYSICAL EXAM:  GENERAL: NAD, well-groomed, well-developed  HEAD:  Atraumatic, Normocephalic  EYES: EOMI, PERRL, conjunctiva and sclera clear  ENMT:  Moist mucous membranes, Good dentition, No lesions  NECK: Supple, No JVD, Normal thyroid  NERVOUS SYSTEM:  Alert & Oriented X3, Good concentration; Motor Strength 5/5 B/L upper and lower extremities; DTRs 2+ intact and symmetric  CHEST/LUNG: Clear to percussion bilaterally; No rales, rhonchi, wheezing, or rubs  HEART: Regular rate and rhythm; No murmurs, rubs, or gallops  ABDOMEN: Soft, Nontender, Nondistended; Bowel sounds present  EXTREMITIES:  2+ Peripheral Pulses, No clubbing, cyanosis, or edema  LYMPH: No lymphadenopathy noted  SKIN: No rashes or lesions    LABS:                        10.1   5.47  )-----------( 373      ( 06 Mar 2019 08:26 )             32.3               CAPILLARY BLOOD GLUCOSE                        RADIOLOGY & ADDITIONAL TESTS:    Imaging Personally Reviewed:  [ ] YES  [ ] NO    Consultant(s) Notes Reviewed:  [ ] YES  [ ] NO    Care Discussed with Consultants/Other Providers [ ] YES  [ ] NO    PROBLEMS:  ACUTE DEEP VEIN THROMBOSIS OF LEFT LOWER EXTREMITY  LEFT LOWER LEG DEEP VEIN THROMBOSIS  Acute deep vein thrombosis (DVT) of left lower extremity, unspecified vein  Hypercholesterolemia  Benign prostatic hyperplasia, unspecified whether lower urinary tract symptoms present  Essential hypertension  PAD (peripheral artery disease)  Acute deep vein thrombosis (DVT) of left lower extremity, unspecified vein      Care discussed with family,         [  ]   yes  [  ]  No    imp:    stable[ ]    unstable[  ]     improving [   ]       unchanged  [  ]                Plans:  Continue present plans  [  ]               New consult [  ]   specialty  .......               order harriet[  ]    test name.                  Discharge Planning  [  ]

## 2019-03-07 LAB
HCT VFR BLD CALC: 31.3 % — LOW (ref 39–50)
HGB BLD-MCNC: 9.5 G/DL — LOW (ref 13–17)
MCHC RBC-ENTMCNC: 29.7 PG — SIGNIFICANT CHANGE UP (ref 27–34)
MCHC RBC-ENTMCNC: 30.4 GM/DL — LOW (ref 32–36)
MCV RBC AUTO: 97.8 FL — SIGNIFICANT CHANGE UP (ref 80–100)
NRBC # BLD: 0 /100 WBCS — SIGNIFICANT CHANGE UP (ref 0–0)
PLATELET # BLD AUTO: 335 K/UL — SIGNIFICANT CHANGE UP (ref 150–400)
RBC # BLD: 3.2 M/UL — LOW (ref 4.2–5.8)
RBC # FLD: 15.1 % — HIGH (ref 10.3–14.5)
WBC # BLD: 5.61 K/UL — SIGNIFICANT CHANGE UP (ref 3.8–10.5)
WBC # FLD AUTO: 5.61 K/UL — SIGNIFICANT CHANGE UP (ref 3.8–10.5)

## 2019-03-07 RX ORDER — APIXABAN 2.5 MG/1
1 TABLET, FILM COATED ORAL
Qty: 60 | Refills: 0
Start: 2019-03-07 | End: 2019-04-05

## 2019-03-07 RX ORDER — METOPROLOL TARTRATE 50 MG
50 TABLET ORAL DAILY
Qty: 0 | Refills: 0 | Status: DISCONTINUED | OUTPATIENT
Start: 2019-03-07 | End: 2019-03-11

## 2019-03-07 RX ORDER — ASPIRIN/CALCIUM CARB/MAGNESIUM 324 MG
1 TABLET ORAL
Qty: 0 | Refills: 0 | COMMUNITY

## 2019-03-07 RX ADMIN — LISINOPRIL 40 MILLIGRAM(S): 2.5 TABLET ORAL at 05:36

## 2019-03-07 RX ADMIN — APIXABAN 10 MILLIGRAM(S): 2.5 TABLET, FILM COATED ORAL at 21:44

## 2019-03-07 RX ADMIN — TAMSULOSIN HYDROCHLORIDE 0.4 MILLIGRAM(S): 0.4 CAPSULE ORAL at 21:44

## 2019-03-07 RX ADMIN — CLOPIDOGREL BISULFATE 75 MILLIGRAM(S): 75 TABLET, FILM COATED ORAL at 11:30

## 2019-03-07 RX ADMIN — Medication 50 MILLIGRAM(S): at 05:36

## 2019-03-07 RX ADMIN — AMLODIPINE BESYLATE 10 MILLIGRAM(S): 2.5 TABLET ORAL at 05:36

## 2019-03-07 RX ADMIN — SIMVASTATIN 20 MILLIGRAM(S): 20 TABLET, FILM COATED ORAL at 21:44

## 2019-03-07 RX ADMIN — APIXABAN 10 MILLIGRAM(S): 2.5 TABLET, FILM COATED ORAL at 11:30

## 2019-03-07 NOTE — CHART NOTE - NSCHARTNOTEFT_GEN_A_CORE
Upon Nutritional Assessment by the Registered Dietitian your patient was determined to meet criteria / has evidence of the following diagnosis/diagnoses:          [ ]  Mild Protein Calorie Malnutrition        [ x]  Moderate Protein Calorie Malnutrition        [ ] Severe Protein Calorie Malnutrition        [ ] Unspecified Protein Calorie Malnutrition        [ ] Underweight / BMI <19        [ ] Morbid Obesity / BMI > 40      Findings as based on:  •  Comprehensive nutrition assessment and consultation  •  Calorie counts (nutrient intake analysis)  •  Food acceptance and intake status from observations by staff  •  Follow up  •  Patient education  •  Intervention secondary to interdisciplinary rounds  •   concerns      Treatment:    The following diet has been recommended:  Low sodium , Ensure Clear 8 oz 2x/day (480 marty, 16 gm pro)       PROVIDER Section:     By signing this assessment you are acknowledging and agree with the diagnosis/diagnoses assigned by the Registered Dietitian    Comments:

## 2019-03-07 NOTE — DIETITIAN INITIAL EVALUATION ADULT. - PHYSICAL APPEARANCE
underweight/BMI=21.7(03/01) for wt. of 62.9 kg/138.6 LBS, underweight for age, edema since adm noted, Nutrition focused physical exam conducted; Subcutaneous fat Exam;  [ mild ]  Orbital fat pads region,  [mild  ]Buccal fat region,  [ moderate ]triceps region, [mild  ]ribs region.  Muscle Exam; [ moderate ]temples region, [  moderate]clavicle region, [moderate  ]shoulder region, [ moderate ]Scapula region, [mild  ]Interosseous region, [  mild ]thigh region, [mild   ]Calf region/other (specify)

## 2019-03-07 NOTE — DIETITIAN INITIAL EVALUATION ADULT. - ENERGY NEEDS
Height (cm): 170.18 (03-01)  Weight (kg): 62.9 (03-01)  BMI (kg/m2): 21.7 (03-01)  IBW: 67.1 kg          % IBW:  93%           UBW: 66.2 kg            %UBW: 95%, edema noted, ? dry wt.

## 2019-03-07 NOTE — DIETITIAN INITIAL EVALUATION ADULT. - PERTINENT MEDS FT
MEDICATIONS  (STANDING):  acetaminophen   Tablet .. 650 milliGRAM(s) Oral every 6 hours  amLODIPine   Tablet 10 milliGRAM(s) Oral daily  apixaban 10 milliGRAM(s) Oral <User Schedule>  clopidogrel Tablet 75 milliGRAM(s) Oral daily  lisinopril 40 milliGRAM(s) Oral daily  metoprolol succinate ER 50 milliGRAM(s) Oral daily  simvastatin 20 milliGRAM(s) Oral at bedtime  tamsulosin 0.4 milliGRAM(s) Oral at bedtime    MEDICATIONS  (PRN):

## 2019-03-07 NOTE — DIETITIAN INITIAL EVALUATION ADULT. - OTHER INFO
Pt seen for length of stay.  Pt reports good appetite & enjoying the hospital food.  As per diet hx provided, pt was eating less than normal due to vision problems c difficulty going out and doing his normal activities.  Pt was on Ensure Clear PTA & would like it added to his meal plan.  As per previous adm record, hx of fem-pop surgery noted.

## 2019-03-07 NOTE — PROGRESS NOTE ADULT - ASSESSMENT
IMPROVE VTE Individual Risk Assessment    RISK                                                                Points    [  ] Previous VTE                                                  3    [  ] Thrombophilia                                               2    [  ] Lower limb paralysis                                      2        (unable to hold up >15 seconds)      [  ] Current Cancer                                              2         (within 6 months)    [  ] Immobilization > 24 hrs                                1    [  ] ICU/CCU stay > 24 hours                              1    [  ] Age > 60                                                      1    IMPROVE VTE Score _____2____ On A/C    03/82/19 : Asymptomatic. Leg edema left resolved. Continue IV Heparin.   03/03/19 : Pt. alert, asymptomatic. Left leg non tender. edems resolved. Start on Eliquis, Dc/C Heparin. Discharge planning for AM.  03/04/ 19 : Asymptomatic. On Eliquis now. PT evaluation. Discharge planning.  03/05/19 : Asymptomatic. Vascular consult noted. Continue Eliquis for 6-9 months. Rehab when the bed is available.  03/06/19 : Asymptomatic. [place] [facility] [place] Continue Eliquis.  03/07/19 : Events noted. Had HR 30 and temp. of 95 F. On hyperthermia blanket. Hold discharge. Need to D/C PIDD line and insert Mid line for IV antibiotics. IMPROVE VTE Individual Risk Assessment    RISK                                                                Points    [  ] Previous VTE                                                  3    [  ] Thrombophilia                                               2    [  ] Lower limb paralysis                                      2        (unable to hold up >15 seconds)      [  ] Current Cancer                                              2         (within 6 months)    [  ] Immobilization > 24 hrs                                1    [  ] ICU/CCU stay > 24 hours                              1    [  ] Age > 60                                                      1    IMPROVE VTE Score _____2____ On A/C    03/82/19 : Asymptomatic. Leg edema left resolved. Continue IV Heparin.   03/03/19 : Pt. alert, asymptomatic. Left leg non tender. edems resolved. Start on Eliquis, Dc/C Heparin. Discharge planning for AM.  03/04/ 19 : Asymptomatic. On Eliquis now. PT evaluation. Discharge planning.  03/05/19 : Asymptomatic. Vascular consult noted. Continue Eliquis for 6-9 months. Rehab when the bed is available.  03/06/19 : Asymptomatic. To obtain auth.  from [facility] for STR. Continue Eliquis.  03/07/19 : STR denied by [employer]. For discharge home with home PT. Pt. appealing the decision.

## 2019-03-07 NOTE — DIETITIAN INITIAL EVALUATION ADULT. - ETIOLOGY
inadequate protein-energy intake in setting decreased ability to self manage care, s/p reported cataract and leg surgery

## 2019-03-07 NOTE — DIETITIAN INITIAL EVALUATION ADULT. - NS FNS REASON FOR WEIGHT CHANG
pt reports depressed oral intake after cataract surgery c vision problems, s/p fall in december, s/p surgery in leg & s/p rehab/decreased po intake

## 2019-03-07 NOTE — DIETITIAN INITIAL EVALUATION ADULT. - PERTINENT LABORATORY DATA
03-07  Hgb 9.5 g/dL<L> Hct 31.3 %<L>  03-01 Alb 2.5 g/dL<L>03-01 ALT 24 U/L AST 14 U/L<L> Alkaline Phosphatase 59 U/L

## 2019-03-07 NOTE — PROGRESS NOTE ADULT - SUBJECTIVE AND OBJECTIVE BOX
Patient is a 79y old  Male who presents with a chief complaint of DVT Left leg (06 Mar 2019 10:38)      INTERVAL HPI/OVERNIGHT EVENTS:    MEDICATIONS  (STANDING):  acetaminophen   Tablet .. 650 milliGRAM(s) Oral every 6 hours  amLODIPine   Tablet 10 milliGRAM(s) Oral daily  apixaban 10 milliGRAM(s) Oral <User Schedule>  clopidogrel Tablet 75 milliGRAM(s) Oral daily  lisinopril 40 milliGRAM(s) Oral daily  metoprolol tartrate 50 milliGRAM(s) Oral two times a day  simvastatin 20 milliGRAM(s) Oral at bedtime  tamsulosin 0.4 milliGRAM(s) Oral at bedtime    MEDICATIONS  (PRN):      Allergies    No Known Allergies    Intolerances        REVIEW OF SYSTEMS: NA    Vital Signs Last 24 Hrs  T(C): 35.8 (07 Mar 2019 11:45), Max: 36.9 (07 Mar 2019 05:02)  T(F): 96.5 (07 Mar 2019 11:45), Max: 98.4 (07 Mar 2019 05:02)  HR: 54 (07 Mar 2019 11:45) (50 - 59)  BP: 142/58 (07 Mar 2019 11:45) (113/51 - 142/58)  BP(mean): --  RR: 18 (07 Mar 2019 11:45) (17 - 18)  SpO2: 97% (07 Mar 2019 11:45) (96% - 97%)    PHYSICAL EXAM:  GENERAL: NAD, well-groomed, well-developed  HEAD:  Atraumatic, Normocephalic  EYES: EOMI, PERRL, conjunctiva and sclera clear  ENMT:  Moist mucous membranes, Good dentition, No lesions  NECK: Supple, No JVD, Normal thyroid  NERVOUS SYSTEM:  Alert & Oriented X 3, Good concentration; Motor Strength 4/5 B/L upper and lower extremities;   CHEST/LUNG: Clear to percussion bilaterally; No rales, rhonchi, wheezing, or rubs  HEART:A-fib, unchanged systolic murmur. No  rubs, or gallops  ABDOMEN: Soft, Nontender, Nondistended; Bowel sounds present  EXTREMITIES:  2+ Peripheral Pulses, No clubbing, cyanosis, or edema  LYMPH: No lymphadenopathy noted  SKIN: No rashes or lesions    LABS:                        9.5    5.61  )-----------( 335      ( 07 Mar 2019 07:07 )             31.3               CAPILLARY BLOOD GLUCOSE                        RADIOLOGY & ADDITIONAL TESTS:    Imaging Personally Reviewed:  [ ] YES  [ ] NO    Consultant(s) Notes Reviewed:  [ ] YES  [ ] NO    Care Discussed with Consultants/Other Providers [ ] YES  [ ] NO    PROBLEMS:  ACUTE DEEP VEIN THROMBOSIS OF LEFT LOWER EXTREMITY  LEFT LOWER LEG DEEP VEIN THROMBOSIS  Acute deep vein thrombosis (DVT) of left lower extremity, unspecified vein  Hypercholesterolemia  Benign prostatic hyperplasia, unspecified whether lower urinary tract symptoms present  Essential hypertension  PAD (peripheral artery disease)  Acute deep vein thrombosis (DVT) of left lower extremity, unspecified vein      Care discussed with family,         [  ]   yes  [  ]  No    imp:    stable[ ]    unstable[  ]     improving [   ]       unchanged  [  ]                Plans:  Continue present plans  [  ]               New consult [  ]   specialty  .......               order harriet[  ]    test name.                  Discharge Planning  [  ]

## 2019-03-08 RX ADMIN — SIMVASTATIN 20 MILLIGRAM(S): 20 TABLET, FILM COATED ORAL at 21:19

## 2019-03-08 RX ADMIN — APIXABAN 10 MILLIGRAM(S): 2.5 TABLET, FILM COATED ORAL at 21:19

## 2019-03-08 RX ADMIN — APIXABAN 10 MILLIGRAM(S): 2.5 TABLET, FILM COATED ORAL at 11:52

## 2019-03-08 RX ADMIN — LISINOPRIL 40 MILLIGRAM(S): 2.5 TABLET ORAL at 05:56

## 2019-03-08 RX ADMIN — TAMSULOSIN HYDROCHLORIDE 0.4 MILLIGRAM(S): 0.4 CAPSULE ORAL at 21:19

## 2019-03-08 RX ADMIN — CLOPIDOGREL BISULFATE 75 MILLIGRAM(S): 75 TABLET, FILM COATED ORAL at 11:52

## 2019-03-08 RX ADMIN — AMLODIPINE BESYLATE 10 MILLIGRAM(S): 2.5 TABLET ORAL at 05:56

## 2019-03-08 NOTE — PROGRESS NOTE ADULT - ASSESSMENT
IMPROVE VTE Individual Risk Assessment    RISK                                                                Points    [  ] Previous VTE                                                  3    [  ] Thrombophilia                                               2    [  ] Lower limb paralysis                                      2        (unable to hold up >15 seconds)      [  ] Current Cancer                                              2         (within 6 months)    [  ] Immobilization > 24 hrs                                1    [  ] ICU/CCU stay > 24 hours                              1    [  ] Age > 60                                                      1    IMPROVE VTE Score _____2____ On A/C    03/82/19 : Asymptomatic. Leg edema left resolved. Continue IV Heparin.   03/03/19 : Pt. alert, asymptomatic. Left leg non tender. edems resolved. Start on Eliquis, Dc/C Heparin. Discharge planning for AM.  03/04/ 19 : Asymptomatic. On Eliquis now. PT evaluation. Discharge planning.  03/05/19 : Asymptomatic. Vascular consult noted. Continue Eliquis for 6-9 months. Rehab when the bed is available.  03/06/19 : Asymptomatic. To obtain auth.  from [facility] for STR. Continue Eliquis.  03/07/19 : STR denied by [employer]. For discharge home with home PT. Pt. appealing the decision.   03/08/19 : Pt. waiting for the out come of the appeal. On Eliquis

## 2019-03-08 NOTE — PROGRESS NOTE ADULT - SUBJECTIVE AND OBJECTIVE BOX
Patient is a 79y old  Male who presents with a chief complaint of DVT Left leg (07 Mar 2019 12:27)      INTERVAL HPI/OVERNIGHT EVENTS:    MEDICATIONS  (STANDING):  acetaminophen   Tablet .. 650 milliGRAM(s) Oral every 6 hours  amLODIPine   Tablet 10 milliGRAM(s) Oral daily  apixaban 10 milliGRAM(s) Oral <User Schedule>  clopidogrel Tablet 75 milliGRAM(s) Oral daily  lisinopril 40 milliGRAM(s) Oral daily  metoprolol succinate ER 50 milliGRAM(s) Oral daily  simvastatin 20 milliGRAM(s) Oral at bedtime  tamsulosin 0.4 milliGRAM(s) Oral at bedtime    MEDICATIONS  (PRN):      Allergies    No Known Allergies    Intolerances        REVIEW OF SYSTEMS:  CONSTITUTIONAL: No fever, weight loss, or fatigue  EYES: No eye pain, visual disturbances, or discharge  ENMT:  No difficulty hearing, tinnitus, vertigo; No sinus or throat pain  NECK: No pain or stiffness  BREASTS: No pain, masses, or nipple discharge  RESPIRATORY: No cough, wheezing, chills or hemoptysis; No shortness of breath  CARDIOVASCULAR: No chest pain, palpitations, dizziness, or leg swelling  GASTROINTESTINAL: No abdominal or epigastric pain. No nausea, vomiting, or hematemesis; No diarrhea or constipation. No melena or hematochezia.  GENITOURINARY: No dysuria, frequency, hematuria, or incontinence  NEUROLOGICAL: No headaches, memory loss, loss of strength, numbness, or tremors  SKIN: No itching, burning, rashes, or lesions   LYMPH NODES: No enlarged glands  ENDOCRINE: No heat or cold intolerance; No hair loss  MUSCULOSKELETAL: No joint pain or swelling; No muscle, back, or extremity pain  PSYCHIATRIC: No depression, anxiety, mood swings, or difficulty sleeping  HEME/LYMPH: No easy bruising, or bleeding gums  ALLERGY AND IMMUNOLOGIC: No hives or eczema    Vital Signs Last 24 Hrs  T(C): 36 (07 Mar 2019 23:59), Max: 36.1 (07 Mar 2019 17:57)  T(F): 96.8 (07 Mar 2019 23:59), Max: 97 (07 Mar 2019 17:57)  HR: 59 (07 Mar 2019 23:59) (54 - 59)  BP: 126/62 (07 Mar 2019 23:59) (119/52 - 142/58)  BP(mean): --  RR: 18 (07 Mar 2019 23:59) (18 - 18)  SpO2: 97% (07 Mar 2019 23:59) (97% - 99%)    PHYSICAL EXAM:  GENERAL: NAD, well-groomed, well-developed  HEAD:  Atraumatic, Normocephalic  EYES: EOMI, PERRL, conjunctiva and sclera clear  ENMT:  Moist mucous membranes, Good dentition, No lesions  NECK: Supple, No JVD, Normal thyroid  NERVOUS SYSTEM:  Alert & Oriented X3, Good concentration; Motor Strength 5/5 B/L upper and lower extremities; DTRs 2+ intact and symmetric  CHEST/LUNG: Clear to percussion bilaterally; No rales, rhonchi, wheezing, or rubs  HEART: Regular rate and rhythm; No murmurs, rubs, or gallops  ABDOMEN: Soft, Nontender, Nondistended; Bowel sounds present  EXTREMITIES:  2+ Peripheral Pulses, No clubbing, cyanosis, or edema  LYMPH: No lymphadenopathy noted  SKIN: No rashes or lesions    LABS:                        9.5    5.61  )-----------( 335      ( 07 Mar 2019 07:07 )             31.3               CAPILLARY BLOOD GLUCOSE                        RADIOLOGY & ADDITIONAL TESTS:    Imaging Personally Reviewed:  [ ] YES  [ ] NO    Consultant(s) Notes Reviewed:  [ ] YES  [ ] NO    Care Discussed with Consultants/Other Providers [ ] YES  [ ] NO    PROBLEMS:  ACUTE DEEP VEIN THROMBOSIS OF LEFT LOWER EXTREMITY  LEFT LOWER LEG DEEP VEIN THROMBOSIS  Acute deep vein thrombosis (DVT) of left lower extremity, unspecified vein  Hypercholesterolemia  Benign prostatic hyperplasia, unspecified whether lower urinary tract symptoms present  Essential hypertension  PAD (peripheral artery disease)  Acute deep vein thrombosis (DVT) of left lower extremity, unspecified vein      Care discussed with family,         [  ]   yes  [  ]  No    imp:    stable[ ]    unstable[  ]     improving [   ]       unchanged  [  ]                Plans:  Continue present plans  [  ]               New consult [  ]   specialty  .......               order harriet[  ]    test name.                  Discharge Planning  [  ]

## 2019-03-09 RX ADMIN — APIXABAN 10 MILLIGRAM(S): 2.5 TABLET, FILM COATED ORAL at 10:40

## 2019-03-09 RX ADMIN — Medication 650 MILLIGRAM(S): at 05:18

## 2019-03-09 RX ADMIN — CLOPIDOGREL BISULFATE 75 MILLIGRAM(S): 75 TABLET, FILM COATED ORAL at 13:50

## 2019-03-09 RX ADMIN — APIXABAN 10 MILLIGRAM(S): 2.5 TABLET, FILM COATED ORAL at 22:38

## 2019-03-09 RX ADMIN — SIMVASTATIN 20 MILLIGRAM(S): 20 TABLET, FILM COATED ORAL at 22:38

## 2019-03-09 RX ADMIN — AMLODIPINE BESYLATE 10 MILLIGRAM(S): 2.5 TABLET ORAL at 05:17

## 2019-03-09 RX ADMIN — Medication 650 MILLIGRAM(S): at 05:19

## 2019-03-09 RX ADMIN — TAMSULOSIN HYDROCHLORIDE 0.4 MILLIGRAM(S): 0.4 CAPSULE ORAL at 22:38

## 2019-03-09 RX ADMIN — LISINOPRIL 40 MILLIGRAM(S): 2.5 TABLET ORAL at 05:17

## 2019-03-09 NOTE — PROGRESS NOTE ADULT - ASSESSMENT
IMPROVE VTE Individual Risk Assessment    RISK                                                                Points    [  ] Previous VTE                                                  3    [  ] Thrombophilia                                               2    [  ] Lower limb paralysis                                      2        (unable to hold up >15 seconds)      [  ] Current Cancer                                              2         (within 6 months)    [  ] Immobilization > 24 hrs                                1    [  ] ICU/CCU stay > 24 hours                              1    [  ] Age > 60                                                      1    IMPROVE VTE Score _____2____ On A/C    03/82/19 : Asymptomatic. Leg edema left resolved. Continue IV Heparin.   03/03/19 : Pt. alert, asymptomatic. Left leg non tender. edems resolved. Start on Eliquis, Dc/C Heparin. Discharge planning for AM.  03/04/ 19 : Asymptomatic. On Eliquis now. PT evaluation. Discharge planning.  03/05/19 : Asymptomatic. Vascular consult noted. Continue Eliquis for 6-9 months. Rehab when the bed is available.  03/06/19 : Asymptomatic. To obtain auth.  from [facility] for STR. Continue Eliquis.  03/07/19 : STR denied by [employer]. For discharge home with home PT. Pt. appealing the decision.   03/08/19 : Pt. waiting for the out come of the appeal. On Eliquis.  03/09/19 : Alona Hodgson, Care coordinator's note  appreciated

## 2019-03-09 NOTE — PROGRESS NOTE ADULT - SUBJECTIVE AND OBJECTIVE BOX
Patient is a 79y old  Male who presents with a chief complaint of DVT Left leg (08 Mar 2019 10:32)      INTERVAL HPI/OVERNIGHT EVENTS:    MEDICATIONS  (STANDING):  acetaminophen   Tablet .. 650 milliGRAM(s) Oral every 6 hours  amLODIPine   Tablet 10 milliGRAM(s) Oral daily  apixaban 10 milliGRAM(s) Oral <User Schedule>  clopidogrel Tablet 75 milliGRAM(s) Oral daily  lisinopril 40 milliGRAM(s) Oral daily  metoprolol succinate ER 50 milliGRAM(s) Oral daily  simvastatin 20 milliGRAM(s) Oral at bedtime  tamsulosin 0.4 milliGRAM(s) Oral at bedtime    MEDICATIONS  (PRN):      Allergies    No Known Allergies    Intolerances        REVIEW OF SYSTEMS:  CONSTITUTIONAL: No fever, weight loss, or fatigue  EYES: No eye pain, visual disturbances, or discharge  ENMT:  No difficulty hearing, tinnitus, vertigo; No sinus or throat pain  NECK: No pain or stiffness  BREASTS: No pain, masses, or nipple discharge  RESPIRATORY: No cough, wheezing, chills or hemoptysis; No shortness of breath  CARDIOVASCULAR: No chest pain, palpitations, dizziness, or leg swelling  GASTROINTESTINAL: No abdominal or epigastric pain. No nausea, vomiting, or hematemesis; No diarrhea or constipation. No melena or hematochezia.  GENITOURINARY: No dysuria, frequency, hematuria, or incontinence  NEUROLOGICAL: No headaches, memory loss, loss of strength, numbness, or tremors  SKIN: No itching, burning, rashes, or lesions   LYMPH NODES: No enlarged glands  ENDOCRINE: No heat or cold intolerance; No hair loss  MUSCULOSKELETAL: No joint pain or swelling; No muscle, back, or extremity pain  PSYCHIATRIC: No depression, anxiety, mood swings, or difficulty sleeping  HEME/LYMPH: No easy bruising, or bleeding gums  ALLERGY AND IMMUNOLOGIC: No hives or eczema    Vital Signs Last 24 Hrs  T(C): 36.4 (09 Mar 2019 05:24), Max: 37 (08 Mar 2019 12:07)  T(F): 97.5 (09 Mar 2019 05:24), Max: 98.6 (08 Mar 2019 12:07)  HR: 51 (09 Mar 2019 05:24) (51 - 61)  BP: 126/59 (09 Mar 2019 05:24) (112/61 - 136/58)  BP(mean): --  RR: 17 (09 Mar 2019 05:24) (17 - 18)  SpO2: 99% (09 Mar 2019 05:24) (97% - 99%)    PHYSICAL EXAM:  GENERAL: NAD, well-groomed, well-developed  HEAD:  Atraumatic, Normocephalic  EYES: EOMI, PERRL, conjunctiva and sclera clear  ENMT:  Moist mucous membranes, Good dentition, No lesions  NECK: Supple, No JVD, Normal thyroid  NERVOUS SYSTEM:  Alert & Oriented X3, Good concentration; Motor Strength 5/5 B/L upper and lower extremities; DTRs 2+ intact and symmetric  CHEST/LUNG: Clear to percussion bilaterally; No rales, rhonchi, wheezing, or rubs  HEART: Regular rate and rhythm; No murmurs, rubs, or gallops  ABDOMEN: Soft, Nontender, Nondistended; Bowel sounds present  EXTREMITIES:  2+ Peripheral Pulses, No clubbing, cyanosis, or edema  LYMPH: No lymphadenopathy noted  SKIN: No rashes or lesions    LABS:              CAPILLARY BLOOD GLUCOSE                        RADIOLOGY & ADDITIONAL TESTS:    Imaging Personally Reviewed:  [ ] YES  [ ] NO    Consultant(s) Notes Reviewed:  [ ] YES  [ ] NO    Care Discussed with Consultants/Other Providers [ ] YES  [ ] NO    PROBLEMS:  ACUTE DEEP VEIN THROMBOSIS OF LEFT LOWER EXTREMITY  LEFT LOWER LEG DEEP VEIN THROMBOSIS  Acute deep vein thrombosis (DVT) of left lower extremity, unspecified vein  Hypercholesterolemia  Benign prostatic hyperplasia, unspecified whether lower urinary tract symptoms present  Essential hypertension  PAD (peripheral artery disease)  Acute deep vein thrombosis (DVT) of left lower extremity, unspecified vein      Care discussed with family,         [  ]   yes  [  ]  No    imp:    stable[ ]    unstable[  ]     improving [   ]       unchanged  [  ]                Plans:  Continue present plans  [  ]               New consult [  ]   specialty  .......               order harriet[  ]    test name.                  Discharge Planning  [  ]

## 2019-03-10 RX ADMIN — Medication 50 MILLIGRAM(S): at 06:06

## 2019-03-10 RX ADMIN — LISINOPRIL 40 MILLIGRAM(S): 2.5 TABLET ORAL at 06:06

## 2019-03-10 RX ADMIN — TAMSULOSIN HYDROCHLORIDE 0.4 MILLIGRAM(S): 0.4 CAPSULE ORAL at 23:00

## 2019-03-10 RX ADMIN — CLOPIDOGREL BISULFATE 75 MILLIGRAM(S): 75 TABLET, FILM COATED ORAL at 12:32

## 2019-03-10 RX ADMIN — SIMVASTATIN 20 MILLIGRAM(S): 20 TABLET, FILM COATED ORAL at 23:00

## 2019-03-10 RX ADMIN — AMLODIPINE BESYLATE 10 MILLIGRAM(S): 2.5 TABLET ORAL at 06:06

## 2019-03-10 NOTE — PROGRESS NOTE ADULT - REASON FOR ADMISSION
DVT Left leg

## 2019-03-10 NOTE — PROGRESS NOTE ADULT - PROBLEM SELECTOR PROBLEM 5
Hypercholesterolemia

## 2019-03-10 NOTE — PROGRESS NOTE ADULT - PROBLEM SELECTOR PLAN 5
Simvastatin.

## 2019-03-10 NOTE — PROGRESS NOTE ADULT - ASSESSMENT
IMPROVE VTE Individual Risk Assessment    RISK                                                                Points    [  ] Previous VTE                                                  3    [  ] Thrombophilia                                               2    [  ] Lower limb paralysis                                      2        (unable to hold up >15 seconds)      [  ] Current Cancer                                              2         (within 6 months)    [  ] Immobilization > 24 hrs                                1    [  ] ICU/CCU stay > 24 hours                              1    [  ] Age > 60                                                      1    IMPROVE VTE Score _____2____ On A/C    03/82/19 : Asymptomatic. Leg edema left resolved. Continue IV Heparin.   03/03/19 : Pt. alert, asymptomatic. Left leg non tender. edems resolved. Start on Eliquis, Dc/C Heparin. Discharge planning for AM.  03/04/ 19 : Asymptomatic. On Eliquis now. PT evaluation. Discharge planning.  03/05/19 : Asymptomatic. Vascular consult noted. Continue Eliquis for 6-9 months. Rehab when the bed is available.  03/06/19 : Asymptomatic. To obtain auth.  from [facility] [place]. Continue Eliquis.  03/07/19 : STR denied by [facility]. For discharge home with home PT. Pt. appealing the decision.   03/08/19 : Pt. waiting for the out come of the appeal. On Eliquis.  03/09/19 : Alona Hodgson, Care coordinator's note  appreciated.  03/10/19 : On Eliquis. Discharge planning.

## 2019-03-10 NOTE — PROGRESS NOTE ADULT - SUBJECTIVE AND OBJECTIVE BOX
Patient is a 79y old  Male who presents with a chief complaint of DVT Left leg (09 Mar 2019 10:41)      INTERVAL HPI/OVERNIGHT EVENTS:    MEDICATIONS  (STANDING):  acetaminophen   Tablet .. 650 milliGRAM(s) Oral every 6 hours  amLODIPine   Tablet 10 milliGRAM(s) Oral daily  clopidogrel Tablet 75 milliGRAM(s) Oral daily  lisinopril 40 milliGRAM(s) Oral daily  metoprolol succinate ER 50 milliGRAM(s) Oral daily  simvastatin 20 milliGRAM(s) Oral at bedtime  tamsulosin 0.4 milliGRAM(s) Oral at bedtime    MEDICATIONS  (PRN):      Allergies    No Known Allergies    Intolerances        REVIEW OF SYSTEMS:  CONSTITUTIONAL: No fever, weight loss, or fatigue  EYES: No eye pain, visual disturbances, or discharge  ENMT:  No difficulty hearing, tinnitus, vertigo; No sinus or throat pain  NECK: No pain or stiffness  BREASTS: No pain, masses, or nipple discharge  RESPIRATORY: No cough, wheezing, chills or hemoptysis; No shortness of breath  CARDIOVASCULAR: No chest pain, palpitations, dizziness, or leg swelling  GASTROINTESTINAL: No abdominal or epigastric pain. No nausea, vomiting, or hematemesis; No diarrhea or constipation. No melena or hematochezia.  GENITOURINARY: No dysuria, frequency, hematuria, or incontinence  NEUROLOGICAL: No headaches, memory loss, loss of strength, numbness, or tremors  SKIN: No itching, burning, rashes, or lesions   LYMPH NODES: No enlarged glands  ENDOCRINE: No heat or cold intolerance; No hair loss  MUSCULOSKELETAL: No joint pain or swelling; No muscle, back, or extremity pain  PSYCHIATRIC: No depression, anxiety, mood swings, or difficulty sleeping  HEME/LYMPH: No easy bruising, or bleeding gums  ALLERGY AND IMMUNOLOGIC: No hives or eczema    Vital Signs Last 24 Hrs  T(C): 36.2 (10 Mar 2019 06:08), Max: 36.6 (09 Mar 2019 17:29)  T(F): 97.2 (10 Mar 2019 06:08), Max: 97.9 (09 Mar 2019 17:29)  HR: 51 (10 Mar 2019 06:08) (51 - 65)  BP: 142/66 (10 Mar 2019 06:08) (116/58 - 142/66)  BP(mean): --  RR: 18 (10 Mar 2019 06:08) (18 - 18)  SpO2: 95% (10 Mar 2019 06:08) (95% - 99%)    PHYSICAL EXAM:  GENERAL: NAD, well-groomed, well-developed  HEAD:  Atraumatic, Normocephalic  EYES: EOMI, PERRL, conjunctiva and sclera clear  ENMT:  Moist mucous membranes, Good dentition, No lesions  NECK: Supple, No JVD, Normal thyroid  NERVOUS SYSTEM:  Alert & Oriented X3, Good concentration; Motor Strength 5/5 B/L upper and lower extremities; DTRs 2+ intact and symmetric  CHEST/LUNG: Clear to percussion bilaterally; No rales, rhonchi, wheezing, or rubs  HEART: Regular rate and rhythm; No murmurs, rubs, or gallops  ABDOMEN: Soft, Nontender, Nondistended; Bowel sounds present  EXTREMITIES:  2+ Peripheral Pulses, No clubbing, cyanosis, or edema  LYMPH: No lymphadenopathy noted  SKIN: No rashes or lesions    LABS:              CAPILLARY BLOOD GLUCOSE                        RADIOLOGY & ADDITIONAL TESTS:    Imaging Personally Reviewed:  [ ] YES  [ ] NO    Consultant(s) Notes Reviewed:  [ ] YES  [ ] NO    Care Discussed with Consultants/Other Providers [ ] YES  [ ] NO    PROBLEMS:  ACUTE DEEP VEIN THROMBOSIS OF LEFT LOWER EXTREMITY  LEFT LOWER LEG DEEP VEIN THROMBOSIS  Acute deep vein thrombosis (DVT) of left lower extremity, unspecified vein  Hypercholesterolemia  Benign prostatic hyperplasia, unspecified whether lower urinary tract symptoms present  Essential hypertension  PAD (peripheral artery disease)  Acute deep vein thrombosis (DVT) of left lower extremity, unspecified vein      Care discussed with family,         [  ]   yes  [  ]  No    imp:    stable[ ]    unstable[  ]     improving [   ]       unchanged  [  ]                Plans:  Continue present plans  [  ]               New consult [  ]   specialty  .......               order harriet[  ]    test name.                  Discharge Planning  [  ]

## 2019-03-10 NOTE — PROGRESS NOTE ADULT - PROBLEM SELECTOR PROBLEM 4
Benign prostatic hyperplasia, unspecified whether lower urinary tract symptoms present

## 2019-03-11 ENCOUNTER — TRANSCRIPTION ENCOUNTER (OUTPATIENT)
Age: 79
End: 2019-03-11

## 2019-03-11 VITALS
HEART RATE: 63 BPM | SYSTOLIC BLOOD PRESSURE: 111 MMHG | OXYGEN SATURATION: 98 % | TEMPERATURE: 98 F | DIASTOLIC BLOOD PRESSURE: 60 MMHG | RESPIRATION RATE: 17 BRPM

## 2019-03-11 RX ORDER — APIXABAN 2.5 MG/1
5 TABLET, FILM COATED ORAL EVERY 12 HOURS
Qty: 0 | Refills: 0 | Status: DISCONTINUED | OUTPATIENT
Start: 2019-03-11 | End: 2019-03-11

## 2019-03-11 RX ADMIN — Medication 650 MILLIGRAM(S): at 14:14

## 2019-03-11 RX ADMIN — LISINOPRIL 40 MILLIGRAM(S): 2.5 TABLET ORAL at 05:56

## 2019-03-11 RX ADMIN — AMLODIPINE BESYLATE 10 MILLIGRAM(S): 2.5 TABLET ORAL at 05:56

## 2019-03-11 RX ADMIN — APIXABAN 5 MILLIGRAM(S): 2.5 TABLET, FILM COATED ORAL at 17:25

## 2019-03-11 RX ADMIN — CLOPIDOGREL BISULFATE 75 MILLIGRAM(S): 75 TABLET, FILM COATED ORAL at 14:13

## 2019-03-11 NOTE — DISCHARGE NOTE PROVIDER - NSDCCPCAREPLAN_GEN_ALL_CORE_FT
PRINCIPAL DISCHARGE DIAGNOSIS  Problem: Acute deep vein thrombosis (DVT) of left lower extremity, unspecified vein  Assessment and Plan of Treatment:

## 2019-03-11 NOTE — DISCHARGE NOTE NURSING/CASE MANAGEMENT/SOCIAL WORK - NSDCDPATPORTLINK_GEN_ALL_CORE
You can access the [facility] Patient Portal, offered by [facility], by registering with the following website: [email or web address]

## 2019-03-11 NOTE — DISCHARGE NOTE NURSING/CASE MANAGEMENT/SOCIAL WORK - NSDCPEPTCOWAR_GEN_ALL_CORE
Coumadin/Warfarin - Dietary Advice/Coumadin/Warfarin - Compliance/Coumadin/Warfarin - Potential for adverse drug reactions and interactions/Coumadin/Warfarin - Follow up monitoring

## 2019-03-11 NOTE — DISCHARGE NOTE PROVIDER - CARE PROVIDER_API CALL
Ba Das)  Internal Medicine  [address], [place] [address]  Phone: [phone]  Fax: [phone]  Follow Up Time:

## 2019-03-11 NOTE — DISCHARGE NOTE PROVIDER - HOSPITAL COURSE
03/82/19 : Asymptomatic. Leg edema left resolved. Continue IV Heparin.     03/03/19 : Pt. alert, asymptomatic. Left leg non tender. edems resolved. Start on Eliquis, Dc/C Heparin. Discharge planning for AM.    03/04/ 19 : Asymptomatic. On Eliquis now. PT evaluation. Discharge planning.    03/05/19 : Asymptomatic. Vascular consult noted. Continue Eliquis for 6-9 months. Rehab when the bed is available.    03/06/19 : Asymptomatic. To obtain auth.  from [facility] for STR. Continue Eliquis.    03/07/19 : STR denied by [facility]. For discharge home with home PT. Pt. appealing the decision.     03/08/19 : Pt. waiting for the out come of the appeal. On Eliquis.    03/09/19 : Alona Hodgson, Care coordinator's note  appreciated.    03/10/19 : On Eliquis. Discharge planning.            Problem/Plan - 1:    ·  Problem: Acute deep vein thrombosis (DVT) of left lower extremity, unspecified vein.  Plan: A/C.         Problem/Plan - 2:    ·  Problem: PAD (peripheral artery disease).  Plan: S/P Bypass, Thrombectomy.         Problem/Plan - 3:    ·  Problem: Essential hypertension.  Plan: Monitor BP.         Problem/Plan - 4:    ·  Problem: Benign prostatic hyperplasia, unspecified whether lower urinary tract symptoms present.  Plan: tamsulosin.         Problem/Plan - 5:    ·  Problem: Hypercholesterolemia.  Plan: Simvastatin.

## 2019-03-15 DIAGNOSIS — I73.9 PERIPHERAL VASCULAR DISEASE, UNSPECIFIED: ICD-10-CM

## 2019-03-15 DIAGNOSIS — E44.0 MODERATE PROTEIN-CALORIE MALNUTRITION: ICD-10-CM

## 2019-03-15 DIAGNOSIS — I82.412 ACUTE EMBOLISM AND THROMBOSIS OF LEFT FEMORAL VEIN: ICD-10-CM

## 2019-03-15 DIAGNOSIS — N40.0 BENIGN PROSTATIC HYPERPLASIA WITHOUT LOWER URINARY TRACT SYMPTOMS: ICD-10-CM

## 2019-03-15 DIAGNOSIS — Z95.820 PERIPHERAL VASCULAR ANGIOPLASTY STATUS WITH IMPLANTS AND GRAFTS: ICD-10-CM

## 2019-03-15 DIAGNOSIS — I10 ESSENTIAL (PRIMARY) HYPERTENSION: ICD-10-CM

## 2019-03-15 DIAGNOSIS — M79.89 OTHER SPECIFIED SOFT TISSUE DISORDERS: ICD-10-CM

## 2019-03-15 DIAGNOSIS — E78.5 HYPERLIPIDEMIA, UNSPECIFIED: ICD-10-CM

## 2019-03-15 DIAGNOSIS — Z79.82 LONG TERM (CURRENT) USE OF ASPIRIN: ICD-10-CM

## 2019-03-15 DIAGNOSIS — Z79.02 LONG TERM (CURRENT) USE OF ANTITHROMBOTICS/ANTIPLATELETS: ICD-10-CM

## 2019-04-04 ENCOUNTER — APPOINTMENT (OUTPATIENT)
Dept: ELECTROPHYSIOLOGY | Facility: CLINIC | Age: 79
End: 2019-04-04

## 2019-04-08 PROBLEM — E78.00 PURE HYPERCHOLESTEROLEMIA, UNSPECIFIED: Chronic | Status: ACTIVE | Noted: 2019-03-01

## 2019-04-08 PROBLEM — I73.9 PERIPHERAL VASCULAR DISEASE, UNSPECIFIED: Chronic | Status: ACTIVE | Noted: 2019-03-01

## 2019-04-08 PROBLEM — N40.0 BENIGN PROSTATIC HYPERPLASIA WITHOUT LOWER URINARY TRACT SYMPTOMS: Chronic | Status: ACTIVE | Noted: 2019-03-01

## 2019-04-11 ENCOUNTER — APPOINTMENT (OUTPATIENT)
Dept: VASCULAR SURGERY | Facility: CLINIC | Age: 79
End: 2019-04-11
Payer: MEDICARE

## 2019-04-11 VITALS
WEIGHT: 146 LBS | HEIGHT: 67 IN | SYSTOLIC BLOOD PRESSURE: 177 MMHG | DIASTOLIC BLOOD PRESSURE: 74 MMHG | TEMPERATURE: 97.4 F | BODY MASS INDEX: 22.91 KG/M2 | HEART RATE: 47 BPM

## 2019-04-11 VITALS — SYSTOLIC BLOOD PRESSURE: 175 MMHG | DIASTOLIC BLOOD PRESSURE: 60 MMHG | HEART RATE: 43 BPM

## 2019-04-11 DIAGNOSIS — Z86.39 PERSONAL HISTORY OF OTHER ENDOCRINE, NUTRITIONAL AND METABOLIC DISEASE: ICD-10-CM

## 2019-04-11 DIAGNOSIS — Z78.9 OTHER SPECIFIED HEALTH STATUS: ICD-10-CM

## 2019-04-11 DIAGNOSIS — Z95.828 PRESENCE OF OTHER VASCULAR IMPLANTS AND GRAFTS: ICD-10-CM

## 2019-04-11 DIAGNOSIS — Z86.79 PERSONAL HISTORY OF OTHER DISEASES OF THE CIRCULATORY SYSTEM: ICD-10-CM

## 2019-04-11 DIAGNOSIS — Z87.891 PERSONAL HISTORY OF NICOTINE DEPENDENCE: ICD-10-CM

## 2019-04-11 PROCEDURE — 99024 POSTOP FOLLOW-UP VISIT: CPT

## 2019-04-11 PROCEDURE — 93926 LOWER EXTREMITY STUDY: CPT

## 2019-04-11 NOTE — ASSESSMENT
[FreeTextEntry1] : 79 year old male with history HTN, HLD, and bilateral lower extremity bypass grafts from the 1980s presents today for post-op visit. He is 7 weeks s/p L EIA to profunda bypass graft and thrombectomy of prior L fem-pop bypass graft. He had initially presented to [facility] with acute onset of LLE pain, mild sensory and motor loss after getting a massage. Symptoms resolved after the surgery. He was also found to have a L femoral DVT several days post-op in the rehab center and started on Eliquis. \par \par Today he feels well and no longer has ischemic symptoms in his leg. He complains of leg swelling worse on the L than the R but unchanged since the surgery. He has been back to most of his regular activities and has no difficulty with ambulation and no complaints of claudication, rest pain, or tissue loss. He continues to take plavix and Eliquis. He has quit smoking since his hospitalization.\par \par Ultrasound today demonstrates a patent L EIA to profunda graft. The L fem-pop is occluded. There is low velocities noted distally.\par \par On exam, his legs are warm. The left leg is more swollen than the right. L groin incision is well healed. \par \par Patient is doing well and is asymptomatic with a patent L EIA to profunda graft and occluded fem-pop. He should continue taking the Eliquis for not only the DVT but for graft patency. Follow up in 3 months for repeat arterial duplex of the LLE and WOODY.

## 2019-04-11 NOTE — PHYSICAL EXAM
[Alert] : alert [Oriented to Place] : oriented to place [Oriented to Person] : oriented to person [Calm] : calm [Oriented to Time] : oriented to time [de-identified] : well appearing, NAD [de-identified] : unlabored [FreeTextEntry1] : Legs are warm and appear to be well perfused. L groin is well healed. LLE with 2+ pitting throughout the calf. RLE with minimal swelling. [de-identified] : regular rate [de-identified] : soft, NT

## 2019-04-11 NOTE — REVIEW OF SYSTEMS
[Fever] : no fever [Chills] : no chills [Leg Claudication] : no intermittent leg claudication [Feeling Poorly] : not feeling poorly [Lower Ext Edema] : lower extremity edema [Skin Wound] : no skin wound [Limb Weakness] : no limb weakness [Difficulty Walking] : no difficulty walking

## 2019-04-11 NOTE — HISTORY OF PRESENT ILLNESS
[FreeTextEntry1] : 79 year old male with history HTN, HLD, and bilateral lower extremity bypass grafts from the 1980s presents today for post-op visit. He is 7 weeks s/p L EIA to profunda bypass graft and thrombectomy of prior L fem-pop bypass graft. He had initially presented to [facility] with acute onset of LLE pain, mild sensory and motor loss after getting a massage. Symptoms resolved after the surgery. He was also found to have a L femoral DVT several days post-op in the rehab center and started on Eliquis. \par \par Today he feels well and no longer has ischemic symptoms in his leg. He complains of leg swelling worse on the L than the R but unchanged since the surgery. He has been back to most of his regular activities and has no difficulty with ambulation and no complaints of claudication, rest pain, or tissue loss. He continues to take plavix and Eliquis. He has quit smoking since his hospitalization.

## 2019-04-15 PROBLEM — Z87.891 FORMER SMOKER: Status: ACTIVE | Noted: 2019-04-11

## 2019-04-15 PROBLEM — Z95.828 HISTORY OF ARTERIAL BYPASS OF LOWER EXTREMITY: Status: RESOLVED | Noted: 2019-04-15 | Resolved: 2019-04-15

## 2019-04-15 PROBLEM — Z86.39 HISTORY OF HIGH CHOLESTEROL: Status: RESOLVED | Noted: 2019-04-11 | Resolved: 2019-04-15

## 2019-04-15 PROBLEM — Z78.9 DRINKS WINE: Status: ACTIVE | Noted: 2019-04-11

## 2019-04-15 PROBLEM — Z86.79 HISTORY OF HYPERTENSION: Status: RESOLVED | Noted: 2019-04-11 | Resolved: 2019-04-15

## 2019-04-15 RX ORDER — APIXABAN 5 MG/1
TABLET, FILM COATED ORAL
Refills: 0 | Status: ACTIVE | COMMUNITY

## 2019-04-15 RX ORDER — TAMSULOSIN HYDROCHLORIDE 0.4 MG/1
CAPSULE ORAL
Refills: 0 | Status: ACTIVE | COMMUNITY

## 2019-04-15 RX ORDER — CLOPIDOGREL 75 MG/1
TABLET, FILM COATED ORAL
Refills: 0 | Status: ACTIVE | COMMUNITY

## 2019-04-15 RX ORDER — SIMVASTATIN 80 MG/1
TABLET, FILM COATED ORAL
Refills: 0 | Status: ACTIVE | COMMUNITY

## 2019-05-16 ENCOUNTER — APPOINTMENT (OUTPATIENT)
Dept: VASCULAR SURGERY | Facility: CLINIC | Age: 79
End: 2019-05-16
Payer: MEDICARE

## 2019-05-16 VITALS
WEIGHT: 146 LBS | BODY MASS INDEX: 22.91 KG/M2 | TEMPERATURE: 97.9 F | HEIGHT: 67 IN | SYSTOLIC BLOOD PRESSURE: 139 MMHG | DIASTOLIC BLOOD PRESSURE: 58 MMHG | HEART RATE: 44 BPM

## 2019-05-16 VITALS — DIASTOLIC BLOOD PRESSURE: 67 MMHG | SYSTOLIC BLOOD PRESSURE: 137 MMHG | HEART RATE: 49 BPM

## 2019-05-16 PROCEDURE — 93926 LOWER EXTREMITY STUDY: CPT

## 2019-05-16 PROCEDURE — 93922 UPR/L XTREMITY ART 2 LEVELS: CPT

## 2019-05-16 PROCEDURE — 99024 POSTOP FOLLOW-UP VISIT: CPT

## 2019-05-20 ENCOUNTER — OTHER (OUTPATIENT)
Age: 79
End: 2019-05-20

## 2019-05-22 ENCOUNTER — APPOINTMENT (OUTPATIENT)
Dept: VASCULAR SURGERY | Facility: HOSPITAL | Age: 79
End: 2019-05-22

## 2019-05-22 ENCOUNTER — OUTPATIENT (OUTPATIENT)
Dept: OUTPATIENT SERVICES | Facility: HOSPITAL | Age: 79
LOS: 1 days | Discharge: HOME | End: 2019-05-22
Payer: MEDICARE

## 2019-05-22 DIAGNOSIS — R07.9 CHEST PAIN, UNSPECIFIED: ICD-10-CM

## 2019-05-22 DIAGNOSIS — I73.9 PERIPHERAL VASCULAR DISEASE, UNSPECIFIED: ICD-10-CM

## 2019-05-22 DIAGNOSIS — I99.8 OTHER DISORDER OF CIRCULATORY SYSTEM: ICD-10-CM

## 2019-05-22 LAB
ANION GAP SERPL CALC-SCNC: 11 MMO/L — SIGNIFICANT CHANGE UP (ref 7–14)
BUN SERPL-MCNC: 27 MG/DL — HIGH (ref 7–23)
CALCIUM SERPL-MCNC: 8.8 MG/DL — SIGNIFICANT CHANGE UP (ref 8.4–10.5)
CHLORIDE SERPL-SCNC: 109 MMOL/L — HIGH (ref 98–107)
CO2 SERPL-SCNC: 23 MMOL/L — SIGNIFICANT CHANGE UP (ref 22–31)
CREAT SERPL-MCNC: 1.54 MG/DL — HIGH (ref 0.5–1.3)
GLUCOSE SERPL-MCNC: 98 MG/DL — SIGNIFICANT CHANGE UP (ref 70–99)
HBA1C BLD-MCNC: 5.5 % — SIGNIFICANT CHANGE UP (ref 4–5.6)
HCT VFR BLD CALC: 30.2 % — LOW (ref 39–50)
HGB BLD-MCNC: 9.2 G/DL — LOW (ref 13–17)
MCHC RBC-ENTMCNC: 26.9 PG — LOW (ref 27–34)
MCHC RBC-ENTMCNC: 30.5 % — LOW (ref 32–36)
MCV RBC AUTO: 88.3 FL — SIGNIFICANT CHANGE UP (ref 80–100)
NRBC # FLD: 0 K/UL — SIGNIFICANT CHANGE UP (ref 0–0)
PLATELET # BLD AUTO: 189 K/UL — SIGNIFICANT CHANGE UP (ref 150–400)
PMV BLD: 9.8 FL — SIGNIFICANT CHANGE UP (ref 7–13)
POTASSIUM SERPL-MCNC: 4.1 MMOL/L — SIGNIFICANT CHANGE UP (ref 3.5–5.3)
POTASSIUM SERPL-SCNC: 4.1 MMOL/L — SIGNIFICANT CHANGE UP (ref 3.5–5.3)
RBC # BLD: 3.42 M/UL — LOW (ref 4.2–5.8)
RBC # FLD: 14.6 % — HIGH (ref 10.3–14.5)
SODIUM SERPL-SCNC: 143 MMOL/L — SIGNIFICANT CHANGE UP (ref 135–145)
WBC # BLD: 4.74 K/UL — SIGNIFICANT CHANGE UP (ref 3.8–10.5)
WBC # FLD AUTO: 4.74 K/UL — SIGNIFICANT CHANGE UP (ref 3.8–10.5)

## 2019-05-22 PROCEDURE — 75710 ARTERY X-RAYS ARM/LEG: CPT | Mod: 26

## 2019-05-22 PROCEDURE — 36246 INS CATH ABD/L-EXT ART 2ND: CPT

## 2019-05-22 RX ORDER — SODIUM CHLORIDE 9 MG/ML
3 INJECTION INTRAMUSCULAR; INTRAVENOUS; SUBCUTANEOUS EVERY 8 HOURS
Refills: 0 | Status: DISCONTINUED | OUTPATIENT
Start: 2019-05-22 | End: 2019-06-06

## 2019-05-22 RX ORDER — APIXABAN 2.5 MG/1
1 TABLET, FILM COATED ORAL
Qty: 0 | Refills: 0 | DISCHARGE

## 2019-05-22 RX ORDER — SODIUM CHLORIDE 9 MG/ML
1000 INJECTION INTRAMUSCULAR; INTRAVENOUS; SUBCUTANEOUS
Refills: 0 | Status: DISCONTINUED | OUTPATIENT
Start: 2019-05-22 | End: 2019-06-06

## 2019-05-22 RX ORDER — AMLODIPINE BESYLATE 2.5 MG/1
20 TABLET ORAL
Qty: 0 | Refills: 0 | DISCHARGE

## 2019-05-22 RX ORDER — APIXABAN 2.5 MG/1
1 TABLET, FILM COATED ORAL
Qty: 60 | Refills: 0
Start: 2019-05-22 | End: 2019-06-20

## 2019-05-22 NOTE — H&P CARDIOLOGY - HISTORY OF PRESENT ILLNESS
79 year old man with PMHx of HTN, HLD, BPH, prostate CA s/p seed placement,  PAD s/p LE bypass graft and stent arrived today for LE angiogram and possible angioplasty. Patient had LLE occlusion s/p thrombectomy of old left fem-pop bypass. Patient has been having pain and fatigue in left leg with ambulation which improves with rest. He denies any swelling and redness of left leg.

## 2019-05-22 NOTE — H&P CARDIOLOGY - RS GEN PE MLT RESP DETAILS PC
clear to auscultation bilaterally/breath sounds equal/good air movement/normal/airway patent/respirations non-labored

## 2019-06-04 ENCOUNTER — OUTPATIENT (OUTPATIENT)
Dept: OUTPATIENT SERVICES | Facility: HOSPITAL | Age: 79
LOS: 1 days | End: 2019-06-04
Payer: MEDICARE

## 2019-06-04 VITALS
HEIGHT: 68 IN | TEMPERATURE: 97 F | RESPIRATION RATE: 18 BRPM | WEIGHT: 139.99 LBS | HEART RATE: 41 BPM | SYSTOLIC BLOOD PRESSURE: 130 MMHG | OXYGEN SATURATION: 96 % | DIASTOLIC BLOOD PRESSURE: 68 MMHG

## 2019-06-04 DIAGNOSIS — Z86.79 PERSONAL HISTORY OF OTHER DISEASES OF THE CIRCULATORY SYSTEM: ICD-10-CM

## 2019-06-04 DIAGNOSIS — Z98.890 OTHER SPECIFIED POSTPROCEDURAL STATES: Chronic | ICD-10-CM

## 2019-06-04 DIAGNOSIS — I73.9 PERIPHERAL VASCULAR DISEASE, UNSPECIFIED: ICD-10-CM

## 2019-06-04 DIAGNOSIS — I10 ESSENTIAL (PRIMARY) HYPERTENSION: ICD-10-CM

## 2019-06-04 DIAGNOSIS — R06.09 OTHER FORMS OF DYSPNEA: ICD-10-CM

## 2019-06-04 LAB
ANION GAP SERPL CALC-SCNC: 12 MMO/L — SIGNIFICANT CHANGE UP (ref 7–14)
BLD GP AB SCN SERPL QL: NEGATIVE — SIGNIFICANT CHANGE UP
BUN SERPL-MCNC: 29 MG/DL — HIGH (ref 7–23)
CALCIUM SERPL-MCNC: 9.5 MG/DL — SIGNIFICANT CHANGE UP (ref 8.4–10.5)
CHLORIDE SERPL-SCNC: 106 MMOL/L — SIGNIFICANT CHANGE UP (ref 98–107)
CO2 SERPL-SCNC: 24 MMOL/L — SIGNIFICANT CHANGE UP (ref 22–31)
CREAT SERPL-MCNC: 1.56 MG/DL — HIGH (ref 0.5–1.3)
GLUCOSE SERPL-MCNC: 81 MG/DL — SIGNIFICANT CHANGE UP (ref 70–99)
HCT VFR BLD CALC: 32.1 % — LOW (ref 39–50)
HGB BLD-MCNC: 9.1 G/DL — LOW (ref 13–17)
MCHC RBC-ENTMCNC: 25.1 PG — LOW (ref 27–34)
MCHC RBC-ENTMCNC: 28.3 % — LOW (ref 32–36)
MCV RBC AUTO: 88.7 FL — SIGNIFICANT CHANGE UP (ref 80–100)
NRBC # FLD: 0 K/UL — SIGNIFICANT CHANGE UP (ref 0–0)
PLATELET # BLD AUTO: 247 K/UL — SIGNIFICANT CHANGE UP (ref 150–400)
PMV BLD: 10.5 FL — SIGNIFICANT CHANGE UP (ref 7–13)
POTASSIUM SERPL-MCNC: 4.1 MMOL/L — SIGNIFICANT CHANGE UP (ref 3.5–5.3)
POTASSIUM SERPL-SCNC: 4.1 MMOL/L — SIGNIFICANT CHANGE UP (ref 3.5–5.3)
RBC # BLD: 3.62 M/UL — LOW (ref 4.2–5.8)
RBC # FLD: 14.8 % — HIGH (ref 10.3–14.5)
RH IG SCN BLD-IMP: POSITIVE — SIGNIFICANT CHANGE UP
SODIUM SERPL-SCNC: 142 MMOL/L — SIGNIFICANT CHANGE UP (ref 135–145)
WBC # BLD: 5.12 K/UL — SIGNIFICANT CHANGE UP (ref 3.8–10.5)
WBC # FLD AUTO: 5.12 K/UL — SIGNIFICANT CHANGE UP (ref 3.8–10.5)

## 2019-06-04 PROCEDURE — 93010 ELECTROCARDIOGRAM REPORT: CPT

## 2019-06-04 NOTE — H&P PST ADULT - ASSESSMENT
78 yo male with pre-op diagnosis of peripheral vascular disease, history of DVT in LLE  scheduled for left lower extremity distal bypass on 06/18/2019. He  reports this procedure was attempted in February but was not successful.

## 2019-06-04 NOTE — H&P PST ADULT - RS GEN PE MLT RESP DETAILS PC
no wheezes/respirations non-labored/clear to auscultation bilaterally/airway patent/good air movement/breath sounds equal

## 2019-06-04 NOTE — H&P PST ADULT - NSICDXPASTSURGICALHX_GEN_ALL_CORE_FT
PAST SURGICAL HISTORY:  H/O peripheral artery bypass     S/P angiogram of extremity fem-pop stent of LLE

## 2019-06-04 NOTE — H&P PST ADULT - NSICDXPROBLEM_GEN_ALL_CORE_FT
Results noted: no evidence of stress induced ischemia; EF 65-70%; ascending aorta mildly dilated at 4.3 cm. Will discuss aortic dilatation with Dr Gonzales. LM for patient to call back to discuss. PROBLEM DIAGNOSES  Problem: PVD (peripheral vascular disease)  Assessment and Plan: scheduled for left lower extremity distal bypass on 06/18/2019.   Pre-Op instructions provided to patient.  Pepcid for GI prophylaxis provided.   Surgical scrub instructions reviewed and provided to patient with teach back and return demonstration.  Patient will obtain medical clearance as per surgeons request-copy requested.  Last dose of Plavix per    Problem: History of bradycardia  Assessment and Plan: Patient reports  his HR typically runs low. Review of Allscript Vitals consistent with HR in the 40's. Patient asymptomatic in PST, denies lightheadedness, CP, SOB, N&V. DIscussed with Dr. Stearns.     Problem: HTN (hypertension)  Assessment and Plan: Pt. instructed to continue medications as prescribed.     Problem: PARRA (dyspnea on exertion)  Assessment and Plan: Pending cardiac eval. PROBLEM DIAGNOSES  Problem: PVD (peripheral vascular disease)  Assessment and Plan: scheduled for left lower extremity distal bypass on 06/18/2019.   Pre-Op instructions provided to patient.  Pepcid for GI prophylaxis provided.   Surgical scrub instructions reviewed and provided to patient with teach back and return demonstration.  Patient will obtain medical clearance as per surgeons request-copy requested.  Will call Dr. Landers to inquire whether Eliquis & Plavix may be continued for surgery otherwise last dose per PCP.     Problem: History of bradycardia  Assessment and Plan: Patient reports  his HR typically runs low. Review of Allscript Vitals consistent with HR in the 40's. Patient asymptomatic in PST, denies lightheadedness, CP, SOB, N&V. DIscussed with Dr. Stearns.     Problem: HTN (hypertension)  Assessment and Plan: Pt. instructed to continue medications as prescribed.     Problem: PARRA (dyspnea on exertion)  Assessment and Plan: Pending cardiac eval.

## 2019-06-04 NOTE — H&P PST ADULT - SYMPTOMS
claudication/left lower extremity r/t pre-op diagnosis PVD left lower extremity  pain r/t pre-op diagnosis PVD/dyspnea

## 2019-06-04 NOTE — H&P PST ADULT - HISTORY OF PRESENT ILLNESS
78 yo male with pre-op diagnosis of peripheral vascular disease scheduled for left lower extremity distal bypass on 06/18/2019. He has a history of PAD and reports this procedure was attempted in February but was not successful. 78 yo male with PMH HTN, HLD,  former 20-pk year smoker presents to PST with  pre-op diagnosis of peripheral vascular disease, history of DVT in LLE  scheduled for left lower extremity distal bypass on 06/18/2019. He  reports this procedure was attempted in February but was not successful.

## 2019-06-04 NOTE — H&P PST ADULT - NEGATIVE ENMT SYMPTOMS
no tinnitus/no vertigo/no hearing difficulty/no ear pain/no nasal congestion/no nasal discharge/no throat pain/no dysphagia

## 2019-06-04 NOTE — H&P PST ADULT - NSICDXPASTMEDICALHX_GEN_ALL_CORE_FT
PAST MEDICAL HISTORY:  Benign prostatic hyperplasia, unspecified whether lower urinary tract symptoms present     DVT, lower extremity LLE    HTN (hypertension)     Hypercholesterolemia     PAD (peripheral artery disease)

## 2019-06-06 ENCOUNTER — APPOINTMENT (OUTPATIENT)
Dept: VASCULAR SURGERY | Facility: CLINIC | Age: 79
End: 2019-06-06

## 2019-06-11 PROBLEM — I82.409 ACUTE EMBOLISM AND THROMBOSIS OF UNSPECIFIED DEEP VEINS OF UNSPECIFIED LOWER EXTREMITY: Chronic | Status: ACTIVE | Noted: 2019-06-04

## 2019-06-13 ENCOUNTER — APPOINTMENT (OUTPATIENT)
Dept: VASCULAR SURGERY | Facility: CLINIC | Age: 79
End: 2019-06-13
Payer: MEDICARE

## 2019-06-13 VITALS
SYSTOLIC BLOOD PRESSURE: 157 MMHG | HEART RATE: 50 BPM | HEIGHT: 67 IN | DIASTOLIC BLOOD PRESSURE: 66 MMHG | BODY MASS INDEX: 22.6 KG/M2 | TEMPERATURE: 97.9 F | WEIGHT: 144 LBS

## 2019-06-13 VITALS — SYSTOLIC BLOOD PRESSURE: 165 MMHG | DIASTOLIC BLOOD PRESSURE: 69 MMHG | HEART RATE: 49 BPM

## 2019-06-13 PROCEDURE — 99212 OFFICE O/P EST SF 10 MIN: CPT

## 2019-06-13 PROCEDURE — 93971 EXTREMITY STUDY: CPT

## 2019-06-14 NOTE — HISTORY OF PRESENT ILLNESS
[FreeTextEntry1] : 79 year old male is here today s/p LLE diagnostic angiogram on 5/22/19 to discuss surgery. His symptoms are unchanged as he continues to experience rest pain worse at night and 1 block claudication in the left leg. He reports new SOBE especially walking uphill and taking the stairs. He was evaluated by his cardiologist yesterday and is waiting to be scheduled for tests. He is on plavix and Eliquis (last dose 6/12/19 in anticipation of surgery).\par \par PMH: HTN, HLD, bilateral lower extremity bypass grafts from the 1980s, L EIA to profunda bypass graft and thrombectomy of prior L fem-pop bypass graft in 2/2019, L femoral DVT on Eliquis \par \par LLE angio demonstrated a patent L EIA to profunda bypass and single vessel runoff via peroneal. Distal bypass (profunda to peroneal) was recommended. Vein mapping completed today and shows competent GSVs bilaterally. \par \par On previous visit, WOODY on the left was diminished at 0.29.\par \par \par

## 2019-06-14 NOTE — PHYSICAL EXAM
[Alert] : alert [Oriented to Person] : oriented to person [Oriented to Place] : oriented to place [Oriented to Time] : oriented to time [Calm] : calm [de-identified] : well appearing, NAD [de-identified] : unlabored [FreeTextEntry1] : Legs are warm. R groin is well healed s/p angiogram. L groin with well healed incision and 1+ pitting edema.  [de-identified] : soft, NT [de-identified] : LE intact

## 2019-06-14 NOTE — REVIEW OF SYSTEMS
[Chest Pain] : no chest pain [Leg Claudication] : intermittent leg claudication [Lower Ext Edema] : lower extremity edema [SOB on Exertion] : shortness of breath during exertion [Abdominal Pain] : no abdominal pain [Skin Wound] : no skin wound [Limb Weakness] : no limb weakness [Negative] : Constitutional

## 2019-06-14 NOTE — ASSESSMENT
[FreeTextEntry1] : 79 year old male is here today s/p LLE diagnostic angiogram on 5/22/19 to discuss surgery. His symptoms are unchanged as he continues to experience rest pain worse at night and 1 block claudication in the left leg. He reports new SOBE especially walking uphill and taking the stairs. He was evaluated by his cardiologist yesterday and is waiting to be scheduled for tests. He is on plavix and Eliquis (last dose 6/12/19 in anticipation of surgery).\par \par PMH: HTN, HLD, bilateral lower extremity bypass grafts from the 1980s, L EIA to profunda bypass graft and thrombectomy of prior L fem-pop bypass graft in 2/2019, L femoral DVT on Eliquis \par \par LLE angio demonstrated a patent L EIA to profunda bypass and single vessel runoff via peroneal. Distal bypass (profunda to peroneal) was recommended. Vein mapping completed today and shows competent GSVs bilaterally. \par \par On previous visit, WOODY on the left was diminished at 0.29.\par \par On exam, the legs are warm. R groin is well healed s/p angiogram and is without evidence of swelling/hematoma formation. L groin with well healed incisions. LLE has 1+ pitting edema. \par \par Patient s/p LLE angiogram and scheduled for L distal bypass next week. Will obtain medical clearance from cardiologist. Continue to hold anticoagulation for now in anticipation of surgery. \par

## 2019-06-17 NOTE — ASU PATIENT PROFILE, ADULT - ABLE TO REACH PT
pt needs medical clearance ,  & Dr calderon office made aware pt states he does not have medical clearance

## 2019-06-19 DIAGNOSIS — I73.9 PERIPHERAL VASCULAR DISEASE, UNSPECIFIED: ICD-10-CM

## 2019-10-23 ENCOUNTER — OTHER (OUTPATIENT)
Age: 79
End: 2019-10-23

## 2019-10-24 ENCOUNTER — APPOINTMENT (OUTPATIENT)
Dept: VASCULAR SURGERY | Facility: CLINIC | Age: 79
End: 2019-10-24

## 2019-10-28 ENCOUNTER — OTHER (OUTPATIENT)
Age: 79
End: 2019-10-28

## 2019-12-05 ENCOUNTER — APPOINTMENT (OUTPATIENT)
Dept: VASCULAR SURGERY | Facility: CLINIC | Age: 79
End: 2019-12-05
Payer: MEDICARE

## 2019-12-05 VITALS — SYSTOLIC BLOOD PRESSURE: 135 MMHG | HEART RATE: 56 BPM | TEMPERATURE: 97.7 F | DIASTOLIC BLOOD PRESSURE: 69 MMHG

## 2019-12-05 VITALS
WEIGHT: 137 LBS | HEIGHT: 67 IN | DIASTOLIC BLOOD PRESSURE: 81 MMHG | BODY MASS INDEX: 21.5 KG/M2 | HEART RATE: 51 BPM | SYSTOLIC BLOOD PRESSURE: 128 MMHG

## 2019-12-05 PROCEDURE — 93922 UPR/L XTREMITY ART 2 LEVELS: CPT

## 2019-12-05 PROCEDURE — 99212 OFFICE O/P EST SF 10 MIN: CPT

## 2019-12-06 NOTE — HISTORY OF PRESENT ILLNESS
[FreeTextEntry1] : GIANNI MURPHY is a 79 year male who presents today for PAD follow-up. He has a h/o bilateral LE bypasses from the 1980s. He recently underwent L EIA to profunda bypass and thrombectomy of L fem-pop by Dr. Landers in 2/2019. In 5/2019, patient developed rest pain and was found to have an WOODY of 0.29. Subsequent LLE angiogram (5/22/19) demonstrated a patent L EIA -profunda bypass and single vessel runoff via peroneal; distal bypass was recommended but deferred by the patient. \par \par Today the patient reports no rest pain. He states that he can walk several blocks without claudication. He also denies tissue loss. \par \par PMH: HTN, HLD, bilateral lower extremity bypass grafts from the 1980s, L EIA to profunda bypass graft and thrombectomy of prior L fem-pop bypass graft in 2/2019, L femoral DVT on Eliquis \par

## 2019-12-06 NOTE — ASSESSMENT
[FreeTextEntry1] : GIANNI MURPHY is a 79 year male who presents today for PAD follow-up. He has a h/o bilateral LE bypasses from the 1980s. He recently underwent L EIA to profunda bypass and thrombectomy of L fem-pop by Dr. Landers in 2/2019. In 5/2019, patient developed rest pain and was found to have an WOODY of 0.29. Subsequent LLE angiogram (5/22/19) demonstrated a patent L EIA -profunda bypass and single vessel runoff via peroneal; distal bypass was recommended but deferred by the patient. \par \par Today the patient reports no rest pain. He states that he can walk several blocks without claudication. He also denies tissue loss. \par \par PMH: HTN, HLD, bilateral lower extremity bypass grafts from the 1980s, L EIA to profunda bypass graft and thrombectomy of prior L fem-pop bypass graft in 2/2019, L femoral DVT on Eliquis \par \par WOODY today as follows: R 0.82, L 0.41\par \par On exam, legs are warm and appear well perfused. Well healed surgical incisions present in bilateral LE. No evidence of tissue loss. \par \par Patient with h/o multiple LE bypass procedures now with resolution of symptoms and slight improvement in L WOODY. Recommend 6 month follow up with bilateral LE arterial duplexes to assess patency of grafts and WOODY. \par

## 2019-12-06 NOTE — PHYSICAL EXAM
[Skin Ulcer] : no ulcer [Alert] : alert [Oriented to Place] : oriented to place [Oriented to Time] : oriented to time [Oriented to Person] : oriented to person [de-identified] : well appearing, NAD [de-identified] : unlabored [Calm] : calm [FreeTextEntry1] : Legs are warm and appear well perfused.   [de-identified] : soft, NT [de-identified] : LE intact

## 2019-12-06 NOTE — REVIEW OF SYSTEMS
[Chest Pain] : no chest pain [Leg Claudication] : no intermittent leg claudication [Lower Ext Edema] : no extremity edema [Shortness Of Breath] : no shortness of breath [Abdominal Pain] : no abdominal pain [Limb Pain] : no limb pain [Limb Swelling] : no limb swelling [Skin Lesions] : no skin lesions [Skin Wound] : no skin wound [Limb Weakness] : no limb weakness [Difficulty Walking] : no difficulty walking [Negative] : Constitutional

## 2020-02-18 NOTE — ED PROVIDER NOTE - NS ED MD DISPO ADMIT FRK
64 y/o F from home, walks independently, lives with  with PMH of (Breast ca, s/p Bilateral mastectomy on ibrance), Malignant pleural effusion s/p VATS procedure, Chronic Left upper extremity Lymphedema, Hypothyroidism admitted with FLU, pneumonia, Strep bacteremia.  1.If transthoracic negative, will have SEAN in AM.  2.ABX as per ID.  3.Hypothyroidism-synthroid.  4.Breast ca-Heme/Onc.  5.GI and DVT prophylaxis. MEDICAL/SURGICAL

## 2020-02-18 NOTE — DISCHARGE NOTE ADULT - FUNCTIONAL STATUS DATE
21-Feb-2019 Subsequent Stages Histo Method Verbiage: Using a similar technique to that described above, a thin layer of tissue was removed from all areas where tumor was visible on the previous stage.  The tissue was again oriented, mapped, dyed, and processed as above.

## 2020-06-11 ENCOUNTER — APPOINTMENT (OUTPATIENT)
Dept: VASCULAR SURGERY | Facility: CLINIC | Age: 80
End: 2020-06-11

## 2020-11-17 NOTE — DISCHARGE NOTE ADULT - TOBACCO CESSATION EDUCATION/COUNSELLING(PROVIDED IF TOBACCO USED IN THE PAST 30 DAYS) NON CORE MEASURE SITES
Rounded on patient, resting in ED cot with eyes closed and monitor on. No pain distress noted.    Offered and patient declined

## 2021-03-01 NOTE — H&P PST ADULT - CARDIOVASCULAR COMMENTS
Internal Medicine   Blake Moreno | PGY-1    OVERNIGHT EVENTS: No acute overnight events. Measuring Na qdaily now.     SUBJECTIVE: Patient was seen and examined at bedside this morning. Denies any nausea/vomiting/diarrhea, headache, shortness of breath or chest pain.       MEDICATIONS  (STANDING):  ascorbic acid 500 milliGRAM(s) Oral daily  aspirin enteric coated 81 milliGRAM(s) Oral daily  bethanechol 10 milliGRAM(s) Oral three times a day  cholecalciferol 1000 Unit(s) Oral daily  cyanocobalamin 1000 MICROGram(s) Oral daily  heparin   Injectable 5000 Unit(s) SubCutaneous every 8 hours  lactobacillus acidophilus 1 Tablet(s) Oral two times a day  meropenem  IVPB 1000 milliGRAM(s) IV Intermittent every 12 hours  mirtazapine 15 milliGRAM(s) Oral at bedtime  Nephro-portia 1 Tablet(s) Oral daily  nystatin Powder 1 Application(s) Topical two times a day  potassium phosphate / sodium phosphate Powder (PHOS-NaK) 1 Packet(s) Oral three times a day with meals  psyllium Powder 1 Packet(s) Oral two times a day  sodium chloride 2 Gram(s) Oral three times a day  vancomycin    Solution 125 milliGRAM(s) Oral every 6 hours    MEDICATIONS  (PRN):  acetaminophen   Tablet .. 650 milliGRAM(s) Oral every 12 hours PRN Temp greater or equal to 38C (100.4F), Mild Pain (1 - 3), Moderate Pain (4 - 6)  ondansetron    Tablet 4 milliGRAM(s) Oral every 8 hours PRN Nausea and/or Vomiting        T(F): 98.9 (03-01-21 @ 05:40), Max: 98.9 (03-01-21 @ 05:40)  HR: 95 (03-01-21 @ 05:40) (90 - 99)  BP: 97/62 (03-01-21 @ 05:40) (97/62 - 122/73)  BP(mean): --  RR: 18 (03-01-21 @ 05:40) (18 - 18)  SpO2: 98% (03-01-21 @ 05:40) (95% - 100%)      PHYSICAL EXAM:   Gen: Awake, NAD, minimally conversational  HEENT: NCAT, PERRL, EOMI, clear conjunctiva, no scleral icterus  Neck: Supple, no JVD, no LAD  CV: RRR, S1S2, no m/r/g  Resp: CTAB, normal respiratory effort  Abd: Soft, L sided TTP on soft and deep palpation, no rebound or guarding,   Ext: minimal R calf swelling compared to L, no clubbing or cyanosis  Neuro: minimally conversational, following verbal commands, nods/shakes head to questions   Skin: warm, perfused    TELEMETRY:    LABS:                        9.5    7.83  )-----------( 449      ( 28 Feb 2021 07:11 )             29.6     02-28    130<L>  |  95<L>  |  7   ----------------------------<  82  4.2   |  23  |  0.72    Ca    8.5      28 Feb 2021 07:10  Phos  3.1     02-28  Mg     1.7     02-28              Creatinine Trend: 0.72<--, 0.75<--, 0.67<--, 0.65<--, 0.67<--, 0.70<--  I&O's Summary    28 Feb 2021 07:01  -  01 Mar 2021 07:00  --------------------------------------------------------  IN: 640 mL / OUT: 800 mL / NET: -160 mL      BNP    RADIOLOGY & ADDITIONAL STUDIES:             Internal Medicine   Blake Moreno | PGY-1    OVERNIGHT EVENTS: No acute overnight events. Measuring Na qdaily now.     SUBJECTIVE: Patient was seen and examined at bedside this morning. Denies any nausea/vomiting/diarrhea, headache, shortness of breath or chest pain. Patient appears more alert during conversation, asking when she can go home. Denies any abdominal pain.       MEDICATIONS  (STANDING):  ascorbic acid 500 milliGRAM(s) Oral daily  aspirin enteric coated 81 milliGRAM(s) Oral daily  bethanechol 10 milliGRAM(s) Oral three times a day  cholecalciferol 1000 Unit(s) Oral daily  cyanocobalamin 1000 MICROGram(s) Oral daily  heparin   Injectable 5000 Unit(s) SubCutaneous every 8 hours  lactobacillus acidophilus 1 Tablet(s) Oral two times a day  meropenem  IVPB 1000 milliGRAM(s) IV Intermittent every 12 hours  mirtazapine 15 milliGRAM(s) Oral at bedtime  Nephro-portia 1 Tablet(s) Oral daily  nystatin Powder 1 Application(s) Topical two times a day  potassium phosphate / sodium phosphate Powder (PHOS-NaK) 1 Packet(s) Oral three times a day with meals  psyllium Powder 1 Packet(s) Oral two times a day  sodium chloride 2 Gram(s) Oral three times a day  vancomycin    Solution 125 milliGRAM(s) Oral every 6 hours    MEDICATIONS  (PRN):  acetaminophen   Tablet .. 650 milliGRAM(s) Oral every 12 hours PRN Temp greater or equal to 38C (100.4F), Mild Pain (1 - 3), Moderate Pain (4 - 6)  ondansetron    Tablet 4 milliGRAM(s) Oral every 8 hours PRN Nausea and/or Vomiting        T(F): 98.9 (03-01-21 @ 05:40), Max: 98.9 (03-01-21 @ 05:40)  HR: 95 (03-01-21 @ 05:40) (90 - 99)  BP: 97/62 (03-01-21 @ 05:40) (97/62 - 122/73)  BP(mean): --  RR: 18 (03-01-21 @ 05:40) (18 - 18)  SpO2: 98% (03-01-21 @ 05:40) (95% - 100%)      PHYSICAL EXAM:   Gen: Awake, NAD, minimally conversational  HEENT: NCAT, PERRL, EOMI, clear conjunctiva, no scleral icterus  Neck: Supple, no JVD, no LAD  CV: RRR, S1S2, no m/r/g  Resp: CTAB, normal respiratory effort  Abd: Soft, L sided TTP on soft and deep palpation, now improving  no rebound or guarding,   Ext: minimal R calf swelling compared to L, no clubbing or cyanosis ,continues to endorse pain on palpation of shins b/l  Neuro: minimally conversational, following verbal commands, nods/shakes head to questions   Skin: warm, perfused, does not appear dry/edematous     TELEMETRY:    LABS:                        9.5    7.83  )-----------( 449      ( 28 Feb 2021 07:11 )             29.6     02-28    130<L>  |  95<L>  |  7   ----------------------------<  82  4.2   |  23  |  0.72    Ca    8.5      28 Feb 2021 07:10  Phos  3.1     02-28  Mg     1.7     02-28              Creatinine Trend: 0.72<--, 0.75<--, 0.67<--, 0.65<--, 0.67<--, 0.70<--  I&O's Summary    28 Feb 2021 07:01  -  01 Mar 2021 07:00  --------------------------------------------------------  IN: 640 mL / OUT: 800 mL / NET: -160 mL      BNP    RADIOLOGY & ADDITIONAL STUDIES:             feels pain in LLE with walking and at night

## 2021-03-05 NOTE — ED ADULT NURSE NOTE - NS ED PATIENT SAFETY CONCERN
ACUTE PHYSICAL THERAPY GOALS: 
(Developed with and agreed upon by patient and/or caregiver. ) LTG: 
(1.)Mr. Zack Easton will move from supine to sit and sit to supine, scoot up and down and roll side to side in bed INDEPENDENTLY with bed flat within 7 treatment day(s). (2.)Mr. Zack Easton will transfer from bed to chair and chair to bed with STAND BY ASSIST using the least restrictive device within 7 treatment day(s). (3.)Mr. Zack Easton will ambulate with STAND BY ASSIST for 175+ feet with the least restrictive device within 7 treatment day(s). (4.)Mr. Zack Easton will perform exercises per HEP in sitting and standing to improve strength and mobility within 7 days. ________________________________________________________________________________________________ PHYSICAL THERAPY ASSESSMENT: Initial Assessment and AM PT Treatment Day # 1 Ann Durán is a 64 y.o. male PRIMARY DIAGNOSIS: Acute ischemic stroke ([facility] [address] Acute ischemic stroke ([facility] [address] [I63.9] Reason for Referral:  Weakness, R facial droop, AMS 
ICD-10: Treatment Diagnosis: Generalized Muscle Weakness (M62.81) Difficulty in walking, Not elsewhere classified (R26.2) Other abnormalities of gait and mobility (R26.89) Repeated Falls (R29.6) [place]PATIENT: [place] / [place] / Product Type: Medicare /  
 
ASSESSMENT:  
 
REHAB RECOMMENDATIONS:  
Recommendation to date pending progress: 
Setting: 
? Short-term Rehab Equipment: ? To Be Determined PRIOR LEVEL OF FUNCTION: 
(Prior to Hospitalization) INITIAL/CURRENT LEVEL OF FUNCTION: 
(Most Recently Demonstrated) Bed Mobility: 
? Odilon Sparks Sit to Stand: 
? Odilon Sparks Transfers: 
? Odilon Sparks Gait/Mobility: 
? Contact Guard Assistance Bed Mobility: ? Minimal Assistance x 2-moderate assistance x2 Sit to Stand: ? Minimal Assistance x 2 Transfers: ? Minimal Assistance x 2 Gait/Mobility: ? Minimal Assistance x 2  
 
ASSESSMENT: 
 Mr. Benigno Cordova presents with altered mental status, weakness, and right facial droop with history of metastatic lung cancer. He is a questionable historian today due to AMS; states he lives with wife and is ambulatory for household distances using RW, uses wheelchair at times. Presents with delayed responses and confusion today; was able to provide some history. Needs min-mod A x2 for bed mobility/transfer to sitting with fair to fair to fair+ seated balance. Practiced sit-stand transfers x several reps from bed and chair with cueing for technique, hand/foot placement, sequencing. Gait training x 3 trials (5 ft then 15 ft x2) with RW and min assist of 2 for safety. Demonstrates slow, shuffled gait with decreased step clearance and forward flexed posture. SpO2 97% during activity on room air. BP stable. Up to chair after activity with legs elevated, needs in reach, chair alarm on. Mr. Benigno Cordova will need continued therapy during hospital stay and rehab at [facility]. SUBJECTIVE:  
Mr. Benigno Cordova states, \"I'm ok\" SOCIAL HISTORY/LIVING ENVIRONMENT: Lives with wife. RW or wheelchair at home. Home Environment: Private residence Living Alone: No 
Support Systems: Spouse/Significant Other/Partner OBJECTIVE:  
 
PAIN: VITAL SIGNS: LINES/DRAINS:  
Pre Treatment: Pain Screen Pain Scale 1: Numeric (0 - 10) Pain Intensity 1: 0 Post Treatment: 0/10 Vital Signs O2 Sat (%): 97 % O2 Device: Room air IV 
O2 Device: Room air GROSS EVALUATION: 
 Within Functional Limits Abnormal/ Functional Abnormal/ Non-Functional (see comments) Not Tested Comments: AROM [x] [] [] [] PROM [] [] [] [] Strength [] [x] [] [] Balance [] [x] [] [] Posture [] [x] [] [] Sensation [] [] [] [] Coordination [] [] [] [] Tone [] [] [] [] Edema [] [] [] [] Activity Tolerance [] [x] [] []   
 [] [] [] [] COGNITION/ 
PERCEPTION: Intact Impaired  
(see comments) Comments:  
Orientation [] [x] Vision [] [] Hearing [] [x] Command Following [] [x] Safety Awareness [] [x]   
 [] [] MOBILITY: I Mod I S SBA CGA Min Mod Max Total  NT x2 Comments:  
Bed Mobility Rolling [] [] [] [] [] [x] [x] [] [] [] [x] Supine to Sit [] [] [] [] [] [x] [x] [] [] [] [x] Scooting [] [] [] [] [] [x] [x] [] [] [] [x] Sit to Supine [] [] [] [] [] [] [] [] [] [] []   
Transfers Sit to Stand [] [] [] [] [] [x] [x] [] [] [] [x] Bed to Chair [] [] [] [] [] [x] [] [] [] [] [x] Stand to Sit [] [] [] [] [] [x] [] [] [] [] [x] I=Independent, Mod I=Modified Independent, S=Supervision, SBA=Standby Assistance, CGA=Contact Guard Assistance,  
Min=Minimal Assistance, Mod=Moderate Assistance, Max=Maximal Assistance, Total=Total Assistance, NT=Not Tested GAIT: I Mod I S SBA CGA Min Mod Max Total  NT x2 Comments:  
Level of Assistance [] [] [] [] [] [x] [x] [] [] [] [x] Distance 5 ft 15 ftx2 DME Suha Ruddle Gait Quality Shuffling, dec step clearance, dec step length Weightbearing Status N/A I=Independent, Mod I=Modified Independent, S=Supervision, SBA=Standby Assistance, CGA=Contact Guard Assistance,  
Min=Minimal Assistance, Mod=Moderate Assistance, Max=Maximal Assistance, Total=Total Assistance, NT=Not Tested [facility] Basic Mobility Inpatient Short Form How much difficulty does the patient currently have. .. Unable A Lot A Little None 1. Turning over in bed (including adjusting bedclothes, sheets and blankets)? [] 1   [] 2   [x] 3   [] 4  
2. Sitting down on and standing up from a chair with arms ( e.g., wheelchair, bedside commode, etc.)   [] 1   [] 2   [x] 3   [] 4  
3. Moving from lying on back to sitting on the side of the bed? [] 1   [] 2   [x] 3   [] 4 How much help from another person does the patient currently need. .. Total A Lot A Little None 4. Moving to and from a bed to a chair (including a wheelchair)?    [] 1   [] 2   [x] 3   [] 4  
 5.  Need to walk in hospital room? [] 1   [x] 2   [] 3   [] 4  
6. Climbing 3-5 steps with a railing? [] 1   [x] 2   [] 3   [] 4  
© , Trustees of [address] [place] [address] [address] [id number], under license to [employer]. All rights reserved Score:  Initial: 16 Most Recent: X (Date: -- ) Interpretation of Tool:  Represents activities that are increasingly more difficult (i.e. Bed mobility, Transfers, Gait). PLAN:  
FREQUENCY/DURATION: PT Plan of Care: 3 times/week for duration of hospital stay or until stated goals are met, whichever comes first. 
 
PROBLEM LIST:  
(Skilled intervention is medically necessary to address:) 1. Decreased Activity Tolerance 2. Decreased Balance 3. Decreased Cognition 4. Decreased Coordination 5. Decreased Gait Ability 6. Decreased Strength 7. Decreased Transfer Abilities INTERVENTIONS PLANNED:  
(Benefits and precautions of physical therapy have been discussed with the patient.) 1. Therapeutic Activity 2. Therapeutic Exercise/HEP 3. Neuromuscular Re-education 4. Gait Training 5. Manual Therapy 6. Education TREATMENT:  
 
EVALUATION: Moderate Complexity : (Untimed Charge) TREATMENT:  
($$ Gait Trainin-22 mins 
$$ Therapeutic Activity: 8-22 mins    ) Co-Treatment PT/OT necessary due to patient's decreased overall endurance/tolerance levels, as well as need for high level skilled assistance to complete functional transfers/mobility and functional tasks Therapeutic Activity (15 Minutes): Therapeutic activity included Rolling, Supine to Sit, Scooting, Transfer Training, Ambulation on level ground, Sitting balance , Standing balance and chair transfers, standing static/dynamic activities to improve functional Mobility, Strength, Activity tolerance and balance. Gait Training (8 Minutes): Gait training for 5 ft then 15 ft x2 feet utilizing [facility]. Patient required Manual, Tactile, Verbal and Visual cueing to improve Assistive Device Utilization, Dynamic Standing Balance and Gait Mechanics. TREATMENT GRID: 
N/A 
 
AFTER TREATMENT POSITION/PRECAUTIONS: 
Alarm Activated, Chair, Needs within reach and RN notified INTERDISCIPLINARY COLLABORATION: 
RN/PCT, PT/PTA and OT/ROONEY TOTAL TREATMENT DURATION: 
PT Patient Time In/Time Out Time In: 0333 Time Out: 1003 Cassidy Garza DPT 
 
 No

## 2021-06-19 NOTE — ED ADULT NURSE REASSESSMENT NOTE - NS ED NURSE REASSESS COMMENT FT1
received call from lab that PTT is over 200. Heparin drip immediately stopped and hold for 1 hr. No s/s of bleeding noted. No complaints of chest pain, headache, nausea, dizziness, vomiting  SOB, fever, chills verbalized. will continue to monitor. Pt belonging inventoried and brought down to security via PCA.
received report from merlyn MORRIS. Pt is a/o x 3. No complaints of chest pain, headache, nausea, dizziness, vomiting  SOB, fever, chills verbalized. Pt states he has pain to affected extremity. pt given pain medication as per order. Pt has 18g to right AC with heparin running at 12ml/hr, Next PTT is due at 0045. 20g IV placed to left AC. labs drawn and sent. Pt denies any bleeding and no S/S of bleeding noted. pt resting comfortably in bed NAD pt on cardiac monitor in NSR, WIll continue to monitor.
report given to [place] olson. pt going to or then the floor. Vascular surgeon took him OR.
75

## 2022-01-01 NOTE — DIETITIAN INITIAL EVALUATION ADULT. - PHYSICAL APPEARANCE
Breastfeeding Services Note    Lactation consultant attended weekly OFT's. Discussed current feeding plan and readiness for breastfeeding if appropriate. Current concerns addressed.    Still working on feedings. Still with nasal congestion, may restart steroids.    well nourished

## 2022-07-06 NOTE — ED PROCEDURE NOTE - NS ED PROCEDURE ASSISTED BY
Subjective:       Patient ID: Minoo Cornell is a 38 y.o. female.    Chief Complaint: No chief complaint on file.  Pt here to disc multiple issues-pt is always irreg with menses and has multiple med problems and is not a good candidate for hormonal regulation   Has had mixed results with provera in the past, had an EMB and U/S in Dec 2021 and tissue was normal, U/S revealed a left ov cyst that has not been followed up  Pt is concerned about pregnancy risk and had unprotected intercourse with a transgender female and subsequently had a bleeding episode start  PT would like to disc BTL vs hyst  Pt has had hernia surgery before with mesh placed and then removed, has another hernia now  Also has a h/o C/S  C/o rec BV   HPI  Review of Systems      Objective:      Physical Exam  Vitals and nursing note reviewed.   Constitutional:       Appearance: She is obese.   HENT:      Head: Normocephalic.   Pulmonary:      Effort: Pulmonary effort is normal.   Musculoskeletal:      Cervical back: Normal range of motion.   Neurological:      Mental Status: She is alert.         Assessment:       Problem List Items Addressed This Visit    None         Plan:         Class 3 severe obesity with body mass index (BMI) of 60.0 to 69.9 in adult, unspecified obesity type, unspecified whether serious comorbidity present    Anovulation    Left ovarian cyst  -     US Pelvis Comp with Transvag NON-OB (xpd; Future; Expected date: 07/06/2022    Irregular menses  -     POCT urine pregnancy    Sterilization consult      Long disc about alternative methods of contraception-pt is not a good surgical candidate and is considering IUD,  Is aware of poss problems and difficulties with the IUD  PT also not a good candidate for hormones  RTC 4 weeks for further disc    The procedure was performed independently

## 2022-07-25 NOTE — ED ADULT NURSE NOTE - CAS ELECT INFOMATION PROVIDED
Patient ID: [id number]     Chief Complaint: Hypertension and Hyperlipidemia        HPI:     Prisca Crews is a 32 y.o. male here today for a follow up.     The patient presents for follow-up evaluation of hypertension. The quality of hypertension symptom(s) since the patient's last visit is described as decreased. The severity of the hypertension symptom(s) since the last visit is mild. Since the patient's last visit, the timing/course of hypertension symptom(s) is improving. The context of the hypertension: blood pressure was maintained within the target range. Exacerbating factors consist of missed medication. Relieving factors consist of medication. Associated symptoms consist of denies chest pain, denies headache, denies vision changes, reports occasional edema currently asymptomatic, resolves with Rx HCTZ p.r.n., denies palpitations and denies shortness of breath. Compliance problems: not with medications. Additional pertinent history: He denies adverse Rx side effects.   - He is also here for repeat labs due to hypercalcemia, Vit D def, and elevated serum protein level, asymptomatic.  - Here for followup hyperlipidemia, asymptomatic, compliant with lifestyle modifications and OTC omega-3-fish oils as directed, due for repeat labs today.   - He needs Tdap today. He refuses COVID-19 vaccine.   - He is seeing Dr. Walsh (Neurosurgery) for neck pain and shoulder pain.   - Patient is without any other complaints today.      ----------------------------  Hypercalcemia  Hypertensive disorder  Vitamin D deficiency     History reviewed. No pertinent surgical history.    Review of patient's allergies indicates:  No Known Allergies    Outpatient Medications Marked as Taking for the 7/25/22 encounter (Office Visit) with Yuliet Montez MD   Medication Sig Dispense Refill    amLODIPine (NORVASC) 5 MG tablet Take 1 tablet by mouth once daily.      cholecalciferol, vitamin D3, (VITAMIN D3) 50 mcg (2,000 unit)  Tab Take 2,000 Int'l Units by mouth once daily.      hydroCHLOROthiazide (HYDRODIURIL) 12.5 MG Tab Take 12.5 mg by mouth once daily.      omega-3s-dha-epa-fish oil (OMEGA-3 FISH OIL) 300-1,000 mg Cap Take 1,200 mg by mouth 3 (three) times daily.       Current Facility-Administered Medications for the 7/25/22 encounter (Office Visit) with Yuliet Montez MD   Medication Dose Route Frequency Provider Last Rate Last Admin    Tdap (BOOSTRIX) vaccine injection 0.5 mL  0.5 mL Intramuscular 1 time in Clinic/HOD Yuliet Montez MD           Social History     Socioeconomic History    Marital status: Unknown   Tobacco Use    Smoking status: Never Smoker    Smokeless tobacco: Never Used   Substance and Sexual Activity    Alcohol use: Yes    Drug use: Never    Sexual activity: Yes        Family History   Family history unknown: Yes        Subjective:       Review of Systems:    See HPI for details    Constitutional: Denies Change in appetite. Denies Chills. Denies Fever. Denies Night sweats.  Eye: Denies Blurred vision. Denies Discharge. Denies Eye pain.  ENT: Denies Decreased hearing. Denies Sore throat. Denies Swollen glands.  Respiratory: Denies Cough. Denies Shortness of breath. Denies Shortness of breath with exertion. Denies Wheezing.  Cardiovascular: Denies Chest pain at rest. Denies Chest pain with exertion. Denies Irregular heartbeat. Denies Palpitations.  Gastrointestinal: Denies Abdominal pain. Denies Diarrhea. Denies Nausea. Denies Vomiting. Denies Hematemesis or Hematochezia.  Genitourinary: Denies Dysuria. Denies Urinary frequency. Denies Urinary urgency. Denies Blood in urine.  Endocrine: Denies Cold intolerance. Denies Excessive thirst. Denies Heat intolerance. Denies Weight loss. Denies Weight gain.  Musculoskeletal: Painful joints. Denies Weakness.  Integumentary: Denies Rash. Denies Itching. Denies Dry skin.  Neurologic: Denies Dizziness. Denies Fainting. Denies Headache.  Psychiatric:  "Denies Depression. Denies Anxiety. Denies Suicidal/Homicidal ideations.    All Other ROS: Negative except as stated in HPI.     Answers for HPI/ROS submitted by the patient on 7/25/2022  activity change: No  unexpected weight change: No  neck pain: No  hearing loss: No  rhinorrhea: No  trouble swallowing: No  eye discharge: No  visual disturbance: No  chest tightness: No  wheezing: No  chest pain: No  palpitations: No  blood in stool: No  constipation: No  vomiting: No  diarrhea: No  polydipsia: No  polyuria: No  difficulty urinating: No  urgency: No  hematuria: No  joint swelling: No  arthralgias: No  headaches: No  weakness: No  confusion: No  dysphoric mood: No        Objective:     /72   Pulse 76   Temp 98.2 °F (36.8 °C) (Oral)   Resp 18   Ht 5' 10" (1.778 m)   Wt 77.1 kg (170 lb)   SpO2 98%   BMI 24.39 kg/m²     Physical Exam    General: Alert and oriented, No acute distress.  Head: Normocephalic, Atraumatic.  Eye: Pupils are equal, round and reactive to light, Extraocular movements are intact, Sclera non-icteric.  Ears/Nose/Throat: Normal, Mucosa moist,Clear.  Neck/Thyroid: Supple, Non-tender, No carotid bruit, No palpable thyromegaly or thyroid nodule, No lymphadenopathy, No JVD, Full range of motion.  Respiratory: Clear to auscultation bilaterally; No wheezes, rales or rhonchi,Non-labored respirations, Symmetrical chest wall expansion.  Cardiovascular: Regular rate and rhythm, S1/S2 normal, No murmurs, rubs or gallops.  Gastrointestinal: Soft, Non-tender, Non-distended, Normal bowel sounds, No palpable organomegaly.  Musculoskeletal: Normal range of motion.  Integumentary: Warm, Dry, Intact, No suspicious lesions or rashes.  Extremities: No clubbing, cyanosis or edema  Neurologic: No focal deficits, Cranial Nerves II-XII are grossly intact, Motor strength normal upper and lower extremities, Sensory exam intact.  Psychiatric: Normal interaction, Coherent speech, Euthymic mood, Appropriate affect "         Assessment:       ICD-10-CM ICD-9-CM   1. Primary hypertension  I10 401.9   2. Essential familial hyperlipidemia  E78.49 272.2   3. Hypercalcemia  E83.52 275.42   4. Elevated serum protein level  R77.9 790.99   5. Need for Tdap vaccination  Z23 V06.1   6. Vitamin D deficiency  E55.9 268.9        Plan:     Problem List Items Addressed This Visit        Cardiac/Vascular    Primary hypertension - Primary    Relevant Orders    Comprehensive Metabolic Panel    CBC Auto Differential    Essential familial hyperlipidemia    Relevant Orders    Comprehensive Metabolic Panel    Lipid Panel       Renal/    Hypercalcemia    Relevant Orders    Comprehensive Metabolic Panel    PTH, Intact       Other    Elevated serum protein level    Relevant Orders    Protein Electrophoresis, Serum, w/Interp      Other Visit Diagnoses     Need for Tdap vaccination        Relevant Medications    Tdap (BOOSTRIX) vaccine injection 0.5 mL (Start on 7/25/2022  3:00 PM)    Vitamin D deficiency        Relevant Orders    Vitamin D       1. Primary hypertension  - Comprehensive Metabolic Panel; Future  - CBC Auto Differential; Future  - BP is well controlled. Low sodium diet encouraged. Continue BP Rx as prescribed. Keep daily BP log. Will titrate BP Rx until BP is <120/80. Notify M.D. or ER if BP >170/100 or <90/60, chest pain, palpitations, headache, SOB, temp greater than 100.4, or any acute illness.   Continue  Low Sodium Diet (DASH Diet - Less than 2 grams of sodium per day).  Monitor blood pressure daily and log. Report consistent numbers greater than 140/90.  Smoking cessation encouraged to aid in BP reduction.  Maintain healthy weight with goal BMI <30. Exercise 30 minutes per day, 5 days per week.    2. Essential familial hyperlipidemia  - Comprehensive Metabolic Panel; Future  - Lipid Panel; Future  - Continue lifestyle modifications and OTC omega-3/fish oils as directed, will treat pending results.   Continue  Stressed importance of  dietary modifications. Follow a low cholesterol, low saturated fat diet with less that 200mg of cholesterol a day.  Avoid fried foods and high saturated fats (high saturated fats less than 7% of calories).  Add Flax Seed/Fish Oil supplements to diet. Increase dietary fiber.  Regular exercise can reduce LDL and raise HDL. Stressed importance of physical activity 5 times per week for 30 minutes per day.     3. Hypercalcemia  - Comprehensive Metabolic Panel; Future  - PTH, Intact; Future  - Will treat pending lab results.    4. Elevated serum protein level  - Protein Electrophoresis, Serum, w/Interp; Future  - Same as #2.    5. Need for Tdap vaccination  - Tdap (BOOSTRIX) vaccine injection 0.5 mL  - Tdap x 1 today.       Prisca was seen today for hypertension and hyperlipidemia.    Diagnoses and all orders for this visit:    Primary hypertension  -     Comprehensive Metabolic Panel; Future  -     CBC Auto Differential; Future    Essential familial hyperlipidemia  -     Comprehensive Metabolic Panel; Future  -     Lipid Panel; Future    Hypercalcemia  -     Comprehensive Metabolic Panel; Future  -     PTH, Intact; Future    Elevated serum protein level  -     Protein Electrophoresis, Serum, w/Interp; Future    Need for Tdap vaccination  -     Tdap (BOOSTRIX) vaccine injection 0.5 mL    Vitamin D deficiency  -     Vitamin D; Future          Medication List with Changes/Refills   Current Medications    AMLODIPINE (NORVASC) 5 MG TABLET    Take 1 tablet by mouth once daily.       Start Date: 11/9/2021 End Date: --    CHOLECALCIFEROL, VITAMIN D3, (VITAMIN D3) 50 MCG (2,000 UNIT) TAB    Take 2,000 Int'l Units by mouth once daily.       Start Date: 10/29/2021End Date: --    DICLOFENAC (VOLTAREN) 50 MG EC TABLET    Take 50 mg by mouth 2 (two) times daily as needed.       Start Date: 2/7/2022  End Date: --    HYDROCHLOROTHIAZIDE (HYDRODIURIL) 12.5 MG TAB    Take 12.5 mg by mouth once daily.       Start Date: 2/10/2022 End  Date: 2/5/2023    OMEGA-3S-DHA-EPA-FISH OIL (OMEGA-3 FISH OIL) 300-1,000 MG CAP    Take 1,200 mg by mouth 3 (three) times daily.       Start Date: 10/29/2021End Date: --          Follow up in about 4 months (around 11/25/2022) for Wellness.   DC instructions

## 2022-08-21 NOTE — PHYSICAL THERAPY INITIAL EVALUATION ADULT - ACTIVE RANGE OF MOTION EXAMINATION, REHAB EVAL
left hip fleixon 0-90, knee flexion 0-90, ankle wfl/bilateral upper extremity Active ROM was WFL (within functional limits)/Right LE Active ROM was WFL (within functional limits) Patient with syncope, vasovagal based on description.  VSS.  Neuro intact.  Lab values reviewed, there are no values which require acute intervention.  Serial EKG and labs stable.  CT imaging head and neck unremarkable.  Orthostatics wnl.  Normal stress echo 1 year ago.  Pickard Brock syncope score 0.  Discussed results and outcome of today's visit with the patient/family, copy of results given with discharge.  Patient advised to arrange umaña follow up with their PMD and/or any provided referral(s) within the next few days and are cautioned to return to the Emergency Department for any worsening symptoms.  Patient advised that their doctor may call  to follow up on the specific results of the tests performed today in the emergency department.   Patient appears well on discharge. Patient with syncope, vasovagal based on description, no suspicion for seizure.  VSS.  Neuro intact.  Lab values reviewed, there are no values which require acute intervention.  Serial EKG and labs stable.  CT imaging head and neck unremarkable.  Orthostatics wnl.  Normal stress echo 1 year ago.  Pickard Brock syncope score 0.  Discussed results and outcome of today's visit with the patient/family, copy of results given with discharge.  Patient advised to arrange umaña follow up with their PMD and/or any provided referral(s) within the next few days and are cautioned to return to the Emergency Department for any worsening symptoms.  Patient advised that their doctor may call  to follow up on the specific results of the tests performed today in the emergency department.   Patient appears well on discharge.

## 2022-12-28 NOTE — H&P ADULT - PROBLEM SELECTOR PLAN 5
Quality 226: Preventive Care And Screening: Tobacco Use: Screening And Cessation Intervention: Patient screened for tobacco use and is an ex/non-smoker
Detail Level: Detailed
Quality 130: Documentation Of Current Medications In The Medical Record: Current Medications Documented
Simvastatin.

## 2023-01-30 NOTE — H&P PST ADULT - RS GEN HX ROS MEA POS PC
SUBJECTIVE:   Ricarda presents today for hyperglycemia- pt had excessive diarrhea at all doses, and now she is tolerating the long acting at 2000 mg daily  I had recommended that the patient begin metformin as she has had fasting hyperglycemia without a diagnosis of diabetes on several occasions.  I explained that although this is not a diagnosis of diabetes it is indicative of future diabetes unless some significant changes are made to her activity and diet.  She understands and is continuing to work on this, and as stated above is tolerating the 2000 mg of metformin daily as well as increasing activity, and trying to eat healthier.    Insomnia-no meds and under increased stress with her daughter going to [employer] for depression, although her daughter has improved in the past year.  Work is stressful, but overall going well  She has a history of insomnia, but has not needed medications regularly.    HTN-amlodipine in the AM and toprol in the PM.  No shortness of breath, no chest pain, no palpitations.  Tolerating the medications without side effects, but occasionally forgetting the toprol    I have reviewed the patient's medications and allergies, past medical, surgical, social and family history, updating these as appropriate.  See History section of the EMR for a display of this information.    ROS:  The daughter is a 15-year-old sophomore, and although things are not perfect, she is doing better.  Mom also has a 21-year-old son, who is a  at [employer] and a 14-year-old son is in ninth grade    OBJECTIVE: Examination of the patient reveals:   VITALS:    Blood pressure 126/70, pulse 72, weight 100.4 kg (221 lb 5.5 oz), last menstrual period 10/15/2022, SpO2 98 %.      GENERAL: appears stated age, is in no apparent distress and is well developed and well nourished, the patient is conversant.  SKIN mildly matt cheeks, but eyes and skin overall looks better than prior visits  LUNGS:  No distress, full  aeration, clear bilaterally  HEART:  Normal rate, normal S1 and S2, no extra heart sounds, currently no murmur auscultated  ABDOMEN:  Nontender with positive bowel sounds      ASSESSMENT / PLAN:   -Hyperglycemia-cont the metformin at 1000 mg two times daily.  I furthermore recommended that the patient work on doing 20 minutes of some type of light activity daily, and 40 minutes or more of some type of sweat inducing physical activity on 4 >days out of every week.  She is walking the dog almost every day outside unless it is 2 icy or cold for    -Insomnia-sleep hygiene and nightly melatonin, and a BZD for as needed use due to increased stress.  I explained the addictive and sedative nature of these meds so she is not taking any    -HTN-cont meds as currently.  She was taking the metoprolol at night, and I reiterated that this is a good choice as this can be sedating.    -Neuropathy- improved     Labs will be obtained when the patient has had nothing to eat or drink other than water for 12 hours.         wheezing/dyspnea/cough

## 2023-02-23 NOTE — ED ADULT NURSE NOTE - NSFALLRSKPASTHIST_ED_ALL_ED
PAST MEDICAL HISTORY:  Asthma     H/O Bell's palsy     Hepatitis C     HTN (hypertension)     Seizure      no

## 2023-12-20 NOTE — ED ADULT NURSE NOTE - CAS TRG GEN SKIN COLOR
Tired returning patient phone call. I had to leave a message for him to return my call.    
Normal for race

## 2024-01-26 ENCOUNTER — INPATIENT (INPATIENT)
Facility: HOSPITAL | Age: 84
LOS: 6 days | Discharge: HOME HEALTH | End: 2024-02-02
Attending: INTERNAL MEDICINE | Admitting: INTERNAL MEDICINE
Payer: MEDICARE

## 2024-01-26 VITALS
SYSTOLIC BLOOD PRESSURE: 211 MMHG | TEMPERATURE: 98 F | HEART RATE: 56 BPM | HEIGHT: 67 IN | DIASTOLIC BLOOD PRESSURE: 81 MMHG | OXYGEN SATURATION: 97 % | RESPIRATION RATE: 16 BRPM | WEIGHT: 117.95 LBS

## 2024-01-26 DIAGNOSIS — Z98.890 OTHER SPECIFIED POSTPROCEDURAL STATES: Chronic | ICD-10-CM

## 2024-01-26 DIAGNOSIS — R07.9 CHEST PAIN, UNSPECIFIED: ICD-10-CM

## 2024-01-26 LAB
ALBUMIN SERPL ELPH-MCNC: 3.9 G/DL — SIGNIFICANT CHANGE UP (ref 3.3–5)
ALP SERPL-CCNC: 62 U/L — SIGNIFICANT CHANGE UP (ref 40–120)
ALT FLD-CCNC: 12 U/L — SIGNIFICANT CHANGE UP (ref 4–41)
ANION GAP SERPL CALC-SCNC: 11 MMOL/L — SIGNIFICANT CHANGE UP (ref 7–14)
AST SERPL-CCNC: 14 U/L — SIGNIFICANT CHANGE UP (ref 4–40)
BASOPHILS # BLD AUTO: 0.06 K/UL — SIGNIFICANT CHANGE UP (ref 0–0.2)
BASOPHILS NFR BLD AUTO: 1.3 % — SIGNIFICANT CHANGE UP (ref 0–2)
BILIRUB SERPL-MCNC: 0.5 MG/DL — SIGNIFICANT CHANGE UP (ref 0.2–1.2)
BUN SERPL-MCNC: 37 MG/DL — HIGH (ref 7–23)
CALCIUM SERPL-MCNC: 9.4 MG/DL — SIGNIFICANT CHANGE UP (ref 8.4–10.5)
CHLORIDE SERPL-SCNC: 105 MMOL/L — SIGNIFICANT CHANGE UP (ref 98–107)
CO2 SERPL-SCNC: 26 MMOL/L — SIGNIFICANT CHANGE UP (ref 22–31)
CREAT SERPL-MCNC: 1.71 MG/DL — HIGH (ref 0.5–1.3)
EGFR: 39 ML/MIN/1.73M2 — LOW
EOSINOPHIL # BLD AUTO: 0.1 K/UL — SIGNIFICANT CHANGE UP (ref 0–0.5)
EOSINOPHIL NFR BLD AUTO: 2.1 % — SIGNIFICANT CHANGE UP (ref 0–6)
FLUAV AG NPH QL: SIGNIFICANT CHANGE UP
FLUBV AG NPH QL: SIGNIFICANT CHANGE UP
GLUCOSE SERPL-MCNC: 91 MG/DL — SIGNIFICANT CHANGE UP (ref 70–99)
HCT VFR BLD CALC: 40.4 % — SIGNIFICANT CHANGE UP (ref 39–50)
HGB BLD-MCNC: 13.1 G/DL — SIGNIFICANT CHANGE UP (ref 13–17)
IANC: 1.7 K/UL — LOW (ref 1.8–7.4)
IMM GRANULOCYTES NFR BLD AUTO: 0.2 % — SIGNIFICANT CHANGE UP (ref 0–0.9)
LYMPHOCYTES # BLD AUTO: 2.11 K/UL — SIGNIFICANT CHANGE UP (ref 1–3.3)
LYMPHOCYTES # BLD AUTO: 45 % — HIGH (ref 13–44)
MAGNESIUM SERPL-MCNC: 2.3 MG/DL — SIGNIFICANT CHANGE UP (ref 1.6–2.6)
MCHC RBC-ENTMCNC: 29.6 PG — SIGNIFICANT CHANGE UP (ref 27–34)
MCHC RBC-ENTMCNC: 32.4 GM/DL — SIGNIFICANT CHANGE UP (ref 32–36)
MCV RBC AUTO: 91.4 FL — SIGNIFICANT CHANGE UP (ref 80–100)
MONOCYTES # BLD AUTO: 0.71 K/UL — SIGNIFICANT CHANGE UP (ref 0–0.9)
MONOCYTES NFR BLD AUTO: 15.1 % — HIGH (ref 2–14)
NEUTROPHILS # BLD AUTO: 1.7 K/UL — LOW (ref 1.8–7.4)
NEUTROPHILS NFR BLD AUTO: 36.3 % — LOW (ref 43–77)
NRBC # BLD: 0 /100 WBCS — SIGNIFICANT CHANGE UP (ref 0–0)
NRBC # FLD: 0 K/UL — SIGNIFICANT CHANGE UP (ref 0–0)
NT-PROBNP SERPL-SCNC: 2471 PG/ML — HIGH
PLATELET # BLD AUTO: 131 K/UL — LOW (ref 150–400)
POTASSIUM SERPL-MCNC: 4.8 MMOL/L — SIGNIFICANT CHANGE UP (ref 3.5–5.3)
POTASSIUM SERPL-SCNC: 4.8 MMOL/L — SIGNIFICANT CHANGE UP (ref 3.5–5.3)
PROT SERPL-MCNC: 7.1 G/DL — SIGNIFICANT CHANGE UP (ref 6–8.3)
RBC # BLD: 4.42 M/UL — SIGNIFICANT CHANGE UP (ref 4.2–5.8)
RBC # FLD: 14.7 % — HIGH (ref 10.3–14.5)
RSV RNA NPH QL NAA+NON-PROBE: SIGNIFICANT CHANGE UP
SARS-COV-2 RNA SPEC QL NAA+PROBE: SIGNIFICANT CHANGE UP
SODIUM SERPL-SCNC: 142 MMOL/L — SIGNIFICANT CHANGE UP (ref 135–145)
TROPONIN T, HIGH SENSITIVITY RESULT: 46 NG/L — SIGNIFICANT CHANGE UP
TROPONIN T, HIGH SENSITIVITY RESULT: 50 NG/L — SIGNIFICANT CHANGE UP
WBC # BLD: 4.69 K/UL — SIGNIFICANT CHANGE UP (ref 3.8–10.5)
WBC # FLD AUTO: 4.69 K/UL — SIGNIFICANT CHANGE UP (ref 3.8–10.5)

## 2024-01-26 PROCEDURE — 71045 X-RAY EXAM CHEST 1 VIEW: CPT | Mod: 26

## 2024-01-26 PROCEDURE — G0316 PROLONG INPT EVAL ADD15 M: CPT

## 2024-01-26 PROCEDURE — 99223 1ST HOSP IP/OBS HIGH 75: CPT

## 2024-01-26 PROCEDURE — 99285 EMERGENCY DEPT VISIT HI MDM: CPT

## 2024-01-26 NOTE — ED PROVIDER NOTE - OBJECTIVE STATEMENT
83-year-old male, history of hypertension, hyperlipidemia, PAD, sent in by urgent care center due to concern for elevated blood pressure.  Patient reports that for the past 4 days he has not been feeling well -he has been experiencing sore throat, cough.  He went to urgent care center today and his blood pressure was noticed to be over 200 and he was recommended to come to the emergency room for further evaluation.  His BP was 239/83 at triage.  Patient is currently complaining of shortness of breath and chest pain.  Denies any other symptoms.  He takes Ramipril 10 mg once daily for his blood pressure.  Reports he has been compliant with his medicine.

## 2024-01-26 NOTE — ED PROVIDER NOTE - PHYSICAL EXAMINATION
Gen: Patient is well-appearing, NAD, AAOx3, able to ambulate without assistance  HEENT: NCAT, EOMI, PERRLA, normal conjunctiva, tongue midline, oral mucosa moist  Lung: CTAB, no respiratory distress, no wheezes/rhonchi/rales B/L, speaking in full sentences  CV: RRR, no murmurs, rubs or gallops, distal pulses 2+ b/l  Abd: soft, NT, ND, no guarding, no rigidity, no rebound tenderness  MSK: no visible deformities, ROM normal in UE/LE  Neuro: No focal sensory or motor deficits  Skin: Warm, well perfused, no rash, no leg swelling  Psych: normal affect, calm

## 2024-01-26 NOTE — ED ADULT NURSE NOTE - NSFALLUNIVINTERV_ED_ALL_ED
Bed/Stretcher in lowest position, wheels locked, appropriate side rails in place/Call bell, personal items and telephone in reach/Instruct patient to call for assistance before getting out of bed/chair/stretcher/Non-slip footwear applied when patient is off stretcher/[place] to call system/Physically safe environment - no spills, clutter or unnecessary equipment/Purposeful proactive rounding/Room/bathroom lighting operational, light cord in reach Statement Selected

## 2024-01-26 NOTE — ED PROVIDER NOTE - CLINICAL SUMMARY MEDICAL DECISION MAKING FREE TEXT BOX
83-year-old male, history of hypertension, hyperlipidemia, PAD, sent in by urgent care center due to concern for elevated blood pressure.  Patient reports that for the past 4 days he has not been feeling well -he has been experiencing sore throat, cough.  He went to urgent care center today and his blood pressure was noticed to be over 200 and he was recommended to come to the emergency room for further evaluation.  His BP was 239/83 at triage.  Patient is currently complaining of shortness of breath and chest pain.  Denies any other symptoms.  He takes Ramipril 10 mg once daily for his blood pressure.  Reports he has been compliant with his medicine. /86.  Otherwise vital signs within normal limits.  Physical exam patient is well-appearing, not in acute distress, normal respiratory effort, clear lung exam bilaterally, abdomen soft, nontender, no leg swelling noted.  Differentials include but not limited to uncontrolled hypertension versus hypertensive emergency.  Will get labs, EKG, cardiac markers, chest x-ray to evaluate for endorgan dysfunction. will give hydrochlorothiazide 25 mg, reassess.

## 2024-01-26 NOTE — ED ADULT TRIAGE NOTE - WEIGHT METHOD
POD 0 after BEE left oophrectomy, USS, cystoscopy    smooth post-op progression  PCA pump to be started    pmedvedeva
stated
Please refer to physician note    [email or web address]

## 2024-01-26 NOTE — ED PROVIDER NOTE - ATTENDING CONTRIBUTION TO CARE
83M h/o HTN, HLD, PAD, genital herpes sent from urgent care 2/2 elevated blood pressure. Pt reports feeling unwell x4 days with general fatigue, dry cough and sore throat prompting him to go to urgent care today where he was found to have systolic blood pressures > 200 so sent to ED. BP on arrival to /83. Pt also reports sob and mild chest pain at current. Takes ramipril 10mg daily for BP and states he has been compliant with daily meds. Does not follow with a cardiologist.   Gen: awake and alert, no labored breathing, nad  CV: rrr, no appreciable murmur  Pulm: clear lungs  Abd: soft, ntnd  Ext: no edema/swelling  Skin: no rash/petechiae  MDM: 83M h/o HTN, HLD, PAD, genital herpes sent from urgent care 2/2 elevated blood pressure. Symptoms concerning for ACS vs viral illness vs uncontrolled HTN vs HTN emergency vs CHF vs less likely bacterial PNA. WIll obtain cmp to assess for evidence of end organ damage from long-standing HTN, trop and EKG to assess for ACS, pro-bnp to assess to assess for evidence of heart failure. Flu/Covid/RSV sent to assess for infectious etiology. CXR ordered to assess for PNA/PTX though PTX unlikely. Will give pt HCTZ to pair with his ramipril which he has already taken today.

## 2024-01-26 NOTE — ED ADULT NURSE NOTE - OBJECTIVE STATEMENT
received pt to spot12. A&oX4 RR even unlabored completing full sentences. past medical hx of hypertension, hyperlipidemia, PAD. pt was sent in by urgent care center due to elevated blood pressure.  pt states that for the past 4 days he has not been feeling well, endorsing sore throat, cough.  pt states went to urgent care center today and his blood pressure was noticed to be over 200 and he was directed to ED.  pt currently endorsing shortness of breath and chest pain.  pt states chest pain comes and goes. Denies h/a, dizziness/lightheadedness, abd pain, n/v/d, fevers, falls/trauma, gu sx.   pt states takes Ramipril 10 mg once daily for his blood pressure and endorses compliance with his medicine. pt placed on CM, NSR noted. 20gIV placed LFA, labs drawn and sent. medicated per orders. safety measures maintained throughout. pendingXR

## 2024-01-27 DIAGNOSIS — Z29.9 ENCOUNTER FOR PROPHYLACTIC MEASURES, UNSPECIFIED: ICD-10-CM

## 2024-01-27 DIAGNOSIS — I10 ESSENTIAL (PRIMARY) HYPERTENSION: ICD-10-CM

## 2024-01-27 DIAGNOSIS — N17.9 ACUTE KIDNEY FAILURE, UNSPECIFIED: ICD-10-CM

## 2024-01-27 DIAGNOSIS — Z79.899 OTHER LONG TERM (CURRENT) DRUG THERAPY: ICD-10-CM

## 2024-01-27 DIAGNOSIS — I20.0 UNSTABLE ANGINA: ICD-10-CM

## 2024-01-27 DIAGNOSIS — I50.30 UNSPECIFIED DIASTOLIC (CONGESTIVE) HEART FAILURE: ICD-10-CM

## 2024-01-27 LAB
ALBUMIN SERPL ELPH-MCNC: 3.9 G/DL — SIGNIFICANT CHANGE UP (ref 3.3–5)
ALP SERPL-CCNC: 67 U/L — SIGNIFICANT CHANGE UP (ref 40–120)
ALT FLD-CCNC: 12 U/L — SIGNIFICANT CHANGE UP (ref 4–41)
ANION GAP SERPL CALC-SCNC: 11 MMOL/L — SIGNIFICANT CHANGE UP (ref 7–14)
APPEARANCE UR: CLEAR — SIGNIFICANT CHANGE UP
APTT BLD: 40 SEC — HIGH (ref 24.5–35.6)
AST SERPL-CCNC: 14 U/L — SIGNIFICANT CHANGE UP (ref 4–40)
BACTERIA # UR AUTO: NEGATIVE /HPF — SIGNIFICANT CHANGE UP
BASOPHILS # BLD AUTO: 0.06 K/UL — SIGNIFICANT CHANGE UP (ref 0–0.2)
BASOPHILS NFR BLD AUTO: 1.7 % — SIGNIFICANT CHANGE UP (ref 0–2)
BILIRUB SERPL-MCNC: 0.4 MG/DL — SIGNIFICANT CHANGE UP (ref 0.2–1.2)
BILIRUB UR-MCNC: NEGATIVE — SIGNIFICANT CHANGE UP
BUN SERPL-MCNC: 35 MG/DL — HIGH (ref 7–23)
CALCIUM SERPL-MCNC: 9.7 MG/DL — SIGNIFICANT CHANGE UP (ref 8.4–10.5)
CAST: 0 /LPF — SIGNIFICANT CHANGE UP (ref 0–4)
CHLORIDE SERPL-SCNC: 103 MMOL/L — SIGNIFICANT CHANGE UP (ref 98–107)
CHOLEST SERPL-MCNC: 169 MG/DL — SIGNIFICANT CHANGE UP
CO2 SERPL-SCNC: 26 MMOL/L — SIGNIFICANT CHANGE UP (ref 22–31)
COLOR SPEC: YELLOW — SIGNIFICANT CHANGE UP
CREAT SERPL-MCNC: 1.58 MG/DL — HIGH (ref 0.5–1.3)
DIFF PNL FLD: NEGATIVE — SIGNIFICANT CHANGE UP
EGFR: 43 ML/MIN/1.73M2 — LOW
EOSINOPHIL # BLD AUTO: 0.09 K/UL — SIGNIFICANT CHANGE UP (ref 0–0.5)
EOSINOPHIL NFR BLD AUTO: 2.5 % — SIGNIFICANT CHANGE UP (ref 0–6)
GLUCOSE SERPL-MCNC: 144 MG/DL — HIGH (ref 70–99)
GLUCOSE UR QL: NEGATIVE MG/DL — SIGNIFICANT CHANGE UP
HCT VFR BLD CALC: 42 % — SIGNIFICANT CHANGE UP (ref 39–50)
HDLC SERPL-MCNC: 64 MG/DL — SIGNIFICANT CHANGE UP
HGB BLD-MCNC: 14.1 G/DL — SIGNIFICANT CHANGE UP (ref 13–17)
IANC: 1.3 K/UL — LOW (ref 1.8–7.4)
IMM GRANULOCYTES NFR BLD AUTO: 0.3 % — SIGNIFICANT CHANGE UP (ref 0–0.9)
INR BLD: 1.19 RATIO — HIGH (ref 0.85–1.18)
KETONES UR-MCNC: NEGATIVE MG/DL — SIGNIFICANT CHANGE UP
LEUKOCYTE ESTERASE UR-ACNC: NEGATIVE — SIGNIFICANT CHANGE UP
LIPID PNL WITH DIRECT LDL SERPL: 89 MG/DL — SIGNIFICANT CHANGE UP
LYMPHOCYTES # BLD AUTO: 1.56 K/UL — SIGNIFICANT CHANGE UP (ref 1–3.3)
LYMPHOCYTES # BLD AUTO: 44.2 % — HIGH (ref 13–44)
MAGNESIUM SERPL-MCNC: 2.3 MG/DL — SIGNIFICANT CHANGE UP (ref 1.6–2.6)
MCHC RBC-ENTMCNC: 29.7 PG — SIGNIFICANT CHANGE UP (ref 27–34)
MCHC RBC-ENTMCNC: 33.6 GM/DL — SIGNIFICANT CHANGE UP (ref 32–36)
MCV RBC AUTO: 88.4 FL — SIGNIFICANT CHANGE UP (ref 80–100)
MONOCYTES # BLD AUTO: 0.51 K/UL — SIGNIFICANT CHANGE UP (ref 0–0.9)
MONOCYTES NFR BLD AUTO: 14.4 % — HIGH (ref 2–14)
NEUTROPHILS # BLD AUTO: 1.3 K/UL — LOW (ref 1.8–7.4)
NEUTROPHILS NFR BLD AUTO: 36.9 % — LOW (ref 43–77)
NITRITE UR-MCNC: NEGATIVE — SIGNIFICANT CHANGE UP
NON HDL CHOLESTEROL: 105 MG/DL — SIGNIFICANT CHANGE UP
NRBC # BLD: 0 /100 WBCS — SIGNIFICANT CHANGE UP (ref 0–0)
NRBC # FLD: 0 K/UL — SIGNIFICANT CHANGE UP (ref 0–0)
NT-PROBNP SERPL-SCNC: 2653 PG/ML — HIGH
PH UR: 6.5 — SIGNIFICANT CHANGE UP (ref 5–8)
PHOSPHATE SERPL-MCNC: 3.4 MG/DL — SIGNIFICANT CHANGE UP (ref 2.5–4.5)
PLATELET # BLD AUTO: 146 K/UL — LOW (ref 150–400)
POTASSIUM SERPL-MCNC: 4.1 MMOL/L — SIGNIFICANT CHANGE UP (ref 3.5–5.3)
POTASSIUM SERPL-SCNC: 4.1 MMOL/L — SIGNIFICANT CHANGE UP (ref 3.5–5.3)
PROT SERPL-MCNC: 7.2 G/DL — SIGNIFICANT CHANGE UP (ref 6–8.3)
PROT UR-MCNC: 100 MG/DL
PROTHROM AB SERPL-ACNC: 13.4 SEC — HIGH (ref 9.5–13)
RBC # BLD: 4.75 M/UL — SIGNIFICANT CHANGE UP (ref 4.2–5.8)
RBC # FLD: 14.5 % — SIGNIFICANT CHANGE UP (ref 10.3–14.5)
RBC CASTS # UR COMP ASSIST: 1 /HPF — SIGNIFICANT CHANGE UP (ref 0–4)
SODIUM SERPL-SCNC: 140 MMOL/L — SIGNIFICANT CHANGE UP (ref 135–145)
SP GR SPEC: 1.02 — SIGNIFICANT CHANGE UP (ref 1–1.03)
SQUAMOUS # UR AUTO: 0 /HPF — SIGNIFICANT CHANGE UP (ref 0–5)
TRIGL SERPL-MCNC: 79 MG/DL — SIGNIFICANT CHANGE UP
TSH SERPL-MCNC: 3.03 UIU/ML — SIGNIFICANT CHANGE UP (ref 0.27–4.2)
UROBILINOGEN FLD QL: 1 MG/DL — SIGNIFICANT CHANGE UP (ref 0.2–1)
WBC # BLD: 3.53 K/UL — LOW (ref 3.8–10.5)
WBC # FLD AUTO: 3.53 K/UL — LOW (ref 3.8–10.5)
WBC UR QL: 0 /HPF — SIGNIFICANT CHANGE UP (ref 0–5)

## 2024-01-27 PROCEDURE — 93306 TTE W/DOPPLER COMPLETE: CPT | Mod: 26

## 2024-01-27 RX ORDER — HYDRALAZINE HCL 50 MG
2.5 TABLET ORAL ONCE
Refills: 0 | Status: COMPLETED | OUTPATIENT
Start: 2024-01-27 | End: 2024-01-27

## 2024-01-27 RX ORDER — AMLODIPINE BESYLATE 2.5 MG/1
10 TABLET ORAL DAILY
Refills: 0 | Status: DISCONTINUED | OUTPATIENT
Start: 2024-01-27 | End: 2024-02-02

## 2024-01-27 RX ORDER — APIXABAN 2.5 MG/1
1 TABLET, FILM COATED ORAL
Qty: 0 | Refills: 0 | DISCHARGE

## 2024-01-27 RX ORDER — METOPROLOL TARTRATE 50 MG
0 TABLET ORAL
Qty: 0 | Refills: 0 | DISCHARGE

## 2024-01-27 RX ORDER — HEPARIN SODIUM 5000 [USP'U]/ML
5000 INJECTION INTRAVENOUS; SUBCUTANEOUS EVERY 12 HOURS
Refills: 0 | Status: DISCONTINUED | OUTPATIENT
Start: 2024-01-27 | End: 2024-02-02

## 2024-01-27 RX ORDER — AMLODIPINE BESYLATE 2.5 MG/1
10 TABLET ORAL
Qty: 0 | Refills: 0 | DISCHARGE

## 2024-01-27 RX ORDER — ONDANSETRON 8 MG/1
4 TABLET, FILM COATED ORAL EVERY 8 HOURS
Refills: 0 | Status: DISCONTINUED | OUTPATIENT
Start: 2024-01-27 | End: 2024-02-02

## 2024-01-27 RX ORDER — RAMIPRIL 5 MG
0 CAPSULE ORAL
Qty: 0 | Refills: 0 | DISCHARGE

## 2024-01-27 RX ORDER — OXYBUTYNIN CHLORIDE 5 MG
1 TABLET ORAL
Qty: 0 | Refills: 0 | DISCHARGE

## 2024-01-27 RX ORDER — SIMVASTATIN 20 MG/1
1 TABLET, FILM COATED ORAL
Refills: 0 | DISCHARGE

## 2024-01-27 RX ORDER — VALACYCLOVIR 500 MG/1
1 TABLET, FILM COATED ORAL
Refills: 0 | DISCHARGE

## 2024-01-27 RX ORDER — SIMVASTATIN 20 MG/1
20 TABLET, FILM COATED ORAL AT BEDTIME
Refills: 0 | Status: DISCONTINUED | OUTPATIENT
Start: 2024-01-27 | End: 2024-02-02

## 2024-01-27 RX ORDER — CHOLECALCIFEROL (VITAMIN D3) 125 MCG
2 CAPSULE ORAL
Refills: 0 | DISCHARGE

## 2024-01-27 RX ORDER — SIMVASTATIN 20 MG/1
1 TABLET, FILM COATED ORAL
Qty: 0 | Refills: 0 | DISCHARGE

## 2024-01-27 RX ORDER — TAMSULOSIN HYDROCHLORIDE 0.4 MG/1
1 CAPSULE ORAL
Qty: 0 | Refills: 0 | DISCHARGE

## 2024-01-27 RX ORDER — LANOLIN ALCOHOL/MO/W.PET/CERES
3 CREAM (GRAM) TOPICAL AT BEDTIME
Refills: 0 | Status: DISCONTINUED | OUTPATIENT
Start: 2024-01-27 | End: 2024-02-02

## 2024-01-27 RX ORDER — LISINOPRIL 2.5 MG/1
40 TABLET ORAL DAILY
Refills: 0 | Status: DISCONTINUED | OUTPATIENT
Start: 2024-01-27 | End: 2024-01-27

## 2024-01-27 RX ORDER — CHOLECALCIFEROL (VITAMIN D3) 125 MCG
2000 CAPSULE ORAL DAILY
Refills: 0 | Status: DISCONTINUED | OUTPATIENT
Start: 2024-01-27 | End: 2024-01-27

## 2024-01-27 RX ORDER — ACETAMINOPHEN 500 MG
650 TABLET ORAL EVERY 6 HOURS
Refills: 0 | Status: DISCONTINUED | OUTPATIENT
Start: 2024-01-27 | End: 2024-02-02

## 2024-01-27 RX ADMIN — Medication 2.5 MILLIGRAM(S): at 02:32

## 2024-01-27 RX ADMIN — HEPARIN SODIUM 5000 UNIT(S): 5000 INJECTION INTRAVENOUS; SUBCUTANEOUS at 18:42

## 2024-01-27 RX ADMIN — Medication 2.5 MILLIGRAM(S): at 04:11

## 2024-01-27 RX ADMIN — AMLODIPINE BESYLATE 10 MILLIGRAM(S): 2.5 TABLET ORAL at 09:23

## 2024-01-27 RX ADMIN — SIMVASTATIN 20 MILLIGRAM(S): 20 TABLET, FILM COATED ORAL at 21:08

## 2024-01-27 NOTE — H&P ADULT - TIME BILLING
How Severe Is Your Skin Lesion?: mild
Has Your Skin Lesion Been Treated?: not been treated
Is This A New Presentation, Or A Follow-Up?: Skin Lesion
I have spent a total of greater than 76 minutes time spent to prepare to see the patient, including the Emergency room charts and ED progress notes, obtaining and reviewing history, physical examination, explaining the diagnosis, prognosis and treatment plan with the patient/family/caregiver. I also have spent the time ordering studies and testing, interpreting results, medicine reconciliation, subspecialty consultations as indicated and documentation as above.

## 2024-01-27 NOTE — H&P ADULT - PROBLEM SELECTOR PLAN 1
- patient presented for new onset chest tightness and SOB with exertion  - BNP 2471  - EKG shows hypertrophy, which likely indicated longstanding uncontrolled HTN leading to possible diastolic failure    Plan:  - TTE pending  - hold off diuretics at this time as the patient does not have leg swelling or acute SOB  - tele monitoring  - daily weights, monitor I/O  - fluid restrict 800cc/day

## 2024-01-27 NOTE — H&P ADULT - PROBLEM SELECTOR PLAN 6
- heparin subq for dvt ppx  - As per current hospital policy, this patient will be followed by the private attending and to take over the case in the morning, and assume complete responsibility by Dr. Lamas as listed on the attending of record as above on the chart.

## 2024-01-27 NOTE — H&P ADULT - HISTORY OF PRESENT ILLNESS
This is a 82 y/o M with pmhx of HTN, PVD, presented to the ED for new onset shortness of breath with exertion for 1 week. The patient went to urgent care because he was having chest tightness and shortness of breath with exertion. He had his blood pressure checked and it was elevated, and was sent to the ED for evaluation. He denies hx of cardiac disease. He also endorsed that he felt lightheaded when he sits up but never had LOC. Denies chest pain at this time. ROS otherwise negative.

## 2024-01-27 NOTE — PATIENT PROFILE ADULT - PUBLIC BENEFITS
HPI Comments: 9:43 PM: Kinza Arguello is a 5 wk. o. year old male presenting to the ED c/o fever onset today per parents. States fever was 100.2 F. Parents also note that the Pt was fussy and crying today with feeding and only drank 2 oz today. Pt last drank 3-4 oz at 7pm today. Pt is on formula every 3 hrs. Pt has also had a dry cough x 2 days and was constipated last week. Patient has been playful and interactive today. Pt had no medications PTA. Pt was born at full term with no birth complications. Immunizations are UTD. History is limited due to pt's age      The history is provided by the mother and the father. History limited by: age. No  was used. The history is provided by the mother and the father. History limited by: age. No  was used. Pediatric Social History:         Past Medical History:   Diagnosis Date    Acid reflux        History reviewed. No pertinent surgical history. History reviewed. No pertinent family history. Social History     Social History    Marital status: SINGLE     Spouse name: N/A    Number of children: N/A    Years of education: N/A     Occupational History    Not on file. Social History Main Topics    Smoking status: Never Smoker    Smokeless tobacco: Never Used    Alcohol use Not on file    Drug use: Not on file    Sexual activity: Not on file     Other Topics Concern    Not on file     Social History Narrative    No narrative on file         ALLERGIES: Review of patient's allergies indicates no known allergies. Review of Systems   Constitutional: Positive for appetite change (difficulty feeding) and fever. Negative for activity change and irritability. HENT: Negative for trouble swallowing. Eyes: Negative for redness. Respiratory: Positive for cough. Cardiovascular: Negative for cyanosis. Gastrointestinal: Positive for constipation (last week). Negative for vomiting. Skin: Negative for rash. Neurological: Negative for seizures. All other systems reviewed and are negative. Vitals:    10/09/17 2330 10/09/17 2344 10/09/17 2345 10/09/17 2354   Pulse:       Resp:       Temp:    98.2 °F (36.8 °C)   SpO2: 100% 100% 100%    Weight:       Height:                Physical Exam   HENT:   Head: Normocephalic and atraumatic. Anterior fontanelle is flat. Nose: Congestion present. Mouth/Throat: Oropharynx is clear. Eyes: Pupils are equal, round, and reactive to light. Neck: Normal range of motion. Cardiovascular: Regular rhythm. No murmur heard. Pulmonary/Chest: Effort normal and breath sounds normal.   Musculoskeletal: Normal range of motion. Neurological: He is alert. Suck normal.   Skin: No cyanosis. MDM  Number of Diagnoses or Management Options    ED Course       Procedures    Vitals:  No data found. Medications ordered:   Medications - No data to display      Lab findings:  No results found for this or any previous visit (from the past 12 hour(s)). X-Ray, CT or other radiology findings or impressions:  XR CHEST PA LAT      IMPRESSION:     Hypoinflation exaggerating bronchovascular markings. Suggested subtle bilateral  upper lung haziness may represent atelectasis or developing infiltrate. Peribronchial wall thickening may represent superimposed viral process or  reactive airway disease.     Questionable biapical trace lucency is favored to be positional. Short-term  follow-up radiographs may be helpful if high clinical suspicion for  pneumothorax. Progress notes, Consult notes or additional Procedure notes:   10:14 PM pt is feeding now without difficulty  12:19 AM Reviewed results with parents. Pt is at baseline with no irritability. Told of poss b/l pneumonia although most c/w viral syndrome. Poss tx to chkd, recommend further ed obs for now. Parents prefer to go home now with close home observation.  Decline further ed obs as recommended, state w spacing out feeding pt tolerating without difficulty, no complaints. Af, nl vitals. Nl exam.  Brisk cap refill, no s/o dehydration. No s/o respir distress or sob. Detailed ret inst given, mom and gm verbalize understanding. cxr findings noted, no indication for further radiation at this time, no sx's. No rr, nl effort. Disposition:  Diagnosis:   1. Acute upper respiratory infection        Disposition: discharge    Follow-up Information     Follow up With Details Comments Contact Info    Nusrat Landis MD Schedule an appointment as soon as possible for a visit today  Jarek Nayak  [phone]             Discharge Medication List as of 2017 12:19 AM      CONTINUE these medications which have NOT CHANGED    Details   RANITIDINE HCL (ZANTAC PO) Take 1.2 mL by mouth two (2) times a day., Historical Med             Scribe Attestation      Calleen Keren and [place] acting as scribes for and in the presence of Keisha Staples MD      October 09, 2017 at 9:42 PM       Provider Attestation:      I personally performed the services described in the documentation, reviewed the documentation, as recorded by the scribe in my presence, and it accurately and completely records my words and actions.  October 09, 2017 at 9:42 PM - Keisha Staples MD no

## 2024-01-27 NOTE — H&P ADULT - NSHPREVIEWOFSYSTEMS_GEN_ALL_CORE
REVIEW OF SYSTEMS:    CONSTITUTIONAL: No weakness, fevers or chills  EYES/ENT: No visual changes;  No dysphagia; No sore throat; No rhinorrhea; No sinus pain/pressure  NECK: No pain or stiffness  RESPIRATORY: No cough, wheezing, hemoptysis; + shortness of breath with exertion  CARDIOVASCULAR: + chest tightness, no palpitations; No lower extremity edema  GASTROINTESTINAL: No abdominal or epigastric pain. No nausea, vomiting, or hematemesis; No diarrhea or constipation. No melena or hematochezia.  GENITOURINARY: No dysuria, frequency or hematuria  NEUROLOGICAL: No numbness or weakness  MSK: ambulates without assistance  SKIN: No itching, burning, rashes, or lesions   All other review of systems is negative unless indicated above.

## 2024-01-27 NOTE — H&P ADULT - PROBLEM SELECTOR PLAN 3
- DAX likely secondary to ARB use  - unclear if it is cardiorenal syndrome as the patient does not to be in respiratory distress for cardiorenal syndrome  - nephrology consult in the AM

## 2024-01-27 NOTE — CONSULT NOTE ADULT - ASSESSMENT
This is a 82 y/o M with pmhx of HTN, PVD, presented to the ED for new onset shortness of breath with exertion for 1 week    #Dyspnea/Chest discomfort  -in setting of uncontrolled HTN  -trops negative  -no significant findings on CXR  -plan for echo  -pharm nuclear stress    #HTN  -cont amlodipine  -start losartan 100mg PO daily    #Bradycardia  -intermittent sinus katey on tele  -cont to monitor  -defer AVNB    81 minutes spent on total encounter; more than 50% of the visit was spent counseling and/or coordinating care by the attending physician.

## 2024-01-27 NOTE — PATIENT PROFILE ADULT - FALL HARM RISK - UNIVERSAL INTERVENTIONS
Bed in lowest position, wheels locked, appropriate side rails in place/Call bell, personal items and telephone in reach/Instruct patient to call for assistance before getting out of bed or chair/Non-slip footwear when patient is out of bed/[place] to call system/Physically safe environment - no spills, clutter or unnecessary equipment/Purposeful Proactive Rounding/Room/bathroom lighting operational, light cord in reach

## 2024-01-27 NOTE — H&P ADULT - ASSESSMENT
82 y/o M with pmhx of HTN, PVD, presented to the ED for new onset shortness of breath with exertion for 1 week. Admit for ACS work up and possible new onset CHF.

## 2024-01-27 NOTE — H&P ADULT - NSHPPHYSICALEXAM_GEN_ALL_CORE
PHYSICAL EXAM:  VITALS: Vital Signs Last 24 Hrs  T(C): 36.8 (27 Jan 2024 02:26), Max: 36.8 (26 Jan 2024 18:01)  T(F): 98.2 (27 Jan 2024 02:26), Max: 98.3 (26 Jan 2024 18:01)  HR: 57 (27 Jan 2024 03:58) (53 - 99)  BP: 176/103 (27 Jan 2024 04:30) (152/80 - 239/83)  BP(mean): --  RR: 16 (27 Jan 2024 02:26) (15 - 16)  SpO2: 98% (27 Jan 2024 02:26) (97% - 98%)    Parameters below as of 27 Jan 2024 02:26  Patient On (Oxygen Delivery Method): room air      GENERAL: NAD, comfortable at bedside  HEAD:  Atraumatic, Normocephalic  EYES: EOMI, PERRL, conjunctiva and sclera clear  ENT: Moist Mucus Membranes present, no ulcers appreciated  NECK: Supple, + JVD  CHEST/LUNG: Clear to auscultation bilaterally; No wheezes, rales or rhonchi, no accessory muscle use  HEART: Regular rate and rhythm; No murmurs, rubs, or gallops, (+)S1, S2  ABDOMEN: Soft, Nontender, Nondistended; Normal Bowel sounds   EXTREMITIES: No clubbing, cyanosis, or edema  PSYCH: normal mood and affect  NEUROLOGY: AAOx3, non-focal  SKIN: No rashes or lesions

## 2024-01-27 NOTE — H&P ADULT - NSHPLABSRESULTS_GEN_ALL_CORE
13.1   4.69  )-----------( 131      ( 26 Jan 2024 21:00 )             40.4       01-26    142  |  105  |  37<H>  ----------------------------<  91  4.8   |  26  |  1.71<H>    Ca    9.4      26 Jan 2024 21:00  Mg     2.30     01-26    TPro  7.1  /  Alb  3.9  /  TBili  0.5  /  DBili  x   /  AST  14  /  ALT  12  /  AlkPhos  62  01-26                    Urinalysis Basic - ( 26 Jan 2024 21:00 )    Color: x / Appearance: x / SG: x / pH: x  Gluc: 91 mg/dL / Ketone: x  / Bili: x / Urobili: x   Blood: x / Protein: x / Nitrite: x   Leuk Esterase: x / RBC: x / WBC x   Sq Epi: x / Non Sq Epi: x / Bacteria: x            CXR: As per my read - no opacities noted, Will wait for prelim/official read    EKG: As per my read - NSR, tall QTS complexes, concerning for hypertrophy QTc 418 ms

## 2024-01-28 LAB
ANION GAP SERPL CALC-SCNC: 12 MMOL/L — SIGNIFICANT CHANGE UP (ref 7–14)
BASOPHILS # BLD AUTO: 0.03 K/UL — SIGNIFICANT CHANGE UP (ref 0–0.2)
BASOPHILS NFR BLD AUTO: 0.8 % — SIGNIFICANT CHANGE UP (ref 0–2)
BUN SERPL-MCNC: 32 MG/DL — HIGH (ref 7–23)
CALCIUM SERPL-MCNC: 9.5 MG/DL — SIGNIFICANT CHANGE UP (ref 8.4–10.5)
CHLORIDE SERPL-SCNC: 103 MMOL/L — SIGNIFICANT CHANGE UP (ref 98–107)
CO2 SERPL-SCNC: 25 MMOL/L — SIGNIFICANT CHANGE UP (ref 22–31)
CREAT SERPL-MCNC: 1.54 MG/DL — HIGH (ref 0.5–1.3)
EGFR: 44 ML/MIN/1.73M2 — LOW
EOSINOPHIL # BLD AUTO: 0.08 K/UL — SIGNIFICANT CHANGE UP (ref 0–0.5)
EOSINOPHIL NFR BLD AUTO: 2.2 % — SIGNIFICANT CHANGE UP (ref 0–6)
GLUCOSE SERPL-MCNC: 89 MG/DL — SIGNIFICANT CHANGE UP (ref 70–99)
HCT VFR BLD CALC: 42 % — SIGNIFICANT CHANGE UP (ref 39–50)
HGB BLD-MCNC: 14 G/DL — SIGNIFICANT CHANGE UP (ref 13–17)
IANC: 1.13 K/UL — LOW (ref 1.8–7.4)
IMM GRANULOCYTES NFR BLD AUTO: 0.3 % — SIGNIFICANT CHANGE UP (ref 0–0.9)
LYMPHOCYTES # BLD AUTO: 1.78 K/UL — SIGNIFICANT CHANGE UP (ref 1–3.3)
LYMPHOCYTES # BLD AUTO: 49.4 % — HIGH (ref 13–44)
MAGNESIUM SERPL-MCNC: 2.4 MG/DL — SIGNIFICANT CHANGE UP (ref 1.6–2.6)
MCHC RBC-ENTMCNC: 29.6 PG — SIGNIFICANT CHANGE UP (ref 27–34)
MCHC RBC-ENTMCNC: 33.3 GM/DL — SIGNIFICANT CHANGE UP (ref 32–36)
MCV RBC AUTO: 88.8 FL — SIGNIFICANT CHANGE UP (ref 80–100)
MONOCYTES # BLD AUTO: 0.57 K/UL — SIGNIFICANT CHANGE UP (ref 0–0.9)
MONOCYTES NFR BLD AUTO: 15.8 % — HIGH (ref 2–14)
NEUTROPHILS # BLD AUTO: 1.13 K/UL — LOW (ref 1.8–7.4)
NEUTROPHILS NFR BLD AUTO: 31.5 % — LOW (ref 43–77)
NRBC # BLD: 0 /100 WBCS — SIGNIFICANT CHANGE UP (ref 0–0)
NRBC # FLD: 0 K/UL — SIGNIFICANT CHANGE UP (ref 0–0)
PHOSPHATE SERPL-MCNC: 3.8 MG/DL — SIGNIFICANT CHANGE UP (ref 2.5–4.5)
PLATELET # BLD AUTO: 162 K/UL — SIGNIFICANT CHANGE UP (ref 150–400)
POTASSIUM SERPL-MCNC: 4.6 MMOL/L — SIGNIFICANT CHANGE UP (ref 3.5–5.3)
POTASSIUM SERPL-SCNC: 4.6 MMOL/L — SIGNIFICANT CHANGE UP (ref 3.5–5.3)
PROT ?TM UR-MCNC: 47 MG/DL — SIGNIFICANT CHANGE UP
RBC # BLD: 4.73 M/UL — SIGNIFICANT CHANGE UP (ref 4.2–5.8)
RBC # FLD: 14.7 % — HIGH (ref 10.3–14.5)
SODIUM SERPL-SCNC: 140 MMOL/L — SIGNIFICANT CHANGE UP (ref 135–145)
WBC # BLD: 3.6 K/UL — LOW (ref 3.8–10.5)
WBC # FLD AUTO: 3.6 K/UL — LOW (ref 3.8–10.5)

## 2024-01-28 PROCEDURE — 76770 US EXAM ABDO BACK WALL COMP: CPT | Mod: 26

## 2024-01-28 RX ORDER — LOSARTAN POTASSIUM 100 MG/1
50 TABLET, FILM COATED ORAL DAILY
Refills: 0 | Status: DISCONTINUED | OUTPATIENT
Start: 2024-01-28 | End: 2024-01-30

## 2024-01-28 RX ADMIN — AMLODIPINE BESYLATE 10 MILLIGRAM(S): 2.5 TABLET ORAL at 05:07

## 2024-01-28 RX ADMIN — HEPARIN SODIUM 5000 UNIT(S): 5000 INJECTION INTRAVENOUS; SUBCUTANEOUS at 17:12

## 2024-01-28 RX ADMIN — HEPARIN SODIUM 5000 UNIT(S): 5000 INJECTION INTRAVENOUS; SUBCUTANEOUS at 05:07

## 2024-01-28 RX ADMIN — SIMVASTATIN 20 MILLIGRAM(S): 20 TABLET, FILM COATED ORAL at 21:31

## 2024-01-28 NOTE — CONSULT NOTE ADULT - ASSESSMENT
83y Male with history of HTN presents with CP and SOB. Nephrology consulted for elevated Scr.    1) CKD-3b: Patient with h/o CKD as Scr noted to be 1.5 as per labs in 2019. Renal function at baseline. UA bland with proteinuria. Check spot urine TP/CR to quantify. Check renal US. Avoid nephrotoxins. Monitor electrolytes.    2) HTN with CKD: BP improving. Will restart ACE-I/ARB if BP increases. Monitor BP.    3) CP: As per cardiology. Pending NST.    4) Leukopenia: As per primary team.      [facility] NEPHROLOGY  Will Jackson M.D.  Norberto Shane D.O.  Gina Aviles M.D.  MD Blank Waldron, MSN, ANP-C    Telephone: [phone]  Facsimile: [phone] [place] [address], #[place] [address]

## 2024-01-28 NOTE — CONSULT NOTE ADULT - ASSESSMENT
84 y/o M with pmhx of HTN, PVD, presented to the ED for new onset shortness of breath with exertion for 1 week. The patient went to urgent care because he was having chest tightness and shortness of breath with exertion. He had his blood pressure checked and it was elevated, and was sent to the ED for evaluation. He denies hx of cardiac disease. He also endorsed that he felt lightheaded when he sits up but never had LOC. Denies chest pain at this time. I feel fine now..        Problem/Plan - 1:  ·  Problem: Dyspnea   ·  Plan: - patient presented for new onset chest tightness and SOB with exertion  - BNP 2471  - NST pending.   TT Enhancing Agent (01.27.24 @ 14:23) >  CONCLUSIONS:      1. Left ventricular cavity is normal. Left ventricular systolic function is normal with an ejection fraction of 59 % by Thakur's method of disks. There are no regional wall motion abnormalities seen.   2. Moderate aortic regurgitation.    < end of copied text >       Problem/Plan - 2:  ·  Problem: Chest Pain .   ·  Plan: - troponins stable  - EKG no ST changes noted  - TTE noted . NST pending.   - cardiology help appreciated.      Problem/Plan - 3:  ·  Problem: DAX (acute kidney injury).   ·  Plan: - DAX likely secondary to ARB use  - unclear if it is cardiorenal syndrome as the patient does not to be in respiratory distress for cardiorenal syndrome  - nephrology helping.      Problem/Plan - 4:  ·  Problem: Benign essential HTN.   ·  Plan: - start amlodipine for now  - hold ARB due to DAX.     Problem/Plan - 5:  ·  Problem: Leg pain .   ·  Plan: - Awaiting WOODY      Problem/Plan - 6:  ·  Problem: Prophylactic measure.   ·  Plan: - heparin subq for dvt ppx

## 2024-01-28 NOTE — PROGRESS NOTE ADULT - ASSESSMENT
This is a 84 y/o M with pmhx of HTN, PVD, presented to the ED for new onset shortness of breath with exertion for 1 week    #Dyspnea/Chest discomfort  -start low dose asa  -in setting of uncontrolled HTN  -trops negative  -no significant findings on CXR  -plan for echo  -pharm nuclear stress    #HTN  -cont amlodipine  -start losartan 500mg PO daily    #Bradycardia  -intermittent sinus katey on tele  -cont to monitor  -defer AVNB    #PAD  -check sheyla/pvr, pt c/o left leg pain    51 minutes spent on total encounter; more than 50% of the visit was spent counseling and/or coordinating care by the attending physician.

## 2024-01-28 NOTE — PROGRESS NOTE ADULT - SUBJECTIVE AND OBJECTIVE BOX
CARDIOLOGY FOLLOW UP - Dr. Lamas  Date of Service: 1/28/24  CC: no cp/sob    Review of Systems:  Constitutional: No fever, weight loss, or fatigue  Respiratory: No cough, wheezing, or hemoptysis, no shortness of breath  Cardiovascular: No chest pain, palpitations, passing out, dizziness, or leg swelling  Gastrointestinal: No abd or epigastric pain. No nausea, vomiting, or hematemesis; no diarrhea or consiptaiton, no melena or hematochezia  Vascular: No edema     TELEMETRY:    PHYSICAL EXAM:  T(C): 36.6 (01-28-24 @ 00:00), Max: 36.6 (01-27-24 @ 18:57)  HR: 84 (01-28-24 @ 05:00) (66 - 100)  BP: 141/86 (01-28-24 @ 05:00) (141/86 - 161/59)  RR: 20 (01-28-24 @ 05:00) (18 - 20)  SpO2: 99% (01-28-24 @ 05:00) (99% - 99%)  Wt(kg): --  I&O's Summary      Appearance: Normal	  Cardiovascular: Normal S1 S2,RRR, No JVD, No murmurs  Respiratory: Lungs clear to auscultation	  Gastrointestinal:  Soft, Non-tender, + BS	  Extremities: Normal range of motion, No clubbing, cyanosis or edema  Vascular: Peripheral pulses palpable 2+ bilaterally       Home Medications:  D3 25 mcg (1000 intl units) oral tablet: 2 tab(s) orally once a day (27 Jan 2024 04:47)  ramipril 10 mg oral tablet: 1 tab(s) orally once a day (27 Jan 2024 04:47)  simvastatin 20 mg oral tablet: 1 tab(s) orally once a day (27 Jan 2024 04:47)  valACYclovir 500 mg oral tablet: 1 tab(s) orally once a day (27 Jan 2024 04:47)        MEDICATIONS  (STANDING):  amLODIPine   Tablet 10 milliGRAM(s) Oral daily  heparin   Injectable 5000 Unit(s) SubCutaneous every 12 hours  simvastatin 20 milliGRAM(s) Oral at bedtime        EKG:  RADIOLOGY:  DIAGNOSTIC TESTING:  [ ] Echocardiogram:  [ ] Catherterization:  [ ] Stress Test:  OTHER:     LABS:	 	                          14.0   3.60  )-----------( 162      ( 28 Jan 2024 06:11 )             42.0     01-28    140  |  103  |  32<H>  ----------------------------<  89  4.6   |  25  |  1.54<H>    Ca    9.5      28 Jan 2024 06:11  Phos  3.8     01-28  Mg     2.40     01-28    TPro  7.2  /  Alb  3.9  /  TBili  0.4  /  DBili  x   /  AST  14  /  ALT  12  /  AlkPhos  67  01-27      PT/INR - ( 27 Jan 2024 10:21 )   PT: 13.4 sec;   INR: 1.19 ratio         PTT - ( 27 Jan 2024 10:21 )  PTT:40.0 sec    CARDIAC MARKERS:

## 2024-01-29 LAB
ANION GAP SERPL CALC-SCNC: 12 MMOL/L — SIGNIFICANT CHANGE UP (ref 7–14)
BASOPHILS # BLD AUTO: 0.06 K/UL — SIGNIFICANT CHANGE UP (ref 0–0.2)
BASOPHILS NFR BLD AUTO: 1.6 % — SIGNIFICANT CHANGE UP (ref 0–2)
BUN SERPL-MCNC: 33 MG/DL — HIGH (ref 7–23)
CALCIUM SERPL-MCNC: 9.3 MG/DL — SIGNIFICANT CHANGE UP (ref 8.4–10.5)
CHLORIDE SERPL-SCNC: 105 MMOL/L — SIGNIFICANT CHANGE UP (ref 98–107)
CO2 SERPL-SCNC: 24 MMOL/L — SIGNIFICANT CHANGE UP (ref 22–31)
CREAT SERPL-MCNC: 1.78 MG/DL — HIGH (ref 0.5–1.3)
EGFR: 37 ML/MIN/1.73M2 — LOW
EOSINOPHIL # BLD AUTO: 0.09 K/UL — SIGNIFICANT CHANGE UP (ref 0–0.5)
EOSINOPHIL NFR BLD AUTO: 2.4 % — SIGNIFICANT CHANGE UP (ref 0–6)
GLUCOSE SERPL-MCNC: 86 MG/DL — SIGNIFICANT CHANGE UP (ref 70–99)
HCT VFR BLD CALC: 39.9 % — SIGNIFICANT CHANGE UP (ref 39–50)
HGB BLD-MCNC: 13.2 G/DL — SIGNIFICANT CHANGE UP (ref 13–17)
IANC: 1.1 K/UL — LOW (ref 1.8–7.4)
IMM GRANULOCYTES NFR BLD AUTO: 0 % — SIGNIFICANT CHANGE UP (ref 0–0.9)
LYMPHOCYTES # BLD AUTO: 1.94 K/UL — SIGNIFICANT CHANGE UP (ref 1–3.3)
LYMPHOCYTES # BLD AUTO: 51.2 % — HIGH (ref 13–44)
MAGNESIUM SERPL-MCNC: 2.3 MG/DL — SIGNIFICANT CHANGE UP (ref 1.6–2.6)
MCHC RBC-ENTMCNC: 29.3 PG — SIGNIFICANT CHANGE UP (ref 27–34)
MCHC RBC-ENTMCNC: 33.1 GM/DL — SIGNIFICANT CHANGE UP (ref 32–36)
MCV RBC AUTO: 88.5 FL — SIGNIFICANT CHANGE UP (ref 80–100)
MONOCYTES # BLD AUTO: 0.6 K/UL — SIGNIFICANT CHANGE UP (ref 0–0.9)
MONOCYTES NFR BLD AUTO: 15.8 % — HIGH (ref 2–14)
NEUTROPHILS # BLD AUTO: 1.1 K/UL — LOW (ref 1.8–7.4)
NEUTROPHILS NFR BLD AUTO: 29 % — LOW (ref 43–77)
NRBC # BLD: 0 /100 WBCS — SIGNIFICANT CHANGE UP (ref 0–0)
NRBC # FLD: 0 K/UL — SIGNIFICANT CHANGE UP (ref 0–0)
PHOSPHATE SERPL-MCNC: 4 MG/DL — SIGNIFICANT CHANGE UP (ref 2.5–4.5)
PLATELET # BLD AUTO: 148 K/UL — LOW (ref 150–400)
POTASSIUM SERPL-MCNC: 4.2 MMOL/L — SIGNIFICANT CHANGE UP (ref 3.5–5.3)
POTASSIUM SERPL-SCNC: 4.2 MMOL/L — SIGNIFICANT CHANGE UP (ref 3.5–5.3)
RBC # BLD: 4.51 M/UL — SIGNIFICANT CHANGE UP (ref 4.2–5.8)
RBC # FLD: 14.6 % — HIGH (ref 10.3–14.5)
SODIUM SERPL-SCNC: 141 MMOL/L — SIGNIFICANT CHANGE UP (ref 135–145)
WBC # BLD: 3.79 K/UL — LOW (ref 3.8–10.5)
WBC # FLD AUTO: 3.79 K/UL — LOW (ref 3.8–10.5)

## 2024-01-29 PROCEDURE — 93018 CV STRESS TEST I&R ONLY: CPT | Mod: GC

## 2024-01-29 PROCEDURE — 78452 HT MUSCLE IMAGE SPECT MULT: CPT | Mod: 26

## 2024-01-29 PROCEDURE — 93016 CV STRESS TEST SUPVJ ONLY: CPT | Mod: GC

## 2024-01-29 RX ORDER — ASPIRIN/CALCIUM CARB/MAGNESIUM 324 MG
81 TABLET ORAL DAILY
Refills: 0 | Status: DISCONTINUED | OUTPATIENT
Start: 2024-01-29 | End: 2024-02-02

## 2024-01-29 RX ADMIN — Medication 81 MILLIGRAM(S): at 11:49

## 2024-01-29 RX ADMIN — AMLODIPINE BESYLATE 10 MILLIGRAM(S): 2.5 TABLET ORAL at 05:46

## 2024-01-29 RX ADMIN — LOSARTAN POTASSIUM 50 MILLIGRAM(S): 100 TABLET, FILM COATED ORAL at 05:46

## 2024-01-29 RX ADMIN — HEPARIN SODIUM 5000 UNIT(S): 5000 INJECTION INTRAVENOUS; SUBCUTANEOUS at 17:05

## 2024-01-29 RX ADMIN — SIMVASTATIN 20 MILLIGRAM(S): 20 TABLET, FILM COATED ORAL at 21:04

## 2024-01-29 RX ADMIN — HEPARIN SODIUM 5000 UNIT(S): 5000 INJECTION INTRAVENOUS; SUBCUTANEOUS at 05:46

## 2024-01-29 NOTE — PROGRESS NOTE ADULT - ASSESSMENT
This is a 84 y/o M with pmhx of HTN, PVD, presented to the ED for new onset shortness of breath with exertion for 1 week    #Dyspnea/Chest discomfort  -start low dose asa  -in setting of uncontrolled HTN  -trops negative  -no significant findings on CXR  -Echo with mod AR, nl LV sys fx    -FU pharm nuclear stress    #HTN  -cont meds     #Bradycardia  -intermittent sinus katey on tele  -cont to monitor  -defer AVNB    #PAD  -check sheyla/pvr, pt c/o left leg pain  -med fu     #[place]   -renal us ;No hydronephrosis. Bilateral renal cysts more numerous on the left. Moderately enlarged prostate gland with radiation seeds.  -renal FU

## 2024-01-29 NOTE — PROGRESS NOTE ADULT - ASSESSMENT
84 y/o M with pmhx of HTN, PVD, presented to the ED for new onset shortness of breath with exertion for 1 week. The patient went to urgent care because he was having chest tightness and shortness of breath with exertion. He had his blood pressure checked and it was elevated, and was sent to the ED for evaluation. He denies hx of cardiac disease. He also endorsed that he felt lightheaded when he sits up but never had LOC. Denies chest pain at this time. I feel fine now..        Problem/Plan - 1:  ·  Problem: Dyspnea   ·  Plan: - patient presented for new onset chest tightness and SOB with exertion  - BNP 2471  - NST pending.   TT Enhancing Agent (01.27.24 @ 14:23) >  CONCLUSIONS:      1. Left ventricular cavity is normal. Left ventricular systolic function is normal with an ejection fraction of 59 % by Thakur's method of disks. There are no regional wall motion abnormalities seen.   2. Moderate aortic regurgitation.    < end of copied text >       Problem/Plan - 2:  ·  Problem: Chest Pain .   ·  Plan: - troponins stable  - EKG no ST changes noted  - TTE noted . NST pending.   - cardiology help appreciated.      Problem/Plan - 3:  ·  Problem: CKD stage 3.   ·  Plan: - Trending BMP   - nephrology helping.      Problem/Plan - 4:  ·  Problem: Benign essential HTN.   ·  Plan: - start amlodipine for now  - Continue home meds.     Problem/Plan - 5:  ·  Problem: Prophylactic measure.   ·  Plan: - heparin subq for dvt ppx

## 2024-01-29 NOTE — PROGRESS NOTE ADULT - SUBJECTIVE AND OBJECTIVE BOX
Date of Service  : 01-29-24     INTERVAL HPI/OVERNIGHT EVENTS: No new concerns.   Vital Signs Last 24 Hrs  T(C): 36.7 (29 Jan 2024 05:45), Max: 36.8 (28 Jan 2024 22:10)  T(F): 98.1 (29 Jan 2024 05:45), Max: 98.2 (28 Jan 2024 22:10)  HR: 67 (29 Jan 2024 05:45) (62 - 108)  BP: 145/76 (29 Jan 2024 05:45) (141/81 - 166/85)  BP(mean): --  RR: 18 (29 Jan 2024 05:45) (18 - 19)  SpO2: 97% (29 Jan 2024 05:45) (97% - 98%)    Parameters below as of 29 Jan 2024 05:45  Patient On (Oxygen Delivery Method): room air      I&O's Summary    MEDICATIONS  (STANDING):  amLODIPine   Tablet 10 milliGRAM(s) Oral daily  heparin   Injectable 5000 Unit(s) SubCutaneous every 12 hours  losartan 50 milliGRAM(s) Oral daily  simvastatin 20 milliGRAM(s) Oral at bedtime    MEDICATIONS  (PRN):  acetaminophen     Tablet .. 650 milliGRAM(s) Oral every 6 hours PRN Temp greater or equal to 38C (100.4F), Mild Pain (1 - 3)  aluminum hydroxide/magnesium hydroxide/simethicone Suspension 30 milliLiter(s) Oral every 4 hours PRN Dyspepsia  melatonin 3 milliGRAM(s) Oral at bedtime PRN Insomnia  ondansetron Injectable 4 milliGRAM(s) IV Push every 8 hours PRN Nausea and/or Vomiting    LABS:                        13.2   3.79  )-----------( 148      ( 29 Jan 2024 06:19 )             39.9     01-29    141  |  105  |  33<H>  ----------------------------<  86  4.2   |  24  |  1.78<H>    Ca    9.3      29 Jan 2024 06:19  Phos  4.0     01-29  Mg     2.30     01-29    TPro  7.2  /  Alb  3.9  /  TBili  0.4  /  DBili  x   /  AST  14  /  ALT  12  /  AlkPhos  67  01-27    PT/INR - ( 27 Jan 2024 10:21 )   PT: 13.4 sec;   INR: 1.19 ratio         PTT - ( 27 Jan 2024 10:21 )  PTT:40.0 sec  Urinalysis Basic - ( 29 Jan 2024 06:19 )    Color: x / Appearance: x / SG: x / pH: x  Gluc: 86 mg/dL / Ketone: x  / Bili: x / Urobili: x   Blood: x / Protein: x / Nitrite: x   Leuk Esterase: x / RBC: x / WBC x   Sq Epi: x / Non Sq Epi: x / Bacteria: x      CAPILLARY BLOOD GLUCOSE            Urinalysis Basic - ( 29 Jan 2024 06:19 )    Color: x / Appearance: x / SG: x / pH: x  Gluc: 86 mg/dL / Ketone: x  / Bili: x / Urobili: x   Blood: x / Protein: x / Nitrite: x   Leuk Esterase: x / RBC: x / WBC x   Sq Epi: x / Non Sq Epi: x / Bacteria: x      REVIEW OF SYSTEMS:  CONSTITUTIONAL: No fever, weight loss, or fatigue  EYES: No eye pain, visual disturbances, or discharge  ENMT:  No difficulty hearing, tinnitus, vertigo; No sinus or throat pain  NECK: No pain or stiffness  RESPIRATORY: No cough, wheezing, chills or hemoptysis; No shortness of breath  CARDIOVASCULAR: No chest pain, palpitations, dizziness, or leg swelling  GASTROINTESTINAL: No abdominal or epigastric pain. No nausea, vomiting, or hematemesis; No diarrhea or constipation. No melena or hematochezia.  GENITOURINARY: No dysuria, frequency, hematuria, or incontinence  NEUROLOGICAL: No headaches, memory loss, loss of strength, numbness, or tremors      RADIOLOGY & ADDITIONAL TESTS:    Consultant(s) Notes Reviewed:  [x ] YES  [ ] NO    PHYSICAL EXAM:  GENERAL: NAD, well-groomed, well-developed,not in any distress ,  HEAD:  Atraumatic, Normocephalic  EYES: EOMI, PERRLA, conjunctiva and sclera clear  ENMT: No tonsillar erythema, exudates, or enlargement; Moist mucous membranes, Good dentition, No lesions  NECK: Supple, No JVD, Normal thyroid  NERVOUS SYSTEM:  Alert & Oriented X3, No focal deficit   CHEST/LUNG: Good air entry bilateral with no  rales, rhonchi, wheezing, or rubs  HEART: Regular rate and rhythm; No murmurs, rubs, or gallops  ABDOMEN: Soft, Nontender, Nondistended; Bowel sounds present  EXTREMITIES:  2+ Peripheral Pulses, No clubbing, cyanosis, or edema      Care Discussed with Consultants/Other Providers [ x] YES  [ ] NO

## 2024-01-29 NOTE — PROGRESS NOTE ADULT - ASSESSMENT
83y Male with history of HTN presents with CP and SOB. Nephrology consulted for elevated Scr.    1) CKD-3b: Patient with h/o CKD as Scr noted to be 1.5 as per labs in 2019. Renal function at baseline. UA bland with proteinuria. Check spot urine TP/CR to quantify. Renal US with bilateral renal cysts. Avoid nephrotoxins. Monitor electrolytes.    2) HTN with CKD: BP uncontrolled for which losartan 50 mg daily restarted this morning. Monitor BP.    3) CP: As per cardiology. Pending NST.    4) Leukopenia: As per primary team.      [facility] NEPHROLOGY  Will Jackson M.D.  Norberto Shane D.O.  Gina Aviles M.D.  MD Blank Waldron, MSN, ANP-C    Telephone: [phone]  Facsimile: [phone]    [address] [place] [address], [address] [place] [address]

## 2024-01-29 NOTE — PROGRESS NOTE ADULT - SUBJECTIVE AND OBJECTIVE BOX
CARDIOLOGY FOLLOW UP - Dr. Lamas  DATE OF SERVICE: 1/29/24    CC no cp or sob       REVIEW OF SYSTEMS:  CONSTITUTIONAL: No fever, weight loss, or fatigue  RESPIRATORY: No cough, wheezing, chills or hemoptysis; No Shortness of Breath  CARDIOVASCULAR: No chest pain, palpitations, passing out, dizziness, or leg swelling  GASTROINTESTINAL: No abdominal or epigastric pain. No nausea, vomiting, or hematemesis; No diarrhea or constipation. No melena or hematochezia.  VASCULAR: No edema     PHYSICAL EXAM:  T(C): 36.7 (01-29-24 @ 05:45), Max: 36.8 (01-28-24 @ 22:10)  HR: 67 (01-29-24 @ 05:45) (62 - 108)  BP: 145/76 (01-29-24 @ 05:45) (141/81 - 166/85)  RR: 18 (01-29-24 @ 05:45) (18 - 19)  SpO2: 97% (01-29-24 @ 05:45) (97% - 98%)  Wt(kg): --  I&O's Summary      Appearance: Normal	  Cardiovascular: Normal S1 S2,RRR, +  murmurs  Respiratory: Lungs clear to auscultation	  Gastrointestinal:  Soft, Non-tender, + BS	  Extremities: Normal range of motion, No clubbing, cyanosis or edema      Home Medications:  D3 25 mcg (1000 intl units) oral tablet: 2 tab(s) orally once a day (27 Jan 2024 04:47)  ramipril 10 mg oral tablet: 1 tab(s) orally once a day (27 Jan 2024 04:47)  simvastatin 20 mg oral tablet: 1 tab(s) orally once a day (27 Jan 2024 04:47)  valACYclovir 500 mg oral tablet: 1 tab(s) orally once a day (27 Jan 2024 04:47)      MEDICATIONS  (STANDING):  amLODIPine   Tablet 10 milliGRAM(s) Oral daily  heparin   Injectable 5000 Unit(s) SubCutaneous every 12 hours  losartan 50 milliGRAM(s) Oral daily  simvastatin 20 milliGRAM(s) Oral at bedtime      TELEMETRY: 	SB/  NSR    ECG:  	  RADIOLOGY:   DIAGNOSTIC TESTING:  [ ] Echocardiogram:  < from: TTE W or WO Ultrasound Enhancing Agent (01.27.24 @ 14:23) >     CONCLUSIONS:      1. Left ventricular cavity is normal. Left ventricular systolic function is normal with an ejection fraction of 59 % by Thakur's method of disks. There are no regional wall motion abnormalities seen.   2. Moderate aortic regurgitation.    ________________________________________________________________________________________    < end of copied text >    [ ]  Catheterization:  [ ] Stress Test:    OTHER: 	    LABS:	 	    Troponin T, High Sensitivity Result: 46 ng/L (01-26 @ 22:10)  Troponin T, High Sensitivity Result: 50 ng/L (01-26 @ 21:00)                          13.2   3.79  )-----------( 148      ( 29 Jan 2024 06:19 )             39.9     01-29    141  |  105  |  33<H>  ----------------------------<  86  4.2   |  24  |  1.78<H>    Ca    9.3      29 Jan 2024 06:19  Phos  4.0     01-29  Mg     2.30     01-29

## 2024-01-29 NOTE — PROGRESS NOTE ADULT - SUBJECTIVE AND OBJECTIVE BOX
[facility] NEPHROLOGY- PROGRESS NOTE    83y Male with history of HTN presents with CP and SOB. Nephrology consulted for elevated Scr.    REVIEW OF SYSTEMS:  Gen: no fevers  Cards: no chest pain  Resp: no dyspnea  GI: no nausea or vomiting or diarrhea  Vascular: no LE edema    No Known Allergies      Hospital Medications: Medications reviewed    VITALS:  T(F): 97.5 (01-29-24 @ 14:16), Max: 98.2 (01-28-24 @ 22:10)  HR: 66 (01-29-24 @ 14:16)  BP: 151/96 (01-29-24 @ 14:16)  RR: 17 (01-29-24 @ 14:16)  SpO2: 100% (01-29-24 @ 14:16)  Wt(kg): --  Height (cm): 170.2 (01-26 @ 17:01)  Weight (kg): 53.5 (01-26 @ 17:01)  BMI (kg/m2): 18.5 (01-26 @ 17:01)  BSA (m2): 1.62 (01-26 @ 17:01)      PHYSICAL EXAM:    Gen: NAD, calm  Cards: RRR, +S1/S2, no M/G/R  Resp: CTA B/L  GI: soft, NT/ND, NABS  Vascular: no LE edema B/L    LABS:  01-29    141  |  105  |  33<H>  ----------------------------<  86  4.2   |  24  |  1.78<H>    Ca    9.3      29 Jan 2024 06:19  Phos  4.0     01-29  Mg     2.30     01-29      Creatinine Trend: 1.78 <--, 1.54 <--, 1.58 <--, 1.71 <--                        13.2   3.79  )-----------( 148      ( 29 Jan 2024 06:19 )             39.9     Urine Studies:  Urinalysis Basic - ( 29 Jan 2024 06:19 )    Color:  / Appearance:  / SG:  / pH:   Gluc: 86 mg/dL / Ketone:   / Bili:  / Urobili:    Blood:  / Protein:  / Nitrite:    Leuk Esterase:  / RBC:  / WBC    Sq Epi:  / Non Sq Epi:  / Bacteria:           RADIOLOGY & ADDITIONAL STUDIES:    < from: US Kidney and Bladder (01.28.24 @ 19:09) >  IMPRESSION:  No hydronephrosis. Bilateral renal cysts more numerous on the left.    Moderately enlarged prostate gland with radiation seeds.        --- End of Report ---    < end of copied text >

## 2024-01-30 LAB
ANION GAP SERPL CALC-SCNC: 13 MMOL/L — SIGNIFICANT CHANGE UP (ref 7–14)
BUN SERPL-MCNC: 41 MG/DL — HIGH (ref 7–23)
CALCIUM SERPL-MCNC: 9.1 MG/DL — SIGNIFICANT CHANGE UP (ref 8.4–10.5)
CHLORIDE SERPL-SCNC: 104 MMOL/L — SIGNIFICANT CHANGE UP (ref 98–107)
CO2 SERPL-SCNC: 22 MMOL/L — SIGNIFICANT CHANGE UP (ref 22–31)
CREAT SERPL-MCNC: 2.17 MG/DL — HIGH (ref 0.5–1.3)
EGFR: 29 ML/MIN/1.73M2 — LOW
GLUCOSE SERPL-MCNC: 86 MG/DL — SIGNIFICANT CHANGE UP (ref 70–99)
HCT VFR BLD CALC: 38.4 % — LOW (ref 39–50)
HGB BLD-MCNC: 12.5 G/DL — LOW (ref 13–17)
MAGNESIUM SERPL-MCNC: 2.3 MG/DL — SIGNIFICANT CHANGE UP (ref 1.6–2.6)
MCHC RBC-ENTMCNC: 29.3 PG — SIGNIFICANT CHANGE UP (ref 27–34)
MCHC RBC-ENTMCNC: 32.6 GM/DL — SIGNIFICANT CHANGE UP (ref 32–36)
MCV RBC AUTO: 90.1 FL — SIGNIFICANT CHANGE UP (ref 80–100)
NRBC # BLD: 0 /100 WBCS — SIGNIFICANT CHANGE UP (ref 0–0)
NRBC # FLD: 0 K/UL — SIGNIFICANT CHANGE UP (ref 0–0)
PHOSPHATE SERPL-MCNC: 4.2 MG/DL — SIGNIFICANT CHANGE UP (ref 2.5–4.5)
PLATELET # BLD AUTO: 150 K/UL — SIGNIFICANT CHANGE UP (ref 150–400)
POTASSIUM SERPL-MCNC: 4.4 MMOL/L — SIGNIFICANT CHANGE UP (ref 3.5–5.3)
POTASSIUM SERPL-SCNC: 4.4 MMOL/L — SIGNIFICANT CHANGE UP (ref 3.5–5.3)
RBC # BLD: 4.26 M/UL — SIGNIFICANT CHANGE UP (ref 4.2–5.8)
RBC # FLD: 14.8 % — HIGH (ref 10.3–14.5)
SODIUM SERPL-SCNC: 139 MMOL/L — SIGNIFICANT CHANGE UP (ref 135–145)
WBC # BLD: 3.8 K/UL — SIGNIFICANT CHANGE UP (ref 3.8–10.5)
WBC # FLD AUTO: 3.8 K/UL — SIGNIFICANT CHANGE UP (ref 3.8–10.5)

## 2024-01-30 PROCEDURE — 93923 UPR/LXTR ART STDY 3+ LVLS: CPT | Mod: 26

## 2024-01-30 PROCEDURE — 93922 UPR/L XTREMITY ART 2 LEVELS: CPT | Mod: 26,59

## 2024-01-30 RX ORDER — ACETYLCYSTEINE 200 MG/ML
1200 VIAL (ML) MISCELLANEOUS EVERY 12 HOURS
Refills: 0 | Status: COMPLETED | OUTPATIENT
Start: 2024-01-30 | End: 2024-02-01

## 2024-01-30 RX ORDER — HYDRALAZINE HCL 50 MG
25 TABLET ORAL THREE TIMES A DAY
Refills: 0 | Status: DISCONTINUED | OUTPATIENT
Start: 2024-01-30 | End: 2024-02-02

## 2024-01-30 RX ORDER — SODIUM CHLORIDE 9 MG/ML
1000 INJECTION INTRAMUSCULAR; INTRAVENOUS; SUBCUTANEOUS
Refills: 0 | Status: DISCONTINUED | OUTPATIENT
Start: 2024-01-30 | End: 2024-01-31

## 2024-01-30 RX ADMIN — Medication 1200 MILLIGRAM(S): at 18:58

## 2024-01-30 RX ADMIN — AMLODIPINE BESYLATE 10 MILLIGRAM(S): 2.5 TABLET ORAL at 05:28

## 2024-01-30 RX ADMIN — HEPARIN SODIUM 5000 UNIT(S): 5000 INJECTION INTRAVENOUS; SUBCUTANEOUS at 05:29

## 2024-01-30 RX ADMIN — Medication 81 MILLIGRAM(S): at 11:16

## 2024-01-30 RX ADMIN — SIMVASTATIN 20 MILLIGRAM(S): 20 TABLET, FILM COATED ORAL at 21:40

## 2024-01-30 RX ADMIN — LOSARTAN POTASSIUM 50 MILLIGRAM(S): 100 TABLET, FILM COATED ORAL at 05:28

## 2024-01-30 RX ADMIN — Medication 25 MILLIGRAM(S): at 18:01

## 2024-01-30 RX ADMIN — SODIUM CHLORIDE 50 MILLILITER(S): 9 INJECTION INTRAMUSCULAR; INTRAVENOUS; SUBCUTANEOUS at 14:14

## 2024-01-30 RX ADMIN — HEPARIN SODIUM 5000 UNIT(S): 5000 INJECTION INTRAVENOUS; SUBCUTANEOUS at 18:02

## 2024-01-30 RX ADMIN — Medication 25 MILLIGRAM(S): at 21:39

## 2024-01-30 NOTE — PROGRESS NOTE ADULT - ASSESSMENT
83y Male with history of HTN presents with CP and SOB. Nephrology consulted for elevated Scr.    1) DAX: likely hemodynamically mediated in setting of recently restarted ARB and hypovolemia. Scr increasing with plans for cadiac cath today for which would start NS @ 1 ml/kg/hour X 12 hours to reduce risk of JENNIFER. Hold losartan. UA bland. Check urine sodium and urine creatinine. Check bladder scan. Avoid nephrotoxins.    2) CKD-3b: Patient with h/o CKD as Scr noted to be 1.5 as per labs in 2019. UA bland with proteinuria. Check spot urine TP/CR to quantify. Renal US with bilateral renal cysts. Monitor electrolytes.    3) HTN with CKD: BP improving. Hold losartan given DAX and plans for C today. Monitor BP.    4) CP: As per cardiology.    5) Leukopenia: Improving. As per primary team.      [facility] NEPHROLOGY  Will Jackson M.D.  Norberto Shane D.O.  Gina Aviles M.D.  MD Blank Waldron, MSN, ANP-C    Telephone: [phone]  Facsimile: [phone] [place] [address], #[place] [address]

## 2024-01-30 NOTE — PROGRESS NOTE ADULT - ASSESSMENT
82 y/o M with pmhx of HTN, PVD, presented to the ED for new onset shortness of breath with exertion for 1 week. The patient went to urgent care because he was having chest tightness and shortness of breath with exertion. He had his blood pressure checked and it was elevated, and was sent to the ED for evaluation. He denies hx of cardiac disease. He also endorsed that he felt lightheaded when he sits up but never had LOC. Denies chest pain at this time. I feel fine now..        Problem/Plan - 1:  ·  Problem: Dyspnea with LV dysfunction    ·  Plan: - Awaiting cardiac cath.   patient presented for new onset chest tightness and SOB with exertion  - BNP 2471  < from: Nuclear Stress Test-Pharmacologic.. (01.29.24 @ 07:02) >  Conclusions:   1. Baseline electrocardiogram: atrial tachycardia at a rate of 104 bpm with T wave inversion leads I, II, III, aVF, aVR, V3-V4.   2. During adenosine infusion Atrial tachycardia converted to sinus rhythm with frequent PACs in pattern of bigeminy   3. Occasional APD's occurred during stress and recovery, increased with stress. Single VPD's occurred during recovery.   4. The left ventricle ismoderately decreased in function and normal in size. The resting left ventricular EF% is 40 %. The resting end diastolic volume is 90 ml and systolic volume is 54 ml. The left ventricular EF% during stress is 39 %. The stress end diastolic volume is 115 ml and systolic volume is 70 ml.   5. Diffuse left ventricular wall motion hypokinesis.   6. Normal right ventricular function. There is no right ventricular dilation.    < end of copied text >    TT Enhancing Agent (01.27.24 @ 14:23) >  CONCLUSIONS:      1. Left ventricular cavity is normal. Left ventricular systolic function is normal with an ejection fraction of 59 % by Thakur's method of disks. There are no regional wall motion abnormalities seen.   2. Moderate aortic regurgitation.    < end of copied text >       Problem/Plan - 2:  ·  Problem: Chest Pain .   ·  Plan: - troponins stable  - EKG no ST changes noted  - TTE noted . NST pending.   - cardiology help appreciated.      Problem/Plan - 3:  ·  Problem: CKD stage 3.   ·  Plan: - Trending BMP   - nephrology helping.      Problem/Plan - 4:  ·  Problem: Benign essential HTN.   ·  Plan: - start amlodipine for now  - Continue home meds.     Problem/Plan - 5:  ·  Problem: Prophylactic measure.   ·  Plan: - heparin subq for dvt ppx

## 2024-01-30 NOTE — PROGRESS NOTE ADULT - SUBJECTIVE AND OBJECTIVE BOX
CARDIOLOGY FOLLOW UP - Dr. Lamas  DATE OF SERVICE: 1/30/24    CC no cp or sob       REVIEW OF SYSTEMS:  CONSTITUTIONAL: No fever, weight loss, or fatigue  RESPIRATORY: No cough, wheezing, chills or hemoptysis; No Shortness of Breath  CARDIOVASCULAR: No chest pain, palpitations, passing out, dizziness, or leg swelling  GASTROINTESTINAL: No abdominal or epigastric pain. No nausea, vomiting, or hematemesis; No diarrhea or constipation. No melena or hematochezia.  VASCULAR: No edema     PHYSICAL EXAM:  T(C): 35.8 (01-30-24 @ 05:31), Max: 36.4 (01-29-24 @ 14:16)  HR: 75 (01-30-24 @ 05:31) (66 - 83)  BP: 157/80 (01-30-24 @ 05:31) (144/75 - 157/80)  RR: 19 (01-30-24 @ 05:31) (17 - 19)  SpO2: 98% (01-30-24 @ 05:31) (98% - 100%)  Wt(kg): --  I&O's Summary      Appearance: Normal	  Cardiovascular: Normal S1 S2,RRR, No JVD, No murmurs  Respiratory: Lungs clear to auscultation	  Gastrointestinal:  Soft, Non-tender, + BS	  Extremities: Normal range of motion, No clubbing, cyanosis or edema      Home Medications:  D3 25 mcg (1000 intl units) oral tablet: 2 tab(s) orally once a day (27 Jan 2024 04:47)  ramipril 10 mg oral tablet: 1 tab(s) orally once a day (27 Jan 2024 04:47)  simvastatin 20 mg oral tablet: 1 tab(s) orally once a day (27 Jan 2024 04:47)  valACYclovir 500 mg oral tablet: 1 tab(s) orally once a day (27 Jan 2024 04:47)      MEDICATIONS  (STANDING):  amLODIPine   Tablet 10 milliGRAM(s) Oral daily  aspirin enteric coated 81 milliGRAM(s) Oral daily  heparin   Injectable 5000 Unit(s) SubCutaneous every 12 hours  losartan 50 milliGRAM(s) Oral daily  simvastatin 20 milliGRAM(s) Oral at bedtime      TELEMETRY: nsr	    ECG:  	  RADIOLOGY:   DIAGNOSTIC TESTING:  [ ] Echocardiogram:  [ ]  Catheterization:  [ ] Stress Test:    < from: Nuclear Stress Test-Pharmacologic.. (01.29.24 @ 07:02) >  Conclusions:   1. Baseline electrocardiogram: atrial tachycardia at a rate of 104 bpm with T wave inversion leads I, II, III, aVF, aVR, V3-V4.   2. During adenosine infusion Atrial tachycardia converted to sinus rhythm with frequent PACs in pattern of bigeminy   3. Occasional APD's occurred during stress and recovery, increased with stress. Single VPD's occurred during recovery.   4. The left ventricle ismoderately decreased in function and normal in size. The resting left ventricular EF% is 40 %. The resting end diastolic volume is 90 ml and systolic volume is 54 ml. The left ventricular EF% during stress is 39 %. The stress end diastolic volume is 115 ml and systolic volume is 70 ml.   5. Diffuse left ventricular wall motion hypokinesis.   6. Normal right ventricular function. There is no right ventricular dilation.      < end of copied text >    OTHER: 	    LABS:	 	    Troponin T, High Sensitivity Result: 46 ng/L (01-26 @ 22:10)  Troponin T, High Sensitivity Result: 50 ng/L (01-26 @ 21:00)                          12.5   3.80  )-----------( 150      ( 30 Jan 2024 05:50 )             38.4     01-30    139  |  104  |  41<H>  ----------------------------<  86  4.4   |  22  |  2.17<H>    Ca    9.1      30 Jan 2024 05:50  Phos  4.2     01-30  Mg     2.30     01-30

## 2024-01-30 NOTE — PROGRESS NOTE ADULT - ASSESSMENT
This is a 82 y/o M with pmhx of HTN, PVD, presented to the ED for new onset shortness of breath with exertion for 1 week    #Dyspnea/Chest discomfort  -in setting of uncontrolled HTN  -trops negative  -no significant findings on CXR  -Echo with mod AR, nl LV sys fx    -pharm nuclear stress noted;  During adenosine infusion Atrial tachycardia converted to sinus rhythm with frequent PACs in pattern of bigeminy; LV moderately decreased in function ; EF 40% ;left ventricular wall motion hypokinesis.  -plan for cardiac cath today- r/b/a d/w pt = pt agreed to proceed  -c/w asa     #HTN  -cont meds     #Bradycardia  -intermittent sinus katey on tele  -cont to monitor  -defer AVNB    #PAD  -check sheyla/pvr, pt c/o left leg pain  -med fu     #[place]   -renal us ;No hydronephrosis. Bilateral renal cysts more numerous on the left. Moderately enlarged prostate gland with radiation seeds.  -renal FU

## 2024-01-30 NOTE — PROGRESS NOTE ADULT - SUBJECTIVE AND OBJECTIVE BOX
Date of Service  : 01-30-24     INTERVAL HPI/OVERNIGHT EVENTS: i feel okay  Vital Signs Last 24 Hrs  T(C): 36.7 (30 Jan 2024 11:20), Max: 36.7 (30 Jan 2024 11:20)  T(F): 98.1 (30 Jan 2024 11:20), Max: 98.1 (30 Jan 2024 11:20)  HR: 74 (30 Jan 2024 11:20) (74 - 83)  BP: 137/64 (30 Jan 2024 11:20) (137/64 - 157/80)  BP(mean): --  RR: 16 (30 Jan 2024 11:20) (16 - 19)  SpO2: 97% (30 Jan 2024 11:20) (97% - 99%)    Parameters below as of 30 Jan 2024 11:20  Patient On (Oxygen Delivery Method): room air      I&O's Summary    MEDICATIONS  (STANDING):  acetylcysteine  Oral Solution 1200 milliGRAM(s) Oral every 12 hours  amLODIPine   Tablet 10 milliGRAM(s) Oral daily  aspirin enteric coated 81 milliGRAM(s) Oral daily  heparin   Injectable 5000 Unit(s) SubCutaneous every 12 hours  simvastatin 20 milliGRAM(s) Oral at bedtime  sodium chloride 0.9%. 1000 milliLiter(s) (50 mL/Hr) IV Continuous <Continuous>    MEDICATIONS  (PRN):  acetaminophen     Tablet .. 650 milliGRAM(s) Oral every 6 hours PRN Temp greater or equal to 38C (100.4F), Mild Pain (1 - 3)  aluminum hydroxide/magnesium hydroxide/simethicone Suspension 30 milliLiter(s) Oral every 4 hours PRN Dyspepsia  melatonin 3 milliGRAM(s) Oral at bedtime PRN Insomnia  ondansetron Injectable 4 milliGRAM(s) IV Push every 8 hours PRN Nausea and/or Vomiting    LABS:                        12.5   3.80  )-----------( 150      ( 30 Jan 2024 05:50 )             38.4     01-30    139  |  104  |  41<H>  ----------------------------<  86  4.4   |  22  |  2.17<H>    Ca    9.1      30 Jan 2024 05:50  Phos  4.2     01-30  Mg     2.30     01-30        Urinalysis Basic - ( 30 Jan 2024 05:50 )    Color: x / Appearance: x / SG: x / pH: x  Gluc: 86 mg/dL / Ketone: x  / Bili: x / Urobili: x   Blood: x / Protein: x / Nitrite: x   Leuk Esterase: x / RBC: x / WBC x   Sq Epi: x / Non Sq Epi: x / Bacteria: x      CAPILLARY BLOOD GLUCOSE            Urinalysis Basic - ( 30 Jan 2024 05:50 )    Color: x / Appearance: x / SG: x / pH: x  Gluc: 86 mg/dL / Ketone: x  / Bili: x / Urobili: x   Blood: x / Protein: x / Nitrite: x   Leuk Esterase: x / RBC: x / WBC x   Sq Epi: x / Non Sq Epi: x / Bacteria: x      REVIEW OF SYSTEMS:  CONSTITUTIONAL: No fever, weight loss, or fatigue  EYES: No eye pain, visual disturbances, or discharge  ENMT:  No difficulty hearing, tinnitus, vertigo; No sinus or throat pain  NECK: No pain or stiffness  RESPIRATORY: No cough, wheezing, chills or hemoptysis; No shortness of breath  CARDIOVASCULAR: No chest pain, palpitations, dizziness, or leg swelling  GASTROINTESTINAL: No abdominal or epigastric pain. No nausea, vomiting, or hematemesis; No diarrhea or constipation. No melena or hematochezia.  GENITOURINARY: No dysuria, frequency, hematuria, or incontinence  NEUROLOGICAL: No headaches, memory loss, loss of strength, numbness, or tremors      Consultant(s) Notes Reviewed:  [x ] YES  [ ] NO    PHYSICAL EXAM:  GENERAL: NAD, well-groomed, well-developed,not in any distress ,  HEAD:  Atraumatic, Normocephalic  NECK: Supple, No JVD, Normal thyroid  NERVOUS SYSTEM:  Alert & Oriented X3, No focal deficit   CHEST/LUNG: Good air entry bilateral with no  rales, rhonchi, wheezing, or rubs  HEART: Regular rate and rhythm; No murmurs, rubs, or gallops  ABDOMEN: Soft, Nontender, Nondistended; Bowel sounds present  EXTREMITIES:  2+ Peripheral Pulses, No clubbing, cyanosis, or edema    Care Discussed with Consultants/Other Providers [ x] YES  [ ] NO

## 2024-01-30 NOTE — PROGRESS NOTE ADULT - SUBJECTIVE AND OBJECTIVE BOX
[facility] NEPHROLOGY- PROGRESS NOTE    83y Male with history of HTN presents with CP and SOB. Nephrology consulted for elevated Scr.    REVIEW OF SYSTEMS:  Gen: no fevers  Cards: no chest pain  Resp: no dyspnea  GI: no nausea or vomiting or diarrhea  Vascular: no LE edema    No Known Allergies      Hospital Medications: Medications reviewed      VITALS:  T(F): 98.1 (01-30-24 @ 11:20), Max: 98.1 (01-30-24 @ 11:20)  HR: 74 (01-30-24 @ 11:20)  BP: 137/64 (01-30-24 @ 11:20)  RR: 16 (01-30-24 @ 11:20)  SpO2: 97% (01-30-24 @ 11:20)  Wt(kg): --      PHYSICAL EXAM:    Gen: NAD, calm  Cards: RRR, +S1/S2, no M/G/R  Resp: CTA B/L  GI: soft, NT/ND, NABS  Vascular: no LE edema B/L      LABS:  01-30    139  |  104  |  41<H>  ----------------------------<  86  4.4   |  22  |  2.17<H>    Ca    9.1      30 Jan 2024 05:50  Phos  4.2     01-30  Mg     2.30     01-30      Creatinine Trend: 2.17 <--, 1.78 <--, 1.54 <--, 1.58 <--, 1.71 <--                        12.5   3.80  )-----------( 150      ( 30 Jan 2024 05:50 )             38.4     Urine Studies:  Urinalysis Basic - ( 30 Jan 2024 05:50 )    Color:  / Appearance:  / SG:  / pH:   Gluc: 86 mg/dL / Ketone:   / Bili:  / Urobili:    Blood:  / Protein:  / Nitrite:    Leuk Esterase:  / RBC:  / WBC    Sq Epi:  / Non Sq Epi:  / Bacteria:

## 2024-01-31 LAB
ANION GAP SERPL CALC-SCNC: 12 MMOL/L — SIGNIFICANT CHANGE UP (ref 7–14)
BUN SERPL-MCNC: 33 MG/DL — HIGH (ref 7–23)
CALCIUM SERPL-MCNC: 8.6 MG/DL — SIGNIFICANT CHANGE UP (ref 8.4–10.5)
CHLORIDE SERPL-SCNC: 105 MMOL/L — SIGNIFICANT CHANGE UP (ref 98–107)
CO2 SERPL-SCNC: 21 MMOL/L — LOW (ref 22–31)
CREAT ?TM UR-MCNC: 113 MG/DL — SIGNIFICANT CHANGE UP
CREAT SERPL-MCNC: 1.52 MG/DL — HIGH (ref 0.5–1.3)
EGFR: 45 ML/MIN/1.73M2 — LOW
GLUCOSE SERPL-MCNC: 76 MG/DL — SIGNIFICANT CHANGE UP (ref 70–99)
HCT VFR BLD CALC: 37 % — LOW (ref 39–50)
HGB BLD-MCNC: 12.2 G/DL — LOW (ref 13–17)
MAGNESIUM SERPL-MCNC: 2.3 MG/DL — SIGNIFICANT CHANGE UP (ref 1.6–2.6)
MCHC RBC-ENTMCNC: 29.8 PG — SIGNIFICANT CHANGE UP (ref 27–34)
MCHC RBC-ENTMCNC: 33 GM/DL — SIGNIFICANT CHANGE UP (ref 32–36)
MCV RBC AUTO: 90.5 FL — SIGNIFICANT CHANGE UP (ref 80–100)
NRBC # BLD: 0 /100 WBCS — SIGNIFICANT CHANGE UP (ref 0–0)
NRBC # FLD: 0 K/UL — SIGNIFICANT CHANGE UP (ref 0–0)
PHOSPHATE SERPL-MCNC: 2.6 MG/DL — SIGNIFICANT CHANGE UP (ref 2.5–4.5)
PLATELET # BLD AUTO: 154 K/UL — SIGNIFICANT CHANGE UP (ref 150–400)
POTASSIUM SERPL-MCNC: 4.3 MMOL/L — SIGNIFICANT CHANGE UP (ref 3.5–5.3)
POTASSIUM SERPL-SCNC: 4.3 MMOL/L — SIGNIFICANT CHANGE UP (ref 3.5–5.3)
PROT ?TM UR-MCNC: 25 MG/DL — SIGNIFICANT CHANGE UP
PROT/CREAT UR-RTO: 0.2 RATIO — SIGNIFICANT CHANGE UP (ref 0–0.2)
RBC # BLD: 4.09 M/UL — LOW (ref 4.2–5.8)
RBC # FLD: 14.7 % — HIGH (ref 10.3–14.5)
SODIUM SERPL-SCNC: 138 MMOL/L — SIGNIFICANT CHANGE UP (ref 135–145)
SODIUM UR-SCNC: 108 MMOL/L — SIGNIFICANT CHANGE UP
WBC # BLD: 4.72 K/UL — SIGNIFICANT CHANGE UP (ref 3.8–10.5)
WBC # FLD AUTO: 4.72 K/UL — SIGNIFICANT CHANGE UP (ref 3.8–10.5)

## 2024-01-31 PROCEDURE — 93454 CORONARY ARTERY ANGIO S&I: CPT | Mod: 26

## 2024-01-31 RX ORDER — SODIUM CHLORIDE 9 MG/ML
250 INJECTION INTRAMUSCULAR; INTRAVENOUS; SUBCUTANEOUS
Refills: 0 | Status: COMPLETED | OUTPATIENT
Start: 2024-01-31 | End: 2024-01-31

## 2024-01-31 RX ORDER — SODIUM CHLORIDE 9 MG/ML
1000 INJECTION INTRAMUSCULAR; INTRAVENOUS; SUBCUTANEOUS
Refills: 0 | Status: DISCONTINUED | OUTPATIENT
Start: 2024-01-31 | End: 2024-02-01

## 2024-01-31 RX ADMIN — AMLODIPINE BESYLATE 10 MILLIGRAM(S): 2.5 TABLET ORAL at 05:32

## 2024-01-31 RX ADMIN — Medication 1200 MILLIGRAM(S): at 07:04

## 2024-01-31 RX ADMIN — Medication 1200 MILLIGRAM(S): at 18:13

## 2024-01-31 RX ADMIN — Medication 25 MILLIGRAM(S): at 05:32

## 2024-01-31 RX ADMIN — SODIUM CHLORIDE 50 MILLILITER(S): 9 INJECTION INTRAMUSCULAR; INTRAVENOUS; SUBCUTANEOUS at 10:16

## 2024-01-31 RX ADMIN — SODIUM CHLORIDE 75 MILLILITER(S): 9 INJECTION INTRAMUSCULAR; INTRAVENOUS; SUBCUTANEOUS at 15:58

## 2024-01-31 RX ADMIN — SIMVASTATIN 20 MILLIGRAM(S): 20 TABLET, FILM COATED ORAL at 22:22

## 2024-01-31 RX ADMIN — HEPARIN SODIUM 5000 UNIT(S): 5000 INJECTION INTRAVENOUS; SUBCUTANEOUS at 17:57

## 2024-01-31 RX ADMIN — HEPARIN SODIUM 5000 UNIT(S): 5000 INJECTION INTRAVENOUS; SUBCUTANEOUS at 05:32

## 2024-01-31 RX ADMIN — Medication 81 MILLIGRAM(S): at 15:58

## 2024-01-31 RX ADMIN — Medication 25 MILLIGRAM(S): at 15:58

## 2024-01-31 RX ADMIN — Medication 25 MILLIGRAM(S): at 22:22

## 2024-01-31 NOTE — CHART NOTE - NSCHARTNOTEFT_GEN_A_CORE
GIANNI MURPHY  MRN-[id number] 83y    Patient status post [facility] via right femoral access. Dressing site clean, dry, and intact. No hematoma or active bleeding. Extremities warm to touch. Pulses present b/l, capillary refill appropriate. No bruits noted on ausculation of femoral artery. Denies any pain, numbness or tingling. Will continue to monitor.    T(C): 36.8 (01-31-24 @ 22:20), Max: 37 (01-31-24 @ 05:30)  HR: 60 (01-31-24 @ 22:20) (53 - 65)  BP: 150/50 (01-31-24 @ 22:20) (150/50 - 177/72)  RR: 18 (01-31-24 @ 22:20) (17 - 18)  SpO2: 98% (01-31-24 @ 22:20) (98% - 98%)      Wallace Quintero PA-C  Department of Medicine  [facility]   In House Page [phone]

## 2024-01-31 NOTE — PROGRESS NOTE ADULT - SUBJECTIVE AND OBJECTIVE BOX
[facility] NEPHROLOGY- PROGRESS NOTE    83y Male with history of HTN presents with CP and SOB. Nephrology consulted for elevated Scr.    REVIEW OF SYSTEMS:  Gen: no fevers  Cards: no chest pain  Resp: no dyspnea  GI: no nausea or vomiting or diarrhea  Vascular: no LE edema    No Known Allergies      Hospital Medications: Medications reviewed      VITALS:  T(F): 98.6 (01-31-24 @ 05:30), Max: 98.6 (01-30-24 @ 17:30)  HR: 65 (01-31-24 @ 05:30)  BP: 168/74 (01-31-24 @ 05:30)  RR: 17 (01-31-24 @ 05:30)  SpO2: 98% (01-31-24 @ 05:30)  Wt(kg): --    01-30 @ 07:01  -  01-31 @ 07:00  --------------------------------------------------------  IN: 200 mL / OUT: 925 mL / NET: -725 mL        PHYSICAL EXAM:    Gen: NAD, calm  Cards: RRR, +S1/S2, no M/G/R  Resp: CTA B/L  GI: soft, NT/ND, NABS  Vascular: no LE edema B/L      LABS:  01-31    138  |  105  |  33<H>  ----------------------------<  76  4.3   |  21<L>  |  1.52<H>    Ca    8.6      31 Jan 2024 06:10  Phos  2.6     01-31  Mg     2.30     01-31      Creatinine Trend: 1.52 <--, 2.17 <--, 1.78 <--, 1.54 <--, 1.58 <--, 1.71 <--                        12.2   4.72  )-----------( 154      ( 31 Jan 2024 06:10 )             37.0     Urine Studies:  Urinalysis Basic - ( 31 Jan 2024 06:10 )    Color:  / Appearance:  / SG:  / pH:   Gluc: 76 mg/dL / Ketone:   / Bili:  / Urobili:    Blood:  / Protein:  / Nitrite:    Leuk Esterase:  / RBC:  / WBC    Sq Epi:  / Non Sq Epi:  / Bacteria:       Sodium, Random Urine: 108 mmol/L (01-31 @ 07:28)  Creatinine, Random Urine: 113 mg/dL (01-31 @ 07:28)  Protein/Creatinine Ratio Calculation: 0.2 Ratio (01-31 @ 07:28)                 [facility] NEPHROLOGY- PROGRESS NOTE    83y Male with history of HTN presents with CP and SOB. Nephrology consulted for elevated Scr.    REVIEW OF SYSTEMS:  Gen: no fevers  Cards: no chest pain  Resp: no dyspnea  GI: no nausea or vomiting or diarrhea  Vascular: no LE edema    No Known Allergies      Hospital Medications: Medications reviewed      VITALS:  T(F): 98.6 (01-31-24 @ 05:30), Max: 98.6 (01-30-24 @ 17:30)  HR: 65 (01-31-24 @ 05:30)  BP: 168/74 (01-31-24 @ 05:30)  RR: 17 (01-31-24 @ 05:30)  SpO2: 98% (01-31-24 @ 05:30)  Wt(kg): --    01-30 @ 07:01  -  01-31 @ 07:00  --------------------------------------------------------  IN: 200 mL / OUT: 925 mL / NET: -725 mL        PHYSICAL EXAM:    Gen: NAD, calm  Cards: RRR, +S1/S2, no M/G/R  Resp: CTA B/L  GI: soft, NT/ND, NABS  : + condom catheter  Vascular: no LE edema B/L      LABS:  01-31    138  |  105  |  33<H>  ----------------------------<  76  4.3   |  21<L>  |  1.52<H>    Ca    8.6      31 Jan 2024 06:10  Phos  2.6     01-31  Mg     2.30     01-31      Creatinine Trend: 1.52 <--, 2.17 <--, 1.78 <--, 1.54 <--, 1.58 <--, 1.71 <--                        12.2   4.72  )-----------( 154      ( 31 Jan 2024 06:10 )             37.0     Urine Studies:  Urinalysis Basic - ( 31 Jan 2024 06:10 )    Color:  / Appearance:  / SG:  / pH:   Gluc: 76 mg/dL / Ketone:   / Bili:  / Urobili:    Blood:  / Protein:  / Nitrite:    Leuk Esterase:  / RBC:  / WBC    Sq Epi:  / Non Sq Epi:  / Bacteria:       Sodium, Random Urine: 108 mmol/L (01-31 @ 07:28)  Creatinine, Random Urine: 113 mg/dL (01-31 @ 07:28)  Protein/Creatinine Ratio Calculation: 0.2 Ratio (01-31 @ 07:28)

## 2024-01-31 NOTE — CONSULT NOTE ADULT - SUBJECTIVE AND OBJECTIVE BOX
CARDIOLOGY CONSULT - Dr. Lamas  Date of Service: 1/27/2024      HPI:  This is a 82 y/o M with pmhx of HTN, PVD, presented to the ED for new onset shortness of breath with exertion for 1 week. The patient went to urgent care because he was having chest tightness and shortness of breath with exertion. He had his blood pressure checked and it was elevated, and was sent to the ED for evaluation. He denies hx of cardiac disease. He also endorsed that he felt lightheaded when he sits up but never had LOC. Denies chest pain at this time. ROS otherwise negative. (27 Jan 2024 06:38)      PAST MEDICAL & SURGICAL HISTORY:  HTN (hypertension)      PAD (peripheral artery disease)      Hypercholesterolemia      Benign prostatic hyperplasia, unspecified whether lower urinary tract symptoms present      DVT, lower extremity  LLE      S/P angiogram of extremity  fem-pop stent of LLE      H/O peripheral artery bypass              PREVIOUS DIAGNOSTIC TESTING:    [ ] Echocardiogram:  [ ]  Catheterization:  [ ] Stress Test:  	    MEDICATIONS:  MEDICATIONS  (STANDING):  amLODIPine   Tablet 10 milliGRAM(s) Oral daily  heparin   Injectable 5000 Unit(s) SubCutaneous every 12 hours  simvastatin 20 milliGRAM(s) Oral at bedtime      FAMILY HISTORY:  No pertinent family history in first degree relatives        SOCIAL HISTORY:    [ ] Non-smoker  [ ] Smoker  [ ] Alcohol    Allergies    No Known Allergies    Intolerances    	    REVIEW OF SYSTEMS:  CONSTITUTIONAL: No fever, weight loss, or fatigue  EYES: No eye pain, visual disturbances, or discharge  ENMT:  No difficulty hearing, tinnitus, vertigo; No sinus or throat pain  NECK: No pain or stiffness  RESPIRATORY: No cough, wheezing, chills or hemoptysis; No Shortness of Breath  CARDIOVASCULAR: No chest pain, palpitations, passing out, dizziness, or leg swelling  GASTROINTESTINAL: No abdominal or epigastric pain. No nausea, vomiting, or hematemesis; No diarrhea or constipation. No melena or hematochezia.  GENITOURINARY: No dysuria, frequency, hematuria, or incontinence  NEUROLOGICAL: No headaches, memory loss, loss of strength, numbness, or tremors  SKIN: No itching, burning, rashes, or lesions   	    [ ] All others negative	  [ ] Unable to obtain    PHYSICAL EXAM:  T(C): 36.8 (01-27-24 @ 02:26), Max: 36.8 (01-26-24 @ 18:01)  HR: 62 (01-27-24 @ 09:26) (42 - 99)  BP: 164/92 (01-27-24 @ 09:26) (152/80 - 239/83)  RR: 16 (01-27-24 @ 02:26) (15 - 16)  SpO2: 98% (01-27-24 @ 02:26) (97% - 98%)  Wt(kg): --  I&O's Summary      Appearance: Normal	  Psychiatry: A & O x 3, Mood & affect appropriate  HEENT:   Normal oral mucosa, PERRL, EOMI	  Lymphatic: No lymphadenopathy  Cardiovascular: Normal S1 S2,RRR, No JVD, No murmurs  Respiratory: Lungs clear to auscultation	  Gastrointestinal:  Soft, Non-tender, + BS	  Skin: No rashes, No ecchymoses, No cyanosis	  Neurologic: Non-focal  Extremities: Normal range of motion, No clubbing, cyanosis or edema  Vascular: Peripheral pulses palpable 2+ bilaterally    TELEMETRY: 	    ECG:  	  RADIOLOGY:  OTHER: 	  	  LABS:	 	    CARDIAC MARKERS:                                  13.1   4.69  )-----------( 131      ( 26 Jan 2024 21:00 )             40.4     01-26    142  |  105  |  37<H>  ----------------------------<  91  4.8   |  26  |  1.71<H>    Ca    9.4      26 Jan 2024 21:00  Mg     2.30     01-26    TPro  7.1  /  Alb  3.9  /  TBili  0.5  /  DBili  x   /  AST  14  /  ALT  12  /  AlkPhos  62  01-26      proBNP:   Lipid Profile:   HgA1c:   TSH:     ASSESSMENT/PLAN: 	              70 minutes spent on total encounter; more than 50% of the visit was spent counseling and/or coordinating care by the attending physician.  
[facility] NEPHROLOGY- CONSULTATION NOTE    83y Male with history of below presents with CP and SOB. Nephrology consulted for elevated Scr.    Patient denies any prior history of renal disease, proteinuria or microscopic hematuria. Patient denies any h/o nephrolithiasis, hepatitis, HIV, IVDA, PRBC transfusion, tattoos, herbal medication use or chronic NSAID use. Patient on ramipril for h/o HTN.    Patient with h/o prostate CA s/p seeds/chemotherapy now in remission for which he follows with Urology.      REVIEW OF SYSTEMS:  Gen: no fevers  HEENT: no rhinorrhea  Neck: no sore throat  Cards: no chest pain  Resp: no dyspnea  GI: no nausea or vomiting or diarrhea  : no dysuria or hematuria  Vascular: no LE edema  Derm: no rashes  Neuro: no numbness/tingling    No Known Allergies      Home Medications Reviewed  Hospital Medications:   MEDICATIONS  (STANDING):  amLODIPine   Tablet 10 milliGRAM(s) Oral daily  heparin   Injectable 5000 Unit(s) SubCutaneous every 12 hours  simvastatin 20 milliGRAM(s) Oral at bedtime      PAST MEDICAL & SURGICAL HISTORY:  HTN (hypertension)      PAD (peripheral artery disease)      Hypercholesterolemia      Benign prostatic hyperplasia, unspecified whether lower urinary tract symptoms present      DVT, lower extremity  LLE      S/P angiogram of extremity  fem-pop stent of LLE      H/O peripheral artery bypass          FAMILY HISTORY:  No pertinent family history in first degree relatives        SOCIAL HISTORY:  Denies toxic substance use     VITALS:  T(F): 97.9 (01-28-24 @ 00:00), Max: 97.9 (01-28-24 @ 00:00)  HR: 84 (01-28-24 @ 05:00)  BP: 141/86 (01-28-24 @ 05:00)  RR: 20 (01-28-24 @ 05:00)  SpO2: 99% (01-28-24 @ 05:00)  Wt(kg): --        PHYSICAL EXAM:  Gen: NAD, calm  HEENT: MMM  Neck: no JVD  Cards: RRR, +S1/S2, no M/G/R  Resp: CTA B/L  GI: soft, NT/ND, NABS  : no CVA tenderness  Vascular: no LE edema B/L  Derm: no rashes  Neuro: non-focal    LABS:  01-28    140  |  103  |  32<H>  ----------------------------<  89  4.6   |  25  |  1.54<H>    Ca    9.5      28 Jan 2024 06:11  Phos  3.8     01-28  Mg     2.40     01-28    TPro  7.2  /  Alb  3.9  /  TBili  0.4  /  DBili      /  AST  14  /  ALT  12  /  AlkPhos  67  01-27    Creatinine Trend: 1.54 <--, 1.58 <--, 1.71 <--                        14.0   3.60  )-----------( 162      ( 28 Jan 2024 06:11 )             42.0     Urine Studies:  Urinalysis Basic - ( 28 Jan 2024 06:11 )    Color:  / Appearance:  / SG:  / pH:   Gluc: 89 mg/dL / Ketone:   / Bili:  / Urobili:    Blood:  / Protein:  / Nitrite:    Leuk Esterase:  / RBC:  / WBC    Sq Epi:  / Non Sq Epi:  / Bacteria:           RADIOLOGY & ADDITIONAL STUDIES:    < from: Xray Chest 1 View- PORTABLE-Urgent (01.26.24 @ 21:41) >  IMPRESSION:    Trace left linear atelectasis without focal consolidations.    --- End of Report ---    < end of copied text >  
    Patient is a 83y old  Male who presents with a chief complaint of chest tightness      HPI:     82 y/o M with pmhx of HTN, PVD, presented to the ED for new onset shortness of breath with exertion for 1 week. The patient went to urgent care because he was having chest tightness and shortness of breath with exertion. He had his blood pressure checked and it was elevated, and was sent to the ED for evaluation. He denies hx of cardiac disease. He also endorsed that he felt lightheaded when he sits up but never had LOC. Denies chest pain at this time. I feel fine now..       PAST MEDICAL & SURGICAL HISTORY:  HTN (hypertension)      PAD (peripheral artery disease)      Hypercholesterolemia      Benign prostatic hyperplasia, unspecified whether lower urinary tract symptoms present      DVT, lower extremity  LLE      S/P angiogram of extremity  fem-pop stent of LLE      H/O peripheral artery bypass          Social History: No smoking etc,    FAMILY HISTORY:  No pertinent family history in first degree relatives        Allergies    No Known Allergies    Intolerances        REVIEW OF SYSTEMS:    CONSTITUTIONAL: No fever, weight loss, or fatigue  EYES: No eye pain, visual disturbances, or discharge  RESPIRATORY: No cough, wheezing, chills or hemoptysis; No shortness of breath  CARDIOVASCULAR: No chest pain, palpitations, dizziness, or leg swelling  GASTROINTESTINAL: No abdominal or epigastric pain. No nausea, vomiting, or hematemesis; No diarrhea or constipation. No melena or hematochezia.  GENITOURINARY: No dysuria, frequency, hematuria, or incontinence  NEUROLOGICAL: No headaches, memory loss, loss of strength, numbness, or tremors  SKIN: No itching, burning, rashes, or lesions   ENDOCRINE: No heat or cold intolerance; No hair loss  MUSCULOSKELETAL: No joint pain or swelling; No muscle, back, or extremity pain  PSYCHIATRIC: No depression, anxiety, mood swings, or difficulty sleeping      MEDICATIONS  (STANDING):  amLODIPine   Tablet 10 milliGRAM(s) Oral daily  heparin   Injectable 5000 Unit(s) SubCutaneous every 12 hours  simvastatin 20 milliGRAM(s) Oral at bedtime    MEDICATIONS  (PRN):  acetaminophen     Tablet .. 650 milliGRAM(s) Oral every 6 hours PRN Temp greater or equal to 38C (100.4F), Mild Pain (1 - 3)  aluminum hydroxide/magnesium hydroxide/simethicone Suspension 30 milliLiter(s) Oral every 4 hours PRN Dyspepsia  melatonin 3 milliGRAM(s) Oral at bedtime PRN Insomnia  ondansetron Injectable 4 milliGRAM(s) IV Push every 8 hours PRN Nausea and/or Vomiting      Vital Signs Last 24 Hrs  T(C): 36.6 (28 Jan 2024 13:09), Max: 36.6 (27 Jan 2024 18:57)  T(F): 97.8 (28 Jan 2024 13:09), Max: 97.9 (28 Jan 2024 00:00)  HR: 106 (28 Jan 2024 13:09) (84 - 106)  BP: 166/85 (28 Jan 2024 13:09) (141/86 - 166/85)  BP(mean): --  RR: 19 (28 Jan 2024 13:09) (18 - 20)  SpO2: 98% (28 Jan 2024 13:09) (98% - 99%)    Parameters below as of 28 Jan 2024 13:09  Patient On (Oxygen Delivery Method): room air        PHYSICAL EXAM:    GENERAL: NAD, well-groomed, well-developed  HEAD:  Atraumatic, Normocephalic  EYES: EOMI, PERRLA, conjunctiva and sclera clear  ENMT: No tonsillar erythema, exudates, or enlargement; Moist mucous membranes, Good dentition, No lesions  NECK: Supple, No JVD, Normal thyroid  NERVOUS SYSTEM:  Alert & Oriented X3, No new  focal sign .  CHEST/LUNG: Air entry good bilaterally; No rales, rhonchi, wheezing, or rubs  HEART: Regular rate and rhythm; No murmurs, rubs, or gallops  ABDOMEN: Soft, Nontender, Nondistended; Bowel sounds present  EXTREMITIES:  2+ Peripheral Pulses, No clubbing, cyanosis, or edema    LABS:                        14.0   3.60  )-----------( 162      ( 28 Jan 2024 06:11 )             42.0     01-28    140  |  103  |  32<H>  ----------------------------<  89  4.6   |  25  |  1.54<H>    Ca    9.5      28 Jan 2024 06:11  Phos  3.8     01-28  Mg     2.40     01-28    TPro  7.2  /  Alb  3.9  /  TBili  0.4  /  DBili  x   /  AST  14  /  ALT  12  /  AlkPhos  67  01-27    PT/INR - ( 27 Jan 2024 10:21 )   PT: 13.4 sec;   INR: 1.19 ratio         PTT - ( 27 Jan 2024 10:21 )  PTT:40.0 sec  Urinalysis Basic - ( 28 Jan 2024 06:11 )    Color: x / Appearance: x / SG: x / pH: x  Gluc: 89 mg/dL / Ketone: x  / Bili: x / Urobili: x   Blood: x / Protein: x / Nitrite: x   Leuk Esterase: x / RBC: x / WBC x   Sq Epi: x / Non Sq Epi: x / Bacteria: x          RADIOLOGY & ADDITIONAL STUDIES:    
Surgery Consult Note  Attending: Anshul  Service: Vascular  [id number]      HPI: 83y Male with PMH CHF, PAD s/p L external iliac artery to profunda femoris bypass with PTFE, thrombectomy of previous left femoral-popliteal bypass graft, and left external iliac artery angio, with patent peroneal and collateralization of AT/PT in 2019 with Dr. Landers presented to [facility] for SOB for 1 week found to have abnormal nuclear stress test now s/p LHC today with cardiology. Patient reported to be having LLE pain, but on evaluation patient denied any pain, minor cold feeling in the left leg since his last surgery. previous 10 pack year smoker. Denies claudication, changes in sensation or paresthesias. Vascular surgery consulted for abnormal woody/pvr.       PAST MEDICAL HISTORY:  PAST MEDICAL & SURGICAL HISTORY:  HTN (hypertension)      PAD (peripheral artery disease)      Hypercholesterolemia      Benign prostatic hyperplasia, unspecified whether lower urinary tract symptoms present      DVT, lower extremity  LLE      S/P angiogram of extremity  fem-pop stent of LLE      H/O peripheral artery bypass          ALLERGIES:  Allergies    No Known Allergies    Intolerances        SOCIAL HISTORY: Negative for tobacco, etoh, or drug use    FAMILY HISTORY:  FAMILY HISTORY:  No pertinent family history in first degree relatives        PHYSICAL EXAM:  General: NAD, resting comfortably  HEENT: NC/AT, EOMI, normal hearing, no oral lesions, no LAD, neck supple  Pulmonary: normal resp effort, CTA-B  Cardiovascular: NSR, no murmurs  Abdominal: soft, ND/NT, no organomegaly, no guarding or rebound tenderness  Extremities: WWP, normal strength, no clubbing/cyanosis/edema  Neuro: A/O x 3, CNs II-XII grossly intact, normal sensation, no focal deficits    Vascular Pulse Exam:  Right Femoral:  Palpable       Left Femoral:  Palpable  Right Popliteal:  Palpable      Left Popliteal:  signal  Right DP:  signal                Left DP:  none  Right PT:  palpable           Left PT:  signal    VITAL SIGNS:  Vital Signs Last 24 Hrs  T(C): 37 (31 Jan 2024 05:30), Max: 37 (30 Jan 2024 17:30)  T(F): 98.6 (31 Jan 2024 05:30), Max: 98.6 (30 Jan 2024 17:30)  HR: 65 (31 Jan 2024 05:30) (53 - 65)  BP: 168/74 (31 Jan 2024 05:30) (137/68 - 189/60)  BP(mean): --  RR: 17 (31 Jan 2024 05:30) (17 - 17)  SpO2: 98% (31 Jan 2024 05:30) (98% - 99%)    Parameters below as of 31 Jan 2024 05:30  Patient On (Oxygen Delivery Method): room air        I&O's Summary    30 Jan 2024 07:01  -  31 Jan 2024 07:00  --------------------------------------------------------  IN: 200 mL / OUT: 925 mL / NET: -725 mL        LABS:                        12.2   4.72  )-----------( 154      ( 31 Jan 2024 06:10 )             37.0     01-31    138  |  105  |  33<H>  ----------------------------<  76  4.3   |  21<L>  |  1.52<H>    Ca    8.6      31 Jan 2024 06:10  Phos  2.6     01-31  Mg     2.30     01-31        Urinalysis Basic - ( 31 Jan 2024 06:10 )    Color: x / Appearance: x / SG: x / pH: x  Gluc: 76 mg/dL / Ketone: x  / Bili: x / Urobili: x   Blood: x / Protein: x / Nitrite: x   Leuk Esterase: x / RBC: x / WBC x   Sq Epi: x / Non Sq Epi: x / Bacteria: x      CAPILLARY BLOOD GLUCOSE            CULTURES:      RADIOLOGY & ADDITIONAL STUDIES:    CONCLUSIONS:      1. Right: Right WOODY is normal (1.12). Right TBI is mildly decreased (0.60) with a toe pressure of 78 mmHg. Findings suggest the presence of mild small vessel arterial disease in the right foot.   2. Left: Left WOODY is severely decreased (0.47). Left TBI is severely decreased (0.17) with a toe pressure of 22 mmHg. Findings suggest the presence of multi-segment arterial disease in the left lower extremity, including the femoropopliteal segment.

## 2024-01-31 NOTE — PROGRESS NOTE ADULT - ASSESSMENT
This is a 84 y/o M with pmhx of HTN, PVD, presented to the ED for new onset shortness of breath with exertion for 1 week    #Dyspnea/Chest discomfort  -in setting of uncontrolled HTN  -trops negative  -no significant findings on CXR  -Echo with mod AR, nl LV sys fx    -pharm nuclear stress noted;  During adenosine infusion Atrial tachycardia converted to sinus rhythm with frequent PACs in pattern of bigeminy; LV moderately decreased in function ; EF 40% ;left ventricular wall motion hypokinesis.  -plan for cardiac cath today- r/b/a d/w pt = pt agreed to proceed  -c/w asa     #HTN  -cont meds     #Bradycardia  -intermittent sinus katey on tele  -cont to monitor  -defer AVNB    #PAD  -sheyla/pvr abnl in left leg  please call vasc consult  -med fu     #[place]   -improving  -renal f/u    35 minutes spent on total encounter; more than 50% of the visit was spent counseling and/or coordinating care by the attending physician.

## 2024-01-31 NOTE — PROGRESS NOTE ADULT - ASSESSMENT
82 y/o M with pmhx of HTN, PVD, presented to the ED for new onset shortness of breath with exertion for 1 week. The patient went to urgent care because he was having chest tightness and shortness of breath with exertion. He had his blood pressure checked and it was elevated, and was sent to the ED for evaluation. He denies hx of cardiac disease. He also endorsed that he felt lightheaded when he sits up but never had LOC. Denies chest pain at this time. I feel fine now..        Problem/Plan - 1:  ·  Problem: Dyspnea with LV dysfunction    ·  Plan: - Awaiting cardiac cath.   patient presented for new onset chest tightness and SOB with exertion  - BNP 2471  < from: Nuclear Stress Test-Pharmacologic.. (01.29.24 @ 07:02) >  Conclusions:   1. Baseline electrocardiogram: atrial tachycardia at a rate of 104 bpm with T wave inversion leads I, II, III, aVF, aVR, V3-V4.   2. During adenosine infusion Atrial tachycardia converted to sinus rhythm with frequent PACs in pattern of bigeminy   3. Occasional APD's occurred during stress and recovery, increased with stress. Single VPD's occurred during recovery.   4. The left ventricle ismoderately decreased in function and normal in size. The resting left ventricular EF% is 40 %. The resting end diastolic volume is 90 ml and systolic volume is 54 ml. The left ventricular EF% during stress is 39 %. The stress end diastolic volume is 115 ml and systolic volume is 70 ml.   5. Diffuse left ventricular wall motion hypokinesis.   6. Normal right ventricular function. There is no right ventricular dilation.    < end of copied text >    TT Enhancing Agent (01.27.24 @ 14:23) >  CONCLUSIONS:      1. Left ventricular cavity is normal. Left ventricular systolic function is normal with an ejection fraction of 59 % by Thakur's method of disks. There are no regional wall motion abnormalities seen.   2. Moderate aortic regurgitation.    < end of copied text >       Problem/Plan - 2:  ·  Problem: Chest Pain .   ·  Plan: - troponins stable  - EKG no ST changes noted  - TTE noted . NST abnormal so awaiting cardiac cath.   - cardiology help appreciated.      Problem/Plan - 3:  ·  Problem: CKD stage 3.   ·  Plan: - Trending BMP   - nephrology helping.      Problem/Plan - 4:  ·  Problem: Benign essential HTN.   ·  Plan: - start amlodipine for now  - Continue home meds.     Problem/Plan - 5:  ·  Problem: PVD.   ·  Plan: - Vascular helping.

## 2024-01-31 NOTE — CONSULT NOTE ADULT - ASSESSMENT
83y Male with PMH CHF, PAD s/p L external iliac artery to profunda femoris bypass with PTFE, thrombectomy of previous left femoral-popliteal bypass graft, and left external iliac artery angio, with patent peroneal and collateralization of AT/PT in 2019 with Dr. Landers presented to [facility] for SOB for 1 week found to have abnormal nuclear stress test now s/p LHC today with cardiology. Patient reported to be having LLE pain, but on evaluation patient denied any pain, minor cold feeling in the left leg since his last surgery. Exam with no signal in left DP, L PT and popliteal with signals, femoral palpable. WOODY/PVR with L toe pressure 22 and TBI .17. Vascular Surgery consulted for abnormal WOODY/PVR.    Plan:  - to be discussed with vascular fellow  83y Male with PMH CHF, PAD s/p L external iliac artery to profunda femoris bypass with PTFE, thrombectomy of previous left femoral-popliteal bypass graft, and left external iliac artery angio, with patent peroneal and collateralization of AT/PT in 2019 with Dr. Landers presented to [facility] for SOB for 1 week found to have abnormal nuclear stress test now s/p LHC today with cardiology. Patient reported to be having LLE pain, but on evaluation patient denied any pain, minor cold feeling in the left leg since his last surgery. Exam with no signal in left DP, L PT and popliteal with signals, femoral palpable. WOODY/PVR with L toe pressure 22 and TBI .17. Vascular Surgery consulted for abnormal WOODY/PVR.    Plan:  - no acute intervention, low concern for acute limb ischemia at this time  - c/w ASA and statin  - can obtain arterial duplex of LLE to evaluate bypass  - vascular surgery to follow    Discussed with vascular surgery fellow on behalf of Dr. Landers    Vascular Surgery #[id number]  83y Male with PMH CHF, PAD s/p L external iliac artery to profunda femoris bypass with PTFE, thrombectomy of previous left femoral-popliteal bypass graft, and left external iliac artery angio, with patent peroneal and collateralization of AT/PT in 2019 with Dr. Landers presented to [facility] for SOB for 1 week found to have abnormal nuclear stress test now s/p LHC today with cardiology. Patient reported to be having LLE pain, but on evaluation patient denied any pain, minor cold feeling in the left leg since his last surgery. Exam with no signal in left DP, L PT and popliteal with signals, femoral palpable. WOODY/PVR with L toe pressure 22 and TBI .17. Vascular Surgery consulted for abnormal WOODY/PVR.    Plan:  - no acute intervention, low concern for acute limb ischemia at this time  - c/w ASA and statin  - vascular surgery to follow    Discussed with vascular surgery fellow on behalf of Dr. Landers    Vascular Surgery #[id number]  83y Male with PMH CHF, PAD s/p L external iliac artery to profunda femoris bypass with PTFE, thrombectomy of previous left femoral-popliteal bypass graft, and left external iliac artery angio, with patent peroneal and collateralization of AT/PT in 2019 with Dr. Landers presented to [facility] for SOB for 1 week found to have abnormal nuclear stress test now s/p LHC today with cardiology. Patient reported to be having LLE pain, but on evaluation patient denied any pain, minor cold feeling in the left leg since his last surgery. Exam with no signal in left DP, L PT and popliteal with signals, femoral palpable. WOODY/PVR with L toe pressure 22 and TBI .17. Vascular Surgery consulted for abnormal WOODY/PVR.    Plan:  - no acute intervention, low concern for acute limb ischemia at this time.  - c/w ASA and statin.  - Follow-up with Dr. Landers outpatient in his office.    Discussed with vascular surgery fellow on behalf of Dr. Landers.    Vascular Surgery   [id number]

## 2024-01-31 NOTE — PROGRESS NOTE ADULT - SUBJECTIVE AND OBJECTIVE BOX
CARDIOLOGY FOLLOW UP - Dr. Lamas  Date of Service: 1/31/23  CC: awaiting cath    Review of Systems:  Constitutional: No fever, weight loss, or fatigue  Respiratory: No cough, wheezing, or hemoptysis, no shortness of breath  Cardiovascular: No chest pain, palpitations, passing out, dizziness, or leg swelling  Gastrointestinal: No abd or epigastric pain. No nausea, vomiting, or hematemesis; no diarrhea or consiptaiton, no melena or hematochezia  Vascular: No edema     TELEMETRY:    PHYSICAL EXAM:  T(C): 37 (01-31-24 @ 05:30), Max: 37 (01-30-24 @ 17:30)  HR: 65 (01-31-24 @ 05:30) (53 - 65)  BP: 168/74 (01-31-24 @ 05:30) (137/68 - 189/60)  RR: 17 (01-31-24 @ 05:30) (17 - 17)  SpO2: 98% (01-31-24 @ 05:30) (98% - 99%)  Wt(kg): --  I&O's Summary    30 Jan 2024 07:01  -  31 Jan 2024 07:00  --------------------------------------------------------  IN: 200 mL / OUT: 925 mL / NET: -725 mL        Appearance: Normal	  Cardiovascular: Normal S1 S2,RRR, No JVD, No murmurs  Respiratory: Lungs clear to auscultation	  Gastrointestinal:  Soft, Non-tender, + BS	  Extremities: Normal range of motion, No clubbing, cyanosis or edema  Vascular: Peripheral pulses palpable 2+ bilaterally       Home Medications:  D3 25 mcg (1000 intl units) oral tablet: 2 tab(s) orally once a day (27 Jan 2024 04:47)  ramipril 10 mg oral tablet: 1 tab(s) orally once a day (27 Jan 2024 04:47)  simvastatin 20 mg oral tablet: 1 tab(s) orally once a day (27 Jan 2024 04:47)  valACYclovir 500 mg oral tablet: 1 tab(s) orally once a day (27 Jan 2024 04:47)        MEDICATIONS  (STANDING):  acetylcysteine  Oral Solution 1200 milliGRAM(s) Oral every 12 hours  amLODIPine   Tablet 10 milliGRAM(s) Oral daily  aspirin enteric coated 81 milliGRAM(s) Oral daily  heparin   Injectable 5000 Unit(s) SubCutaneous every 12 hours  hydrALAZINE 25 milliGRAM(s) Oral three times a day  simvastatin 20 milliGRAM(s) Oral at bedtime  sodium chloride 0.9%. 1000 milliLiter(s) (50 mL/Hr) IV Continuous <Continuous>        EKG:  RADIOLOGY:  DIAGNOSTIC TESTING:  [ ] Echocardiogram:  [ ] Catherterization:  [ ] Stress Test:  OTHER:     LABS:	 	                          12.2   4.72  )-----------( 154      ( 31 Jan 2024 06:10 )             37.0     01-31    138  |  105  |  33<H>  ----------------------------<  76  4.3   |  21<L>  |  1.52<H>    Ca    8.6      31 Jan 2024 06:10  Phos  2.6     01-31  Mg     2.30     01-31            CARDIAC MARKERS:

## 2024-01-31 NOTE — PROGRESS NOTE ADULT - SUBJECTIVE AND OBJECTIVE BOX
Date of Service  : 01-31-24     INTERVAL HPI/OVERNIGHT EVENTS: I feel fine. My left leg sometimes feels cold.   Vital Signs Last 24 Hrs  T(C): 36.7 (31 Jan 2024 15:30), Max: 37 (30 Jan 2024 21:38)  T(F): 98 (31 Jan 2024 15:30), Max: 98.6 (30 Jan 2024 21:38)  HR: 53 (31 Jan 2024 15:30) (53 - 65)  BP: 177/72 (31 Jan 2024 15:30) (137/68 - 177/72)  BP(mean): --  RR: 17 (31 Jan 2024 15:30) (17 - 17)  SpO2: 98% (31 Jan 2024 15:30) (98% - 98%)    Parameters below as of 31 Jan 2024 15:30  Patient On (Oxygen Delivery Method): room air      I&O's Summary    30 Jan 2024 07:01  -  31 Jan 2024 07:00  --------------------------------------------------------  IN: 200 mL / OUT: 925 mL / NET: -725 mL      MEDICATIONS  (STANDING):  acetylcysteine  Oral Solution 1200 milliGRAM(s) Oral every 12 hours  amLODIPine   Tablet 10 milliGRAM(s) Oral daily  aspirin enteric coated 81 milliGRAM(s) Oral daily  heparin   Injectable 5000 Unit(s) SubCutaneous every 12 hours  hydrALAZINE 25 milliGRAM(s) Oral three times a day  simvastatin 20 milliGRAM(s) Oral at bedtime  sodium chloride 0.9%. 1000 milliLiter(s) (50 mL/Hr) IV Continuous <Continuous>    MEDICATIONS  (PRN):  acetaminophen     Tablet .. 650 milliGRAM(s) Oral every 6 hours PRN Temp greater or equal to 38C (100.4F), Mild Pain (1 - 3)  aluminum hydroxide/magnesium hydroxide/simethicone Suspension 30 milliLiter(s) Oral every 4 hours PRN Dyspepsia  melatonin 3 milliGRAM(s) Oral at bedtime PRN Insomnia  ondansetron Injectable 4 milliGRAM(s) IV Push every 8 hours PRN Nausea and/or Vomiting    LABS:                        12.2   4.72  )-----------( 154      ( 31 Jan 2024 06:10 )             37.0     01-31    138  |  105  |  33<H>  ----------------------------<  76  4.3   |  21<L>  |  1.52<H>    Ca    8.6      31 Jan 2024 06:10  Phos  2.6     01-31  Mg     2.30     01-31        Urinalysis Basic - ( 31 Jan 2024 06:10 )    Color: x / Appearance: x / SG: x / pH: x  Gluc: 76 mg/dL / Ketone: x  / Bili: x / Urobili: x   Blood: x / Protein: x / Nitrite: x   Leuk Esterase: x / RBC: x / WBC x   Sq Epi: x / Non Sq Epi: x / Bacteria: x      CAPILLARY BLOOD GLUCOSE            Urinalysis Basic - ( 31 Jan 2024 06:10 )    Color: x / Appearance: x / SG: x / pH: x  Gluc: 76 mg/dL / Ketone: x  / Bili: x / Urobili: x   Blood: x / Protein: x / Nitrite: x   Leuk Esterase: x / RBC: x / WBC x   Sq Epi: x / Non Sq Epi: x / Bacteria: x      REVIEW OF SYSTEMS:  CONSTITUTIONAL: No fever, weight loss, or fatigue  EYES: No eye pain, visual disturbances, or discharge  ENMT:  No difficulty hearing, tinnitus, vertigo; No sinus or throat pain  NECK: No pain or stiffness  RESPIRATORY: No cough, wheezing, chills or hemoptysis; No shortness of breath  CARDIOVASCULAR: No chest pain, palpitations, dizziness, or leg swelling  GASTROINTESTINAL: No abdominal or epigastric pain. No nausea, vomiting, or hematemesis; No diarrhea or constipation. No melena or hematochezia.  GENITOURINARY: No dysuria, frequency, hematuria, or incontinence  NEUROLOGICAL: No headaches, memory loss, loss of strength, numbness, or tremors    Consultant(s) Notes Reviewed:  [x ] YES  [ ] NO    PHYSICAL EXAM:  GENERAL: NAD, well-groomed, well-developed,not in any distress ,  HEAD:  Atraumatic, Normocephalic  EYES: EOMI, PERRLA, conjunctiva and sclera clear  ENMT: No tonsillar erythema, exudates, or enlargement; Moist mucous membranes, Good dentition, No lesions  NECK: Supple, No JVD, Normal thyroid  NERVOUS SYSTEM:  Alert & Oriented X3, No focal deficit   CHEST/LUNG: Good air entry bilateral with no  rales, rhonchi, wheezing, or rubs  HEART: Regular rate and rhythm; No murmurs, rubs, or gallops  ABDOMEN: Soft, Nontender, Nondistended; Bowel sounds present  EXTREMITIES:   No clubbing, cyanosis, or edema    Care Discussed with Consultants/Other Providers [ x] YES  [ ] NO

## 2024-01-31 NOTE — CONSULT NOTE ADULT - NSCONSULTADDITIONALINFOA_GEN_ALL_CORE
Patient denies rest pain, claudication, or wound. WOODY .41 in office in 2019. Can continue ASA. No acute vascular intervention. Needs to follow up in the office with Dr. Landers for outpatient bypass surveillance.    D/W Dr. Landers

## 2024-01-31 NOTE — PROGRESS NOTE ADULT - ASSESSMENT
83y Male with history of HTN presents with CP and SOB. Nephrology consulted for elevated Scr.    1) DAX: likely hemodynamically mediated in setting of recently restarted ARB and hypovolemia. Scr improved s/p IVF. UA bland. FeNa low. Bladder scan negative. Avoid nephrotoxins.    2) CKD-3b: Patient with h/o CKD as Scr noted to be 1.5 as per labs in 2019. UA bland with mild proteinuria (spot urine TP/CR 0.2). Renal US with bilateral renal cysts. Monitor electrolytes.    3) HTN with CKD: BP uncontrolled for which patient started on Hydralazine. Holding losartan given plans for [facility] today. Monitor BP.    4) CP: As per cardiology.    No renal objection to proceed with cardiac cath today. Would continue with mucomyst as ordered and start NS @ 1 ml/kg/hour (50 ml/hour) 1 hour prior to cath and continue for 6 hours post-cath.    [facility] NEPHROLOGY  Will Jackson M.D.  Norberto Shane D.O.  Gina Aviles M.D.  MD Blank Waldron, MSN, ANP-C    Telephone: [phone]  Facsimile: [phone] [place] [address], [address]  [place] [address]

## 2024-02-01 ENCOUNTER — TRANSCRIPTION ENCOUNTER (OUTPATIENT)
Age: 84
End: 2024-02-01

## 2024-02-01 LAB
ANION GAP SERPL CALC-SCNC: 10 MMOL/L — SIGNIFICANT CHANGE UP (ref 7–14)
BUN SERPL-MCNC: 33 MG/DL — HIGH (ref 7–23)
CALCIUM SERPL-MCNC: 8.9 MG/DL — SIGNIFICANT CHANGE UP (ref 8.4–10.5)
CHLORIDE SERPL-SCNC: 106 MMOL/L — SIGNIFICANT CHANGE UP (ref 98–107)
CO2 SERPL-SCNC: 22 MMOL/L — SIGNIFICANT CHANGE UP (ref 22–31)
CREAT SERPL-MCNC: 1.45 MG/DL — HIGH (ref 0.5–1.3)
EGFR: 47 ML/MIN/1.73M2 — LOW
GLUCOSE SERPL-MCNC: 75 MG/DL — SIGNIFICANT CHANGE UP (ref 70–99)
HCT VFR BLD CALC: 37.6 % — LOW (ref 39–50)
HGB BLD-MCNC: 12.5 G/DL — LOW (ref 13–17)
MAGNESIUM SERPL-MCNC: 2.2 MG/DL — SIGNIFICANT CHANGE UP (ref 1.6–2.6)
MCHC RBC-ENTMCNC: 29.7 PG — SIGNIFICANT CHANGE UP (ref 27–34)
MCHC RBC-ENTMCNC: 33.2 GM/DL — SIGNIFICANT CHANGE UP (ref 32–36)
MCV RBC AUTO: 89.3 FL — SIGNIFICANT CHANGE UP (ref 80–100)
NRBC # BLD: 0 /100 WBCS — SIGNIFICANT CHANGE UP (ref 0–0)
NRBC # FLD: 0 K/UL — SIGNIFICANT CHANGE UP (ref 0–0)
PHOSPHATE SERPL-MCNC: 2.7 MG/DL — SIGNIFICANT CHANGE UP (ref 2.5–4.5)
PLATELET # BLD AUTO: 150 K/UL — SIGNIFICANT CHANGE UP (ref 150–400)
POTASSIUM SERPL-MCNC: 4.3 MMOL/L — SIGNIFICANT CHANGE UP (ref 3.5–5.3)
POTASSIUM SERPL-SCNC: 4.3 MMOL/L — SIGNIFICANT CHANGE UP (ref 3.5–5.3)
RBC # BLD: 4.21 M/UL — SIGNIFICANT CHANGE UP (ref 4.2–5.8)
RBC # FLD: 14.8 % — HIGH (ref 10.3–14.5)
SODIUM SERPL-SCNC: 138 MMOL/L — SIGNIFICANT CHANGE UP (ref 135–145)
WBC # BLD: 4.64 K/UL — SIGNIFICANT CHANGE UP (ref 3.8–10.5)
WBC # FLD AUTO: 4.64 K/UL — SIGNIFICANT CHANGE UP (ref 3.8–10.5)

## 2024-02-01 PROCEDURE — 93926 LOWER EXTREMITY STUDY: CPT | Mod: 26,LT

## 2024-02-01 RX ORDER — TAMSULOSIN HYDROCHLORIDE 0.4 MG/1
0.4 CAPSULE ORAL AT BEDTIME
Refills: 0 | Status: DISCONTINUED | OUTPATIENT
Start: 2024-02-01 | End: 2024-02-02

## 2024-02-01 RX ORDER — LOSARTAN POTASSIUM 100 MG/1
25 TABLET, FILM COATED ORAL AT BEDTIME
Refills: 0 | Status: DISCONTINUED | OUTPATIENT
Start: 2024-02-01 | End: 2024-02-02

## 2024-02-01 RX ADMIN — SIMVASTATIN 20 MILLIGRAM(S): 20 TABLET, FILM COATED ORAL at 21:37

## 2024-02-01 RX ADMIN — AMLODIPINE BESYLATE 10 MILLIGRAM(S): 2.5 TABLET ORAL at 04:50

## 2024-02-01 RX ADMIN — Medication 25 MILLIGRAM(S): at 15:20

## 2024-02-01 RX ADMIN — Medication 25 MILLIGRAM(S): at 21:37

## 2024-02-01 RX ADMIN — Medication 81 MILLIGRAM(S): at 15:21

## 2024-02-01 RX ADMIN — LOSARTAN POTASSIUM 25 MILLIGRAM(S): 100 TABLET, FILM COATED ORAL at 21:41

## 2024-02-01 RX ADMIN — TAMSULOSIN HYDROCHLORIDE 0.4 MILLIGRAM(S): 0.4 CAPSULE ORAL at 21:41

## 2024-02-01 RX ADMIN — HEPARIN SODIUM 5000 UNIT(S): 5000 INJECTION INTRAVENOUS; SUBCUTANEOUS at 15:20

## 2024-02-01 RX ADMIN — HEPARIN SODIUM 5000 UNIT(S): 5000 INJECTION INTRAVENOUS; SUBCUTANEOUS at 04:49

## 2024-02-01 RX ADMIN — Medication 1200 MILLIGRAM(S): at 07:04

## 2024-02-01 RX ADMIN — Medication 25 MILLIGRAM(S): at 04:49

## 2024-02-01 NOTE — DISCHARGE NOTE PROVIDER - DETAILS OF MALNUTRITION DIAGNOSIS/DIAGNOSES
This patient has been assessed with a concern for Malnutrition and was treated during this hospitalization for the following Nutrition diagnosis/diagnoses:     -  02/01/2024: Moderate protein-calorie malnutrition   -  02/01/2024: Underweight (BMI < 19)

## 2024-02-01 NOTE — DISCHARGE NOTE PROVIDER - CARE PROVIDER_API CALL
Norberto Shanemiethel  Nephrology  [address] [place] [address], [address]  [place] [address]  Phone: [phone]  Fax: [phone]  Follow Up Time: 1 month   Norberto Shane  Nephrology  [address], [address]  [place] [address]  Phone: [phone]  Fax: [phone]  Follow Up Time: 1 month    Reyes Lamas  Cardiovascular Disease  [address] [facility], [address]  [place] [address]  Phone: [phone]  Fax: [phone]  Follow Up Time: 2 weeks    Mack Landers)  Vascular Surgery  [address] [facility], [address]  [place] [address]  Phone: [phone]  Fax: [phone]  Follow Up Time: 2 weeks   Norberto Shane  Nephrology  [address], [address]  [place] [address]  Phone: [phone]  Fax: [phone]  Follow Up Time: 1 month    Reyes Lamas  Cardiovascular Disease  [address] [facility], [address]  [place] [address]  Phone: [phone]  Fax: [phone]  Follow Up Time: 2 weeks    Mack Landers)  Vascular Surgery  [address] [facility], [address]  [place] [address]  Phone: [phone]  Fax: [phone]  Follow Up Time: 2 weeks    Pete Ruth  Pulmonary Disease  [address] [place] [address]  Phone: [phone]  Fax: [phone]  Follow Up Time:

## 2024-02-01 NOTE — DISCHARGE NOTE PROVIDER - CARE PROVIDERS DIRECT ADDRESSES
,DirectAddress_Unknown ,DirectAddress_Unknown,[email or web address] ,[email or web address],[email or web address],DirectAddress_Unknown

## 2024-02-01 NOTE — DISCHARGE NOTE PROVIDER - NSFOLLOWUPCLINICS_GEN_ALL_ED_FT
[facility]  General Surgery  [address] [place] [address] - [address]rd [place] [address]  Phone: [phone]  Fax:      [facility]  General Surgery  [address] [place] [address] - 3rd [place] [address]  Phone: [phone]  Fax:     [facility] Internal Medicine  General Internal Medicine  [address] [place] [address]  Phone: [phone]  Fax:

## 2024-02-01 NOTE — PROGRESS NOTE ADULT - SUBJECTIVE AND OBJECTIVE BOX
[facility] NEPHROLOGY- PROGRESS NOTE    83y Male with history of HTN presents with CP and SOB. Nephrology consulted for elevated Scr.    REVIEW OF SYSTEMS:  Gen: no fevers  Cards: no chest pain  Resp: no dyspnea  GI: no nausea or vomiting or diarrhea  : + urinary incontinence  Vascular: no LE edema    No Known Allergies      Hospital Medications: Medications reviewed      VITALS:  T(F): 98.7 (02-01-24 @ 04:45), Max: 98.7 (02-01-24 @ 04:45)  HR: 60 (02-01-24 @ 04:45)  BP: 150/50 (01-31-24 @ 22:20)  RR: 17 (02-01-24 @ 04:45)  SpO2: 99% (02-01-24 @ 04:45)  Wt(kg): --        PHYSICAL EXAM:    Gen: NAD, calm  Cards: RRR, +S1/S2, no M/G/R  Resp: CTA B/L  GI: soft, NT/ND, NABS  Vascular: no LE edema B/L      LABS:  02-01    138  |  106  |  33<H>  ----------------------------<  75  4.3   |  22  |  1.45<H>    Ca    8.9      01 Feb 2024 06:05  Phos  2.7     02-01  Mg     2.20     02-01      Creatinine Trend: 1.45 <--, 1.52 <--, 2.17 <--, 1.78 <--, 1.54 <--, 1.58 <--, 1.71 <--                        12.5   4.64  )-----------( 150      ( 01 Feb 2024 06:05 )             37.6     Urine Studies:  Urinalysis Basic - ( 01 Feb 2024 06:05 )    Color:  / Appearance:  / SG:  / pH:   Gluc: 75 mg/dL / Ketone:   / Bili:  / Urobili:    Blood:  / Protein:  / Nitrite:    Leuk Esterase:  / RBC:  / WBC    Sq Epi:  / Non Sq Epi:  / Bacteria:       Sodium, Random Urine: 108 mmol/L (01-31 @ 07:28)  Creatinine, Random Urine: 113 mg/dL (01-31 @ 07:28)  Protein/Creatinine Ratio Calculation: 0.2 Ratio (01-31 @ 07:28)

## 2024-02-01 NOTE — DISCHARGE NOTE NURSING/CASE MANAGEMENT/SOCIAL WORK - NSDCPEFALRISK_GEN_ALL_CORE
For information on Fall & Injury Prevention, visit: [email or web address] OR  [email or web address] OR  https://www.cdc.gov/steadi/patient.html

## 2024-02-01 NOTE — DISCHARGE NOTE PROVIDER - PROVIDER TOKENS
PROVIDER:[TOKEN:[[id number]],FOLLOWUP:[1 month]] PROVIDER:[TOKEN:[[id number]],FOLLOWUP:[1 month]],PROVIDER:[TOKEN:[[id number]],FOLLOWUP:[2 weeks]],PROVIDER:[TOKEN:[[id number]],FOLLOWUP:[2 weeks]] PROVIDER:[TOKEN:[[id number]],FOLLOWUP:[1 month]],PROVIDER:[TOKEN:[[id number]],FOLLOWUP:[2 weeks]],PROVIDER:[TOKEN:[[id number]],FOLLOWUP:[2 weeks]],PROVIDER:[TOKEN:[[id number]:[id number]]]

## 2024-02-01 NOTE — DISCHARGE NOTE PROVIDER - NSDCCPCAREPLAN_GEN_ALL_CORE_FT
PRINCIPAL DISCHARGE DIAGNOSIS  Diagnosis: Diastolic heart failure, unspecified HF chronicity  Assessment and Plan of Treatment: Continue regimen from hospital. Follow heart healthy and low salt diet, fluid restriction to 1000mL daily. Monitor your fluid intake and weight daily. If you develop severe lower extremity swelling and shortness of breath, please seek medial attention. Follow up with your PCP and cardiologist for further evaluation and managment within 1 to 2 weeks. Please call to make an appointment.      SECONDARY DISCHARGE DIAGNOSES  Diagnosis: Unstable angina pectoris  Assessment and Plan of Treatment: Your stress test was abnormal so you had a left heart catheterization and did not require any intervention/stent placement.  Please follow up with your cardiolgist/PCP.    Diagnosis: Benign essential HTN  Assessment and Plan of Treatment: You were started on amlodipine and losartan. Continue blood pressure medication regimen as directed. Monitor for any visual changes, headaches or dizziness.  Monitor blood pressure regularly.  Follow up with your primary care provider or cardiologist for further management for high blood pressure.      Diagnosis: Acute kidney injury superimposed on CKD  Assessment and Plan of Treatment: In order to prevent further disease progression, continue to follow recommendations made by your primary provider/nephrologist. Continue a diet that is low in sodium and avoid foods that are concentrated in potassium and phosphorus. Continue your medications/supplementations as directed and avoid over-the-counter drugs that are harmful to kidneys, such as, Non-Steroidal Anti-Inflammatory Drugs (NSAIDs). Follow-up as outpatient to monitor your kidney function, as well as, vitamin D, Calcium, potassium, and phosphorus levels.      Diagnosis: PAD (peripheral artery disease)  Assessment and Plan of Treatment: You were noted to have abnormal blood flow in your left leg. You were seen by vascular who recommended ______________     PRINCIPAL DISCHARGE DIAGNOSIS  Diagnosis: Diastolic heart failure, unspecified HF chronicity  Assessment and Plan of Treatment: Continue regimen from hospital. Follow heart healthy and low salt diet, fluid restriction to 1000mL daily. Monitor your fluid intake and weight daily. If you develop severe lower extremity swelling and shortness of breath, please seek medial attention. Follow up with your PCP and cardiologist for further evaluation and managment within 1 to 2 weeks. Please call to make an appointment.      SECONDARY DISCHARGE DIAGNOSES  Diagnosis: Unstable angina pectoris  Assessment and Plan of Treatment: Your stress test was abnormal so you had a left heart catheterization and did not require any intervention/stent placement.  Please follow up with your cardiolgist/PCP.    Diagnosis: Benign essential HTN  Assessment and Plan of Treatment: You were started on amlodipine and losartan. Continue blood pressure medication regimen as directed. Monitor for any visual changes, headaches or dizziness.  Monitor blood pressure regularly.  Follow up with your primary care provider or cardiologist for further management for high blood pressure.      Diagnosis: Acute kidney injury superimposed on CKD  Assessment and Plan of Treatment: Your Creatinine (measure of kidney function) was elevated so you were seen by nephrology. In order to prevent further disease progression, continue to follow recommendations made by your primary provider/nephrologist. Please follow up with Nephrologist for CKD work up outpatient.    Diagnosis: PAD (peripheral artery disease)  Assessment and Plan of Treatment: You were noted to have abnormal blood flow in your left leg. You were seen by vascular who recommended ______________     PRINCIPAL DISCHARGE DIAGNOSIS  Diagnosis: Diastolic heart failure, unspecified HF chronicity  Assessment and Plan of Treatment: Continue regimen from hospital. Follow heart healthy and low salt diet. Monitor your fluid intake and weight daily. If you develop severe lower extremity swelling and shortness of breath, please seek medial attention. Follow up with your PCP and cardiologist for further evaluation and managment within 1 to 2 weeks. Please call to make an appointment.      SECONDARY DISCHARGE DIAGNOSES  Diagnosis: Unstable angina pectoris  Assessment and Plan of Treatment: Your stress test was abnormal so you had a left heart catheterization and did not require any intervention/stent placement.  Please follow up with your cardiolgist/PCP.    Diagnosis: Benign essential HTN  Assessment and Plan of Treatment: You were started on amlodipine and losartan. Continue blood pressure medication regimen as directed. Monitor for any visual changes, headaches or dizziness.  Monitor blood pressure regularly.  Follow up with your primary care provider or cardiologist for further management for high blood pressure.      Diagnosis: Acute kidney injury superimposed on CKD  Assessment and Plan of Treatment: Your Creatinine (measure of kidney function) was elevated so you were seen by nephrology. In order to prevent further disease progression, continue to follow recommendations made by your primary provider/nephrologist. Please follow up with Nephrologist for CKD work up outpatient.    Diagnosis: PAD (peripheral artery disease)  Assessment and Plan of Treatment: You were noted to have abnormal blood flow in your left leg. You were seen by vascular who recommended you follow up outpatient with Dr. Landers for surveillance.    Diagnosis: Orthostatic hypotension  Assessment and Plan of Treatment: You were treated with IV fluids. Continue oral intake and stay hydrated.

## 2024-02-01 NOTE — DISCHARGE NOTE PROVIDER - HOSPITAL COURSE
84 y/o male, with a PmHx of HTN, HLD, PVD, sent in by urgent care center due to concern for elevated blood pressure.  His BP was 239/83 at triage. Admitted with shortness of breath and chest pain.    Diastolic Heart Failure  - patient presented for new onset chest tightness and SOB with exertion  - BNP 2471  - 1/26 CXR - Trace left linear atelectasis without focal consolidations.  - EKG shows hypertrophy, which likely indicated longstanding uncontrolled HTN leading to possible diastolic failure  - hold off diuretics at this time as the patient does not have leg swelling or acute SOB  - tele monitoring  - daily weights, monitor I/O  - fluid restrict 1000cc/day.  - Echo with mod AR, nl LV sys fx      Unstable angina pectoris.   - hsTrop: 50-->46  - EKG no ST changes noted  - pharm nuclear stress noted;  During adenosine infusion Atrial tachycardia converted to sinus rhythm with frequent PACs in pattern of bigeminy; LV moderately decreased in function; EF 40% ;left ventricular wall motion hypokinesis  - 1/31 LHC: prox LAD 20-30%. LCx mild. RCA nondominant. RFA access.    Bradycardia  - intermittent sinus katey on tele  - cont to monitor  - cards defer AVNB    DAX  - likely hemodynamically mediated in setting of recently restarted ARB and hypovolemia  - DAX resolved s/p IVF despite LHC on 1/31.   - UA bland. FeNa low  - Bladder scan negative. Start flomax 0.4 mg QHS given urinary frequency  - Avoid nephrotoxins.    Benign essential HTN  - started on amlodipine  - BP uncontrolled for which low dose losartan 25 mg QHS started     HLD  - cont statin    h/o CKD   - Scr noted to be 1.5 as per labs in 2019   - UA bland with mild proteinuria (spot urine TP/CR 0.2). Renal US with bilateral renal cysts.   - Outpatient CKD work up  -Monitor electrolytes.    PAD  - WOODY/PVR: Left WOODY is severely decreased (0.47). Left TBI is severely decreased (0.17) with a toe pressure of 22 mmHg.   - vascular consulted: no acute intervention, low concern for acute limb ischemia at this time, rec [ ] arterial duplex of LLE to evaluate bypass    On 2/ __ /2024, discussed with Dr. Lamas, patient is medically cleared and optimized for discharge today to home. All medications were reviewed with attending, and any new/required prescriptions were sent to mutually agreed upon pharmacy.   84 y/o male, with a PmHx of HTN, HLD, PVD, sent in by urgent care center due to concern for elevated blood pressure.  His BP was 239/83 at triage. Admitted with shortness of breath and chest pain.    Diastolic Heart Failure  - patient presented for new onset chest tightness and SOB with exertion  - BNP 2471  - 1/26 CXR - Trace left linear atelectasis without focal consolidations.  - EKG shows hypertrophy, which likely indicated longstanding uncontrolled HTN leading to possible diastolic failure  - hold off diuretics at this time as the patient does not have leg swelling or acute SOB  - tele monitoring  - daily weights, monitor I/O  - fluid restrict 1000cc/day.  - Echo with mod AR, nl LV sys fx      Unstable angina pectoris.   - hsTrop: 50-->46  - EKG no ST changes noted  - pharm nuclear stress noted;  During adenosine infusion Atrial tachycardia converted to sinus rhythm with frequent PACs in pattern of bigeminy; LV moderately decreased in function; EF 40% ;left ventricular wall motion hypokinesis  - 1/31 LHC: prox LAD 20-30%. LCx mild. RCA nondominant. RFA access.    Bradycardia  - intermittent sinus katey on tele  - cont to monitor  - cards defer AVNB    DAX  - likely hemodynamically mediated in setting of recently restarted ARB and hypovolemia  - DAX resolved s/p IVF despite LHC on 1/31.   - UA bland. FeNa low  - Bladder scan negative. Start flomax 0.4 mg QHS given urinary frequency  - Avoid nephrotoxins.    Benign essential HTN  - started on amlodipine  - BP uncontrolled for which low dose losartan 25 mg QHS started     HLD  - cont statin    h/o CKD   - Scr noted to be 1.5 as per labs in 2019   - UA bland with mild proteinuria (spot urine TP/CR 0.2). Renal US with bilateral renal cysts.   - Outpatient CKD work up  -Monitor electrolytes.    PAD  - WOODY/PVR: Left WOODY is severely decreased (0.47). Left TBI is severely decreased (0.17) with a toe pressure of 22 mmHg.   - vascular consulted: rec arterial duplex of LLE to evaluate bypass which showed 3 vessel runoff distally to the left ankle via the PTA, peroneal and CLAU vessels. The left common femoral artery to popliteal artery bypass graft is occluded throughout its entire segment. No flow noted within. Inflow artery is patent.  - vascular rec no acute intervention, low concern for acute limb ischemia at this time. Needs to follow up in the office with Dr. Landers for outpatient bypass surveillance.    On 2/ __ /2024, discussed with Dr. Lamas, patient is medically cleared and optimized for discharge today to home. All medications were reviewed with attending, and any new/required prescriptions were sent to mutually agreed upon pharmacy.   84 y/o male, with a PmHx of HTN, HLD, PVD, sent in by urgent care center due to concern for elevated blood pressure.  His BP was 239/83 at triage. Admitted with shortness of breath and chest pain.    Diastolic Heart Failure  - patient presented for new onset chest tightness and SOB with exertion  - BNP 2471  - 1/26 CXR - Trace left linear atelectasis without focal consolidations.  - EKG shows hypertrophy, which likely indicated longstanding uncontrolled HTN leading to possible diastolic failure  - hold off diuretics at this time as the patient does not have leg swelling or acute SOB  - tele monitoring  - daily weights, monitor I/O  - Echo with mod AR, nl LV sys fx      1L NS IVF for orthostatic hypotension 2/2    Unstable angina pectoris.   - hsTrop: 50-->46  - EKG no ST changes noted  - pharm nuclear stress noted;  During adenosine infusion Atrial tachycardia converted to sinus rhythm with frequent PACs in pattern of bigeminy; LV moderately decreased in function; EF 40% ;left ventricular wall motion hypokinesis  - 1/31 LHC: prox LAD 20-30%. LCx mild. RCA nondominant. RFA access.    Bradycardia  - intermittent sinus katey on tele  - cont to monitor  - cards defer AVNB    DAX  - likely hemodynamically mediated in setting of recently restarted ARB and hypovolemia  - DAX resolved s/p IVF despite LHC on 1/31.   - UA bland. FeNa low  - Bladder scan negative. Start flomax 0.4 mg QHS given urinary frequency  - Avoid nephrotoxins.    Benign essential HTN  - started on amlodipine  - BP uncontrolled for which low dose losartan 25 mg QHS started     HLD  - cont statin    h/o CKD   - Scr noted to be 1.5 as per labs in 2019   - UA bland with mild proteinuria (spot urine TP/CR 0.2). Renal US with bilateral renal cysts.   - Outpatient CKD work up  -Monitor electrolytes.    PAD  - WOODY/PVR: Left WOODY is severely decreased (0.47). Left TBI is severely decreased (0.17) with a toe pressure of 22 mmHg.   - vascular consulted: rec arterial duplex of LLE to evaluate bypass which showed 3 vessel runoff distally to the left ankle via the PTA, peroneal and CLAU vessels. The left common femoral artery to popliteal artery bypass graft is occluded throughout its entire segment. No flow noted within. Inflow artery is patent.  - vascular rec no acute intervention, low concern for acute limb ischemia at this time. Needs to follow up in the office with Dr. Landers for outpatient bypass surveillance.    On 2/ __ /2024, discussed with Dr. Lamas, patient is medically cleared and optimized for discharge today to home. All medications were reviewed with attending, and any new/required prescriptions were sent to mutually agreed upon pharmacy.   84 y/o male, with a PmHx of HTN, HLD, PVD, sent in by urgent care center due to concern for elevated blood pressure.  His BP was 239/83 at triage. Admitted with shortness of breath and chest pain.    Diastolic Heart Failure  - patient presented for new onset chest tightness and SOB with exertion  - BNP 2471  - 1/26 CXR - Trace left linear atelectasis without focal consolidations.  - EKG shows hypertrophy, which likely indicated longstanding uncontrolled HTN leading to possible diastolic failure  - hold off diuretics at this time as the patient does not have leg swelling or acute SOB  - daily weights, monitor I/O  - Echo with mod AR, nl LV sys fx      1L NS IVF for orthostatic hypotension 2/2    Unstable angina pectoris.   - hsTrop: 50-->46  - EKG no ST changes noted  - pharm nuclear stress noted;  During adenosine infusion Atrial tachycardia converted to sinus rhythm with frequent PACs in pattern of bigeminy; LV moderately decreased in function; EF 40% ;left ventricular wall motion hypokinesis  - 1/31 LHC: prox LAD 20-30%. LCx mild. RCA nondominant. RFA access.    Bradycardia  - intermittent sinus katey on tele  - cards defer AVNB    DAX  - likely hemodynamically mediated in setting of recently restarted ARB and hypovolemia  - DAX resolved s/p IVF despite LHC on 1/31.   - UA bland. FeNa low      Benign essential HTN  - started on amlodipine  - BP uncontrolled for which low dose losartan 25 mg QHS started     HLD  - cont statin    h/o CKD   - Scr noted to be 1.5 as per labs in 2019   - UA bland with mild proteinuria (spot urine TP/CR 0.2). Renal US with bilateral renal cysts.   - Outpatient CKD work up    PAD  - WOODY/PVR: Left WOODY is severely decreased (0.47). Left TBI is severely decreased (0.17) with a toe pressure of 22 mmHg.   - vascular consulted: rec arterial duplex of LLE to evaluate bypass which showed 3 vessel runoff distally to the left ankle via the PTA, peroneal and CLAU vessels. The left common femoral artery to popliteal artery bypass graft is occluded throughout its entire segment. No flow noted within. Inflow artery is patent.  - vascular rec no acute intervention, low concern for acute limb ischemia at this time. Needs to follow up in the office with Dr. Landers for outpatient bypass surveillance.      discussed with attending ready for discharge

## 2024-02-01 NOTE — PROGRESS NOTE ADULT - TIME BILLING
Agree with above ACP note.  cv stable  s/p cath no new obs CAD  echo with nl lv fxn  await vasc eval of le   dcp
Agree with above ACP note.  cv stable  cont current tx  f/u stress  pad r/o
Agree with above ACP note.  cv stable  cont current tx  stress noted  plan for cath when cr back to baseline  pad r/o

## 2024-02-01 NOTE — DIETITIAN INITIAL EVALUATION ADULT - PERTINENT MEDS FT
MEDICATIONS  (STANDING):  amLODIPine   Tablet 10 milliGRAM(s) Oral daily  aspirin enteric coated 81 milliGRAM(s) Oral daily  heparin   Injectable 5000 Unit(s) SubCutaneous every 12 hours  hydrALAZINE 25 milliGRAM(s) Oral three times a day  losartan 25 milliGRAM(s) Oral at bedtime  simvastatin 20 milliGRAM(s) Oral at bedtime  tamsulosin 0.4 milliGRAM(s) Oral at bedtime    MEDICATIONS  (PRN):  acetaminophen     Tablet .. 650 milliGRAM(s) Oral every 6 hours PRN Temp greater or equal to 38C (100.4F), Mild Pain (1 - 3)  aluminum hydroxide/magnesium hydroxide/simethicone Suspension 30 milliLiter(s) Oral every 4 hours PRN Dyspepsia  melatonin 3 milliGRAM(s) Oral at bedtime PRN Insomnia  ondansetron Injectable 4 milliGRAM(s) IV Push every 8 hours PRN Nausea and/or Vomiting

## 2024-02-01 NOTE — DIETITIAN INITIAL EVALUATION ADULT - NS FNS DIET ORDER
Diet, DASH/TLC:   Sodium & Cholesterol Restricted  1000mL Fluid Restriction ([id number]) (01-29-24 @ 12:04)

## 2024-02-01 NOTE — DISCHARGE NOTE NURSING/CASE MANAGEMENT/SOCIAL WORK - PATIENT PORTAL LINK FT
You can access the FollowMyHealth Patient Portal offered by [facility] by registering at the following website: [email or web address]. By joining [employer]’s FollowMyHealth portal, you will also be able to view your health information using other applications (apps) compatible with our system.

## 2024-02-01 NOTE — DIETITIAN INITIAL EVALUATION ADULT - ADD RECOMMEND
1. Recommend adding Ensure Compact 4oz 2x/day (440kcal, 18gm pro) for nutrient support.   2. Monitor weight, labs, po intake and tolerance, bowel movement, skin integrity.   3. Encourage PO intake and honor food preferences as able.

## 2024-02-01 NOTE — DISCHARGE NOTE PROVIDER - NSDCMRMEDTOKEN_GEN_ALL_CORE_FT
D3 25 mcg (1000 intl units) oral tablet: 2 tab(s) orally once a day  ramipril 10 mg oral tablet: 1 tab(s) orally once a day  simvastatin 20 mg oral tablet: 1 tab(s) orally once a day  valACYclovir 500 mg oral tablet: 1 tab(s) orally once a day   aspirin 81 mg oral delayed release tablet: 1 tab(s) orally once a day  D3 25 mcg (1000 intl units) oral tablet: 2 tab(s) orally once a day  simvastatin 20 mg oral tablet: 1 tab(s) orally once a day  valACYclovir 500 mg oral tablet: 1 tab(s) orally once a day   amLODIPine 10 mg oral tablet: 1 tab(s) orally once a day  aspirin 81 mg oral delayed release tablet: 1 tab(s) orally once a day  D3 25 mcg (1000 intl units) oral tablet: 2 tab(s) orally once a day  hydrALAZINE 25 mg oral tablet: 1 tab(s) orally 3 times a day  losartan 25 mg oral tablet: 1 tab(s) orally once a day (at bedtime)  simvastatin 20 mg oral tablet: 1 tab(s) orally once a day  valACYclovir 500 mg oral tablet: 1 tab(s) orally once a day

## 2024-02-01 NOTE — PROGRESS NOTE ADULT - SUBJECTIVE AND OBJECTIVE BOX
CARDIOLOGY FOLLOW UP - Dr. Lamas  DATE OF SERVICE: 2/1/24    CC no cp or sob       REVIEW OF SYSTEMS:  CONSTITUTIONAL: No fever, weight loss, or fatigue  RESPIRATORY: No cough, wheezing, chills or hemoptysis; No Shortness of Breath  CARDIOVASCULAR: No chest pain, palpitations, passing out, dizziness, or leg swelling  GASTROINTESTINAL: No abdominal or epigastric pain. No nausea, vomiting, or hematemesis; No diarrhea or constipation. No melena or hematochezia.  VASCULAR: No edema     PHYSICAL EXAM:  T(C): 37.1 (02-01-24 @ 04:45), Max: 37.1 (02-01-24 @ 04:45)  HR: 60 (02-01-24 @ 04:45) (53 - 60)  BP: 150/50 (01-31-24 @ 22:20) (150/50 - 177/72)  RR: 17 (02-01-24 @ 04:45) (17 - 18)  SpO2: 99% (02-01-24 @ 04:45) (98% - 99%)  Wt(kg): --  I&O's Summary      Appearance: Normal	  Cardiovascular: Normal S1 S2,RRR, No JVD, No murmurs  Respiratory: Lungs clear to auscultation	  Gastrointestinal:  Soft, Non-tender, + BS	  Extremities: Normal range of motion, No clubbing, cyanosis or edema  right groin CDI no hematoma     Home Medications:  D3 25 mcg (1000 intl units) oral tablet: 2 tab(s) orally once a day (27 Jan 2024 04:47)  ramipril 10 mg oral tablet: 1 tab(s) orally once a day (27 Jan 2024 04:47)  simvastatin 20 mg oral tablet: 1 tab(s) orally once a day (27 Jan 2024 04:47)  valACYclovir 500 mg oral tablet: 1 tab(s) orally once a day (27 Jan 2024 04:47)      MEDICATIONS  (STANDING):  amLODIPine   Tablet 10 milliGRAM(s) Oral daily  aspirin enteric coated 81 milliGRAM(s) Oral daily  heparin   Injectable 5000 Unit(s) SubCutaneous every 12 hours  hydrALAZINE 25 milliGRAM(s) Oral three times a day  losartan 25 milliGRAM(s) Oral at bedtime  simvastatin 20 milliGRAM(s) Oral at bedtime  tamsulosin 0.4 milliGRAM(s) Oral at bedtime      TELEMETRY: nsr/SB hr 50-70  	    ECG:  	  RADIOLOGY:   < from: VA WOODY WITH PVR (01.30.24 @ 09:57) >  --------------------------------------------------------------------------------  CONCLUSIONS:      1. Right: Right WOODY is normal (1.12). Right TBI is mildly decreased (0.60) with a toe pressure of 78 mmHg. Findings suggest the presence of mild small vessel arterial disease in the right foot.   2. Left: Left WOODY is severely decreased (0.47). Left TBI is severely decreased (0.17) with a toe pressure of 22 mmHg. Findings suggest the presence of multi-segment arterial disease in the left lower extremity, including the femoropopliteal segment.      < end of copied text >    DIAGNOSTIC TESTING:  [ ] Echocardiogram:  [ ]  Catheterization:  [ ] Stress Test:    OTHER: 	    LABS:	 	    Troponin T, High Sensitivity Result: 46 ng/L (01-26 @ 22:10)  Troponin T, High Sensitivity Result: 50 ng/L (01-26 @ 21:00)                          12.5   4.64  )-----------( 150      ( 01 Feb 2024 06:05 )             37.6     02-01    138  |  106  |  33<H>  ----------------------------<  75  4.3   |  22  |  1.45<H>    Ca    8.9      01 Feb 2024 06:05  Phos  2.7     02-01  Mg     2.20     02-01

## 2024-02-01 NOTE — DIETITIAN INITIAL EVALUATION ADULT - ORAL INTAKE PTA/DIET HISTORY
Patient reports usual body weight 140lbs, a year ago. Patient states that he lives by himself, reports some difficulty in food procurement/preparation, noted SW is following on home food delivery program. Patient occasionally supplementing with Ensure PTA. Patient has no known food allergies.

## 2024-02-01 NOTE — PROGRESS NOTE ADULT - ASSESSMENT
84 y/o M with pmhx of HTN, PVD, presented to the ED for new onset shortness of breath with exertion for 1 week. The patient went to urgent care because he was having chest tightness and shortness of breath with exertion. He had his blood pressure checked and it was elevated, and was sent to the ED for evaluation. He denies hx of cardiac disease. He also endorsed that he felt lightheaded when he sits up but never had LOC. Denies chest pain at this time. I feel fine now..        Problem/Plan - 1:  ·  Problem: Dyspnea with LV dysfunction    ·  Plan: - S/P cardiac cath.   patient presented for new onset chest tightness and SOB with exertion  - BNP 2471  < from: Nuclear Stress Test-Pharmacologic.. (01.29.24 @ 07:02) >  Conclusions:   1. Baseline electrocardiogram: atrial tachycardia at a rate of 104 bpm with T wave inversion leads I, II, III, aVF, aVR, V3-V4.   2. During adenosine infusion Atrial tachycardia converted to sinus rhythm with frequent PACs in pattern of bigeminy   3. Occasional APD's occurred during stress and recovery, increased with stress. Single VPD's occurred during recovery.   4. The left ventricle ismoderately decreased in function and normal in size. The resting left ventricular EF% is 40 %. The resting end diastolic volume is 90 ml and systolic volume is 54 ml. The left ventricular EF% during stress is 39 %. The stress end diastolic volume is 115 ml and systolic volume is 70 ml.   5. Diffuse left ventricular wall motion hypokinesis.   6. Normal right ventricular function. There is no right ventricular dilation.    < end of copied text >    TT Enhancing Agent (01.27.24 @ 14:23) >  CONCLUSIONS:      1. Left ventricular cavity is normal. Left ventricular systolic function is normal with an ejection fraction of 59 % by Thakur's method of disks. There are no regional wall motion abnormalities seen.   2. Moderate aortic regurgitation.    < end of copied text >       Problem/Plan - 2:  ·  Problem: Chest Pain .   ·  Plan: - troponins stable  - EKG no ST changes noted  - TTE noted . NST abnormal so S/P Cardiac cath. Mild NOCAD   - cardiology help appreciated.      Problem/Plan - 3:  ·  Problem: CKD stage 3.   ·  Plan: - Trending BMP   - nephrology helping.      Problem/Plan - 4:  ·  Problem: Benign essential HTN.   ·  Plan: - start amlodipine for now  - Continue home meds.     Problem/Plan - 5:  ·  Problem: PVD.   ·  Plan: - Vascular helping.     < from: VA Duplex Arterial Lower Ext Ltd, Left (02.01.24 @ 08:56) >  CONCLUSIONS:      1. There is 3 vessel runoff distally to the left ankle via the PTA, peroneal and CLAU vessels.   2. The left common femoral artery to popliteal artery bypass graft is occluded throughout its entire segment. No flow noted within.    < end of copied text >

## 2024-02-01 NOTE — DIETITIAN INITIAL EVALUATION ADULT - PERTINENT LABORATORY DATA
02-01    138  |  106  |  33<H>  ----------------------------<  75  4.3   |  22  |  1.45<H>    Ca    8.9      01 Feb 2024 06:05  Phos  2.7     02-01  Mg     2.20     02-01

## 2024-02-01 NOTE — PHARMACOTHERAPY INTERVENTION NOTE - COMMENTS
Discharge medications reviewed with the patient. Current medication schedule was discussed in detail including: medication name, indication, dose, administration times, treatment duration, side effects, and special instructions. Also discussed CHF management. Patient counseled on heart failure medication indications, administration, and side effects. Also discussed fluid and salt restrictions, and maintaining a log of daily weights. Discussed warning signs of fluid overload (SOB, orthopnea, PARRA, edema, etc.) and when to seek medical attention. Patient verbalized understanding. Questions and concerns were answered and addressed. Patient provided with educational handout and CHF booklet.    New medications: aspirin, hydralazine, losartan    Pily CastelanD, BCPS  Clinical Pharmacy Specialist  [phone]

## 2024-02-01 NOTE — PROGRESS NOTE ADULT - SUBJECTIVE AND OBJECTIVE BOX
Date of Service  : 02-01-24     INTERVAL HPI/OVERNIGHT EVENTS: I feel fine.   Vital Signs Last 24 Hrs  T(C): 36.6 (01 Feb 2024 11:48), Max: 37.1 (01 Feb 2024 04:45)  T(F): 97.9 (01 Feb 2024 11:48), Max: 98.7 (01 Feb 2024 04:45)  HR: 71 (01 Feb 2024 15:10) (59 - 71)  BP: 140/61 (01 Feb 2024 15:10) (140/61 - 150/50)  BP(mean): --  RR: 17 (01 Feb 2024 11:48) (17 - 18)  SpO2: 96% (01 Feb 2024 11:48) (96% - 99%)    Parameters below as of 01 Feb 2024 11:48  Patient On (Oxygen Delivery Method): room air      I&O's Summary    MEDICATIONS  (STANDING):  amLODIPine   Tablet 10 milliGRAM(s) Oral daily  aspirin enteric coated 81 milliGRAM(s) Oral daily  heparin   Injectable 5000 Unit(s) SubCutaneous every 12 hours  hydrALAZINE 25 milliGRAM(s) Oral three times a day  losartan 25 milliGRAM(s) Oral at bedtime  simvastatin 20 milliGRAM(s) Oral at bedtime  tamsulosin 0.4 milliGRAM(s) Oral at bedtime    MEDICATIONS  (PRN):  acetaminophen     Tablet .. 650 milliGRAM(s) Oral every 6 hours PRN Temp greater or equal to 38C (100.4F), Mild Pain (1 - 3)  aluminum hydroxide/magnesium hydroxide/simethicone Suspension 30 milliLiter(s) Oral every 4 hours PRN Dyspepsia  melatonin 3 milliGRAM(s) Oral at bedtime PRN Insomnia  ondansetron Injectable 4 milliGRAM(s) IV Push every 8 hours PRN Nausea and/or Vomiting    LABS:                        12.5   4.64  )-----------( 150      ( 01 Feb 2024 06:05 )             37.6     02-01    138  |  106  |  33<H>  ----------------------------<  75  4.3   |  22  |  1.45<H>    Ca    8.9      01 Feb 2024 06:05  Phos  2.7     02-01  Mg     2.20     02-01        Urinalysis Basic - ( 01 Feb 2024 06:05 )    Color: x / Appearance: x / SG: x / pH: x  Gluc: 75 mg/dL / Ketone: x  / Bili: x / Urobili: x   Blood: x / Protein: x / Nitrite: x   Leuk Esterase: x / RBC: x / WBC x   Sq Epi: x / Non Sq Epi: x / Bacteria: x      CAPILLARY BLOOD GLUCOSE            Urinalysis Basic - ( 01 Feb 2024 06:05 )    Color: x / Appearance: x / SG: x / pH: x  Gluc: 75 mg/dL / Ketone: x  / Bili: x / Urobili: x   Blood: x / Protein: x / Nitrite: x   Leuk Esterase: x / RBC: x / WBC x   Sq Epi: x / Non Sq Epi: x / Bacteria: x      REVIEW OF SYSTEMS:  CONSTITUTIONAL: No fever, weight loss, or fatigue  EYES: No eye pain, visual disturbances, or discharge  ENMT:  No difficulty hearing, tinnitus, vertigo; No sinus or throat pain  NECK: No pain or stiffness  RESPIRATORY: No cough, wheezing, chills or hemoptysis; No shortness of breath  CARDIOVASCULAR: No chest pain, palpitations, dizziness, or leg swelling  GASTROINTESTINAL: No abdominal or epigastric pain. No nausea, vomiting, or hematemesis; No diarrhea or constipation. No melena or hematochezia.  GENITOURINARY: No dysuria, frequency, hematuria, or incontinence  NEUROLOGICAL: No headaches, memory loss, loss of strength, numbness, or tremors      RADIOLOGY & ADDITIONAL TESTS:    Consultant(s) Notes Reviewed:  [x ] YES  [ ] NO    PHYSICAL EXAM:  GENERAL: NAD, well-groomed, well-developed,not in any distress ,  HEAD:  Atraumatic, Normocephalic  NECK: Supple, No JVD, Normal thyroid  NERVOUS SYSTEM:  Alert & Oriented X3, No focal deficit   CHEST/LUNG: Good air entry bilateral with no  rales, rhonchi, wheezing, or rubs  HEART: Regular rate and rhythm; No murmurs, rubs, or gallops  ABDOMEN: Soft, Nontender, Nondistended; Bowel sounds present  EXTREMITIES:  2+ Peripheral Pulses, No clubbing, cyanosis, or edema      Care Discussed with Consultants/Other Providers [ x] YES  [ ] NO

## 2024-02-01 NOTE — DIETITIAN INITIAL EVALUATION ADULT - OTHER INFO
This is a 84 y/o M with pmhx of HTN, PVD, presented to the ED for new onset shortness of breath with exertion for 1 week, per chart.    Patient reports consuming >75% of meals during stay. Patient denies any difficulty chewing or swallowing, any nausea, vomiting, diarrhea, constipation during visit. Reports last bowel movement 1/31. Current weight: 52kg/114.6lbs (2/1, per RN flow sheet). Per Regina ROSALES, weight history: 62.1kg in 2019. Compared current weight with the reported usual body weight 140lbs, noted patient has weight loss of -25.4lbs/-18%BW x 1 year. RD discussed heart healthy diet recommendations with patient during visit. Encouraged patient to avoid food high in sodium, saturated/trans fat. Adequate po intake encouraged during visit. Patient is receptive to the information provided.

## 2024-02-01 NOTE — PROGRESS NOTE ADULT - ASSESSMENT
83y Male with history of HTN presents with CP and SOB. Nephrology consulted for elevated Scr.    1) DAX: likely hemodynamically mediated in setting of recently restarted ARB and hypovolemia. DAX resolved s/p IVF despite LHC on 1/31. UA bland. FeNa low. Bladder scan negative. Avoid nephrotoxins.    2) CKD-3b: Patient with h/o CKD as Scr noted to be 1.5 as per labs in 2019. UA bland with mild proteinuria (spot urine TP/CR 0.2). Renal US with bilateral renal cysts. Outpatient CKD work up. Monitor electrolytes.    3) HTN with CKD: BP uncontrolled for which will start low dose losartan 25 mg QHS. Monitor BP.    4) CP: As per cardiology.      [facility] NEPHROLOGY  Will Jackson M.D.  Norberto Shane D.O.  Gina Aviles M.D.  MD Blank Waldron, MSN, ANP-C    Telephone: [phone]  Facsimile: [phone] [place] [address], [address]  [place] [address]   83y Male with history of HTN, prostate CA presents with CP and SOB. Nephrology consulted for elevated Scr.    1) DAX: likely hemodynamically mediated in setting of recently restarted ARB and hypovolemia. DAX resolved s/p IVF despite LHC on 1/31. UA bland. FeNa low. Bladder scan negative. Start flomax 0.4 mg QHS given urinary frequency. Avoid nephrotoxins.    2) CKD-3b: Patient with h/o CKD as Scr noted to be 1.5 as per labs in 2019. UA bland with mild proteinuria (spot urine TP/CR 0.2). Renal US with bilateral renal cysts. Outpatient CKD work up. Monitor electrolytes.    3) HTN with CKD: BP uncontrolled for which will start low dose losartan 25 mg QHS. Monitor BP.    4) CP: As per cardiology.      [facility] NEPHROLOGY  Will Jackson M.D.  Norberto Shane D.O.  Gina Aviles M.D.  MD Blank Waldron, MSN, ANP-C    Telephone: [phone]  Facsimile: [phone]    [facility] [place] [address], [address]  [place] [address]

## 2024-02-01 NOTE — PROGRESS NOTE ADULT - ASSESSMENT
This is a 84 y/o M with pmhx of HTN, PVD, presented to the ED for new onset shortness of breath with exertion for 1 week    #Dyspnea/Chest discomfort  -in setting of uncontrolled HTN  -trops negative  -no significant findings on CXR  -Echo with mod AR, nl LV sys fx    -pharm nuclear stress noted;  During adenosine infusion Atrial tachycardia converted to sinus rhythm with frequent PACs in pattern of bigeminy; LV moderately decreased in function ; EF 40% ;left ventricular wall motion hypokinesis.  -s/p LHC 1/31: prox LAD 20-30%. LCx mild. RCA nondominant. RFA access.  -c/w asa     #HTN  -cont meds     #Bradycardia  -intermittent sinus katey on tele  -cont to monitor  -defer AVNB    #PAD  -sheyla/pvr abnl in left leg  -vasc eval noted-- FU  arterial duplex of LLE to evaluate bypass  -med fu     #[place]   -improving  -renal f/u    DCP PENDING VASCULAR RECC

## 2024-02-01 NOTE — DISCHARGE NOTE NURSING/CASE MANAGEMENT/SOCIAL WORK - NSTOBACCOREFERRAL_GEN_A_NCS
Returned call to patient. Two patient identifiers used. Informed patient of CT date and time. Patient also wanted to know about [employer] form. Explained to patient I could send her the form if Dr. Brayan Oseguera prescribes the medication post op  Patient in agreement. Patient declined information

## 2024-02-01 NOTE — DISCHARGE NOTE PROVIDER - NPI NUMBER (FOR SYSADMIN USE ONLY) :
[id number] [[id number]],[[id number]] [[id number]]

## 2024-02-02 LAB
ANION GAP SERPL CALC-SCNC: 12 MMOL/L — SIGNIFICANT CHANGE UP (ref 7–14)
BUN SERPL-MCNC: 30 MG/DL — HIGH (ref 7–23)
CALCIUM SERPL-MCNC: 8.8 MG/DL — SIGNIFICANT CHANGE UP (ref 8.4–10.5)
CHLORIDE SERPL-SCNC: 106 MMOL/L — SIGNIFICANT CHANGE UP (ref 98–107)
CO2 SERPL-SCNC: 20 MMOL/L — LOW (ref 22–31)
CREAT SERPL-MCNC: 1.41 MG/DL — HIGH (ref 0.5–1.3)
EGFR: 49 ML/MIN/1.73M2 — LOW
GLUCOSE SERPL-MCNC: 77 MG/DL — SIGNIFICANT CHANGE UP (ref 70–99)
HCT VFR BLD CALC: 35.8 % — LOW (ref 39–50)
HGB BLD-MCNC: 12.2 G/DL — LOW (ref 13–17)
MAGNESIUM SERPL-MCNC: 2.2 MG/DL — SIGNIFICANT CHANGE UP (ref 1.6–2.6)
MCHC RBC-ENTMCNC: 30.1 PG — SIGNIFICANT CHANGE UP (ref 27–34)
MCHC RBC-ENTMCNC: 34.1 GM/DL — SIGNIFICANT CHANGE UP (ref 32–36)
MCV RBC AUTO: 88.4 FL — SIGNIFICANT CHANGE UP (ref 80–100)
NRBC # BLD: 0 /100 WBCS — SIGNIFICANT CHANGE UP (ref 0–0)
NRBC # FLD: 0 K/UL — SIGNIFICANT CHANGE UP (ref 0–0)
PHOSPHATE SERPL-MCNC: 3.1 MG/DL — SIGNIFICANT CHANGE UP (ref 2.5–4.5)
PLATELET # BLD AUTO: 160 K/UL — SIGNIFICANT CHANGE UP (ref 150–400)
POTASSIUM SERPL-MCNC: 4.4 MMOL/L — SIGNIFICANT CHANGE UP (ref 3.5–5.3)
POTASSIUM SERPL-SCNC: 4.4 MMOL/L — SIGNIFICANT CHANGE UP (ref 3.5–5.3)
RBC # BLD: 4.05 M/UL — LOW (ref 4.2–5.8)
RBC # FLD: 14.6 % — HIGH (ref 10.3–14.5)
SODIUM SERPL-SCNC: 138 MMOL/L — SIGNIFICANT CHANGE UP (ref 135–145)
WBC # BLD: 4.36 K/UL — SIGNIFICANT CHANGE UP (ref 3.8–10.5)
WBC # FLD AUTO: 4.36 K/UL — SIGNIFICANT CHANGE UP (ref 3.8–10.5)

## 2024-02-02 RX ORDER — SODIUM CHLORIDE 9 MG/ML
1000 INJECTION INTRAMUSCULAR; INTRAVENOUS; SUBCUTANEOUS
Refills: 0 | Status: DISCONTINUED | OUTPATIENT
Start: 2024-02-02 | End: 2024-02-02

## 2024-02-02 RX ADMIN — SIMVASTATIN 20 MILLIGRAM(S): 20 TABLET, FILM COATED ORAL at 21:37

## 2024-02-02 RX ADMIN — HEPARIN SODIUM 5000 UNIT(S): 5000 INJECTION INTRAVENOUS; SUBCUTANEOUS at 04:52

## 2024-02-02 RX ADMIN — Medication 25 MILLIGRAM(S): at 12:18

## 2024-02-02 RX ADMIN — SODIUM CHLORIDE 125 MILLILITER(S): 9 INJECTION INTRAMUSCULAR; INTRAVENOUS; SUBCUTANEOUS at 14:45

## 2024-02-02 RX ADMIN — Medication 25 MILLIGRAM(S): at 04:52

## 2024-02-02 RX ADMIN — Medication 81 MILLIGRAM(S): at 12:18

## 2024-02-02 RX ADMIN — AMLODIPINE BESYLATE 10 MILLIGRAM(S): 2.5 TABLET ORAL at 04:52

## 2024-02-02 RX ADMIN — Medication 25 MILLIGRAM(S): at 21:37

## 2024-02-02 RX ADMIN — LOSARTAN POTASSIUM 25 MILLIGRAM(S): 100 TABLET, FILM COATED ORAL at 21:37

## 2024-02-02 RX ADMIN — HEPARIN SODIUM 5000 UNIT(S): 5000 INJECTION INTRAVENOUS; SUBCUTANEOUS at 17:24

## 2024-02-02 NOTE — PROGRESS NOTE ADULT - ASSESSMENT
83y Male with history of HTN, prostate CA presents with CP and SOB. Nephrology consulted for elevated Scr.    1) DAX: likely hemodynamically mediated in setting of recently restarted ARB and hypovolemia. DAX resolved with Scr stable post [facility] [place] [facility] on 1/31. UA bland. FeNa low. Bladder scan negative. Avoid nephrotoxins.    2) CKD-3b: Patient with h/o CKD as Scr noted to be 1.5 as per labs in 2019. UA bland with mild proteinuria (spot urine TP/CR 0.2). Renal US with bilateral renal cysts. Outpatient CKD work up. Monitor electrolytes.    3) HTN with CKD: BP controlled with positive orthostatics for which patient getting IVF. Suspect orthostatics due to recently started flomax. Will discontinue medication. Monitor BP.    4) CP: As per cardiology.      [facility] NEPHROLOGY  Will Jackson M.D.  Norberto Shane D.O.  Gina Aviles M.D.  MD Blank Waldron, MSN, ANP-C    Telephone: [phone]  Facsimile: [phone]    [facility] [place] [address], [address]  [place] [address]

## 2024-02-02 NOTE — PROGRESS NOTE ADULT - ASSESSMENT
84 y/o M with pmhx of HTN, PVD, presented to the ED for new onset shortness of breath with exertion for 1 week. The patient went to urgent care because he was having chest tightness and shortness of breath with exertion. He had his blood pressure checked and it was elevated, and was sent to the ED for evaluation. He denies hx of cardiac disease. He also endorsed that he felt lightheaded when he sits up but never had LOC. Denies chest pain at this time. I feel fine now..        Problem/Plan - 1:  ·  Problem: Dyspnea with LV dysfunction    ·  Plan: - S/P cardiac cath.   patient presented for new onset chest tightness and SOB with exertion  - BNP 2471  < from: Nuclear Stress Test-Pharmacologic.. (01.29.24 @ 07:02) >  Conclusions:   1. Baseline electrocardiogram: atrial tachycardia at a rate of 104 bpm with T wave inversion leads I, II, III, aVF, aVR, V3-V4.   2. During adenosine infusion Atrial tachycardia converted to sinus rhythm with frequent PACs in pattern of bigeminy   3. Occasional APD's occurred during stress and recovery, increased with stress. Single VPD's occurred during recovery.   4. The left ventricle ismoderately decreased in function and normal in size. The resting left ventricular EF% is 40 %. The resting end diastolic volume is 90 ml and systolic volume is 54 ml. The left ventricular EF% during stress is 39 %. The stress end diastolic volume is 115 ml and systolic volume is 70 ml.   5. Diffuse left ventricular wall motion hypokinesis.   6. Normal right ventricular function. There is no right ventricular dilation.    < end of copied text >    TT Enhancing Agent (01.27.24 @ 14:23) >  CONCLUSIONS:      1. Left ventricular cavity is normal. Left ventricular systolic function is normal with an ejection fraction of 59 % by Thakur's method of disks. There are no regional wall motion abnormalities seen.   2. Moderate aortic regurgitation.    < end of copied text >       Problem/Plan - 2:  ·  Problem: Chest Pain .   ·  Plan: - troponins stable  - EKG no ST changes noted  - TTE noted . NST abnormal so S/P Cardiac cath. Mild NOCAD   - cardiology help appreciated.      Problem/Plan - 3:  ·  Problem: CKD stage 3.   ·  Plan: - Trending BMP   - nephrology helping.      Problem/Plan - 4:  ·  Problem: Benign essential HTN.   ·  Plan: - start amlodipine for now  - Continue home meds.     Problem/Plan - 5:  ·  Problem: PVD.   ·  Plan: - Vascular helping. No intervention.    < from: VA Duplex Arterial Lower Ext Ltd, Left (02.01.24 @ 08:56) >  CONCLUSIONS:      1. There is 3 vessel runoff distally to the left ankle via the PTA, peroneal and CLAU vessels.   2. The left common femoral artery to popliteal artery bypass graft is occluded throughout its entire segment. No flow noted within.    < end of copied text >

## 2024-02-02 NOTE — PROGRESS NOTE ADULT - ASSESSMENT
This is a 82 y/o M with pmhx of HTN, PVD, presented to the ED for new onset shortness of breath with exertion for 1 week    #Dyspnea/Chest discomfort  -in setting of uncontrolled HTN  -trops negative  -no significant findings on CXR  -Echo with mod AR, nl LV sys fx    -pharm nuclear stress noted;  During adenosine infusion Atrial tachycardia converted to sinus rhythm with frequent PACs in pattern of bigeminy; LV moderately decreased in function ; EF 40% ;left ventricular wall motion hypokinesis.  -echo with normal LV/EF, maybe discrepent spect due to ectopy   -s/p [facility] 1/31: prox LAD 20-30%. LCx mild. RCA nondominant. RFA access.  -c/w asa     #HTN  -cont meds     #Bradycardia  -intermittent sinus katey on tele  -cont to monitor  -defer AVNB    #PAD  -sheyla/pvr abnl in left leg  -vasc eval noted-  -await input on le doppler    #[place]   -improving  -renal f/u    #orthostatic hypo  -dizziness  -hydrate 1 liter ns    DCP likely [place] pending vasc rec and improved orthostatic vitals      d/w acp  55 minutes spent on total encounter; more than 50% of the visit was spent counseling and/or coordinating care by the attending physician.

## 2024-02-02 NOTE — PROGRESS NOTE ADULT - SUBJECTIVE AND OBJECTIVE BOX
CARDIOLOGY FOLLOW UP NOTE - DR. GARSIA    Patient Name: GIANNI MURPHY    Date of Service: 24 @ 14:01    Patient seen and examined  dizziness +  Subjective:    cv: denies chest pain, dyspnea, palpitations, dizziness  pulmonary: denies cough  GI: denies abdominal pain, nausea, vomiting  vascular/legs: no edema   skin: no rash  ROS: otherwise negative   overnight events:      PHYSICAL EXAM:  T(C): 36.4 (24 @ 04:45), Max: 37.2 (24 @ 21:00)  HR: 67 (24 @ 12:10) (67 - 71)  BP: 137/49 (24 @ 12:10) (135/65 - 140/61)  RR: 17 (24 @ 04:45) (17 - 17)  SpO2: 98% (24 @ 04:45) (97% - 98%)  Wt(kg): --  I&O's Summary    2024 07:01  -  2024 07:00  --------------------------------------------------------  IN: 0 mL / OUT: 800 mL / NET: -800 mL      Daily     Daily Weight in k.6 (2024 04:45)    Appearance: Normal	  Cardiovascular: Normal S1 S2,RRR, No JVD, No murmurs  Respiratory: Lungs clear to auscultation	  Gastrointestinal:  Soft, Non-tender, + BS	  Extremities: Normal range of motion, No clubbing, cyanosis or edema      Home Medications:  D3 25 mcg (1000 intl units) oral tablet: 2 tab(s) orally once a day (2024 04:47)  ramipril 10 mg oral tablet: 1 tab(s) orally once a day (2024 04:47)  simvastatin 20 mg oral tablet: 1 tab(s) orally once a day (2024 04:47)  valACYclovir 500 mg oral tablet: 1 tab(s) orally once a day (2024 04:47)      MEDICATIONS  (STANDING):  amLODIPine   Tablet 10 milliGRAM(s) Oral daily  aspirin enteric coated 81 milliGRAM(s) Oral daily  heparin   Injectable 5000 Unit(s) SubCutaneous every 12 hours  hydrALAZINE 25 milliGRAM(s) Oral three times a day  losartan 25 milliGRAM(s) Oral at bedtime  simvastatin 20 milliGRAM(s) Oral at bedtime  sodium chloride 0.9%. 1000 milliLiter(s) (125 mL/Hr) IV Continuous <Continuous>      TELEMETRY: 	    ECG:  	  RADIOLOGY:   DIAGNOSTIC TESTING:  [ ] Echocardiogram:  [ ] Catheterization:  [ ] Stress Test:    OTHER: 	    LABS:	 	    CARDIAC MARKERS:                                      12.2   4.36  )-----------( 160      ( 2024 06:20 )             35.8         138  |  106  |  30<H>  ----------------------------<  77  4.4   |  20<L>  |  1.41<H>    Ca    8.8      2024 06:20  Phos  3.1       Mg     2.20           proBNP:     Lipid Profile:   HgA1c:     Creatinine: 1.41 mg/dL (24 @ 06:20)  Creatinine: 1.45 mg/dL (24 @ 06:05)  Creatinine: 1.52 mg/dL (24 @ 06:10)

## 2024-02-02 NOTE — PROGRESS NOTE ADULT - SUBJECTIVE AND OBJECTIVE BOX
[facility] NEPHROLOGY- PROGRESS NOTE    83y Male with history of HTN presents with CP and SOB. Nephrology consulted for elevated Scr.    REVIEW OF SYSTEMS:  Gen: no fevers, + dizziness  Cards: no chest pain  Resp: no dyspnea  GI: no nausea or vomiting or diarrhea  : + urinary incontinence  Vascular: no LE edema    No Known Allergies      Hospital Medications: Medications reviewed      VITALS:  T(F): 97.6 (02-02-24 @ 04:45), Max: 98.9 (02-01-24 @ 21:00)  HR: 67 (02-02-24 @ 12:10)  BP: 137/49 (02-02-24 @ 12:10)  RR: 17 (02-02-24 @ 04:45)  SpO2: 98% (02-02-24 @ 04:45)  Wt(kg): --    02-01 @ 07:01  -  02-02 @ 07:00  --------------------------------------------------------  IN: 0 mL / OUT: 800 mL / NET: -800 mL        PHYSICAL EXAM:    Gen: NAD, calm  Cards: RRR, +S1/S2, no M/G/R  Resp: CTA B/L  GI: soft, NT/ND, NABS  Vascular: no LE edema B/L      LABS:  02-02    138  |  106  |  30<H>  ----------------------------<  77  4.4   |  20<L>  |  1.41<H>    Ca    8.8      02 Feb 2024 06:20  Phos  3.1     02-02  Mg     2.20     02-02      Creatinine Trend: 1.41 <--, 1.45 <--, 1.52 <--, 2.17 <--, 1.78 <--, 1.54 <--, 1.58 <--, 1.71 <--                        12.2   4.36  )-----------( 160      ( 02 Feb 2024 06:20 )             35.8     Urine Studies:  Urinalysis Basic - ( 02 Feb 2024 06:20 )    Color:  / Appearance:  / SG:  / pH:   Gluc: 77 mg/dL / Ketone:   / Bili:  / Urobili:    Blood:  / Protein:  / Nitrite:    Leuk Esterase:  / RBC:  / WBC    Sq Epi:  / Non Sq Epi:  / Bacteria:       Sodium, Random Urine: 108 mmol/L (01-31 @ 07:28)  Creatinine, Random Urine: 113 mg/dL (01-31 @ 07:28)  Protein/Creatinine Ratio Calculation: 0.2 Ratio (01-31 @ 07:28)

## 2024-02-02 NOTE — CHART NOTE - NSCHARTNOTEFT_GEN_A_CORE
Vascular team reached out to primary team after reviewing arterial duplex.   Per vascular, since pt is asymptomatic, and imaging w/ three vessel run off showing there's optimal blood flow distally, no acute surgical interventions this visit, and pt can follow up outpatient.

## 2024-02-02 NOTE — PROVIDER CONTACT NOTE (OTHER) - SITUATION
9 beats vtach on tele
Pt went for nuclear stress test today, instructed to flush urine 2x. ACP contacted to see if they want to wait 24hrs before collecting urine sample due to this

## 2024-02-02 NOTE — PROGRESS NOTE ADULT - SUBJECTIVE AND OBJECTIVE BOX
Date of Service  : 02-02-24     INTERVAL HPI/OVERNIGHT EVENTS: I feel fine.   Vital Signs Last 24 Hrs  T(C): 36.4 (02 Feb 2024 18:50), Max: 37.2 (01 Feb 2024 21:00)  T(F): 97.6 (02 Feb 2024 18:50), Max: 98.9 (01 Feb 2024 21:00)  HR: 60 (02 Feb 2024 18:50) (60 - 70)  BP: 147/60 (02 Feb 2024 18:50) (135/65 - 147/60)  BP(mean): --  RR: 17 (02 Feb 2024 18:50) (17 - 17)  SpO2: 99% (02 Feb 2024 18:50) (97% - 99%)    Parameters below as of 02 Feb 2024 18:50  Patient On (Oxygen Delivery Method): room air      I&O's Summary    01 Feb 2024 07:01  -  02 Feb 2024 07:00  --------------------------------------------------------  IN: 0 mL / OUT: 800 mL / NET: -800 mL    02 Feb 2024 07:01  -  02 Feb 2024 19:38  --------------------------------------------------------  IN: 750 mL / OUT: 400 mL / NET: 350 mL      MEDICATIONS  (STANDING):  amLODIPine   Tablet 10 milliGRAM(s) Oral daily  aspirin enteric coated 81 milliGRAM(s) Oral daily  heparin   Injectable 5000 Unit(s) SubCutaneous every 12 hours  hydrALAZINE 25 milliGRAM(s) Oral three times a day  losartan 25 milliGRAM(s) Oral at bedtime  simvastatin 20 milliGRAM(s) Oral at bedtime  sodium chloride 0.9%. 1000 milliLiter(s) (125 mL/Hr) IV Continuous <Continuous>    MEDICATIONS  (PRN):  acetaminophen     Tablet .. 650 milliGRAM(s) Oral every 6 hours PRN Temp greater or equal to 38C (100.4F), Mild Pain (1 - 3)  aluminum hydroxide/magnesium hydroxide/simethicone Suspension 30 milliLiter(s) Oral every 4 hours PRN Dyspepsia  melatonin 3 milliGRAM(s) Oral at bedtime PRN Insomnia  ondansetron Injectable 4 milliGRAM(s) IV Push every 8 hours PRN Nausea and/or Vomiting    LABS:                        12.2   4.36  )-----------( 160      ( 02 Feb 2024 06:20 )             35.8     02-02    138  |  106  |  30<H>  ----------------------------<  77  4.4   |  20<L>  |  1.41<H>    Ca    8.8      02 Feb 2024 06:20  Phos  3.1     02-02  Mg     2.20     02-02        Urinalysis Basic - ( 02 Feb 2024 06:20 )    Color: x / Appearance: x / SG: x / pH: x  Gluc: 77 mg/dL / Ketone: x  / Bili: x / Urobili: x   Blood: x / Protein: x / Nitrite: x   Leuk Esterase: x / RBC: x / WBC x   Sq Epi: x / Non Sq Epi: x / Bacteria: x      CAPILLARY BLOOD GLUCOSE            Urinalysis Basic - ( 02 Feb 2024 06:20 )    Color: x / Appearance: x / SG: x / pH: x  Gluc: 77 mg/dL / Ketone: x  / Bili: x / Urobili: x   Blood: x / Protein: x / Nitrite: x   Leuk Esterase: x / RBC: x / WBC x   Sq Epi: x / Non Sq Epi: x / Bacteria: x      REVIEW OF SYSTEMS:  CONSTITUTIONAL: No fever, weight loss, or fatigue  EYES: No eye pain, visual disturbances, or discharge  ENMT:  No difficulty hearing, tinnitus, vertigo; No sinus or throat pain  NECK: No pain or stiffness  RESPIRATORY: No cough, wheezing, chills or hemoptysis; No shortness of breath  CARDIOVASCULAR: No chest pain, palpitations, dizziness, or leg swelling  GASTROINTESTINAL: No abdominal or epigastric pain. No nausea, vomiting, or hematemesis; No diarrhea or constipation. No melena or hematochezia.  GENITOURINARY: No dysuria, frequency, hematuria, or incontinence  NEUROLOGICAL: No headaches, memory loss, loss of strength, numbness, or tremors      RADIOLOGY & ADDITIONAL TESTS:    Consultant(s) Notes Reviewed:  [x ] YES  [ ] NO    PHYSICAL EXAM:  GENERAL: NAD, well-groomed, well-developed,not in any distress ,  HEAD:  Atraumatic, Normocephalic  NECK: Supple, No JVD, Normal thyroid  NERVOUS SYSTEM:  Alert & Oriented X3, No focal deficit   CHEST/LUNG: Good air entry bilateral with no  rales, rhonchi, wheezing, or rubs  HEART: Regular rate and rhythm; No murmurs, rubs, or gallops  ABDOMEN: Soft, Nontender, Nondistended; Bowel sounds present  EXTREMITIES:  No clubbing, cyanosis, or edema      Care Discussed with Consultants/Other Providers [ x] YES  [ ] NO

## 2024-02-03 VITALS
HEART RATE: 61 BPM | SYSTOLIC BLOOD PRESSURE: 149 MMHG | RESPIRATION RATE: 17 BRPM | TEMPERATURE: 98 F | OXYGEN SATURATION: 100 % | DIASTOLIC BLOOD PRESSURE: 73 MMHG

## 2024-02-03 LAB
ANION GAP SERPL CALC-SCNC: 10 MMOL/L — SIGNIFICANT CHANGE UP (ref 7–14)
BUN SERPL-MCNC: 30 MG/DL — HIGH (ref 7–23)
CALCIUM SERPL-MCNC: 8.5 MG/DL — SIGNIFICANT CHANGE UP (ref 8.4–10.5)
CHLORIDE SERPL-SCNC: 107 MMOL/L — SIGNIFICANT CHANGE UP (ref 98–107)
CO2 SERPL-SCNC: 22 MMOL/L — SIGNIFICANT CHANGE UP (ref 22–31)
CREAT SERPL-MCNC: 1.32 MG/DL — HIGH (ref 0.5–1.3)
EGFR: 53 ML/MIN/1.73M2 — LOW
GLUCOSE SERPL-MCNC: 81 MG/DL — SIGNIFICANT CHANGE UP (ref 70–99)
HCT VFR BLD CALC: 35.6 % — LOW (ref 39–50)
HGB BLD-MCNC: 11.7 G/DL — LOW (ref 13–17)
MAGNESIUM SERPL-MCNC: 2.1 MG/DL — SIGNIFICANT CHANGE UP (ref 1.6–2.6)
MCHC RBC-ENTMCNC: 29.5 PG — SIGNIFICANT CHANGE UP (ref 27–34)
MCHC RBC-ENTMCNC: 32.9 GM/DL — SIGNIFICANT CHANGE UP (ref 32–36)
MCV RBC AUTO: 89.9 FL — SIGNIFICANT CHANGE UP (ref 80–100)
NRBC # BLD: 0 /100 WBCS — SIGNIFICANT CHANGE UP (ref 0–0)
NRBC # FLD: 0 K/UL — SIGNIFICANT CHANGE UP (ref 0–0)
PHOSPHATE SERPL-MCNC: 3.2 MG/DL — SIGNIFICANT CHANGE UP (ref 2.5–4.5)
PLATELET # BLD AUTO: 152 K/UL — SIGNIFICANT CHANGE UP (ref 150–400)
POTASSIUM SERPL-MCNC: 4.1 MMOL/L — SIGNIFICANT CHANGE UP (ref 3.5–5.3)
POTASSIUM SERPL-SCNC: 4.1 MMOL/L — SIGNIFICANT CHANGE UP (ref 3.5–5.3)
RBC # BLD: 3.96 M/UL — LOW (ref 4.2–5.8)
RBC # FLD: 15 % — HIGH (ref 10.3–14.5)
SODIUM SERPL-SCNC: 139 MMOL/L — SIGNIFICANT CHANGE UP (ref 135–145)
WBC # BLD: 3.77 K/UL — LOW (ref 3.8–10.5)
WBC # FLD AUTO: 3.77 K/UL — LOW (ref 3.8–10.5)

## 2024-02-03 RX ORDER — RAMIPRIL 5 MG
1 CAPSULE ORAL
Refills: 0 | DISCHARGE

## 2024-02-03 RX ORDER — ASPIRIN/CALCIUM CARB/MAGNESIUM 324 MG
1 TABLET ORAL
Qty: 0 | Refills: 0 | DISCHARGE
Start: 2024-02-03

## 2024-02-03 RX ORDER — HYDRALAZINE HCL 50 MG
1 TABLET ORAL
Qty: 90 | Refills: 0
Start: 2024-02-03 | End: 2024-03-03

## 2024-02-03 RX ORDER — AMLODIPINE BESYLATE 2.5 MG/1
1 TABLET ORAL
Qty: 30 | Refills: 0
Start: 2024-02-03 | End: 2024-03-03

## 2024-02-03 RX ORDER — LOSARTAN POTASSIUM 100 MG/1
1 TABLET, FILM COATED ORAL
Qty: 30 | Refills: 0
Start: 2024-02-03 | End: 2024-03-03

## 2024-02-03 RX ADMIN — Medication 25 MILLIGRAM(S): at 11:56

## 2024-02-03 RX ADMIN — Medication 25 MILLIGRAM(S): at 05:32

## 2024-02-03 RX ADMIN — AMLODIPINE BESYLATE 10 MILLIGRAM(S): 2.5 TABLET ORAL at 05:32

## 2024-02-03 RX ADMIN — HEPARIN SODIUM 5000 UNIT(S): 5000 INJECTION INTRAVENOUS; SUBCUTANEOUS at 05:32

## 2024-02-03 RX ADMIN — Medication 81 MILLIGRAM(S): at 11:56

## 2024-02-03 NOTE — PROGRESS NOTE ADULT - SUBJECTIVE AND OBJECTIVE BOX
CARDIOLOGY FOLLOW UP NOTE - DR. GARSIA    Patient Name: GIANNI MURPHY    Date of Service: 24 @ 10:18    Patient seen and examined  dizziness resolved       Subjective:    cv: denies chest pain, dyspnea, palpitations, dizziness  pulmonary: denies cough  GI: denies abdominal pain, nausea, vomiting  vascular/legs: no edema   skin: no rash  ROS: otherwise negative   overnight events:      PHYSICAL EXAM:  T(C): 36.6 (24 @ 05:30), Max: 36.6 (24 @ 05:30)  HR: 62 (24 @ 21:35) (60 - 67)  BP: 121/63 (24 @ 21:35) (121/63 - 147/60)  RR: 17 (24 @ 05:30) (17 - 17)  SpO2: 98% (24 @ 05:30) (98% - 99%)  Wt(kg): --  I&O's Summary    2024 07:01  -  2024 07:00  --------------------------------------------------------  IN: 1050 mL / OUT: 900 mL / NET: 150 mL      Daily     Daily Weight in k.3 (2024 05:30)    Appearance: Normal	  Cardiovascular: Normal S1 S2,RRR, No JVD, No murmurs  Respiratory: Lungs clear to auscultation	  Gastrointestinal:  Soft, Non-tender, + BS	  Extremities: Normal range of motion, No clubbing, cyanosis or edema      Home Medications:  D3 25 mcg (1000 intl units) oral tablet: 2 tab(s) orally once a day (2024 04:47)  ramipril 10 mg oral tablet: 1 tab(s) orally once a day (2024 04:47)  simvastatin 20 mg oral tablet: 1 tab(s) orally once a day (2024 04:47)  valACYclovir 500 mg oral tablet: 1 tab(s) orally once a day (2024 04:47)      MEDICATIONS  (STANDING):  amLODIPine   Tablet 10 milliGRAM(s) Oral daily  aspirin enteric coated 81 milliGRAM(s) Oral daily  heparin   Injectable 5000 Unit(s) SubCutaneous every 12 hours  hydrALAZINE 25 milliGRAM(s) Oral three times a day  losartan 25 milliGRAM(s) Oral at bedtime  simvastatin 20 milliGRAM(s) Oral at bedtime  sodium chloride 0.9%. 1000 milliLiter(s) (125 mL/Hr) IV Continuous <Continuous>      TELEMETRY: 	    ECG:  	  RADIOLOGY:   DIAGNOSTIC TESTING:  [ ] Echocardiogram:  [ ] Catheterization:  [ ] Stress Test:    OTHER: 	    LABS:	 	    CARDIAC MARKERS:                                      11.7   3.77  )-----------( 152      ( 2024 06:29 )             35.6         139  |  107  |  30<H>  ----------------------------<  81  4.1   |  22  |  1.32<H>    Ca    8.5      2024 06:29  Phos  3.2       Mg     2.10           proBNP:     Lipid Profile:   HgA1c:     Creatinine: 1.32 mg/dL (24 @ 06:29)  Creatinine: 1.41 mg/dL (24 @ 06:20)  Creatinine: 1.45 mg/dL (24 @ 06:05)

## 2024-02-03 NOTE — PROGRESS NOTE ADULT - ASSESSMENT
This is a 82 y/o M with pmhx of HTN, PVD, presented to the ED for new onset shortness of breath with exertion for 1 week    #Dyspnea/Chest discomfort  -in setting of uncontrolled HTN  -trops negative  -no significant findings on CXR  -Echo with mod AR, nl LV sys fx    -pharm nuclear stress noted;  During adenosine infusion Atrial tachycardia converted to sinus rhythm with frequent PACs in pattern of bigeminy; LV moderately decreased in function ; EF 40% ;left ventricular wall motion hypokinesis.  -echo with normal LV/EF, maybe discrepent spect due to ectopy   -s/p [facility] 1/31: prox LAD 20-30%. LCx mild. RCA nondominant. RFA access.  -c/w asa     #HTN  -cont meds     #Bradycardia  -intermittent sinus katey on tele  -cont to monitor  -defer AVNB    #PAD  -sheyla/pvr abnl in left leg  -vasc eval noted-no further inpt w/u needed  -outpt vasc f/u    #[place]   -improving  -renal f/u    #orthostatic hypo  -dizziness resolved post ivf      stable for dc today   d/w acp   55 minutes spent on total encounter; more than 50% of the visit was spent counseling and/or coordinating care by the attending physician.

## 2024-02-03 NOTE — PROGRESS NOTE ADULT - ASSESSMENT
84 y/o M with pmhx of HTN, PVD, presented to the ED for new onset shortness of breath with exertion for 1 week. The patient went to urgent care because he was having chest tightness and shortness of breath with exertion. He had his blood pressure checked and it was elevated, and was sent to the ED for evaluation. He denies hx of cardiac disease. He also endorsed that he felt lightheaded when he sits up but never had LOC. Denies chest pain at this time. I feel fine now..        Problem/Plan - 1:  ·  Problem: Dyspnea with LV dysfunction    ·  Plan: - S/P cardiac cath.   patient presented for new onset chest tightness and SOB with exertion  - BNP 2471  < from: Nuclear Stress Test-Pharmacologic.. (01.29.24 @ 07:02) >  Conclusions:   1. Baseline electrocardiogram: atrial tachycardia at a rate of 104 bpm with T wave inversion leads I, II, III, aVF, aVR, V3-V4.   2. During adenosine infusion Atrial tachycardia converted to sinus rhythm with frequent PACs in pattern of bigeminy   3. Occasional APD's occurred during stress and recovery, increased with stress. Single VPD's occurred during recovery.   4. The left ventricle ismoderately decreased in function and normal in size. The resting left ventricular EF% is 40 %. The resting end diastolic volume is 90 ml and systolic volume is 54 ml. The left ventricular EF% during stress is 39 %. The stress end diastolic volume is 115 ml and systolic volume is 70 ml.   5. Diffuse left ventricular wall motion hypokinesis.   6. Normal right ventricular function. There is no right ventricular dilation.    < end of copied text >    TT Enhancing Agent (01.27.24 @ 14:23) >  CONCLUSIONS:      1. Left ventricular cavity is normal. Left ventricular systolic function is normal with an ejection fraction of 59 % by Thakur's method of disks. There are no regional wall motion abnormalities seen.   2. Moderate aortic regurgitation.    < end of copied text >       Problem/Plan - 2:  ·  Problem: Chest Pain .   ·  Plan: - troponins stable  - EKG no ST changes noted  - TTE noted . NST abnormal so S/P Cardiac cath. Mild NOCAD   - cardiology help appreciated.      Problem/Plan - 3:  ·  Problem: CKD stage 3.   ·  Plan: - Trending BMP   - nephrology helping.      Problem/Plan - 4:  ·  Problem: Benign essential HTN.   ·  Plan: - start amlodipine for now  - Continue home meds.     Problem/Plan - 5:  ·  Problem: PVD.   ·  Plan: - Vascular helping. No intervention.    < from: VA Duplex Arterial Lower Ext Ltd, Left (02.01.24 @ 08:56) >  CONCLUSIONS:      1. There is 3 vessel runoff distally to the left ankle via the PTA, peroneal and CLAU vessels.   2. The left common femoral artery to popliteal artery bypass graft is occluded throughout its entire segment. No flow noted within.    < end of copied text >      Dispo : DC planning per primary team.

## 2024-02-03 NOTE — PROGRESS NOTE ADULT - SUBJECTIVE AND OBJECTIVE BOX
Date of Service  : 02-03-24     INTERVAL HPI/OVERNIGHT EVENTS: I feel okay .   Vital Signs Last 24 Hrs  T(C): 36.4 (03 Feb 2024 11:33), Max: 36.6 (03 Feb 2024 05:30)  T(F): 97.5 (03 Feb 2024 11:33), Max: 97.9 (03 Feb 2024 05:30)  HR: 61 (03 Feb 2024 11:33) (60 - 62)  BP: 149/73 (03 Feb 2024 11:33) (121/63 - 149/73)  BP(mean): --  RR: 17 (03 Feb 2024 11:33) (17 - 17)  SpO2: 100% (03 Feb 2024 11:33) (98% - 100%)    Parameters below as of 03 Feb 2024 11:33  Patient On (Oxygen Delivery Method): room air      I&O's Summary    02 Feb 2024 07:01  -  03 Feb 2024 07:00  --------------------------------------------------------  IN: 1050 mL / OUT: 900 mL / NET: 150 mL      MEDICATIONS  (STANDING):  amLODIPine   Tablet 10 milliGRAM(s) Oral daily  aspirin enteric coated 81 milliGRAM(s) Oral daily  heparin   Injectable 5000 Unit(s) SubCutaneous every 12 hours  hydrALAZINE 25 milliGRAM(s) Oral three times a day  losartan 25 milliGRAM(s) Oral at bedtime  simvastatin 20 milliGRAM(s) Oral at bedtime  sodium chloride 0.9%. 1000 milliLiter(s) (125 mL/Hr) IV Continuous <Continuous>    MEDICATIONS  (PRN):  acetaminophen     Tablet .. 650 milliGRAM(s) Oral every 6 hours PRN Temp greater or equal to 38C (100.4F), Mild Pain (1 - 3)  aluminum hydroxide/magnesium hydroxide/simethicone Suspension 30 milliLiter(s) Oral every 4 hours PRN Dyspepsia  melatonin 3 milliGRAM(s) Oral at bedtime PRN Insomnia  ondansetron Injectable 4 milliGRAM(s) IV Push every 8 hours PRN Nausea and/or Vomiting    LABS:                        11.7   3.77  )-----------( 152      ( 03 Feb 2024 06:29 )             35.6     02-03    139  |  107  |  30<H>  ----------------------------<  81  4.1   |  22  |  1.32<H>    Ca    8.5      03 Feb 2024 06:29  Phos  3.2     02-03  Mg     2.10     02-03        Urinalysis Basic - ( 03 Feb 2024 06:29 )    Color: x / Appearance: x / SG: x / pH: x  Gluc: 81 mg/dL / Ketone: x  / Bili: x / Urobili: x   Blood: x / Protein: x / Nitrite: x   Leuk Esterase: x / RBC: x / WBC x   Sq Epi: x / Non Sq Epi: x / Bacteria: x      CAPILLARY BLOOD GLUCOSE            Urinalysis Basic - ( 03 Feb 2024 06:29 )    Color: x / Appearance: x / SG: x / pH: x  Gluc: 81 mg/dL / Ketone: x  / Bili: x / Urobili: x   Blood: x / Protein: x / Nitrite: x   Leuk Esterase: x / RBC: x / WBC x   Sq Epi: x / Non Sq Epi: x / Bacteria: x      REVIEW OF SYSTEMS:  CONSTITUTIONAL: No fever, weight loss, or fatigue  EYES: No eye pain, visual disturbances, or discharge  ENMT:  No difficulty hearing, tinnitus, vertigo; No sinus or throat pain  NECK: No pain or stiffness  RESPIRATORY: No cough, wheezing, chills or hemoptysis; No shortness of breath  CARDIOVASCULAR: No chest pain, palpitations, dizziness, or leg swelling  GASTROINTESTINAL: No abdominal or epigastric pain. No nausea, vomiting, or hematemesis; No diarrhea or constipation. No melena or hematochezia.  GENITOURINARY: No dysuria, frequency, hematuria, or incontinence  NEUROLOGICAL: No headaches, memory loss, loss of strength, numbness, or tremors      RADIOLOGY & ADDITIONAL TESTS:    Consultant(s) Notes Reviewed:  [x ] YES  [ ] NO    PHYSICAL EXAM:  GENERAL: NAD, well-groomed, well-developed,not in any distress ,  HEAD:  Atraumatic, Normocephalic  EYES: EOMI, PERRLA, conjunctiva and sclera clear  ENMT: No tonsillar erythema, exudates, or enlargement; Moist mucous membranes, Good dentition, No lesions  NECK: Supple, No JVD, Normal thyroid  NERVOUS SYSTEM:  Alert & Oriented X3, No focal deficit   CHEST/LUNG: Good air entry bilateral with no  rales, rhonchi, wheezing, or rubs  HEART: Regular rate and rhythm; No murmurs, rubs, or gallops  ABDOMEN: Soft, Nontender, Nondistended; Bowel sounds present  EXTREMITIES: , No clubbing, cyanosis, or edema    Care Discussed with Consultants/Other Providers [ x] YES  [ ] NO

## 2024-02-03 NOTE — PROGRESS NOTE ADULT - PROVIDER SPECIALTY LIST ADULT
Cardiology
Internal Medicine
Nephrology
Cardiology
Internal Medicine
Internal Medicine
Nephrology
Cardiology
Internal Medicine
Internal Medicine
Nephrology
Nephrology
Cardiology
Internal Medicine

## 2024-02-03 NOTE — PROGRESS NOTE ADULT - REASON FOR ADMISSION
chest tightness

## 2024-02-03 NOTE — PROGRESS NOTE ADULT - ASSESSMENT
83y Male with history of HTN, prostate CA presents with CP and SOB. Nephrology consulted for elevated Scr.    1) DAX: likely hemodynamically mediated in setting of recently restarted ARB and hypovolemia. DAX resolved with Scr stable post [facility] on 1/31. UA bland. FeNa low. Bladder scan negative. Avoid nephrotoxins.    2) CKD-3b: Patient with h/o CKD as Scr noted to be 1.5 as per labs in 2019. UA bland with mild proteinuria (spot urine TP/CR 0.2). Renal US with bilateral renal cysts. Outpatient CKD work up. Monitor electrolytes.    3) HTN with CKD: BP controlled with positive orthostatics for which patient getting IVF. Suspect orthostatics due to recently started flomax. Will discontinue medication. Monitor BP.    4) CP: As per cardiology.    [facility] NEPHROLOGY  Will Jackson M.D.  Norberto hSane D.O.  Gina Aviles M.D.  MD Blank Waldron, MSN, ANP-C    Telephone: [phone]  Facsimile: [phone]    [facility] [place] [address], [address]  [place] [address]

## 2024-02-03 NOTE — PROGRESS NOTE ADULT - NUTRITIONAL ASSESSMENT
This patient has been assessed with a concern for Malnutrition and has been determined to have a diagnosis/diagnoses of Moderate protein-calorie malnutrition and Underweight (BMI < 19).    This patient is being managed with:   Diet DASH/TLC-  Sodium & Cholesterol Restricted  Supplement Feeding Modality:  Oral  Ensure Compact Cans or Servings Per Day:  1       Frequency:  Two Times a day  Entered: Feb 2 2024 12:40PM  
This patient has been assessed with a concern for Malnutrition and has been determined to have a diagnosis/diagnoses of Moderate protein-calorie malnutrition and Underweight (BMI < 19).    This patient is being managed with:   Diet DASH/TLC-  Sodium & Cholesterol Restricted  Supplement Feeding Modality:  Oral  Ensure Compact Cans or Servings Per Day:  1       Frequency:  Two Times a day  Entered: Feb 2 2024 12:40PM  
This patient has been assessed with a concern for Malnutrition and has been determined to have a diagnosis/diagnoses of Moderate protein-calorie malnutrition and Underweight (BMI < 19).    This patient is being managed with:   Diet DASH/TLC-  Sodium & Cholesterol Restricted  1000mL Fluid Restriction ([id number])  Supplement Feeding Modality:  Oral  Ensure Compact Cans or Servings Per Day:  1       Frequency:  Two Times a day  Entered: Feb 1 2024  6:15PM

## 2024-02-03 NOTE — PROGRESS NOTE ADULT - SUBJECTIVE AND OBJECTIVE BOX
[facility] NEPHROLOGY- PROGRESS NOTE    83y Male with history of HTN presents with CP and SOB. Nephrology consulted for elevated Scr. Reports that his left leg feels cool.    REVIEW OF SYSTEMS:  Gen: no fevers or dizziness  Cards: no chest pain  Resp: no dyspnea  GI: no nausea or vomiting or diarrhea  : + urinary incontinence  Vascular: cool left lower extremity    No Known Allergies    Hospital Medications: Medications reviewed    VITALS:  T(F): 97.5 (02-03-24 @ 11:33), Max: 97.9 (02-03-24 @ 05:30)  HR: 61 (02-03-24 @ 11:33)  BP: 149/73 (02-03-24 @ 11:33)  RR: 17 (02-03-24 @ 11:33)  SpO2: 100% (02-03-24 @ 11:33)  Wt(kg): --    02-02 @ 07:01  -  02-03 @ 07:00  --------------------------------------------------------  IN: 1050 mL / OUT: 900 mL / NET: 150 mL    PHYSICAL EXAM:    Gen: NAD, calm  Cards: RRR, +S1/S2, no M/G/R  Resp: CTA B/L  GI: soft, NT/ND, NABS  Vascular: no LE edema B/L    LABS:  02-03    139  |  107  |  30<H>  ----------------------------<  81  4.1   |  22  |  1.32<H>    Ca    8.5      03 Feb 2024 06:29  Phos  3.2     02-03  Mg     2.10     02-03      Creatinine Trend: 1.32 <--, 1.41 <--, 1.45 <--, 1.52 <--, 2.17 <--, 1.78 <--, 1.54 <--                        11.7   3.77  )-----------( 152      ( 03 Feb 2024 06:29 )             35.6     Urine Studies:  Urinalysis Basic - ( 03 Feb 2024 06:29 )    Color:  / Appearance:  / SG:  / pH:   Gluc: 81 mg/dL / Ketone:   / Bili:  / Urobili:    Blood:  / Protein:  / Nitrite:    Leuk Esterase:  / RBC:  / WBC    Sq Epi:  / Non Sq Epi:  / Bacteria:       Sodium, Random Urine: 108 mmol/L (01-31 @ 07:28)  Creatinine, Random Urine: 113 mg/dL (01-31 @ 07:28)  Protein/Creatinine Ratio Calculation: 0.2 Ratio (01-31 @ 07:28)

## 2024-02-22 ENCOUNTER — APPOINTMENT (OUTPATIENT)
Dept: VASCULAR SURGERY | Facility: CLINIC | Age: 84
End: 2024-02-22
Payer: MEDICARE

## 2024-02-22 VITALS
DIASTOLIC BLOOD PRESSURE: 62 MMHG | HEART RATE: 41 BPM | WEIGHT: 116 LBS | TEMPERATURE: 97.4 F | SYSTOLIC BLOOD PRESSURE: 151 MMHG | HEIGHT: 67 IN | BODY MASS INDEX: 18.21 KG/M2

## 2024-02-22 DIAGNOSIS — I73.9 PERIPHERAL VASCULAR DISEASE, UNSPECIFIED: ICD-10-CM

## 2024-02-22 PROCEDURE — 99213 OFFICE O/P EST LOW 20 MIN: CPT

## 2024-02-22 RX ORDER — HYDRALAZINE HYDROCHLORIDE 25 MG/1
25 TABLET ORAL
Refills: 0 | Status: ACTIVE | COMMUNITY

## 2024-02-22 RX ORDER — AMLODIPINE BESYLATE 10 MG/1
10 TABLET ORAL
Refills: 0 | Status: ACTIVE | COMMUNITY

## 2024-02-22 RX ORDER — LOSARTAN POTASSIUM 100 MG/1
TABLET, FILM COATED ORAL
Refills: 0 | Status: ACTIVE | COMMUNITY

## 2024-02-22 RX ORDER — ASPIRIN 81 MG
81 TABLET, DELAYED RELEASE (ENTERIC COATED) ORAL
Refills: 0 | Status: ACTIVE | COMMUNITY

## 2024-02-24 PROBLEM — I73.9 PAD (PERIPHERAL ARTERY DISEASE): Status: ACTIVE | Noted: 2019-04-15

## 2024-02-24 NOTE — PHYSICAL EXAM
[2+] : left 2+ [0] : left 0 [Ankle Swelling (On Exam)] : present [Ankle Swelling Bilaterally] : bilaterally  [Alert] : alert [Oriented to Person] : oriented to person [Oriented to Place] : oriented to place [Oriented to Time] : oriented to time [Calm] : calm [Skin Ulcer] : no ulcer [de-identified] : well appearing, NAD [de-identified] : unlabored [FreeTextEntry1] : Legs are warm and well perfused [de-identified] : soft, NT

## 2024-02-24 NOTE — ASSESSMENT
[FreeTextEntry1] :  A/P  PAD s/p multiple LE revascularization. Recent hospital workup concerning for occluded LLE bypass. Patient is currently asymptomatic. No surgical intervention at this time. Advise continue surveillance and walking/exercise regimen. Patient counseled on foot/skin hygeine.   Follow up in 6 months for WOODY.   Patient advised to call our office for earlier evaluation if new or concerning symptoms.

## 2024-02-24 NOTE — HISTORY OF PRESENT ILLNESS
[FreeTextEntry1] :  GIANNI MURPHY (: 1940) is a 84 year old male who presents today for PAD follow-up evaluation.   Last office visit noted from . He was recently hospitalized for SOBE 2/2 diastolic HF. During w/ vascular consult was called for abnormal L WOODY of 0.47 (R 1.12). LLE duplex demonstrated an occluded fem-pop bypass graft.  Patient has a past history of lower extremity bypasses from the . Most recent PAD intervention: L EIA to profunda bypass and thrombectomy of L fem-pop by Dr. Landers in 2019. In 2019, patient developed rest pain and was found to have an WOODY of 0.29. Subsequent LLE angiogram (19) demonstrated a patent L EIA -profunda bypass and single vessel runoff via peroneal; distal bypass was recommended but deferred by the patient.   Today the patient reports feeling well.  Regularly walks 1-2 blocks without any issues and states that he can walk for farther distances if needed.  No claudication, rest pain, or wounds.   PMH: HTN, HLD, bilateral lower extremity bypass grafts from the , L EIA to profunda bypass graft and thrombectomy of prior L fem-pop bypass graft in 2019, L femoral DVT treated with Eliquis

## 2024-03-21 NOTE — DIETITIAN INITIAL EVALUATION ADULT. - NS AS NUTRI INTERV FEED ASSISTANCE
The access site was successfully closed using a (CLOSURE VASCADE 5F VCS - [id number]) closure device. Please Encourage po intake, assist with meals and menu selections, provide alternatives PRN.

## 2024-04-02 NOTE — ED ADULT NURSE NOTE - NS ED NOTE ABUSE SUSPICION NEGLECT YN
CC:  Follow up    HPI:  This patient is a 65 year old female here for 3 mo follow up hyperlipidemia. Seen 3 mo ago when TC inc to 202 from 174 prior. Pt was to follow ulow chol diet and cont to take same dose simvastatin 5 mg daily. Had labs done. Wants results.    Cardiology: denies chest pain, dizzy/lightheaded, SOB, leg edema, orthopnea, palpitations, syncope, headaches    Pulmonary: denies SOB, BLAIR, cough, wheeze, chest congestion, chest pain, orthopnea, hemoptysis, night sweats, fevers    Gastroenterology: denies abdominal pain, N/V, D/C, heartburn, dysphagia    Genito-Urinary: denies dysuria, urinary frequency, urinary urgency, hematuria, incontinence, nocturia    ALLERGIES:   Allergen Reactions    Penicillins THROAT SWELLING       Current Outpatient Medications   Medication Sig Dispense Refill    simvastatin (ZOCOR) 5 MG tablet Take 1 tablet by mouth nightly. 90 tablet 0    Multiple Vitamins-Minerals (MULTIVITAL PO)       FEVERFEW PO       VITAMIN D, CHOLECALCIFEROL, PO       Cyanocobalamin (VITAMIN B-12 PO)       COLLAGEN PO       Coenzyme Q10 (Co Q-10) 100 MG Cap Take 100 mg by mouth every 24 hours.       No current facility-administered medications for this visit.       Past Medical History:   Diagnosis Date    Bilateral hearing loss, 3-20, hearing aids     Colon polyps     Diverticulosis, per 12-18 colonoscopy     History of colonoscopy, 12-18, 3 yr repeat, Dr Cavanaugh     Hyperlipidemia     Hypertriglyceridemia 08/04/2022    Migraine without aura and without status migrainosus, not intractable     Nontraumatic complete tear of left rotator cuff, 2-20. nonsurgical     Osteopenia of hip, per 2-20 dexa     Osteopenia of lumbar spine, per 2-20 dexa     Spinal stenosis of lumbar region without neurogenic claudication, 2018     Spondylolisthesis of lumbar region, 2018     Varicose veins of both lower extremities, 3-19 s/p RLE vein stripping         Past Surgical History:   Procedure Laterality Date     Colonoscopy  12/14/2018    Dr Cavanaugh benign adenoma repeat in 3 years    Open access colonoscopy  03/04/2022    10 year repeat (per tel enc 8-4-22). Dr Cavanaugh        Social History     Tobacco Use    Smoking status: Never    Smokeless tobacco: Never   Substance Use Topics    Alcohol use: Yes     Alcohol/week: 2.0 standard drinks of alcohol     Types: 2 Glasses of wine per week     Comment: social etoh        Family History   Problem Relation Age of Onset    ALS Mother     Cancer, Skin Melanoma Mother     Other Father         blood clot following prostate surgery    Coronary Artery Disease Father     Hyperlipidemia Father     Patient is unaware of any medical problems Sister     Anemia Brother         REVIEW OF SYSTEMS:  Review of Systems   Constitutional:  Negative for activity change.   Respiratory:  Negative for chest tightness and shortness of breath.    Cardiovascular:  Negative for chest pain and palpitations.   Gastrointestinal:  Negative for abdominal pain.   Genitourinary:  Negative for dysuria and urgency.   Allergic/Immunologic: Negative for environmental allergies.   Neurological:  Negative for facial asymmetry.       EXAMINATION:  Visit Vitals  LMP  (LMP Unknown)     General Appearance: alert, NAD  Skin: no rash or skin lesions  HEENT: EOMI, conjunctivae pink, TM's clear and flat B/L, EAC patent, oropharynx unremarkable   Neck, Thyroid: supple, no thyromegaly, no lymphadenopathy  Heart: RRR, no murmurs, normal S1S2, no S3/S4  Lungs: CTA B/L.   Abdominal: soft. NT, NABS, no G/R/R, no tympany, no HSM  Extremities: no E/C/C, pulses 2+ B/L  Peripheral Pulses: 2+ B/L   Neurologic: CN II-XII intact, normal sensation and strength, DTRs 2/4 and symmetric B/L, gait normal     ASSESSMENT/PLAN:    1. Hypercholesterolemia    2. Hypertriglyceridemia    3. Impaired fasting glucose    4. Breast screening    5. History of colonoscopy, 3-22, 10 year repeat (per 8-4-22 tel enc)    6. Screen for colon cancer         Current Outpatient Medications   Medication Sig Dispense Refill    simvastatin (ZOCOR) 5 MG tablet Take 1 tablet by mouth nightly. 90 tablet 0    Multiple Vitamins-Minerals (MULTIVITAL PO)       FEVERFEW PO       VITAMIN D, CHOLECALCIFEROL, PO       Cyanocobalamin (VITAMIN B-12 PO)       COLLAGEN PO       Coenzyme Q10 (Co Q-10) 100 MG Cap Take 100 mg by mouth every 24 hours.       No current facility-administered medications for this visit.       EKG 8-22    Labs d/w pt. Copies given and/or pt shown access to portal for their own viewing    Vaccines needed:       Hyperlipidemia - pt ate a lot over holidays. Will hold off on changing simvastatin for now. Recheck lipid panel 3 months. not at goal. takes simvastatin 5 mg. needs to lower cholesterol through exercise, weight loss, low cholesterol diet in attempt to reduce risk for heart attack, stroke, sudden death, etc     Impaired fasting glucose - follow reduced calorie diet and exercise that results in weight loss to help prevent Impaired fasting glucose from progressing into DM    Raspy voice - referred to ENT at [facility] appt.     Smoker - needs to quit! Pt aware smoking can lead to many conditions, some of which include lung cancer, emphysema, osteoporosis, premature coronary artery disease that can result in heart attack, sudden death.      Re breast cancer screening, last mammo 7-24-23 with 1 year repeat (screening mammogram)([facility]). Screening mammogram order given today as rec per prior exam. Pt informed she needs to call to make an appointment and to call back here 2-3 days after for results. Pt aware the purpose of the mammogram is to screen for asymptomatic breast cancer.      Re osteoporosis screening, last bone density test ([facility]) 9-22 shows osteopenia B/L hip, lumbar spine. 2 year repeat. Pt to take calcium 1200 mg daily, vitamin D 4000 iu daily, do weight bearing exercise such as walking 5 times a week. Pt aware that osteopenia  can progress on to asymptomatic osteoporosis that can result in hip/vertebral fractures and therefore reason for treatment as indicated.      Re colorectal cancer screening, last colonoscopy 3-22 (Dr Cavanaugh) with 10 year repeat (per 8-4-22 tel enc).         Discussed treatment options and plan with patient. All questions answered.       30 minutes spent in total pt care. > 50% spent couseling/coordinating care.       Follow up:       Lily Lao DO   No

## 2024-08-28 NOTE — HISTORY OF PRESENT ILLNESS
[FreeTextEntry1] : INCOMPLETE NOTE....   GIANNI MURPHY (: 1940) is a 84 year old male who presents today for   GIANNI MURPHY (: 1940) is a 84 year old male who presents today for PAD follow-up evaluation.   Last office visit noted from . He was recently hospitalized for SOBE 2/2 diastolic HF. During w/u vascular consult was called for abnormal L WOODY of 0.47 (R 1.12). LLE duplex demonstrated an occluded fem-pop bypass graft.  Patient has a past history of lower extremity bypasses from the . Most recent PAD intervention: L EIA to profunda bypass and thrombectomy of L fem-pop by Dr. Landers in 2019. In 2019, patient developed rest pain and was found to have an WOODY of 0.29. Subsequent LLE angiogram (19) demonstrated a patent L EIA -profunda bypass and single vessel runoff via peroneal; distal bypass was recommended but deferred by the patient.   Today the patient reports feeling well.  Regularly walks 1-2 blocks without any issues and states that he can walk for farther distances if needed.  No claudication, rest pain, or wounds.   PMH: HTN, HLD, bilateral lower extremity bypass grafts from the , L EIA to profunda bypass graft and thrombectomy of prior L fem-pop bypass graft in 2019, L femoral DVT treated with Eliquis

## 2024-08-29 ENCOUNTER — APPOINTMENT (OUTPATIENT)
Dept: VASCULAR SURGERY | Facility: CLINIC | Age: 84
End: 2024-08-29

## 2024-10-07 NOTE — ED PROVIDER NOTE - CONSTITUTIONAL, MLM
"Subjective:      Thony Mccray APOLINAR Barron     normal... Well appearing, well nourished, awake, alert, oriented to person, place, time/situation and in no apparent distress.

## 2025-02-06 ENCOUNTER — APPOINTMENT (OUTPATIENT)
Dept: VASCULAR SURGERY | Facility: CLINIC | Age: 85
End: 2025-02-06

## 2025-02-06 VITALS
HEART RATE: 114 BPM | BODY MASS INDEX: 17.58 KG/M2 | SYSTOLIC BLOOD PRESSURE: 149 MMHG | HEIGHT: 67 IN | DIASTOLIC BLOOD PRESSURE: 88 MMHG | WEIGHT: 112 LBS

## 2025-02-06 VITALS — SYSTOLIC BLOOD PRESSURE: 143 MMHG | TEMPERATURE: 98.1 F | HEART RATE: 111 BPM | DIASTOLIC BLOOD PRESSURE: 87 MMHG

## 2025-02-06 DIAGNOSIS — M79.89 OTHER SPECIFIED SOFT TISSUE DISORDERS: ICD-10-CM

## 2025-02-06 DIAGNOSIS — I73.9 PERIPHERAL VASCULAR DISEASE, UNSPECIFIED: ICD-10-CM

## 2025-02-06 PROCEDURE — 99212 OFFICE O/P EST SF 10 MIN: CPT

## 2025-02-06 PROCEDURE — 93970 EXTREMITY STUDY: CPT

## 2025-02-06 PROCEDURE — 93922 UPR/L XTREMITY ART 2 LEVELS: CPT

## 2025-02-06 RX ORDER — TAMSULOSIN HYDROCHLORIDE 0.4 MG/1
0.4 CAPSULE ORAL
Refills: 0 | Status: ACTIVE | COMMUNITY

## 2025-02-06 RX ORDER — NIFEDIPINE 20 MG/1
CAPSULE ORAL
Refills: 0 | Status: ACTIVE | COMMUNITY

## 2025-02-06 RX ORDER — VALACYCLOVIR 500 MG/1
500 TABLET, FILM COATED ORAL
Refills: 0 | Status: ACTIVE | COMMUNITY

## 2025-02-06 RX ORDER — METOPROLOL SUCCINATE 25 MG/1
25 TABLET, EXTENDED RELEASE ORAL
Refills: 0 | Status: ACTIVE | COMMUNITY

## 2025-02-06 RX ORDER — OLMESARTAN MEDOXOMIL 20 MG/1
20 TABLET, FILM COATED ORAL
Refills: 0 | Status: ACTIVE | COMMUNITY

## 2025-02-06 RX ORDER — MIRTAZAPINE 7.5 MG/1
7.5 TABLET, FILM COATED ORAL
Refills: 0 | Status: ACTIVE | COMMUNITY

## 2025-02-06 RX ORDER — OXYBUTYNIN CHLORIDE 5 MG/1
5 TABLET ORAL
Refills: 0 | Status: ACTIVE | COMMUNITY

## 2025-02-14 ENCOUNTER — INPATIENT (INPATIENT)
Facility: HOSPITAL | Age: 85
LOS: 20 days | Discharge: HOME | DRG: 392 | End: 2025-03-07
Attending: INTERNAL MEDICINE | Admitting: INTERNAL MEDICINE
Payer: COMMERCIAL

## 2025-02-14 ENCOUNTER — APPOINTMENT (OUTPATIENT)
Dept: VASCULAR SURGERY | Facility: CLINIC | Age: 85
End: 2025-02-14
Payer: MEDICARE

## 2025-02-14 VITALS
OXYGEN SATURATION: 97 % | DIASTOLIC BLOOD PRESSURE: 106 MMHG | RESPIRATION RATE: 20 BRPM | HEART RATE: 104 BPM | WEIGHT: 115.08 LBS | SYSTOLIC BLOOD PRESSURE: 189 MMHG | HEIGHT: 67 IN | TEMPERATURE: 98 F

## 2025-02-14 VITALS
BODY MASS INDEX: 17.58 KG/M2 | SYSTOLIC BLOOD PRESSURE: 175 MMHG | HEART RATE: 77 BPM | HEIGHT: 67 IN | DIASTOLIC BLOOD PRESSURE: 94 MMHG | WEIGHT: 112 LBS

## 2025-02-14 DIAGNOSIS — Z98.890 OTHER SPECIFIED POSTPROCEDURAL STATES: Chronic | ICD-10-CM

## 2025-02-14 PROBLEM — M79.89 LEG SWELLING: Status: ACTIVE | Noted: 2025-02-14

## 2025-02-14 LAB
ALBUMIN SERPL ELPH-MCNC: 3.2 G/DL — LOW (ref 3.3–5)
ALP SERPL-CCNC: 74 U/L — SIGNIFICANT CHANGE UP (ref 40–120)
ALT FLD-CCNC: 9 U/L — LOW (ref 10–45)
ANION GAP SERPL CALC-SCNC: 10 MMOL/L — SIGNIFICANT CHANGE UP (ref 5–17)
ANION GAP SERPL CALC-SCNC: 13 MMOL/L — SIGNIFICANT CHANGE UP (ref 5–17)
AST SERPL-CCNC: 30 U/L — SIGNIFICANT CHANGE UP (ref 10–40)
BASOPHILS # BLD AUTO: 0.05 K/UL — SIGNIFICANT CHANGE UP (ref 0–0.2)
BASOPHILS NFR BLD AUTO: 0.8 % — SIGNIFICANT CHANGE UP (ref 0–2)
BILIRUB SERPL-MCNC: 0.3 MG/DL — SIGNIFICANT CHANGE UP (ref 0.2–1.2)
BUN SERPL-MCNC: 27 MG/DL — HIGH (ref 7–23)
BUN SERPL-MCNC: 28 MG/DL — HIGH (ref 7–23)
CALCIUM SERPL-MCNC: 8.6 MG/DL — SIGNIFICANT CHANGE UP (ref 8.4–10.5)
CALCIUM SERPL-MCNC: 8.7 MG/DL — SIGNIFICANT CHANGE UP (ref 8.4–10.5)
CHLORIDE SERPL-SCNC: 106 MMOL/L — SIGNIFICANT CHANGE UP (ref 96–108)
CHLORIDE SERPL-SCNC: 107 MMOL/L — SIGNIFICANT CHANGE UP (ref 96–108)
CO2 SERPL-SCNC: 17 MMOL/L — LOW (ref 22–31)
CO2 SERPL-SCNC: 22 MMOL/L — SIGNIFICANT CHANGE UP (ref 22–31)
CREAT SERPL-MCNC: 1.3 MG/DL — SIGNIFICANT CHANGE UP (ref 0.5–1.3)
CREAT SERPL-MCNC: 1.36 MG/DL — HIGH (ref 0.5–1.3)
EGFR: 51 ML/MIN/1.73M2 — LOW
EGFR: 51 ML/MIN/1.73M2 — LOW
EGFR: 54 ML/MIN/1.73M2 — LOW
EGFR: 54 ML/MIN/1.73M2 — LOW
EOSINOPHIL # BLD AUTO: 0.06 K/UL — SIGNIFICANT CHANGE UP (ref 0–0.5)
EOSINOPHIL NFR BLD AUTO: 0.9 % — SIGNIFICANT CHANGE UP (ref 0–6)
FLUAV AG NPH QL: SIGNIFICANT CHANGE UP
FLUBV AG NPH QL: SIGNIFICANT CHANGE UP
GAS PNL BLDV: SIGNIFICANT CHANGE UP
GLUCOSE SERPL-MCNC: 89 MG/DL — SIGNIFICANT CHANGE UP (ref 70–99)
GLUCOSE SERPL-MCNC: 92 MG/DL — SIGNIFICANT CHANGE UP (ref 70–99)
HCT VFR BLD CALC: 34.5 % — LOW (ref 39–50)
HGB BLD-MCNC: 10.4 G/DL — LOW (ref 13–17)
IMM GRANULOCYTES NFR BLD AUTO: 0.5 % — SIGNIFICANT CHANGE UP (ref 0–0.9)
LIDOCAIN IGE QN: 17 U/L — SIGNIFICANT CHANGE UP (ref 7–60)
LYMPHOCYTES # BLD AUTO: 1.09 K/UL — SIGNIFICANT CHANGE UP (ref 1–3.3)
LYMPHOCYTES # BLD AUTO: 16.5 % — SIGNIFICANT CHANGE UP (ref 13–44)
MAGNESIUM SERPL-MCNC: 2.5 MG/DL — SIGNIFICANT CHANGE UP (ref 1.6–2.6)
MCHC RBC-ENTMCNC: 26.2 PG — LOW (ref 27–34)
MCHC RBC-ENTMCNC: 30.1 G/DL — LOW (ref 32–36)
MCV RBC AUTO: 86.9 FL — SIGNIFICANT CHANGE UP (ref 80–100)
MONOCYTES # BLD AUTO: 0.86 K/UL — SIGNIFICANT CHANGE UP (ref 0–0.9)
MONOCYTES NFR BLD AUTO: 13 % — SIGNIFICANT CHANGE UP (ref 2–14)
NEUTROPHILS # BLD AUTO: 4.51 K/UL — SIGNIFICANT CHANGE UP (ref 1.8–7.4)
NEUTROPHILS NFR BLD AUTO: 68.3 % — SIGNIFICANT CHANGE UP (ref 43–77)
NRBC BLD AUTO-RTO: 0 /100 WBCS — SIGNIFICANT CHANGE UP (ref 0–0)
PHOSPHATE SERPL-MCNC: 3.4 MG/DL — SIGNIFICANT CHANGE UP (ref 2.5–4.5)
PLATELET # BLD AUTO: 179 K/UL — SIGNIFICANT CHANGE UP (ref 150–400)
POTASSIUM SERPL-MCNC: 4.2 MMOL/L — SIGNIFICANT CHANGE UP (ref 3.5–5.3)
POTASSIUM SERPL-MCNC: 6.8 MMOL/L — CRITICAL HIGH (ref 3.5–5.3)
POTASSIUM SERPL-SCNC: 4.2 MMOL/L — SIGNIFICANT CHANGE UP (ref 3.5–5.3)
POTASSIUM SERPL-SCNC: 6.8 MMOL/L — CRITICAL HIGH (ref 3.5–5.3)
PROCALCITONIN SERPL-MCNC: 0.17 NG/ML — HIGH (ref 0.02–0.1)
PROT SERPL-MCNC: 7.6 G/DL — SIGNIFICANT CHANGE UP (ref 6–8.3)
RBC # BLD: 3.97 M/UL — LOW (ref 4.2–5.8)
RBC # FLD: 16.4 % — HIGH (ref 10.3–14.5)
RSV RNA NPH QL NAA+NON-PROBE: SIGNIFICANT CHANGE UP
SARS-COV-2 RNA SPEC QL NAA+PROBE: SIGNIFICANT CHANGE UP
SODIUM SERPL-SCNC: 136 MMOL/L — SIGNIFICANT CHANGE UP (ref 135–145)
SODIUM SERPL-SCNC: 139 MMOL/L — SIGNIFICANT CHANGE UP (ref 135–145)
WBC # BLD: 6.6 K/UL — SIGNIFICANT CHANGE UP (ref 3.8–10.5)
WBC # FLD AUTO: 6.6 K/UL — SIGNIFICANT CHANGE UP (ref 3.8–10.5)

## 2025-02-14 PROCEDURE — 99212 OFFICE O/P EST SF 10 MIN: CPT

## 2025-02-14 PROCEDURE — 93926 LOWER EXTREMITY STUDY: CPT | Mod: 26,LT

## 2025-02-14 PROCEDURE — 93971 EXTREMITY STUDY: CPT | Mod: 26,LT

## 2025-02-14 PROCEDURE — 99285 EMERGENCY DEPT VISIT HI MDM: CPT

## 2025-02-14 RX ORDER — SODIUM CHLORIDE 9 G/1000ML
1000 INJECTION, SOLUTION INTRAVENOUS
Refills: 0 | Status: DISCONTINUED | OUTPATIENT
Start: 2025-02-14 | End: 2025-02-15

## 2025-02-14 RX ORDER — SODIUM CHLORIDE 9 G/1000ML
1000 INJECTION, SOLUTION INTRAVENOUS ONCE
Refills: 0 | Status: COMPLETED | OUTPATIENT
Start: 2025-02-14 | End: 2025-02-14

## 2025-02-14 RX ORDER — LOSARTAN POTASSIUM 100 MG/1
25 TABLET, FILM COATED ORAL DAILY
Refills: 0 | Status: DISCONTINUED | OUTPATIENT
Start: 2025-02-14 | End: 2025-02-15

## 2025-02-14 RX ADMIN — SODIUM CHLORIDE 1000 MILLILITER(S): 9 INJECTION, SOLUTION INTRAVENOUS at 15:03

## 2025-02-14 NOTE — CONSULT NOTE ADULT - ASSESSMENT
84M with history of HTN, HLD, bilateral LE bypass grafts, L EIA to profunda bypass graft and thrombectomy of prior L fem-pop bypass graft in 2/2019 with postoperative L femoral DVT previously on Eliquis presenting to ED for several days of weakness, fatigue, and diarrhea. Venous duplex notable for acute-on-chronic LLE DVT to CFV but EIV appears patent. No evidence of phlegmasia or acute ischemia.    Recs:  - No vascular surgical intervention  - AC for acute-on-chronic DVT (previously on Eliquis for chronic DVT and PAD)  - Known occlusion of left fem-pop bypass partially imaged on venous duplex  - Medical admission for dehydration/infectious workup    Discussed with fellow on behalf of Dr. Landers    Vascular [facility]  [id number] 84M with history of HTN, HLD, bilateral LE bypass grafts, L EIA to profunda bypass graft and thrombectomy of prior L fem-pop bypass graft in 2/2019 with postoperative L femoral DVT previously on Eliquis presenting to ED for several days of weakness, fatigue, and diarrhea. Venous duplex notable for acute-on-chronic LLE DVT to CFV but EIV appears patent. No evidence of phlegmasia or acute ischemia.    Recs:  - AC for acute-on-chronic DVT (previously on Eliquis for chronic DVT and PAD)  - Arterial duplex with elevated velocities at L EIA-PFA bypass proximal anastomosis noted  - Medical admission for dehydration/infectious workup    Discussed with fellow on behalf of Dr. Landers    Vascular [facility]  [id number] 84M with history of HTN, HLD, bilateral LE bypass grafts, L EIA to profunda bypass graft and thrombectomy of prior L fem-pop bypass graft in 2/2019 with postoperative L femoral DVT previously on Eliquis presenting to ED for several days of weakness, fatigue, and diarrhea. Venous duplex notable for acute-on-chronic LLE DVT to CFV but EIV appears patent. No evidence of phlegmasia or acute ischemia.    Recs:  - AC for acute-on-chronic DVT (previously on Eliquis for chronic DVT and PAD)  - Arterial duplex with elevated velocities at L EIA-PFA bypass proximal anastomosis noted  - No signs of limb ischemia on exam   - Medical admission for dehydration/infectious workup  -Can follow up outpatient in the office     Discussed with Dr. Wright     Vascular [place]  [id number]

## 2025-02-14 NOTE — ED ADULT NURSE NOTE - OBJECTIVE STATEMENT
84 year old male BIBA from his MD office for Diarrhea Pt stases he had 2 episodes yesterday and 2 today Pt denies N/v and no fevers either.   Pt feels generalized weakness.   NO  recent travel/antibiotics,  IV placed and labs sent and fluids infusing MPRN

## 2025-02-14 NOTE — ED ADULT TRIAGE NOTE - WEIGHT METHOD
08/24/2019 - FARIHA DAVIS -

 

TIME:  12:30.

 

EKG shows normal sinus rhythm with heart rate of 58, incomplete right bundle-

branch block.  No ST elevation or depression.  No T-wave inversions.  QTc of

425.

 

DD:  08/28/2019 12:13:08

DT:  08/28/2019 13:53:33

[facility]

Job #:  [id number]
stated

## 2025-02-14 NOTE — ED PROVIDER NOTE - NSICDXPASTMEDICALHX_GEN_ALL_CORE_FT
PAST MEDICAL HISTORY:  Deep vein thrombosis (DVT)     HLD (hyperlipidemia)     HTN (hypertension)

## 2025-02-14 NOTE — ED PROVIDER NOTE - ATTENDING CONTRIBUTION TO CARE
------------ATTENDING NOTE------------  pt transferred from his PCP's office for concerns of several days of looser nb stools, feeling dehydrated, generalized malaise/fatigue, no fevers, no nausea/vomiting, no abdominal pain, no recent travel/antibiotics, pt was being evaluated for pain/bruising to LLE -->  - Reyes Ponce MD   ------------------------------------------------------ ------------ATTENDING NOTE------------  pt transferred from his PCP's office for concerns of several days of looser nb stools, feeling dehydrated, generalized malaise/fatigue, no fevers, no nausea/vomiting, no abdominal pain, no recent travel/antibiotics, pt was being evaluated for pain/bruising to E --> cleared by Vascular for continuing Eliquis, ED sign out 1500 pending obs -vs- admission for advance diet, abd exams, start Eliquis, outpt needs assessments.  - Reyes Ponce MD   ------------------------------------------------------

## 2025-02-14 NOTE — ED ADULT NURSE NOTE - NSFALLHARMRISKINTERV_ED_ALL_ED

## 2025-02-14 NOTE — ED ADULT NURSE NOTE - NSFALLRISKASMTTYPE_ED_ALL_ED
What Type Of Note Output Would You Prefer (Optional)?: Standard Output How Severe Is Your Rash?: moderate Is This A New Presentation, Or A Follow-Up?: Rash Initial (On Arrival)

## 2025-02-14 NOTE — CONSULT NOTE ADULT - SUBJECTIVE AND OBJECTIVE BOX
VASCULAR SURGERY CONSULT NOTE    HPI:  84M with history of HTN, HLD, bilateral LE bypass grafts in the 1980s, L EIA to profunda bypass graft and thrombectomy of prior L fem-pop bypass graft in 2019 with postoperative L femoral DVT previously on Eliquis presenting to ED for diarrhea and weakness. Patient presented to Dr. Landers's office today for followup and evaluation of left leg swelling, but was referred to the ED given reported 3-4 days of weakness, poor PO intake, and diarrhea. He is unsure if he is taking Eliquis. Lives alone.    VITALS & I/Os:  Vital Signs Last 24 Hrs  T(C): 36.6 (2025 16:00), Max: 36.6 (2025 14:19)  T(F): 97.8 (2025 16:00), Max: 97.9 (2025 14:19)  HR: 92 (2025 16:00) (92 - 104)  BP: 190/89 (2025 16:00) (189/106 - 190/89)  BP(mean): --  RR: 20 (2025 16:00) (20 - 20)  SpO2: 98% (2025 16:00) (97% - 98%)    Parameters below as of 2025 16:00  Patient On (Oxygen Delivery Method): room air        I&O's Summary      Physical Exam:  General: NAD, male appearing stated age, thin  Neuro: Awake, alert and oriented x3  Resp: Nonlabored breathing on RA  GI/Abd: Soft, nondistended, nontender  Ext: LLE with 1+ pitting edema from ankle to knee. Pitting edema to left groin with well healed surgical scars. Palpable femoral pulses bilaterally, absent distal pulses however feet WWP, brisk capillary refill, and no active wounds or skin changes bilaterally      Laboratory:                        10.4   6.60  )-----------( 179      (  @ 15:03 )             34.5                                  Phos: xx Mg: xx  139  |  107  |  27  ----------------------------<  92  4.2   |  22  |  1.36        ,                              Phos: 3.4 M.5  136  |  106  |  28  ----------------------------<  89  6.8   |  17  |  1.30             TPro 7.6 / Alb 3.2[L] / TBili 0.3 / DBili x  / AST/AST 30/9[L] / AlkPhos 74      Urinalysis Basic - ( 2025 16:39 )    Color: x / Appearance: x / SG: x / pH: x  Gluc: 92 mg/dL / Ketone: x  / Bili: x / Urobili: x   Blood: x / Protein: x / Nitrite: x   Leuk Esterase: x / RBC: x / WBC x   Sq Epi: x / Non Sq Epi: x / Bacteria: x           VASCULAR SURGERY CONSULT NOTE    HPI:  84M with history of HTN, HLD, bilateral LE bypass grafts in the 1980s, L EIA to profunda bypass graft and thrombectomy of prior L fem-pop bypass graft in 2019 with postoperative L femoral DVT previously on Eliquis presenting to ED for diarrhea and weakness. Patient presented to Dr. Landers's office today for followup and evaluation of left leg swelling, but was referred to the ED given reported 3-4 days of weakness, poor PO intake, and diarrhea. He is unsure if he is taking Eliquis. Lives alone.    VITALS & I/Os:  Vital Signs Last 24 Hrs  T(C): 36.6 (2025 16:00), Max: 36.6 (2025 14:19)  T(F): 97.8 (2025 16:00), Max: 97.9 (2025 14:19)  HR: 92 (2025 16:00) (92 - 104)  BP: 190/89 (2025 16:00) (189/106 - 190/89)  BP(mean): --  RR: 20 (2025 16:00) (20 - 20)  SpO2: 98% (2025 16:00) (97% - 98%)    Parameters below as of 2025 16:00  Patient On (Oxygen Delivery Method): room air        I&O's Summary      Physical Exam:  General: NAD, male appearing stated age, thin  Neuro: Awake, alert and oriented x3  Resp: Nonlabored breathing on RA  GI/Abd: Soft, nondistended, nontender  Ext: LLE with 1+ pitting edema from ankle to knee. Pitting edema to left groin with well healed surgical scars. Palpable femoral pulses bilaterally, absent distal pulses however feet WWP, brisk capillary refill, and no active wounds or skin changes bilaterally      Laboratory:                        10.4   6.60  )-----------( 179      (  @ 15:03 )             34.5                                  Phos: xx Mg: xx  139  |  107  |  27  ----------------------------<  92  4.2   |  22  |  1.36        ,                              Phos: 3.4 M.5  136  |  106  |  28  ----------------------------<  89  6.8   |  17  |  1.30             TPro 7.6 / Alb 3.2[L] / TBili 0.3 / DBili x  / AST/AST 30/9[L] / AlkPhos 74      Urinalysis Basic - ( 2025 16:39 )    Color: x / Appearance: x / SG: x / pH: x  Gluc: 92 mg/dL / Ketone: x  / Bili: x / Urobili: x   Blood: x / Protein: x / Nitrite: x   Leuk Esterase: x / RBC: x / WBC x   Sq Epi: x / Non Sq Epi: x / Bacteria: x      < from: [facility] Duplex Lower Ext Vein Scan, Left (25 @ 18:35) >  IMPRESSION:  Acute deep venous thrombosis: above theknee.    Acute on chronic partially occlusive thrombosis in the left common   femoral vein and postthrombotic changes in the left femoral vein as   described.    Occluded left femoral bypass and popliteal artery. This will be further   evaluated withdedicated lower extremity arterial ultrasound.        --- End of Report ---    < end of copied text >  < from: [facility] Duplex Low Ext Arterial, Ltd, Left (25 @ 20:02) >  IMPRESSION:    Left common femoral artery to proximal tibial artery bypass graft is   occluded.    Left external iliac artery to deep femoral artery bypass graft with   markedly elevated velocities at the proximal anastomosis (565 cm/s)   concerning for stenosis.    --- End of Report ---    < end of copied text >

## 2025-02-15 DIAGNOSIS — R19.7 DIARRHEA, UNSPECIFIED: ICD-10-CM

## 2025-02-15 LAB
ALBUMIN SERPL ELPH-MCNC: 2.7 G/DL — LOW (ref 3.3–5)
ALP SERPL-CCNC: 60 U/L — SIGNIFICANT CHANGE UP (ref 40–120)
ALT FLD-CCNC: 8 U/L — LOW (ref 10–45)
ANION GAP SERPL CALC-SCNC: 11 MMOL/L — SIGNIFICANT CHANGE UP (ref 5–17)
APPEARANCE UR: CLEAR — SIGNIFICANT CHANGE UP
AST SERPL-CCNC: 11 U/L — SIGNIFICANT CHANGE UP (ref 10–40)
BASOPHILS # BLD AUTO: 0.03 K/UL — SIGNIFICANT CHANGE UP (ref 0–0.2)
BASOPHILS NFR BLD AUTO: 0.5 % — SIGNIFICANT CHANGE UP (ref 0–2)
BILIRUB SERPL-MCNC: 0.2 MG/DL — SIGNIFICANT CHANGE UP (ref 0.2–1.2)
BILIRUB UR-MCNC: NEGATIVE — SIGNIFICANT CHANGE UP
BUN SERPL-MCNC: 34 MG/DL — HIGH (ref 7–23)
CALCIUM SERPL-MCNC: 8 MG/DL — LOW (ref 8.4–10.5)
CHLORIDE SERPL-SCNC: 107 MMOL/L — SIGNIFICANT CHANGE UP (ref 96–108)
CO2 SERPL-SCNC: 22 MMOL/L — SIGNIFICANT CHANGE UP (ref 22–31)
COLOR SPEC: YELLOW — SIGNIFICANT CHANGE UP
CREAT ?TM UR-MCNC: 59 MG/DL — SIGNIFICANT CHANGE UP
CREAT SERPL-MCNC: 1.63 MG/DL — HIGH (ref 0.5–1.3)
DIFF PNL FLD: NEGATIVE — SIGNIFICANT CHANGE UP
EGFR: 41 ML/MIN/1.73M2 — LOW
EGFR: 41 ML/MIN/1.73M2 — LOW
EOSINOPHIL # BLD AUTO: 0.19 K/UL — SIGNIFICANT CHANGE UP (ref 0–0.5)
EOSINOPHIL NFR BLD AUTO: 3.2 % — SIGNIFICANT CHANGE UP (ref 0–6)
GI PCR PANEL: SIGNIFICANT CHANGE UP
GLUCOSE SERPL-MCNC: 109 MG/DL — HIGH (ref 70–99)
GLUCOSE UR QL: NEGATIVE MG/DL — SIGNIFICANT CHANGE UP
HCT VFR BLD CALC: 28.5 % — LOW (ref 39–50)
HGB BLD-MCNC: 9 G/DL — LOW (ref 13–17)
IMM GRANULOCYTES NFR BLD AUTO: 0.3 % — SIGNIFICANT CHANGE UP (ref 0–0.9)
KETONES UR-MCNC: NEGATIVE MG/DL — SIGNIFICANT CHANGE UP
LEUKOCYTE ESTERASE UR-ACNC: NEGATIVE — SIGNIFICANT CHANGE UP
LYMPHOCYTES # BLD AUTO: 1.43 K/UL — SIGNIFICANT CHANGE UP (ref 1–3.3)
LYMPHOCYTES # BLD AUTO: 24.4 % — SIGNIFICANT CHANGE UP (ref 13–44)
MCHC RBC-ENTMCNC: 27 PG — SIGNIFICANT CHANGE UP (ref 27–34)
MCHC RBC-ENTMCNC: 31.6 G/DL — LOW (ref 32–36)
MCV RBC AUTO: 85.6 FL — SIGNIFICANT CHANGE UP (ref 80–100)
MONOCYTES # BLD AUTO: 0.9 K/UL — SIGNIFICANT CHANGE UP (ref 0–0.9)
MONOCYTES NFR BLD AUTO: 15.3 % — HIGH (ref 2–14)
NEUTROPHILS # BLD AUTO: 3.3 K/UL — SIGNIFICANT CHANGE UP (ref 1.8–7.4)
NEUTROPHILS NFR BLD AUTO: 56.3 % — SIGNIFICANT CHANGE UP (ref 43–77)
NITRITE UR-MCNC: NEGATIVE — SIGNIFICANT CHANGE UP
NRBC BLD AUTO-RTO: 0 /100 WBCS — SIGNIFICANT CHANGE UP (ref 0–0)
PH UR: 5.5 — SIGNIFICANT CHANGE UP (ref 5–8)
PLATELET # BLD AUTO: 199 K/UL — SIGNIFICANT CHANGE UP (ref 150–400)
POTASSIUM SERPL-MCNC: 4.6 MMOL/L — SIGNIFICANT CHANGE UP (ref 3.5–5.3)
POTASSIUM SERPL-SCNC: 4.6 MMOL/L — SIGNIFICANT CHANGE UP (ref 3.5–5.3)
PROT SERPL-MCNC: 5.9 G/DL — LOW (ref 6–8.3)
PROT UR-MCNC: NEGATIVE MG/DL — SIGNIFICANT CHANGE UP
RBC # BLD: 3.33 M/UL — LOW (ref 4.2–5.8)
RBC # FLD: 16 % — HIGH (ref 10.3–14.5)
SODIUM SERPL-SCNC: 140 MMOL/L — SIGNIFICANT CHANGE UP (ref 135–145)
SODIUM UR-SCNC: 151 MMOL/L — SIGNIFICANT CHANGE UP
SP GR SPEC: 1.01 — SIGNIFICANT CHANGE UP (ref 1–1.03)
UROBILINOGEN FLD QL: 1 MG/DL — SIGNIFICANT CHANGE UP (ref 0.2–1)
WBC # BLD: 5.87 K/UL — SIGNIFICANT CHANGE UP (ref 3.8–10.5)
WBC # FLD AUTO: 5.87 K/UL — SIGNIFICANT CHANGE UP (ref 3.8–10.5)

## 2025-02-15 PROCEDURE — 76775 US EXAM ABDO BACK WALL LIM: CPT | Mod: 26

## 2025-02-15 PROCEDURE — 99223 1ST HOSP IP/OBS HIGH 75: CPT

## 2025-02-15 RX ORDER — NIFEDIPINE 30 MG
30 TABLET, EXTENDED RELEASE 24 HR ORAL DAILY
Refills: 0 | Status: DISCONTINUED | OUTPATIENT
Start: 2025-02-15 | End: 2025-02-19

## 2025-02-15 RX ORDER — TAMSULOSIN HYDROCHLORIDE 0.4 MG/1
0.4 CAPSULE ORAL AT BEDTIME
Refills: 0 | Status: DISCONTINUED | OUTPATIENT
Start: 2025-02-15 | End: 2025-03-07

## 2025-02-15 RX ORDER — AMLODIPINE BESYLATE 10 MG/1
5 TABLET ORAL DAILY
Refills: 0 | Status: DISCONTINUED | OUTPATIENT
Start: 2025-02-15 | End: 2025-02-16

## 2025-02-15 RX ORDER — ASPIRIN 325 MG
81 TABLET ORAL DAILY
Refills: 0 | Status: DISCONTINUED | OUTPATIENT
Start: 2025-02-15 | End: 2025-03-07

## 2025-02-15 RX ORDER — MIRTAZAPINE 30 MG/1
7.5 TABLET, FILM COATED ORAL DAILY
Refills: 0 | Status: DISCONTINUED | OUTPATIENT
Start: 2025-02-15 | End: 2025-03-07

## 2025-02-15 RX ORDER — ATORVASTATIN CALCIUM 80 MG/1
20 TABLET, FILM COATED ORAL AT BEDTIME
Refills: 0 | Status: DISCONTINUED | OUTPATIENT
Start: 2025-02-15 | End: 2025-03-07

## 2025-02-15 RX ORDER — APIXABAN 2.5 MG/1
10 TABLET, FILM COATED ORAL EVERY 12 HOURS
Refills: 0 | Status: COMPLETED | OUTPATIENT
Start: 2025-02-15 | End: 2025-02-21

## 2025-02-15 RX ORDER — METOPROLOL SUCCINATE 50 MG/1
25 TABLET, EXTENDED RELEASE ORAL DAILY
Refills: 0 | Status: DISCONTINUED | OUTPATIENT
Start: 2025-02-15 | End: 2025-03-07

## 2025-02-15 RX ORDER — APIXABAN 2.5 MG/1
10 TABLET, FILM COATED ORAL ONCE
Refills: 0 | Status: COMPLETED | OUTPATIENT
Start: 2025-02-15 | End: 2025-02-15

## 2025-02-15 RX ADMIN — APIXABAN 10 MILLIGRAM(S): 2.5 TABLET, FILM COATED ORAL at 00:18

## 2025-02-15 RX ADMIN — Medication 30 MILLIGRAM(S): at 18:08

## 2025-02-15 RX ADMIN — Medication 500 MILLILITER(S): at 11:15

## 2025-02-15 RX ADMIN — TAMSULOSIN HYDROCHLORIDE 0.4 MILLIGRAM(S): 0.4 CAPSULE ORAL at 21:30

## 2025-02-15 RX ADMIN — LOSARTAN POTASSIUM 25 MILLIGRAM(S): 100 TABLET, FILM COATED ORAL at 00:17

## 2025-02-15 RX ADMIN — APIXABAN 10 MILLIGRAM(S): 2.5 TABLET, FILM COATED ORAL at 16:33

## 2025-02-15 RX ADMIN — Medication 90 MILLILITER(S): at 03:25

## 2025-02-15 RX ADMIN — ATORVASTATIN CALCIUM 20 MILLIGRAM(S): 80 TABLET, FILM COATED ORAL at 21:30

## 2025-02-15 RX ADMIN — Medication 25 MILLIGRAM(S): at 00:17

## 2025-02-15 RX ADMIN — AMLODIPINE BESYLATE 5 MILLIGRAM(S): 10 TABLET ORAL at 13:46

## 2025-02-15 NOTE — ED ADULT NURSE REASSESSMENT NOTE - NS ED NURSE REASSESS COMMENT FT1
arouses easily from sleep; IV NA at 90 cc per hour via pump; no diarrhea while in CDU; no c/o; appears comfortable while resting.

## 2025-02-15 NOTE — ED CDU PROVIDER DISPOSITION NOTE - ATTENDING CONTRIBUTION TO CARE
I , Dr Miguel Angel Purvis has seen and evaluated the patient.  The pateient has worseing [place].  The patient will need further hydration and will require admisison .

## 2025-02-15 NOTE — ED ADULT NURSE REASSESSMENT NOTE - NS ED NURSE REASSESS COMMENT FT1
pt arrived to CDU from [place] side after report received from Maria Alejandra MORRIS; pt is alert and oriented x 4; skin warm and dry; no acute distress; no c/o pain or sob; no diarrhea at this time; pt provided with food and drink per pt request; has call bell; reviewed plan of care with pt.

## 2025-02-15 NOTE — H&P ADULT - NSICDXPASTMEDICALHX_GEN_ALL_CORE_FT
PAST MEDICAL HISTORY:  HLD (hyperlipidemia)     HTN (hypertension)      PAST MEDICAL HISTORY:  Deep vein thrombosis (DVT)     HLD (hyperlipidemia)     HTN (hypertension)

## 2025-02-15 NOTE — CONSULT NOTE ADULT - SUBJECTIVE AND OBJECTIVE BOX
Dr. Rios  Office [phone] (9 am to 5 pm)  Service : [phone] ( 5pm to 9 am)    Andreia SILVA      RENAL INITIAL CONSULT NOTE: DATE OF SERVICE 02-15-25 @ 13:02    HPI:  84M with history of HTN, HLD, bilateral LE bypass grafts in the 1980s, L EIA to profunda bypass graft and thrombectomy of prior L fem-pop bypass graft in 2/2019 with postoperative L femoral DVT previously on Eliquis presenting to ED for diarrhea and weakness. Patient presented to Dr. Landers's office today for followup and evaluation of left leg swelling, but was referred to the ED given reported 3-4 days of weakness, poor PO intake, and diarrhea. He is found to have DAX    Allergies:  No Known Allergies      PAST MEDICAL & SURGICAL HISTORY:      Home Medications Reviewed    Hospital Medications:   MEDICATIONS  (STANDING):  losartan 25 milliGRAM(s) Oral daily      SOCIAL HISTORY:  Denies ETOh, Smoking,     FAMILY HISTORY: not significant      REVIEW OF SYSTEMS:  CONSTITUTIONAL: No weakness, fevers or chills  EYES/ENT: No visual changes;  No vertigo or throat pain   NECK: No pain or stiffness  RESPIRATORY: No cough, wheezing, hemoptysis; No shortness of breath  CARDIOVASCULAR: No chest pain or palpitations.  GASTROINTESTINAL: No abdominal or epigastric pain. No nausea, vomiting, or hematemesis; No diarrhea or constipation. No melena or hematochezia.  GENITOURINARY: No dysuria, frequency, foamy urine, urinary urgency, incontinence or hematuria  NEUROLOGICAL: No numbness or weakness  SKIN: No itching, burning, rashes, or lesions   VASCULAR: No bilateral lower extremity edema.   All other review of systems is negative unless indicated above.    VITALS:  T(F): 98.5 (02-15-25 @ 12:19), Max: 98.7 (02-15-25 @ 03:51)  HR: 59 (02-15-25 @ 12:19)  BP: 170/70 (02-15-25 @ 12:19)  RR: 18 (02-15-25 @ 12:19)  SpO2: 95% (02-15-25 @ 12:19)  Wt(kg): --    02-14 @ 07:01  -  02-15 @ 07:00  --------------------------------------------------------  IN: 360 mL / OUT: 0 mL / NET: 360 mL      Height (cm): 170.2 (02-14 @ 14:19)  Weight (kg): 52.2 (02-14 @ 14:19)  BMI (kg/m2): 18 (02-14 @ 14:19)  BSA (m2): 1.6 (02-14 @ 14:19)    PHYSICAL EXAM:  Constitutional: NAD  HEENT: anicteric sclera, oropharynx clear, MMM  Neck: No JVD  Respiratory: CTAB, no wheezes, rales or rhonchi  Cardiovascular: S1, S2, RRR  Gastrointestinal: BS+, soft, NT/ND  Extremities: No cyanosis or clubbing. left LE swollen  Neurological: A/O x 3, no focal deficits  Psychiatric: Normal mood, normal affect  : No CVA tenderness. No kimbrough.   Skin: No rashes       LABS:  02-15    140  |  107  |  34[H]  ----------------------------<  109[H]  4.6   |  22  |  1.63[H]    Ca    8.0[L]      15 Feb 2025 06:08  Phos  3.4     02-14  Mg     2.5     02-14    TPro  5.9[L]  /  Alb  2.7[L]  /  TBili  0.2  /  DBili      /  AST  11  /  ALT  8[L]  /  AlkPhos  60  02-15    Creatinine Trend: 1.63 <--, 1.36 <--, 1.30 <--                        9.0    5.87  )-----------( 199      ( 15 Feb 2025 06:08 )             28.5     Urine Studies:  Urinalysis Basic - ( 15 Feb 2025 06:08 )    Color:  / Appearance:  / SG:  / pH:   Gluc: 109 mg/dL / Ketone:   / Bili:  / Urobili:    Blood:  / Protein:  / Nitrite:    Leuk Esterase:  / RBC:  / WBC    Sq Epi:  / Non Sq Epi:  / Bacteria:           RADIOLOGY & ADDITIONAL STUDIES:

## 2025-02-15 NOTE — PATIENT PROFILE ADULT - FALL HARM RISK - UNIVERSAL INTERVENTIONS
Bed in lowest position, wheels locked, appropriate side rails in place/Call bell, personal items and telephone in reach/Instruct patient to call for assistance before getting out of bed or chair/Non-slip footwear when patient is out of bed/[place] to call system/Physically safe environment - no spills, clutter or unnecessary equipment/Purposeful Proactive Rounding/Room/bathroom lighting operational, light cord in reach

## 2025-02-15 NOTE — ED CDU PROVIDER DISPOSITION NOTE - CLINICAL COURSE
83 y/o M with PMH HTN, HLD, DVT, PAD presents to the ER from vascular surgery office with diarrhea and generalized weakness over the last few days. Reports 4 episodes of non-bloody diarrhea. Denies fevers, vomiting, abdominal pain. Able to tolerate PO, although states that he has had a 10lb unintentional weight loss over the last year. Reports also having swelling to his left leg. Patient lives alone. Unsure of daily home medications and doses.    ED course: Hypertensive. Afebrile. Procalcitonin 0.17. [place] duplex showed an acute on chronic partially occlusive thrombosis in the left common femoral vein. While the arterial duplex showed a "Left external iliac artery to deep femoral artery bypass graft with markedly elevated velocities at the proximal anastomosis (565 cm/s) concerning for stenosis." Evaluated by Vascular surgery. Started on Eliquis. Plan for hydration and close observation in CDU. 85 y/o M with PMH HTN, HLD, DVT, PAD presents to the ER from vascular surgery office with diarrhea and generalized weakness over the last few days. Reports 4 episodes of non-bloody diarrhea. Denies fevers, vomiting, abdominal pain. Able to tolerate PO, although states that he has had a 10lb unintentional weight loss over the last year. Reports also having swelling to his left leg. Patient lives alone. Unsure of daily home medications and doses.    ED course: Hypertensive. Afebrile. Procalcitonin 0.17. [place] duplex showed an acute on chronic partially occlusive thrombosis in the left common femoral vein. While the arterial duplex showed a "Left external iliac artery to deep femoral artery bypass graft with markedly elevated velocities at the proximal anastomosis (565 cm/s) concerning for stenosis." Evaluated by Vascular surgery. Started on Eliquis. Plan for hydration and close observation in CDU. While in CDU patient had up-trending creatinine despite hydration.  He was reassessed in morning with ED attending Dr. Purvis who agreed with admission to hospital. Endorsed to Dr. Juan.

## 2025-02-15 NOTE — H&P ADULT - HISTORY OF PRESENT ILLNESS
85 y/o M with pmhx htn, hld, dvt, pad presents to the ER from vascular surgery office with diarrhea and generalized weakness over the last few days. Reports 4 episodes of non-bloody diarrhea. Denies fevers, vomiting, abdominal pain. Able to tolerate PO, although states that he has had a 10lb unintentional weight loss over the last year. Reports also having swelling to his left leg. Patient lives alone. Unsure of daily home medications and doses.  85 y/o M with pmhx htn, hld, dvt, pad presents to the ER from vascular surgery office with diarrhea and generalized weakness over the last few days.   Patient reports that he has been having diarrhea x few days, associated with weakness, went to see [facility] Vascular surgeon as he noticed LLE swelling who referred him to the ED.   No hx fevers/ nausea/ vomiting.   Able to tolerate PO, No sick contacts.

## 2025-02-15 NOTE — CONSULT NOTE ADULT - ASSESSMENT
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 Patient presented to Dr. Landers's office today for followup and evaluation of left leg swelling, but was referred to the ED given reported 3-4 days of weakness, poor PO intake, and diarrhea. He is found to have DVT and DAX    A/P:  DAX:  pre-renal Vs ATN  Get Urine Na, cr, UA, renal US  Bladder scan  Continue NS 75cc/hr  Hold losartan  Monitor I/O  Monitor renal function closely    HTN:  Suboptimal  Hold losartan in view of DAX  Add Amlodipine 5mg QD  Monitor BP      DVT:  Follow up vascular

## 2025-02-15 NOTE — H&P ADULT - ASSESSMENT
85 y/o M with pmhx htn, hld, dvt, pad presents to the ER from vascular surgery office with diarrhea and generalized weakness over the last few days.     VA Dopplers LLE shows Acute deep venous thrombosis: above the knee.    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  Occluded left femoral bypass and popliteal artery. This will be further   evaluated with dedicated lower extremity arterial ultrasound.    Renal Sono shows Bilateral renal cysts, larger on the left, some of which demonstrate thin   septations.    Diffuse bladder wall thickening, out of proportion to the level of   underdistention; etiologies include chronic bladder outlet obstruction if   patient has an enlarged prostate versus underlying cystitis    Acute DVT   Eliquis   Vascular Surgery Consulted     DAX pre renal /ATN     Renal consulted   Reviewed Renal sono   No hydronephrosis   Diarrhea Viral   GI PCR     HTN Hold Olmesartan   Continue with Norvasc and Toprol     HLD Statins

## 2025-02-15 NOTE — PATIENT PROFILE ADULT - FUNCTIONAL ASSESSMENT - BASIC MOBILITY 3.
[FreeTextEntry1] : Ms. ASAD MENDOSA 81 year F, former smoker, arrives  today for evaluation of their MOderate to Severe Aortic Stenosis.  Patient PMH include A fib (Xarelot held since Octoebr), HTN, hypothyroidism and anemia . Symptoms include worsening Fatigue and SOB for 6 months.  She was admitted in October 2022 for HGB 6.6+, her EGD showed H Pylori and Gastritis, Colon both negative for active bleeding.  .NYHA class III . Had capsule EGD 11/30/2022 He had an echocardiogram 11/2022 which showed moderate to severe aortic stenosis with Peak Gradient 37.8 Mean Gradient of 22.7 REMINGTON 1.04.  Here for discussion of potential TAVR.\par \par NYHA class III\par Former light smoker- Quit 30+ years ago\par Pt lives home with daughter no aide\par Lives in [place]\par Born in [place]\par Speaks [place], Daughter Britney \par \par 5 Meter walk: 1. 5.5 S  2. 6 S 3. 6S \par \par Frailty: \par Right: 15.2 14.8 15.2\par Left: 11.7 13.1 14.5 \par \par Their healthcare team includes the following\par PMD:\par Cardio: Dr. Behuria\par Pulmonary: Dr. Alvares\par Gastroenterologist: Dr. Lebron \par  3 = A little assistance

## 2025-02-15 NOTE — ED CDU PROVIDER INITIAL DAY NOTE - PROGRESS NOTE DETAILS
edmund pt reports continued diarrhea but tolerating po - cr worseing further demonstating DAX liklety from moderate dehydration, pt w acute on chronic dvt w severe stenosis of graft started on ac - unsure of prev meds- will need admisison and hydration Patient reassessed this a.m. with attending.  Worsening labs on repeat and still with subjective generalized weakness.  Given worsening labs will admit to hospital.  ED attending in agreement.- Edward Pandey PA-C second message left for unattached physician Dr. Juan, awaiting return call. - DEB ReyesC Patient reassessed this a.m. with attending.  Worsening labs on repeat and still with subjective generalized weakness.  Given worsening labs will admit to hospital.  ED attending in agreement. paged unattached hospitalist, awaiting return call. - Edward Pandey PA-C Patient endorsed to Dr. Juan for admission.  Made aware of workup thus far, accepted patient under her service. - Edward Pandey PA-C

## 2025-02-15 NOTE — ED CDU PROVIDER DISPOSITION NOTE - NSFOLLOWUPINSTRUCTIONS_ED_ALL_ED_FT
Hydrate.     ***ELIQUIS     We recommend you follow up with your primary care provider within the next 2-3 days, please bring all of your results with you.     You can also follow up with **VASCULAR    Please return to the Emergency Department with new, worsening, or concerning symptoms, such as:  -Shortness of breath or trouble breathing  -Pressure, pain, tightness in chest  -Facial drooping, arm weakness, or speech difficulty   -Head injury or loss of consciousness   -Nonstop bleeding or an open wound     *More detailed information regarding your visit and discharge can be found by reviewing this packet

## 2025-02-15 NOTE — ED CDU PROVIDER INITIAL DAY NOTE - DETAILS
ivf, repeat labs, Eliquis  vascular consult   med recc in the morning   DW ED team, vitals every 4 hours, frequent reevaluations

## 2025-02-15 NOTE — ED CDU PROVIDER INITIAL DAY NOTE - OBJECTIVE STATEMENT
Injection Sites (Won't Render If 0): 0 83 y/o M with PMH HTN, HLD, DVT, PAD presents to the ER from vascular surgery office with diarrhea and generalized weakness over the last few days. Reports 4 episodes of non-bloody diarrhea. Denies fevers, vomiting, abdominal pain. Able to tolerate PO, although states that he has had a 10lb unintentional weight loss over the last year. Reports also having swelling to his left leg. Patient lives alone. Unsure of daily home medications and doses.    ED course: Hypertensive. Afebrile. Procalcitonin 0.17. Gail duplex showed an acute on chronic partially occlusive thrombosis in the left common femoral vein. While the arterial duplex showed a "Left external iliac artery to deep femoral artery bypass graft with markedly elevated velocities at the proximal anastomosis (565 cm/s) concerning for stenosis." Evaluated by Vascular surgery. Started on Eliquis. Plan for hydration and close observation in CDU.

## 2025-02-16 LAB
ANION GAP SERPL CALC-SCNC: 13 MMOL/L — SIGNIFICANT CHANGE UP (ref 5–17)
APTT BLD: 52.6 SEC — HIGH (ref 24.5–35.6)
BLD GP AB SCN SERPL QL: NEGATIVE — SIGNIFICANT CHANGE UP
BUN SERPL-MCNC: 26 MG/DL — HIGH (ref 7–23)
CALCIUM SERPL-MCNC: 8.9 MG/DL — SIGNIFICANT CHANGE UP (ref 8.4–10.5)
CHLORIDE SERPL-SCNC: 103 MMOL/L — SIGNIFICANT CHANGE UP (ref 96–108)
CO2 SERPL-SCNC: 22 MMOL/L — SIGNIFICANT CHANGE UP (ref 22–31)
CREAT SERPL-MCNC: 1.17 MG/DL — SIGNIFICANT CHANGE UP (ref 0.5–1.3)
EGFR: 61 ML/MIN/1.73M2 — SIGNIFICANT CHANGE UP
EGFR: 61 ML/MIN/1.73M2 — SIGNIFICANT CHANGE UP
GLUCOSE SERPL-MCNC: 78 MG/DL — SIGNIFICANT CHANGE UP (ref 70–99)
HCT VFR BLD CALC: 35.3 % — LOW (ref 39–50)
HGB BLD-MCNC: 11 G/DL — LOW (ref 13–17)
INR BLD: 1.69 RATIO — HIGH (ref 0.85–1.16)
MAGNESIUM SERPL-MCNC: 2.2 MG/DL — SIGNIFICANT CHANGE UP (ref 1.6–2.6)
MCHC RBC-ENTMCNC: 26.6 PG — LOW (ref 27–34)
MCHC RBC-ENTMCNC: 31.2 G/DL — LOW (ref 32–36)
MCV RBC AUTO: 85.5 FL — SIGNIFICANT CHANGE UP (ref 80–100)
NRBC BLD AUTO-RTO: 0 /100 WBCS — SIGNIFICANT CHANGE UP (ref 0–0)
PHOSPHATE SERPL-MCNC: 2.5 MG/DL — SIGNIFICANT CHANGE UP (ref 2.5–4.5)
PLATELET # BLD AUTO: 257 K/UL — SIGNIFICANT CHANGE UP (ref 150–400)
POTASSIUM SERPL-MCNC: 4.5 MMOL/L — SIGNIFICANT CHANGE UP (ref 3.5–5.3)
POTASSIUM SERPL-SCNC: 4.5 MMOL/L — SIGNIFICANT CHANGE UP (ref 3.5–5.3)
PROTHROM AB SERPL-ACNC: 19.2 SEC — HIGH (ref 9.9–13.4)
RBC # BLD: 4.13 M/UL — LOW (ref 4.2–5.8)
RBC # FLD: 16.1 % — HIGH (ref 10.3–14.5)
RH IG SCN BLD-IMP: POSITIVE — SIGNIFICANT CHANGE UP
SODIUM SERPL-SCNC: 138 MMOL/L — SIGNIFICANT CHANGE UP (ref 135–145)
WBC # BLD: 7.1 K/UL — SIGNIFICANT CHANGE UP (ref 3.8–10.5)
WBC # FLD AUTO: 7.1 K/UL — SIGNIFICANT CHANGE UP (ref 3.8–10.5)

## 2025-02-16 RX ORDER — APIXABAN 2.5 MG/1
2 TABLET, FILM COATED ORAL
Refills: 0 | DISCHARGE
Start: 2025-02-16

## 2025-02-16 RX ORDER — ATORVASTATIN CALCIUM 80 MG/1
1 TABLET, FILM COATED ORAL
Qty: 0 | Refills: 0 | DISCHARGE
Start: 2025-02-16

## 2025-02-16 RX ADMIN — MIRTAZAPINE 7.5 MILLIGRAM(S): 30 TABLET, FILM COATED ORAL at 11:46

## 2025-02-16 RX ADMIN — TAMSULOSIN HYDROCHLORIDE 0.4 MILLIGRAM(S): 0.4 CAPSULE ORAL at 21:23

## 2025-02-16 RX ADMIN — AMLODIPINE BESYLATE 5 MILLIGRAM(S): 10 TABLET ORAL at 06:16

## 2025-02-16 RX ADMIN — Medication 75 MILLILITER(S): at 06:19

## 2025-02-16 RX ADMIN — ATORVASTATIN CALCIUM 20 MILLIGRAM(S): 80 TABLET, FILM COATED ORAL at 22:09

## 2025-02-16 RX ADMIN — Medication 30 MILLIGRAM(S): at 06:18

## 2025-02-16 RX ADMIN — Medication 81 MILLIGRAM(S): at 11:46

## 2025-02-16 RX ADMIN — APIXABAN 10 MILLIGRAM(S): 2.5 TABLET, FILM COATED ORAL at 06:16

## 2025-02-16 RX ADMIN — APIXABAN 10 MILLIGRAM(S): 2.5 TABLET, FILM COATED ORAL at 17:50

## 2025-02-16 RX ADMIN — METOPROLOL SUCCINATE 25 MILLIGRAM(S): 50 TABLET, EXTENDED RELEASE ORAL at 06:16

## 2025-02-16 NOTE — DISCHARGE NOTE PROVIDER - HOSPITAL COURSE
HPI:  83 y/o M with pmhx htn, hld, dvt, pad presents to the ER from vascular surgery office with diarrhea and generalized weakness over the last few days.   Patient reports that he has been having diarrhea x few days, associated with weakness, went to see [facility] Vascular surgeon as he noticed LLE swelling who referred him to the ED.   No hx fevers/ nausea/ vomiting.   Able to tolerate PO, No sick contacts.  (15 Feb 2025 14:52)      Hospital Course:      Important/Active Issues for Follow-Up:  Occluded femoral bypass graft, Acute lower extremity DVT- Vascular surgery, hematology  Weakness, diarrhea, DAX- Primary Care    Medication Changes and Reason:  Hold olmesartan for DAX    Advance Directives  [x] Full code [ ] Hospice [ ] DNR    Discharge Diagnoses:     HPI:  85 y/o M with pmhx htn, hld, dvt, pad presents to the ER from vascular surgery office with diarrhea and generalized weakness over the last few days.   Patient reports that he has been having diarrhea x few days, associated with weakness, went to see [facility] Vascular surgeon as he noticed LLE swelling who referred him to the ED.   No hx fevers/ nausea/ vomiting.   Able to tolerate PO, No sick contacts.  (15 Feb 2025 14:52)      Hospital Course: Patient presented from the vascular office for diarrhea, generalized weakness, and poor PO intake. Admitted on 2/15 with diarrhea and DAX. GI PCR was negative. On 2/17, patient developed chills, fever, and vomiting. Urinalysis and initial blood cultures were negative. On 2/19, patient again developed a fever of 100.4°F without other symptoms. Blood cultures from 2/17 and 2/19 grew Gemella species. TTE showed an atrial septal defect and patent foramen ovale, but no vegetations. Non-contrast CT chest/abdomen/pelvis showed no acute intrathoracic or intra-abdominal pathology. Soft tissue in the retroperitoneum was noted, possibly representing collapsed bowel or lymphadenopathy. Dental evaluation revealed no clinical evidence of infection. Anemia workup included EGD and colonoscopy, which revealed sigmoid diverticulosis and internal hemorrhoids. Repeat blood cultures on 2/20 were negative. Patient received Zosyn from 2/19-2/21 and vancomycin from 2/20-2/21, then was switched to ceftriaxone 2 g twice daily on 2/21. SEBASTIAN planned for 3/6; if negative, antibiotics would be discontinued.        Important/Active Issues for Follow-Up:  Occluded femoral bypass graft, Acute lower extremity DVT- Vascular surgery, hematology  Weakness, diarrhea, DAX- Primary Care    Medication Changes and Reason:  Hold olmesartan for DAX    Advance Directives  [x] Full code [ ] Hospice [ ] DNR    Discharge Diagnoses:     HPI:  83 y/o M with pmhx htn, hld, dvt, pad presents to the ER from vascular surgery office with diarrhea and generalized weakness over the last few days.   Patient reports that he has been having diarrhea x few days, associated with weakness, went to see [facility] Vascular surgeon as he noticed LLE swelling who referred him to the ED.   No hx fevers/ nausea/ vomiting.   Able to tolerate PO, No sick contacts.  (15 Feb 2025 14:52)      Hospital Course: Patient presented from the vascular office for diarrhea, generalized weakness, and poor PO intake. Admitted on 2/15 with diarrhea and DAX. GI PCR was negative. On 2/17, patient developed chills, fever, and vomiting. Urinalysis and initial blood cultures were negative. On 2/19, patient again developed a fever of 100.4°F without other symptoms. Blood cultures from 2/17 and 2/19 grew Gemella species. TTE showed an atrial septal defect and patent foramen ovale, but no vegetations. Non-contrast CT chest/abdomen/pelvis showed no acute intrathoracic or intra-abdominal pathology. Soft tissue in the retroperitoneum was noted, possibly representing collapsed bowel or lymphadenopathy. Dental evaluation revealed no clinical evidence of infection. Anemia workup included EGD and colonoscopy, which revealed sigmoid diverticulosis and internal hemorrhoids. Repeat blood cultures on 2/20 were negative. Patient received Zosyn from 2/19-2/21 and vancomycin from 2/20-2/21, then was switched to ceftriaxone 2 g twice daily on 2/21. SEBASTIAN performed - negative.  Antibiotics discontinued, as per ID.  Patient medically cleared for discharge by Dr. Juan.        Important/Active Issues for Follow-Up:  Occluded femoral bypass graft, Acute lower extremity DVT- Vascular surgery, hematology  Weakness, diarrhea, DAX- Primary Care    Medication Changes and Reason:  Hold olmesartan for DAX    Advance Directives  [x] Full code [ ] Hospice [ ] DNR       HPI:  83 y/o M with pmhx htn, hld, dvt, pad presents to the ER from vascular surgery office with diarrhea and generalized weakness over the last few days.   Patient reports that he has been having diarrhea x few days, associated with weakness, went to see [facility] Vascular surgeon as he noticed LLE swelling who referred him to the ED.   No hx fevers/ nausea/ vomiting.   Able to tolerate PO, No sick contacts.  (15 Feb 2025 14:52)      Hospital Course: Patient presented from the vascular office for diarrhea, generalized weakness, and poor PO intake. Admitted on 2/15 with diarrhea and DAX. GI PCR was negative. On 2/17, patient developed chills, fever, and vomiting. Urinalysis and initial blood cultures were negative. On 2/19, patient again developed a fever of 100.4°F without other symptoms. Blood cultures from 2/17 and 2/19 grew Gemella species. TTE showed an atrial septal defect and patent foramen ovale, but no vegetations. Non-contrast CT chest/abdomen/pelvis showed no acute intrathoracic or intra-abdominal pathology. Soft tissue in the retroperitoneum was noted, possibly representing collapsed bowel or lymphadenopathy. Dental evaluation revealed no clinical evidence of infection. Anemia workup included EGD and colonoscopy, which revealed sigmoid diverticulosis and internal hemorrhoids. Repeat blood cultures on 2/20 were negative. Patient received Zosyn from 2/19-2/21 and vancomycin from 2/20-2/21, then was switched to ceftriaxone 2 g twice daily on 2/21. SEBASTIAN performed - negative.  Antibiotics discontinued, as per ID.  Patient medically cleared for discharge by Dr. Juan.        Important/Active Issues for Follow-Up:  Occluded femoral bypass graft, Acute lower extremity DVT- Vascular surgery, hematology  Weakness, diarrhea, DAX- Primary Care    Medication Changes and Reason:  Hold olmesartan for DAX  Nifedipine increased to 60mg daily for HTN  Metoprolol discontinued for bradycardia    Advance Directives  [x] Full code [ ] Hospice [ ] DNR       HPI:  85 y/o M with pmhx htn, hld, dvt, pad presents to the ER from vascular surgery office with diarrhea and generalized weakness over the last few days.   Patient reports that he has been having diarrhea x few days, associated with weakness, went to see [facility] Vascular surgeon as he noticed LLE swelling who referred him to the ED.   No hx fevers/ nausea/ vomiting.   Able to tolerate PO, No sick contacts.  (15 Feb 2025 14:52)      Hospital Course: Patient presented from the vascular office for diarrhea, generalized weakness, and poor PO intake. Admitted on 2/15 with diarrhea and DAX. GI PCR was negative. On 2/17, patient developed chills, fever, and vomiting. Urinalysis and initial blood cultures were negative. On 2/19, patient again developed a fever of 100.4°F without other symptoms. Blood cultures from 2/17 and 2/19 grew Gemella species. TTE showed an atrial septal defect and patent foramen ovale, but no vegetations. Non-contrast CT chest/abdomen/pelvis showed no acute intrathoracic or intra-abdominal pathology. Soft tissue in the retroperitoneum was noted, possibly representing collapsed bowel or lymphadenopathy. Dental evaluation revealed no clinical evidence of infection. Anemia workup included EGD and colonoscopy, which revealed sigmoid diverticulosis and internal hemorrhoids. Repeat blood cultures on 2/20 were negative. Patient received Zosyn from 2/19-2/21 and vancomycin from 2/20-2/21, then was switched to ceftriaxone 2 g twice daily on 2/21. SEBASTIAN performed - negative.  Antibiotics discontinued, as per ID.  Patient medically cleared for discharge by Dr. Juan.        Important/Active Issues for Follow-Up:  f/u with PCP to discuss current meds and if any changes need to be made  Occluded femoral bypass graft, Acute lower extremity DVT- Vascular surgery, hematology  Weakness, diarrhea, DAX- Primary Care    Medication Changes and Reason:  Hold olmesartan for DAX  Nifedipine increased to 60mg daily for HTN  Metoprolol discontinued for bradycardia    Advance Directives  [x] Full code [ ] Hospice [ ] DNR

## 2025-02-16 NOTE — CONSULT NOTE ADULT - SUBJECTIVE AND OBJECTIVE BOX
Chief Complaint:  Patient is a 84y old  Male who presents with a chief complaint of Referred by Vascular for Weakness Diarrhea (16 Feb 2025 13:44)      HPI: Patient admitted to the hospital with above complaints. He had a LLE doppler and he has acute on chronic DVT of the left leg. He says that he he was not taking any blood thinners as out-pt. He is being treated with Eliquis here. He also has occlusion of artery graft so needs AC for that as well per vascular. He also has a mild anemia. The EGFR was a little low.     Medications:  amLODIPine   Tablet 5 milliGRAM(s) Oral daily  apixaban 10 milliGRAM(s) Oral every 12 hours  aspirin  chewable 81 milliGRAM(s) Oral daily  atorvastatin 20 milliGRAM(s) Oral at bedtime  metoprolol succinate ER 25 milliGRAM(s) Oral daily  mirtazapine 7.5 milliGRAM(s) Oral daily  NIFEdipine XL 30 milliGRAM(s) Oral daily  tamsulosin 0.4 milliGRAM(s) Oral at bedtime        Allergies:  No Known Allergies    FAMILY HISTORY:  No cancer    Social history: Lives alone. Occasional cigarette. No ETOH or IVDA    PAST MEDICAL & SURGICAL HISTORY:  HTN (hypertension)      HLD (hyperlipidemia)      Deep vein thrombosis (DVT)      REVIEW OF SYSTEMS      General: Appetite fair. Some fatigue. No fever or sweats    Skin/Breast: No rash, itch, bruising. Dry skin  	  ENMT:	No nosebleeds or sore throat    Respiratory and Thorax: No cough, SOB, wheezing, hemoptysis  	  Cardiovascular:	NO CP    Gastrointestinal:	No abd pain. No N/V. Prior diarrhea is better    Genitourinary:	Frequent urination, but no hematuria or dysuria    Musculoskeletal:	 No back pain, leg pain, swelling    Neurological:	No HA or dizziness     Vitals:  Vital Signs Last 24 Hrs  T(C): 36.4 (16 Feb 2025 11:51), Max: 37.2 (15 Feb 2025 21:49)  T(F): 97.6 (16 Feb 2025 11:51), Max: 98.9 (15 Feb 2025 21:49)  HR: 70 (16 Feb 2025 11:51) (61 - 115)  BP: 137/68 (16 Feb 2025 11:51) (131/73 - 196/71)  BP(mean): 110 (15 Feb 2025 14:52) (110 - 110)  RR: 18 (16 Feb 2025 11:51) (18 - 18)  SpO2: 92% (16 Feb 2025 11:51) (92% - 98%)    Parameters below as of 16 Feb 2025 11:51  Patient On (Oxygen Delivery Method): room air        Pex:  alert NAD  EOMI anicteric sclera  Neck No LNA  Cv s1 S2 RRR murmur  Lungs clear B/L  abd soft NT ND +BS  No LE edema or tenderness, but LLE > RLE    Labs:                        11.0   7.10  )-----------( 257      ( 16 Feb 2025 07:11 )             35.3     CBC Full  -  ( 16 Feb 2025 07:11 )  WBC Count : 7.10 K/uL  RBC Count : 4.13 M/uL  Hemoglobin : 11.0 g/dL  Hematocrit : 35.3 %  Platelet Count - Automated : 257 K/uL  Mean Cell Volume : 85.5 fl  Mean Cell Hemoglobin : 26.6 pg  Mean Cell Hemoglobin Concentration : 31.2 g/dL  Auto Neutrophil # : x  Auto Lymphocyte # : x  Auto Monocyte # : x  Auto Eosinophil # : x  Auto Basophil # : x  Auto Neutrophil % : x  Auto Lymphocyte % : x  Auto Monocyte % : x  Auto Eosinophil % : x  Auto Basophil % : x    02-16    138  |  103  |  26[H]  ----------------------------<  78  4.5   |  22  |  1.17    Ca    8.9      16 Feb 2025 07:09  Phos  2.5     02-16  Mg     2.2     02-16    TPro  5.9[L]  /  Alb  2.7[L]  /  TBili  0.2  /  DBili  x   /  AST  11  /  ALT  8[L]  /  AlkPhos  60  02-15    PT/INR - ( 16 Feb 2025 07:12 )   PT: 19.2 sec;   INR: 1.69 ratio         PTT - ( 16 Feb 2025 07:12 )  PTT:52.6 sec  [id number]

## 2025-02-16 NOTE — DISCHARGE NOTE PROVIDER - CARE PROVIDERS DIRECT ADDRESSES
,[email or web address],[email or web address]

## 2025-02-16 NOTE — DISCHARGE NOTE PROVIDER - PROVIDER TOKENS
PROVIDER:[TOKEN:[[id number]],FOLLOWUP:[1 week],ESTABLISHEDPATIENT:[T]] PROVIDER:[TOKEN:[[id number]],FOLLOWUP:[1 week],ESTABLISHEDPATIENT:[T]],FREE:[LAST:[ritesh],FIRST:[Ann-Marie LINDSAY)],PHONE:[phone]],FAX:[(   )    -],FOLLOWUP:[1 week],ESTABLISHEDPATIENT:[T]],PROVIDER:[TOKEN:[[id number]],FOLLOWUP:[2 weeks],ESTABLISHEDPATIENT:[T]] PROVIDER:[TOKEN:[[id number]],FOLLOWUP:[1 week],ESTABLISHEDPATIENT:[T]],PROVIDER:[TOKEN:[[id number]],FOLLOWUP:[2 weeks],ESTABLISHEDPATIENT:[T]],FREE:[LAST:[kemiisetti],FIRST:[Ann-Marie LINDSAY)],PHONE:[phone]],FAX:[(   )    -],FOLLOWUP:[1 week],ESTABLISHEDPATIENT:[T]],PROVIDER:[TOKEN:[[id number]],FOLLOWUP:[1 week]]

## 2025-02-16 NOTE — DISCHARGE NOTE PROVIDER - CARE PROVIDER_API CALL
Mack Landers)  Vascular Surgery  [address], [address]  [place] [address]  Phone: [phone]  Fax: [phone]  Established Patient  Follow Up Time: 1 week   Mack Landers)  Vascular Surgery  [address], [address]  [place] [address]  Phone: [phone]  Fax: [phone]  Established Patient  Follow Up Time: 1 week    Ann-Marie awad)  Phone: [phone]  Fax: (   )    -  Established Patient  Follow Up Time: 1 week    Armen Tavera  Hematology  [address], [address]  [place] [address]  Phone: [phone]  Fax: [phone]  Established Patient  Follow Up Time: 2 weeks   Mack Landers)  Vascular Surgery  [address], [address]  [place] [address]  Phone: [phone]  Fax: [phone]  Established Patient  Follow Up Time: 1 week    Armen Tavera Tylor  Hematology  [address], [address]  [place] [address]  Phone: [phone]  Fax: [phone]  Established Patient  Follow Up Time: 2 weeks    Ann-Marie awad)  Phone: [phone]  Fax: (   )    -  Established Patient  Follow Up Time: 1 week    Ancelmo Caruso  Cardiovascular Disease  [address] [place] [address], [address]  [place] [address]  Phone: [phone]  Fax: [phone]  Follow Up Time: 1 week

## 2025-02-16 NOTE — PROGRESS NOTE ADULT - ASSESSMENT
85 y/o M with pmhx htn, hld, dvt, pad presents to the ER from vascular surgery office with diarrhea and generalized weakness over the last few days.     VA Dopplers LLE shows Acute deep venous thrombosis: above the knee.    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  Occluded left femoral bypass and popliteal artery. This will be further   evaluated with dedicated lower extremity arterial ultrasound.    Renal Sono shows Bilateral renal cysts, larger on the left, some of which demonstrate thin   septations.    Diffuse bladder wall thickening, out of proportion to the level of   underdistention; etiologies include chronic bladder outlet obstruction if   patient has an enlarged prostate versus underlying cystitis    Acute DVT   Eliquis   Vascular Surgery Consulted     DAX pre renal /ATN     Renal consulted   Reviewed Renal sono   No hydronephrosis   Diarrhea resolved   HTN Hold Olmesartan   Continue with Norvasc and Toprol     HLD Statins

## 2025-02-16 NOTE — PROGRESS NOTE ADULT - SUBJECTIVE AND OBJECTIVE BOX
Dr. Rios  Office [phone] (9 am to 5 pm)  Service: [phone] (5pm to 9am)  Andreia SILVA      RENAL PROGRESS NOTE: DATE OF SERVICE 02-16-25 @ 13:44    Patient is a 84y old  Male who presents with a chief complaint of Referred by Vascular for Weakness Diarrhea (15 Feb 2025 14:52)      Patient seen and examined at bedside. No chest pain/sob    VITALS:  T(F): 97.6 (02-16-25 @ 11:51), Max: 98.9 (02-15-25 @ 21:49)  HR: 70 (02-16-25 @ 11:51)  BP: 137/68 (02-16-25 @ 11:51)  RR: 18 (02-16-25 @ 11:51)  SpO2: 92% (02-16-25 @ 11:51)  Wt(kg): --    02-15 @ 07:01  -  02-16 @ 07:00  --------------------------------------------------------  IN: 0 mL / OUT: 900 mL / NET: -900 mL    02-16 @ 07:01  -  02-16 @ 13:44  --------------------------------------------------------  IN: 360 mL / OUT: 0 mL / NET: 360 mL      Height (cm): 170.2 (02-15 @ 14:52)  Weight (kg): 52.2 (02-15 @ 14:52)  BMI (kg/m2): 18 (02-15 @ 14:52)  BSA (m2): 1.6 (02-15 @ 14:52)    PHYSICAL EXAM:  Constitutional: NAD  Neck: No JVD  Respiratory: CTAB, no wheezes, rales or rhonchi  Cardiovascular: S1, S2, RRR  Gastrointestinal: BS+, soft, NT/ND  Extremities: No peripheral edema    Hospital Medications:   MEDICATIONS  (STANDING):  amLODIPine   Tablet 5 milliGRAM(s) Oral daily  apixaban 10 milliGRAM(s) Oral every 12 hours  aspirin  chewable 81 milliGRAM(s) Oral daily  atorvastatin 20 milliGRAM(s) Oral at bedtime  metoprolol succinate ER 25 milliGRAM(s) Oral daily  mirtazapine 7.5 milliGRAM(s) Oral daily  NIFEdipine XL 30 milliGRAM(s) Oral daily  sodium chloride 0.9%. 1000 milliLiter(s) (75 mL/Hr) IV Continuous <Continuous>  tamsulosin 0.4 milliGRAM(s) Oral at bedtime      LABS:  02-16    138  |  103  |  26[H]  ----------------------------<  78  4.5   |  22  |  1.17    Ca    8.9      16 Feb 2025 07:09  Phos  2.5     02-16  Mg     2.2     02-16    TPro  5.9[L]  /  Alb  2.7[L]  /  TBili  0.2  /  DBili      /  AST  11  /  ALT  8[L]  /  AlkPhos  60  02-15    Creatinine Trend: 1.17 <--, 1.63 <--, 1.36 <--, 1.30 <--    Phosphorus: 2.5 mg/dL (02-16 @ 07:09)                              11.0   7.10  )-----------( 257      ( 16 Feb 2025 07:11 )             35.3     Urine Studies:  Urinalysis - [02-16-25 @ 07:09]      Color  / Appearance  / SG  / pH       Gluc 78 / Ketone   / Bili  / Urobili        Blood  / Protein  / Leuk Est  / Nitrite       RBC  / WBC  / Hyaline  / Gran  / Sq Epi  / Non Sq Epi  / Bacteria     Urine Creatinine 59      [02-15-25 @ 22:28]  Urine Sodium 151      [02-15-25 @ 22:28]          RADIOLOGY & ADDITIONAL STUDIES:

## 2025-02-16 NOTE — PROGRESS NOTE ADULT - SUBJECTIVE AND OBJECTIVE BOX
Patient is a 84y old  Male who presents with a chief complaint of Referred by Vascular for Weakness Diarrhea (16 Feb 2025 14:47)      SUBJECTIVE / OVERNIGHT EVENTS: no events     MEDICATIONS  (STANDING):  apixaban 10 milliGRAM(s) Oral every 12 hours  aspirin  chewable 81 milliGRAM(s) Oral daily  atorvastatin 20 milliGRAM(s) Oral at bedtime  metoprolol succinate ER 25 milliGRAM(s) Oral daily  mirtazapine 7.5 milliGRAM(s) Oral daily  NIFEdipine XL 30 milliGRAM(s) Oral daily  tamsulosin 0.4 milliGRAM(s) Oral at bedtime    MEDICATIONS  (PRN):      CAPILLARY BLOOD GLUCOSE        I&O's Summary    15 Feb 2025 07:01  -  16 Feb 2025 07:00  --------------------------------------------------------  IN: 0 mL / OUT: 900 mL / NET: -900 mL    16 Feb 2025 07:01  -  16 Feb 2025 21:25  --------------------------------------------------------  IN: 840 mL / OUT: 1100 mL / NET: -260 mL      T(C): 36.6 (02-16-25 @ 20:15), Max: 36.6 (02-16-25 @ 20:15)  HR: 61 (02-16-25 @ 20:15) (61 - 70)  BP: 132/63 (02-16-25 @ 20:15) (132/63 - 137/68)  RR: 18 (02-16-25 @ 20:15) (18 - 18)  SpO2: 93% (02-16-25 @ 20:15) (92% - 93%)    PHYSICAL EXAM:  GENERAL: NAD, well-developed  HEAD:  Atraumatic, Normocephalic  EYES: EOMI, PERRLA, conjunctiva and sclera clear  NECK: Supple, No JVD  CHEST/LUNG: Clear to auscultation bilaterally; No wheeze  HEART: Regular rate and rhythm; No murmurs, rubs, or gallops  ABDOMEN: Soft, Nontender, Nondistended; Bowel sounds present  EXTREMITIES:  2+ Peripheral Pulses, No clubbing, cyanosis, or edema  PSYCH: AAOx3  NEUROLOGY: non-focal  SKIN: No rashes or lesions    LABS:                        11.0   7.10  )-----------( 257      ( 16 Feb 2025 07:11 )             35.3     02-16    138  |  103  |  26[H]  ----------------------------<  78  4.5   |  22  |  1.17    Ca    8.9      16 Feb 2025 07:09  Phos  2.5     02-16  Mg     2.2     02-16    TPro  5.9[L]  /  Alb  2.7[L]  /  TBili  0.2  /  DBili  x   /  AST  11  /  ALT  8[L]  /  AlkPhos  60  02-15    PT/INR - ( 16 Feb 2025 07:12 )   PT: 19.2 sec;   INR: 1.69 ratio         PTT - ( 16 Feb 2025 07:12 )  PTT:52.6 sec      Urinalysis Basic - ( 16 Feb 2025 07:09 )    Color: x / Appearance: x / SG: x / pH: x  Gluc: 78 mg/dL / Ketone: x  / Bili: x / Urobili: x   Blood: x / Protein: x / Nitrite: x   Leuk Esterase: x / RBC: x / WBC x   Sq Epi: x / Non Sq Epi: x / Bacteria: x        RADIOLOGY & ADDITIONAL TESTS:    Imaging Personally Reviewed:    Consultant(s) Notes Reviewed:      Care Discussed with Consultants/Other Providers:

## 2025-02-16 NOTE — CONSULT NOTE ADULT - ASSESSMENT
84-year-old male who is a poor historian admitted for diarrhea and weakness known to vascular for PAD. Per their notes, he had a DVT in the past and was taking Eliquis for that and PAD, but per patient, he was not taking any blood thinners. 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 Also with occluded femoral bypass graft. Likely needs indefinite anticoagulation based upon this history thus do not see need for a hypercoagulable evaluation in this elderly gentleman. Need to weigh pros and cons of AC in setting of elderly patient.     Mild anemia. Has had a mild low EGFR (better today) so may have AOCD due to that. Hgb adequate and no need for any immediate intervention. Will order some anemia labs to assess further. Monitor the Hgb.    Discussed with medical attending.

## 2025-02-16 NOTE — PROGRESS NOTE ADULT - ASSESSMENT
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 Patient presented to Dr. Landers's office today for followup and evaluation of left leg swelling, but was referred to the ED given reported 3-4 days of weakness, poor PO intake, and diarrhea. He is found to have DVT and DAX    A/P:  DAX:  FENa>1  Work up suggest ATN  renal US-no hydro  Discontinue IVF  Hold losartan  Monitor I/O  Monitor renal function closely    HTN:  acceptable  Hold losartan in view of DAX  d/c Amlodipine, patient has been started on Nifedipine by team  Monitor BP      DVT:  Follow up vascular

## 2025-02-16 NOTE — DISCHARGE NOTE PROVIDER - NSDCCPCAREPLAN_GEN_ALL_CORE_FT
PRINCIPAL DISCHARGE DIAGNOSIS  Diagnosis: Acute diarrhea  Assessment and Plan of Treatment:       SECONDARY DISCHARGE DIAGNOSES  Diagnosis: DAX (acute kidney injury)  Assessment and Plan of Treatment: in the setting of diarrhea and dehydration. Your home Olmesartan was held as this medication can affect your kidneys.  You can continue your home Metoprolol and Nifedipine as before. Do not resume Olmesartan until instructed to do so at your outpatient Primary Care follow up. Establish Primary care at the [facility] 1-2 weeks after discharge.  Avoid taking NSAIDs (ex: Ibuprofen, Advil, Celebrex, Naprosyn) and other agents that can harm the kidneys such as intravenous contrast for diagnostic testing, combination cold medications, etc. until you are instructed to do so by your Primary Care Physician.  Have all of your medications adjusted for your renal function by your Health Care Provider.  Blood pressure control is important.  Take all medication as prescribed.  Do not overconsume foods that are high in potassium, such as bananas, until you are instructed to do so by your primary care physician.    Diagnosis: DVT of lower limb, acute  Assessment and Plan of Treatment:     Diagnosis: Occlusion of left femorotibial bypass graft, sequela  Assessment and Plan of Treatment: chronic, follow up with your Vascular Surgeon Dr. Landers within 1 week of discharge. Continue Eliquis as prescribed     PRINCIPAL DISCHARGE DIAGNOSIS  Diagnosis: Acute diarrhea  Assessment and Plan of Treatment: Resolved. Testing was negative. FGollow up with PCP      SECONDARY DISCHARGE DIAGNOSES  Diagnosis: DAX (acute kidney injury)  Assessment and Plan of Treatment: DAX in the setting of diarrhea and dehydration. Your home Olmesartan was held as this medication can affect your kidneys.  You can continue your home Metoprolol and Nifedipine as before. Do not resume Olmesartan until instructed to do so at your outpatient Primary Care follow up. Establish Primary care at the [facility] 1-2 weeks after discharge.  Avoid taking NSAIDs (ex: Ibuprofen, Advil, Celebrex, Naprosyn) and other agents that can harm the kidneys such as intravenous contrast for diagnostic testing, combination cold medications, etc. until you are instructed to do so by your Primary Care Physician.  Have all of your medications adjusted for your renal function by your Health Care Provider.  Blood pressure control is important.  Take all medication as prescribed.  Do not overconsume foods that are high in potassium, such as bananas, until you are instructed to do so by your primary care physician.    Diagnosis: DVT of lower limb, acute  Assessment and Plan of Treatment: Eliquis as ordered and follow up with PCP and hematologist    Diagnosis: Occlusion of left femorotibial bypass graft, sequela  Assessment and Plan of Treatment: chronic, follow up with your Vascular Surgeon Dr. Landers within 1 week of discharge. Continue Eliquis as prescribed     PRINCIPAL DISCHARGE DIAGNOSIS  Diagnosis: Acute diarrhea  Assessment and Plan of Treatment: Resolved. Testing was negative. Follow up with PCP within 2-3 days of discharge      SECONDARY DISCHARGE DIAGNOSES  Diagnosis: DAX (acute kidney injury)  Assessment and Plan of Treatment: DAX in the setting of diarrhea and dehydration. Your home Olmesartan was held as this medication can affect your kidneys.  You can continue your home Metoprolol and an increased dose of Nifedipine. Do not resume Olmesartan until instructed to do so at your outpatient Primary Care follow up. Establish Primary care at the [facility] 2-3 days after discharge.  Avoid taking NSAIDs (ex: Ibuprofen, Advil, Celebrex, Naprosyn) and other agents that can harm the kidneys such as intravenous contrast for diagnostic testing, combination cold medications, etc. until you are instructed to do so by your Primary Care Physician.  Have all of your medications adjusted for your renal function by your Health Care Provider.  Blood pressure control is important.  Take all medication as prescribed.  Do not overconsume foods that are high in potassium, such as bananas, until you are instructed to do so by your primary care physician.    Diagnosis: Bacteremia  Assessment and Plan of Treatment: You received a 15 day course of antibiotics, which you completed.  You must follow up with your primary medical doctor within 2-3 days of discharge.      Diagnosis: DVT of lower limb, acute  Assessment and Plan of Treatment: Continue with your blood thinner (eliquis) as ordered   follow up with PCP within 2-3 days of discharge, and hematologist within one week of discharge.  Take your "blood thinners" as prescribed.  Walking is encouraged, increase activity as tolerated.  If you develop new leg pain, swelling, and/or redness contact your healthcare provider.  If you develop new chest pain with difficulty breathing, a rapid heart rate and/or a feeling of passing out call emergency medical services 911.      Diagnosis: Occlusion of left femorotibial bypass graft, sequela  Assessment and Plan of Treatment: chronic, follow up with your Vascular Surgeon Dr. Landers within 1 week of discharge. Continue Eliquis as prescribed    Diagnosis: Anemia  Assessment and Plan of Treatment: Stable  Your EGD/colonoscopy showed no sign of bleeding.  It showed gastritis, diverticulosis, and internal hemorrhoids.  Follow up with your hematologist within one week of discharge.     PRINCIPAL DISCHARGE DIAGNOSIS  Diagnosis: Acute diarrhea  Assessment and Plan of Treatment: Resolved. Testing was negative. Follow up with PCP within 2-3 days of discharge      SECONDARY DISCHARGE DIAGNOSES  Diagnosis: DAX (acute kidney injury)  Assessment and Plan of Treatment: DAX in the setting of diarrhea and dehydration. Your home Olmesartan was held as this medication can affect your kidneys.  You can continue your home Metoprolol and an increased dose of Nifedipine. Do not resume Olmesartan until instructed to do so at your outpatient Primary Care follow up. Establish Primary care at the [facility] 2-3 days after discharge.  Avoid taking NSAIDs (ex: Ibuprofen, Advil, Celebrex, Naprosyn) and other agents that can harm the kidneys such as intravenous contrast for diagnostic testing, combination cold medications, etc. until you are instructed to do so by your Primary Care Physician.  Have all of your medications adjusted for your renal function by your Health Care Provider.  Blood pressure control is important.  Take all medication as prescribed.  Do not overconsume foods that are high in potassium, such as bananas, until you are instructed to do so by your primary care physician.    Diagnosis: Bacteremia  Assessment and Plan of Treatment: You received a 15 day course of antibiotics, which you completed.  You must follow up with your primary medical doctor within 2-3 days of discharge.      Diagnosis: Hypertension  Assessment and Plan of Treatment: You must STOP taking your olmesartan.  Your Nifedipine was increased to 60mg oral daily.  Low salt diet  Activity as tolerated.  Take all medication as prescribed.  Follow up with your medical doctor for routine blood pressure monitoring at your next visit.  Notify your doctor if you have any of the following symptoms:   Dizziness, Lightheadedness, Blurry vision, Headache, Chest pain, Shortness of breath      Diagnosis: DVT of lower limb, acute  Assessment and Plan of Treatment: Continue with your blood thinner (eliquis) as ordered   follow up with PCP within 2-3 days of discharge, and hematologist within one week of discharge.  Take your "blood thinners" as prescribed.  Walking is encouraged, increase activity as tolerated.  If you develop new leg pain, swelling, and/or redness contact your healthcare provider.  If you develop new chest pain with difficulty breathing, a rapid heart rate and/or a feeling of passing out call emergency medical services 911.      Diagnosis: Occlusion of left femorotibial bypass graft, sequela  Assessment and Plan of Treatment: chronic, follow up with your Vascular Surgeon Dr. Landers within 1 week of discharge. Continue Eliquis as prescribed    Diagnosis: Anemia  Assessment and Plan of Treatment: Stable  Your EGD/colonoscopy showed no sign of bleeding.  It showed gastritis, diverticulosis, and internal hemorrhoids.  Follow up with your hematologist within one week of discharge.  Continue with iron supplement daily, as ordered.     PRINCIPAL DISCHARGE DIAGNOSIS  Diagnosis: Acute diarrhea  Assessment and Plan of Treatment: Resolved. Testing was negative. Follow up with PCP within 2-3 days of discharge      SECONDARY DISCHARGE DIAGNOSES  Diagnosis: DAX (acute kidney injury)  Assessment and Plan of Treatment: DAX in the setting of diarrhea and dehydration. Your home Olmesartan was held as this medication can affect your kidneys.  You can continue your home Metoprolol and an increased dose of Nifedipine. Do not resume Olmesartan until instructed to do so at your outpatient Primary Care follow up. Establish Primary care at the [facility] 2-3 days after discharge.  Avoid taking NSAIDs (ex: Ibuprofen, Advil, Celebrex, Naprosyn) and other agents that can harm the kidneys such as intravenous contrast for diagnostic testing, combination cold medications, etc. until you are instructed to do so by your Primary Care Physician.  Have all of your medications adjusted for your renal function by your Health Care Provider.  Blood pressure control is important.  Take all medication as prescribed.  Do not overconsume foods that are high in potassium, such as bananas, until you are instructed to do so by your primary care physician.    Diagnosis: Bacteremia  Assessment and Plan of Treatment: You received a 15 day course of antibiotics, which you completed.  You must follow up with your primary medical doctor within 2-3 days of discharge.      Diagnosis: Hypertension  Assessment and Plan of Treatment: You must STOP taking your olmesartan.  Your Nifedipine was increased to 60mg oral daily.  Your metoprolol is being held due to low heart rate.  You MUST follow up with your cardiologist within one week.  Low salt diet  Activity as tolerated.  Take all medication as prescribed.  Follow up with your medical doctor for routine blood pressure monitoring at your next visit.  Notify your doctor if you have any of the following symptoms:   Dizziness, Lightheadedness, Blurry vision, Headache, Chest pain, Shortness of breath      Diagnosis: DVT of lower limb, acute  Assessment and Plan of Treatment: Continue with your blood thinner (eliquis) as ordered   follow up with PCP within 2-3 days of discharge, and hematologist within one week of discharge.  Take your "blood thinners" as prescribed.  Walking is encouraged, increase activity as tolerated.  If you develop new leg pain, swelling, and/or redness contact your healthcare provider.  If you develop new chest pain with difficulty breathing, a rapid heart rate and/or a feeling of passing out call emergency medical services 911.      Diagnosis: Occlusion of left femorotibial bypass graft, sequela  Assessment and Plan of Treatment: chronic, follow up with your Vascular Surgeon Dr. Landers within 1 week of discharge. Continue Eliquis as prescribed    Diagnosis: Anemia  Assessment and Plan of Treatment: Stable  Your EGD/colonoscopy showed no sign of bleeding.  It showed gastritis, diverticulosis, and internal hemorrhoids.  Follow up with your hematologist within one week of discharge.  Continue with iron supplement daily, as ordered.     PRINCIPAL DISCHARGE DIAGNOSIS  Diagnosis: Acute diarrhea  Assessment and Plan of Treatment: Resolved. Testing was negative. Follow up with PCP within 2-3 days of discharge      SECONDARY DISCHARGE DIAGNOSES  Diagnosis: DAX (acute kidney injury)  Assessment and Plan of Treatment: DAX in the setting of diarrhea and dehydration. Your home Olmesartan was held as this medication can affect your kidneys.  You can continue your home Metoprolol and an increased dose of Nifedipine. Do not resume Olmesartan until instructed to do so at your outpatient Primary Care follow up. Establish Primary care at the [facility] 2-3 days after discharge.  Avoid taking NSAIDs (ex: Ibuprofen, Advil, Celebrex, Naprosyn) and other agents that can harm the kidneys such as intravenous contrast for diagnostic testing, combination cold medications, etc. until you are instructed to do so by your Primary Care Physician.  Have all of your medications adjusted for your renal function by your Health Care Provider.  Blood pressure control is important.  Take all medication as prescribed.  Do not overconsume foods that are high in potassium, such as bananas, until you are instructed to do so by your primary care physician.    Diagnosis: Bacteremia  Assessment and Plan of Treatment: You received a 15 day course of antibiotics, which you completed.  You must follow up with your primary medical doctor within 2-3 days of discharge.      Diagnosis: Hypertension  Assessment and Plan of Treatment: You must STOP taking your olmesartan.  Your Nifedipine was increased to 60mg oral daily.  Your metoprolol is being held due to low heart rate.  Monitor your blood pressure twice a day and keep a log.  You MUST follow up with your cardiologist within one week.  Low salt diet  Activity as tolerated.  Take all medication as prescribed.  Follow up with your medical doctor for routine blood pressure monitoring at your next visit.  Notify your doctor if you have any of the following symptoms:   Dizziness, Lightheadedness, Blurry vision, Headache, Chest pain, Shortness of breath      Diagnosis: DVT of lower limb, acute  Assessment and Plan of Treatment: Continue with your blood thinner (eliquis) as ordered   follow up with PCP within 2-3 days of discharge, and hematologist within one week of discharge.  Take your "blood thinners" as prescribed.  Walking is encouraged, increase activity as tolerated.  If you develop new leg pain, swelling, and/or redness contact your healthcare provider.  If you develop new chest pain with difficulty breathing, a rapid heart rate and/or a feeling of passing out call emergency medical services 911.      Diagnosis: Occlusion of left femorotibial bypass graft, sequela  Assessment and Plan of Treatment: chronic, follow up with your Vascular Surgeon Dr. Landers within 1 week of discharge. Continue Eliquis as prescribed    Diagnosis: Anemia  Assessment and Plan of Treatment: Stable  Your EGD/colonoscopy showed no sign of bleeding.  It showed gastritis, diverticulosis, and internal hemorrhoids.  Follow up with your hematologist within one week of discharge.  Continue with iron supplement daily, as ordered.

## 2025-02-16 NOTE — DISCHARGE NOTE PROVIDER - NSFOLLOWUPCLINICS_GEN_ALL_ED_FT
[facility] - Primary Care  Primary Care  [address] [place] [address]  Phone: [phone]  Fax:   Follow Up Time: 2 weeks     [facility] - Primary Care  Primary Care  [address] [place] [address]  Phone: [phone]  Fax:   Follow Up Time: 1-3 days

## 2025-02-16 NOTE — DISCHARGE NOTE PROVIDER - DETAILS OF MALNUTRITION DIAGNOSIS/DIAGNOSES
This patient has been assessed with a concern for Malnutrition and was treated during this hospitalization for the following Nutrition diagnosis/diagnoses:     -  02/18/2025: Severe protein-calorie malnutrition   -  02/18/2025: Underweight (BMI < 19)

## 2025-02-16 NOTE — DISCHARGE NOTE PROVIDER - NSDCMRMEDTOKEN_GEN_ALL_CORE_FT
apixaban 5 mg oral tablet: 2 tab(s) orally every 12 hours  aspirin 81 mg oral tablet: 1 tab(s) orally once a day  atorvastatin 20 mg oral tablet: 1 tab(s) orally once a day (at bedtime)  metoprolol succinate 25 mg oral tablet, extended release: 1 tab(s) orally once a day  mirtazapine 7.5 mg oral tablet: 1 tab(s) orally once a day  NIFEdipine 30 mg oral tablet, extended release: 1 tab(s) orally once a day  oxyBUTYnin 10 mg/24 hr oral tablet, extended release: 1 tab(s) orally once a day  tamsulosin 0.4 mg oral capsule: 1 cap(s) orally once a day   aspirin 81 mg oral tablet: 1 tab(s) orally once a day  metoprolol succinate 25 mg oral tablet, extended release: 1 tab(s) orally once a day  mirtazapine 7.5 mg oral tablet: 1 tab(s) orally once a day  NIFEdipine 30 mg oral tablet, extended release: 1 tab(s) orally once a day  oxyBUTYnin 10 mg/24 hr oral tablet, extended release: 1 tab(s) orally once a day  simvastatin 20 mg oral tablet: 1 tab(s) orally once a day  tamsulosin 0.4 mg oral capsule: 1 cap(s) orally once a day  valACYclovir 500 mg oral tablet: 1 tab(s) orally once a day per pharmacy last fill Nov 2024 #30   apixaban 5 mg oral tablet: 1 tab(s) orally every 12 hours  aspirin 81 mg oral tablet: 1 tab(s) orally once a day  Home PT: evaluate and treat  metoprolol succinate 25 mg oral tablet, extended release: 1 tab(s) orally once a day  mirtazapine 7.5 mg oral tablet: 1 tab(s) orally once a day  NIFEdipine 30 mg oral tablet, extended release: 1 tab(s) orally once a day  oxyBUTYnin 10 mg/24 hr oral tablet, extended release: 1 tab(s) orally once a day  Rolling Walker: CODEY - 99  Ht - 170.2cm  Wt - 52.20kg  simvastatin 20 mg oral tablet: 1 tab(s) orally once a day  tamsulosin 0.4 mg oral capsule: 1 cap(s) orally once a day  valACYclovir 500 mg oral tablet: 1 tab(s) orally once a day per pharmacy last fill Nov 2024 #30   apixaban 5 mg oral tablet: 1 tab(s) orally every 12 hours  aspirin 81 mg oral tablet: 1 tab(s) orally once a day  ferrous sulfate 325 mg (65 mg elemental iron) oral tablet: 1 tab(s) orally once a day  Home PT: evaluate and treat  mirtazapine 7.5 mg oral tablet: 1 tab(s) orally once a day  NIFEdipine 60 mg oral tablet, extended release: 1 tab(s) orally once a day  oxyBUTYnin 10 mg/24 hr oral tablet, extended release: 1 tab(s) orally once a day  pantoprazole 40 mg oral delayed release tablet: 1 tab(s) orally once a day  Rolling Walker: CODEY - 99  Ht - 170.2cm  Wt - 52.20kg  simvastatin 20 mg oral tablet: 1 tab(s) orally once a day  tamsulosin 0.4 mg oral capsule: 1 cap(s) orally once a day  valACYclovir 500 mg oral tablet: 1 tab(s) orally once a day per pharmacy last fill Nov 2024 #30   apixaban 5 mg oral tablet: 1 tab(s) orally every 12 hours  aspirin 81 mg oral tablet: 1 tab(s) orally once a day  ferrous sulfate 325 mg (65 mg elemental iron) oral tablet: 1 tab(s) orally once a day  mirtazapine 7.5 mg oral tablet: 1 tab(s) orally once a day  NIFEdipine 60 mg oral tablet, extended release: 1 tab(s) orally once a day  oxyBUTYnin 10 mg/24 hr oral tablet, extended release: 1 tab(s) orally once a day  pantoprazole 40 mg oral delayed release tablet: 1 tab(s) orally once a day  simvastatin 20 mg oral tablet: 1 tab(s) orally once a day  tamsulosin 0.4 mg oral capsule: 1 cap(s) orally once a day  valACYclovir 500 mg oral tablet: 1 tab(s) orally once a day per pharmacy last fill Nov 2024 #30

## 2025-02-16 NOTE — DISCHARGE NOTE PROVIDER - NSDCFUADDAPPT_GEN_ALL_CORE_FT
APPTS ARE READY TO BE MADE: [X] YES    Best Family or Patient Contact (if needed):    Additional Information about above appointments (if needed):    1: pcp  2: vascular  3: hematology    Other comments or requests:    APPTS ARE READY TO BE MADE: [X] YES    Best Family or Patient Contact (if needed):    Additional Information about above appointments (if needed):    1: pcp  2: vascular  3: hematology    Other comments or requests:   Patient was outreached but did not answer nor could a voicemail be left. You must follow up with your primary medical doctor within 2-3 days of discharge - please call to make an appointment.  If you do not have one, you can follow up at the [facility] - please call [phone] to make an appointment.    You must follow up with your Vascular surgeon, Dr. Landers, within one week of discharge - please call to make an appointment.    You must follow up with your Hematologist within one week of discharge - please call to make an appointment.        APPTS ARE READY TO BE MADE: [X] YES    Best Family or Patient Contact (if needed):    Additional Information about above appointments (if needed):    1: pcp  2: vascular  3: hematology    Other comments or requests:   Patient was outreached but did not answer nor could a voicemail be left. You must follow up with your primary medical doctor within 2-3 days of discharge - please call to make an appointment.  If you do not have one, you can follow up at the [facility] - please call [phone] to make an appointment.    You must follow up with your Vascular surgeon, Dr. Landers, within one week of discharge - please call to make an appointment.    You must follow up with your cardiologist, Dr. Caruso, within one week of discharge - please call to make an appointment.  At this appointment, you must discuss your current medication regimen and if any changes need to be made.    You must follow up with your Hematologist within one week of discharge - please call to make an appointment.        APPTS ARE READY TO BE MADE: [X] YES    Best Family or Patient Contact (if needed):    Additional Information about above appointments (if needed):    1: pcp  2: vascular  3: hematology  4: cardiologist    Other comments or requests:   Patient was outreached but did not answer nor could a voicemail be left. You must follow up with your primary medical doctor within 2-3 days of discharge - please call to make an appointment.  If you do not have one, you can follow up at the [facility] - please call [phone] to make an appointment.  At this appointment, you must discuss your current medication regimen and if any changes need to be made.    You must follow up with your Vascular surgeon, Dr. Landers, within one week of discharge - please call to make an appointment.    You must follow up with your cardiologist, Dr. Caruso, within one week of discharge - please call to make an appointment.  At this appointment, you must discuss your current medication regimen and if any changes need to be made.    You must follow up with your Hematologist within one week of discharge - please call to make an appointment.        APPTS ARE READY TO BE MADE: [X] YES    Best Family or Patient Contact (if needed):    Additional Information about above appointments (if needed):    1: pcp  2: vascular  3: hematology  4: cardiologist    Other comments or requests:   Patient was outreached but did not answer nor could a voicemail be left.

## 2025-02-17 LAB
ANION GAP SERPL CALC-SCNC: 11 MMOL/L — SIGNIFICANT CHANGE UP (ref 5–17)
APPEARANCE UR: CLEAR — SIGNIFICANT CHANGE UP
BILIRUB UR-MCNC: NEGATIVE — SIGNIFICANT CHANGE UP
BUN SERPL-MCNC: 24 MG/DL — HIGH (ref 7–23)
CALCIUM SERPL-MCNC: 8.5 MG/DL — SIGNIFICANT CHANGE UP (ref 8.4–10.5)
CHLORIDE SERPL-SCNC: 105 MMOL/L — SIGNIFICANT CHANGE UP (ref 96–108)
CO2 SERPL-SCNC: 23 MMOL/L — SIGNIFICANT CHANGE UP (ref 22–31)
COLOR SPEC: YELLOW — SIGNIFICANT CHANGE UP
CREAT SERPL-MCNC: 1.29 MG/DL — SIGNIFICANT CHANGE UP (ref 0.5–1.3)
DIFF PNL FLD: NEGATIVE — SIGNIFICANT CHANGE UP
EGFR: 54 ML/MIN/1.73M2 — LOW
EGFR: 54 ML/MIN/1.73M2 — LOW
FERRITIN SERPL-MCNC: 76 NG/ML — SIGNIFICANT CHANGE UP (ref 30–400)
FLUAV AG NPH QL: SIGNIFICANT CHANGE UP
FLUBV AG NPH QL: SIGNIFICANT CHANGE UP
FOLATE SERPL-MCNC: 9.3 NG/ML — SIGNIFICANT CHANGE UP
GAS PNL BLDV: SIGNIFICANT CHANGE UP
GLUCOSE SERPL-MCNC: 80 MG/DL — SIGNIFICANT CHANGE UP (ref 70–99)
GLUCOSE UR QL: NEGATIVE MG/DL — SIGNIFICANT CHANGE UP
HAPTOGLOB SERPL-MCNC: 272 MG/DL — HIGH (ref 34–200)
HCT VFR BLD CALC: 31.6 % — LOW (ref 39–50)
HGB BLD-MCNC: 10 G/DL — LOW (ref 13–17)
IRON SATN MFR SERPL: 10 % — LOW (ref 16–55)
IRON SATN MFR SERPL: 25 UG/DL — LOW (ref 45–165)
KETONES UR-MCNC: NEGATIVE MG/DL — SIGNIFICANT CHANGE UP
LDH SERPL L TO P-CCNC: 171 U/L — SIGNIFICANT CHANGE UP (ref 50–242)
LEUKOCYTE ESTERASE UR-ACNC: NEGATIVE — SIGNIFICANT CHANGE UP
MCHC RBC-ENTMCNC: 26.9 PG — LOW (ref 27–34)
MCHC RBC-ENTMCNC: 31.6 G/DL — LOW (ref 32–36)
MCV RBC AUTO: 84.9 FL — SIGNIFICANT CHANGE UP (ref 80–100)
NITRITE UR-MCNC: NEGATIVE — SIGNIFICANT CHANGE UP
NRBC BLD AUTO-RTO: 0 /100 WBCS — SIGNIFICANT CHANGE UP (ref 0–0)
PH UR: 5 — SIGNIFICANT CHANGE UP (ref 5–8)
PLATELET # BLD AUTO: 261 K/UL — SIGNIFICANT CHANGE UP (ref 150–400)
POTASSIUM SERPL-MCNC: 4.6 MMOL/L — SIGNIFICANT CHANGE UP (ref 3.5–5.3)
POTASSIUM SERPL-SCNC: 4.6 MMOL/L — SIGNIFICANT CHANGE UP (ref 3.5–5.3)
PROCALCITONIN SERPL-MCNC: 0.68 NG/ML — HIGH (ref 0.02–0.1)
PROT UR-MCNC: NEGATIVE MG/DL — SIGNIFICANT CHANGE UP
RAPID RVP RESULT: SIGNIFICANT CHANGE UP
RBC # BLD: 3.72 M/UL — LOW (ref 4.2–5.8)
RBC # FLD: 16 % — HIGH (ref 10.3–14.5)
RSV RNA NPH QL NAA+NON-PROBE: SIGNIFICANT CHANGE UP
SARS-COV-2 RNA SPEC QL NAA+PROBE: SIGNIFICANT CHANGE UP
SARS-COV-2 RNA SPEC QL NAA+PROBE: SIGNIFICANT CHANGE UP
SODIUM SERPL-SCNC: 139 MMOL/L — SIGNIFICANT CHANGE UP (ref 135–145)
SP GR SPEC: 1.01 — SIGNIFICANT CHANGE UP (ref 1–1.03)
TIBC SERPL-MCNC: 249 UG/DL — SIGNIFICANT CHANGE UP (ref 220–430)
UIBC SERPL-MCNC: 224 UG/DL — SIGNIFICANT CHANGE UP (ref 110–370)
UROBILINOGEN FLD QL: 0.2 MG/DL — SIGNIFICANT CHANGE UP (ref 0.2–1)
VIT B12 SERPL-MCNC: 388 PG/ML — SIGNIFICANT CHANGE UP (ref 232–1245)
WBC # BLD: 6.05 K/UL — SIGNIFICANT CHANGE UP (ref 3.8–10.5)
WBC # FLD AUTO: 6.05 K/UL — SIGNIFICANT CHANGE UP (ref 3.8–10.5)

## 2025-02-17 PROCEDURE — 71045 X-RAY EXAM CHEST 1 VIEW: CPT | Mod: 26

## 2025-02-17 PROCEDURE — 99222 1ST HOSP IP/OBS MODERATE 55: CPT

## 2025-02-17 PROCEDURE — G0545: CPT

## 2025-02-17 RX ORDER — ACETAMINOPHEN 500 MG/5ML
650 LIQUID (ML) ORAL EVERY 6 HOURS
Refills: 0 | Status: DISCONTINUED | OUTPATIENT
Start: 2025-02-17 | End: 2025-03-07

## 2025-02-17 RX ORDER — FERROUS SULFATE 137(45) MG
325 TABLET, EXTENDED RELEASE ORAL DAILY
Refills: 0 | Status: DISCONTINUED | OUTPATIENT
Start: 2025-02-17 | End: 2025-03-07

## 2025-02-17 RX ORDER — ACETAMINOPHEN 500 MG/5ML
1000 LIQUID (ML) ORAL ONCE
Refills: 0 | Status: COMPLETED | OUTPATIENT
Start: 2025-02-17 | End: 2025-02-17

## 2025-02-17 RX ADMIN — Medication 325 MILLIGRAM(S): at 17:15

## 2025-02-17 RX ADMIN — APIXABAN 10 MILLIGRAM(S): 2.5 TABLET, FILM COATED ORAL at 06:21

## 2025-02-17 RX ADMIN — APIXABAN 10 MILLIGRAM(S): 2.5 TABLET, FILM COATED ORAL at 17:15

## 2025-02-17 RX ADMIN — Medication 1000 MILLIGRAM(S): at 17:39

## 2025-02-17 RX ADMIN — MIRTAZAPINE 7.5 MILLIGRAM(S): 30 TABLET, FILM COATED ORAL at 10:54

## 2025-02-17 RX ADMIN — Medication 30 MILLIGRAM(S): at 06:20

## 2025-02-17 RX ADMIN — TAMSULOSIN HYDROCHLORIDE 0.4 MILLIGRAM(S): 0.4 CAPSULE ORAL at 21:13

## 2025-02-17 RX ADMIN — Medication 81 MILLIGRAM(S): at 10:55

## 2025-02-17 RX ADMIN — METOPROLOL SUCCINATE 25 MILLIGRAM(S): 50 TABLET, EXTENDED RELEASE ORAL at 06:21

## 2025-02-17 RX ADMIN — Medication 400 MILLIGRAM(S): at 13:32

## 2025-02-17 RX ADMIN — ATORVASTATIN CALCIUM 20 MILLIGRAM(S): 80 TABLET, FILM COATED ORAL at 21:13

## 2025-02-17 NOTE — PROGRESS NOTE PEDS - ASSESSMENT
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 Patient presented to Dr. Landers's office today for followup and evaluation of left leg swelling, but was referred to the ED given reported 3-4 days of weakness, poor PO intake, and diarrhea. He is found to have DVT and DAX    A/P:  DAX:  FENa>1  Work up suggest ATN  renal US-no hydro  s/p IVF   Holding losartan  Scr relatively stable  Monitor I/O  Monitor renal function closely    HTN:  acceptable  Holding losartan in view of DAX  d/c Amlodipine, patient has been started on Nifedipine by team  Monitor BP      DVT:  Follow up vascular

## 2025-02-17 NOTE — PROGRESS NOTE ADULT - ASSESSMENT
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 Patient presented to Dr. Landers's office today for followup and evaluation of left leg swelling, but was referred to the ED given reported 3-4 days of weakness, poor PO intake, and diarrhea. He is found to have DVT and DAX    A/P:  DAX:  FENa>1  Work up suggest ATN  renal US-no hydro  s/p IVF  Holding losartan  Scr relatively stable  Monitor I/O  Monitor renal function closely    HTN:  acceptable  Hold losartan in view of DAX  Amlodipine d/c'ed, patient has been started on Nifedipine by team  Monitor BP    DVT:  Follow up vascular

## 2025-02-17 NOTE — PROGRESS NOTE ADULT - SUBJECTIVE AND OBJECTIVE BOX
[facility]   MD INDERJIT NELSON MD MARIA SANTIAGO, NP    TEL:  OFFICE: [phone]  From 5pm-7am Answering Service [phone]    -- RENAL FOLLOW UP NOTE ---Date of Service 02-17-25 @ 14:46    Patient is a 84y old  Male who presents with a chief complaint of Referred by Vascular for Weakness Diarrhea       Patient seen and examined at bedside. No chest pain/sob    VITALS:  T(F): 101.4 (02-17-25 @ 12:22), Max: 101.4 (02-17-25 @ 12:22)  HR: 79 (02-17-25 @ 11:58)  BP: 160/77 (02-17-25 @ 11:58)  RR: 18 (02-17-25 @ 11:58)  SpO2: 93% (02-17-25 @ 11:58)  Wt(kg): --    02-16 @ 07:01  -  02-17 @ 07:00  --------------------------------------------------------  IN: 840 mL / OUT: 1675 mL / NET: -835 mL    02-17 @ 07:01  -  02-17 @ 14:46  --------------------------------------------------------  IN: 600 mL / OUT: 600 mL / NET: 0 mL          PHYSICAL EXAM:  General: NAD  Neck: No JVD  Respiratory: CTAB, no wheezes, rales or rhonchi  Cardiovascular: S1, S2, RRR  Gastrointestinal: BS+, soft, NT/ND  Extremities: No peripheral edema    Hospital Medications:   MEDICATIONS  (STANDING):  apixaban 10 milliGRAM(s) Oral every 12 hours  aspirin  chewable 81 milliGRAM(s) Oral daily  atorvastatin 20 milliGRAM(s) Oral at bedtime  ferrous    sulfate 325 milliGRAM(s) Oral daily  metoprolol succinate ER 25 milliGRAM(s) Oral daily  mirtazapine 7.5 milliGRAM(s) Oral daily  NIFEdipine XL 30 milliGRAM(s) Oral daily  tamsulosin 0.4 milliGRAM(s) Oral at bedtime      LABS:  02-17    139  |  105  |  24[H]  ----------------------------<  80  4.6   |  23  |  1.29    Ca    8.5      17 Feb 2025 07:23  Phos  2.5     02-16  Mg     2.2     02-16      Creatinine Trend: 1.29 <--, 1.17 <--, 1.63 <--, 1.36 <--, 1.30 <--    Iron Total: 25 ug/dL (02-17 @ 07:23)  Iron - Total Binding Capacity.: 249 ug/dL (02-17 @ 07:23)  Ferritin: 76 ng/mL (02-17 @ 07:23)                              10.0   6.05  )-----------( 261      ( 17 Feb 2025 07:23 )             31.6     Urine Studies:  Urinalysis - [02-17-25 @ 07:23]      Color  / Appearance  / SG  / pH       Gluc 80 / Ketone   / Bili  / Urobili        Blood  / Protein  / Leuk Est  / Nitrite       RBC  / WBC  / Hyaline  / Gran  / Sq Epi  / Non Sq Epi  / Bacteria     Urine Creatinine 59      [02-15-25 @ 22:28]  Urine Sodium 151      [02-15-25 @ 22:28]    Iron 25, TIBC 249, %sat 10      [02-17-25 @ 07:23]  Ferritin 76      [02-17-25 @ 07:23]        RADIOLOGY & ADDITIONAL STUDIES:

## 2025-02-17 NOTE — PROGRESS NOTE ADULT - ASSESSMENT
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 Also with PAD. Now getting Eliquis.    Anemia - Hgb lower, but adequate. Anemia evaluation done and results c/w iron deficiency (low iron, normal TIBC, and low normal ferritin in setting of decreased EGFR.) Could start oral iron. Would get GI evaluation to assess for occult blood loss, especially as now on AC. No hemolysis and not B12/folate deficient.     Messaged medical attending with recommendations.

## 2025-02-17 NOTE — CONSULT NOTE ADULT - ATTENDING COMMENTS
84 m with HTN, HLD, PAD, DVT, sent from vascular office for diarrhea, generalized weakness and poor PO intake.   initially afebrile, no WBC, LEONIDAS normal LFT  GI PCR negative  on 2/17 pt had chills then fever and vomiting    admitted with diarrhea and LEONIDAS on 2/15 with negative GI PCR, no WBC, now fever and vomiting, Leonidas improved and still no leukocytosis     * check blood cx  * u/a, urine cx  * CXR  * if fever again will need abd/pelvis CT with contrast    The above assessment and plan was discussed with the primary team    Alissa Forman MD  contact on teams  After 5pm and on weekends call [phone] 84 m with HTN, HLD, PAD, DVT, sent from vascular office for diarrhea, generalized weakness and poor PO intake.   initially afebrile, no WBC, LEONIDAS normal LFT  GI PCR negative  on 2/17 pt had chills then fever and vomiting    admitted with diarrhea and LEONIDAS on 2/15 with negative GI PCR, no WBC, now fever and vomiting, Leonidas improved and still no leukocytosis     * check blood cx  * u/a, urine cx  * CXR  * if fever again will need abd/pelvis CT with contrast and start zosyn    The above assessment and plan was discussed with the primary team    Alissa Forman MD  contact on teams  After 5pm and on weekends call [phone]

## 2025-02-17 NOTE — PROGRESS NOTE PEDS - SUBJECTIVE AND OBJECTIVE BOX
[facility] [place] [facility]   MD INDERJIT NELSON MD MARIA SANTIAGO, NP    TEL:  OFFICE: [phone]  From 5pm-7am Answering Service [phone]    -- RENAL FOLLOW UP NOTE ---Date of Service 02-17-25 @ 11:53    Patient is a 84y old  Male who presents with a chief complaint of Referred by Vascular for Weakness Diarrhea       Patient seen and examined at bedside. No chest pain/sob    VITALS:  T(F): 98.2 (02-17-25 @ 05:28), Max: 98.8 (02-17-25 @ 01:44)  HR: 60 (02-17-25 @ 05:28)  BP: 153/72 (02-17-25 @ 05:28)  RR: 18 (02-17-25 @ 05:28)  SpO2: 92% (02-17-25 @ 05:28)  Wt(kg): --    02-16 @ 07:01  -  02-17 @ 07:00  --------------------------------------------------------  IN: 840 mL / OUT: 1675 mL / NET: -835 mL    02-17 @ 07:01  -  02-17 @ 11:53  --------------------------------------------------------  IN: 360 mL / OUT: 200 mL / NET: 160 mL          PHYSICAL EXAM:  General: NAD  Neck: No JVD  Respiratory: CTAB, no wheezes, rales or rhonchi  Cardiovascular: S1, S2, RRR  Gastrointestinal: BS+, soft, NT/ND  Extremities: No peripheral edema    Hospital Medications:   MEDICATIONS  (STANDING):  apixaban 10 milliGRAM(s) Oral every 12 hours  aspirin  chewable 81 milliGRAM(s) Oral daily  atorvastatin 20 milliGRAM(s) Oral at bedtime  metoprolol succinate ER 25 milliGRAM(s) Oral daily  mirtazapine 7.5 milliGRAM(s) Oral daily  NIFEdipine XL 30 milliGRAM(s) Oral daily  tamsulosin 0.4 milliGRAM(s) Oral at bedtime      LABS:  02-17    139  |  105  |  24[H]  ----------------------------<  80  4.6   |  23  |  1.29    Ca    8.5      17 Feb 2025 07:23  Phos  2.5     02-16  Mg     2.2     02-16      Creatinine Trend: 1.29 <--, 1.17 <--, 1.63 <--, 1.36 <--, 1.30 <--    Iron Total: 25 ug/dL (02-17 @ 07:23)  Iron - Total Binding Capacity.: 249 ug/dL (02-17 @ 07:23)  Ferritin: 76 ng/mL (02-17 @ 07:23)                              10.0   6.05  )-----------( 261      ( 17 Feb 2025 07:23 )             31.6     Urine Studies:  Urinalysis - [02-17-25 @ 07:23]      Color  / Appearance  / SG  / pH       Gluc 80 / Ketone   / Bili  / Urobili        Blood  / Protein  / Leuk Est  / Nitrite       RBC  / WBC  / Hyaline  / Gran  / Sq Epi  / Non Sq Epi  / Bacteria     Urine Creatinine 59      [02-15-25 @ 22:28]  Urine Sodium 151      [02-15-25 @ 22:28]    Iron 25, TIBC 249, %sat 10      [02-17-25 @ 07:23]  Ferritin 76      [02-17-25 @ 07:23]        RADIOLOGY & ADDITIONAL STUDIES:

## 2025-02-17 NOTE — PROVIDER CONTACT NOTE (CHANGE IN STATUS NOTIFICATION) - BACKGROUND
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  admitted w/ Diarrhea.

## 2025-02-17 NOTE — PHYSICAL THERAPY INITIAL EVALUATION ADULT - NSACTIVITYREC_GEN_A_PT
No skilled PT needs identified. Pt is at baseline functional status (independent). PT to sign off at this time. Please re-consult if change in functional status.

## 2025-02-17 NOTE — PROGRESS NOTE ADULT - ASSESSMENT
83 y/o M with pmhx htn, hld, dvt, pad presents to the ER from vascular surgery office with diarrhea and generalized weakness over the last few days.         Fevers   Blood cx   Anemia GI consult   Iron def Anemia     VA Dopplers LLE shows Acute deep venous thrombosis: above the knee.    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  Occluded left femoral bypass and popliteal artery. This will be further   evaluated with dedicated lower extremity arterial ultrasound.    Renal Sono shows Bilateral renal cysts, larger on the left, some of which demonstrate thin   septations.    Diffuse bladder wall thickening, out of proportion to the level of   underdistention; etiologies include chronic bladder outlet obstruction if   patient has an enlarged prostate versus underlying cystitis    Acute DVT   Eliquis   Vascular Surgery Consulted     DAX pre renal /ATN     Renal consulted   Reviewed Renal sono   No hydronephrosis   Diarrhea resolved   HTN Hold Olmesartan   Continue with Norvasc and Toprol     HLD Statins

## 2025-02-17 NOTE — PROGRESS NOTE ADULT - SUBJECTIVE AND OBJECTIVE BOX
Patient is a 84y old  Male who presents with a chief complaint of Referred by Vascular for Weakness Diarrhea (16 Feb 2025 21:24)    Says that he drank cold water and then got the chills so now with warm packs and drinking warm water to try and warm up. Otherwise feels okay.     Medication:   apixaban 10 milliGRAM(s) Oral every 12 hours  aspirin  chewable 81 milliGRAM(s) Oral daily  atorvastatin 20 milliGRAM(s) Oral at bedtime  metoprolol succinate ER 25 milliGRAM(s) Oral daily  mirtazapine 7.5 milliGRAM(s) Oral daily  NIFEdipine XL 30 milliGRAM(s) Oral daily  tamsulosin 0.4 milliGRAM(s) Oral at bedtime      Physical exam    T(C): 38.6 (02-17-25 @ 12:22), Max: 38.6 (02-17-25 @ 12:22)  HR: 79 (02-17-25 @ 11:58) (60 - 79)  BP: 160/77 (02-17-25 @ 11:58) (132/63 - 160/77)  RR: 18 (02-17-25 @ 11:58) (18 - 18)  SpO2: 93% (02-17-25 @ 11:58) (92% - 93%)  Wt(kg): --    alert NAD  EOMI anicteric sclera  Cv s1 S2 RRR  Lungs clear B/L  abd soft NT ND +BS  No LE edema or tenderness    Labs                        10.0   6.05  )-----------( 261      ( 17 Feb 2025 07:23 )             31.6       02-17    139  |  105  |  24[H]  ----------------------------<  80  4.6   |  23  |  1.29    Ca    8.5      17 Feb 2025 07:23  Phos  2.5     02-16  Mg     2.2     02-16      iron   25    TIBC  249  ferritin    76      LDH     171       haptoglobin   272         B12  388    folate    9.3      [phone]

## 2025-02-17 NOTE — PROGRESS NOTE ADULT - SUBJECTIVE AND OBJECTIVE BOX
Patient is a 84y old  Male who presents with a chief complaint of Referred by Vascular for Weakness Diarrhea (16 Feb 2025 14:47)      SUBJECTIVE / OVERNIGHT EVENTS: no events       T(C): 37.3 (02-17-25 @ 15:31), Max: 38.6 (02-17-25 @ 12:22)  HR: 62 (02-17-25 @ 15:31) (62 - 79)  BP: 127/65 (02-17-25 @ 15:31) (127/65 - 160/77)  RR: 18 (02-17-25 @ 15:31) (18 - 18)  SpO2: 93% (02-17-25 @ 15:31) (93% - 93%)        MEDICATIONS  (STANDING):  apixaban 10 milliGRAM(s) Oral every 12 hours  aspirin  chewable 81 milliGRAM(s) Oral daily  atorvastatin 20 milliGRAM(s) Oral at bedtime  ferrous    sulfate 325 milliGRAM(s) Oral daily  metoprolol succinate ER 25 milliGRAM(s) Oral daily  mirtazapine 7.5 milliGRAM(s) Oral daily  NIFEdipine XL 30 milliGRAM(s) Oral daily  tamsulosin 0.4 milliGRAM(s) Oral at bedtime    MEDICATIONS  (PRN):  acetaminophen     Tablet .. 650 milliGRAM(s) Oral every 6 hours PRN Temp greater or equal to 38C (100.4F)  PHYSICAL EXAM:  GENERAL: NAD, well-developed  HEAD:  Atraumatic, Normocephalic  EYES: EOMI, PERRLA, conjunctiva and sclera clear  NECK: Supple, No JVD  CHEST/LUNG: Clear to auscultation bilaterally; No wheeze  HEART: Regular rate and rhythm; No murmurs, rubs, or gallops  ABDOMEN: Soft, Nontender, Nondistended; Bowel sounds present  EXTREMITIES:  2+ Peripheral Pulses, No clubbing, cyanosis, or edema  PSYCH: AAOx3  NEUROLOGY: non-focal  SKIN: No rashes or lesions                            10.0   6.05  )-----------( 261      ( 17 Feb 2025 07:23 )             31.6             PT/INR - ( 16 Feb 2025 07:12 )   PT: 19.2 sec;   INR: 1.69 ratio         PTT - ( 16 Feb 2025 07:12 )  PTT:52.6 sec  139|105|24<80  4.6|23|1.29  8.5,--,--  02-17 @ 07:23    RADIOLOGY & ADDITIONAL TESTS:    Imaging Personally Reviewed:    Consultant(s) Notes Reviewed:      Care Discussed with Consultants/Other Providers:

## 2025-02-17 NOTE — CONSULT NOTE ADULT - SUBJECTIVE AND OBJECTIVE BOX
Patient is a 84y old  Male who presents with a chief complaint of Referred by Vascular for Weakness Diarrhea (17 Feb 2025 12:51)    HPI:  85 y/o M with pmhx htn, hld, dvt, pad presents to the ER from vascular surgery office with diarrhea and generalized weakness over the last few days.   Patient reports that he has been having diarrhea x few days, associated with weakness, went to see [facility] Vascular surgeon as he noticed LLE swelling who referred him to the ED.   No hx fevers/ nausea/ vomiting.   Able to tolerate PO, No sick contacts.  (15 Feb 2025 14:52)       REVIEW OF SYSTEMS  Constitutional: No fevers, chills, weight loss or fatigue   Skin: No rash, no phlebitis	  Eyes: No discharge	  ENMT: No sore throat, oral thrush, ulcers or exudate  Respiratory: No cough, no SOB  Cardiovascular:  No chest pain, palpitations or edema   Gastrointestinal: +diarrhea  Genitourinary: No dysuria, discharge or flank pain  MSK: No arthralgias or back pain   Neurological: No HA, no weakness, no seizures, no AMS       prior hospital charts reviewed [V]  primary team notes reviewed [V]  other consultant notes reviewed [V]    PAST MEDICAL & SURGICAL HISTORY:  HTN (hypertension)  HLD (hyperlipidemia)  Deep vein thrombosis (DVT)      SOCIAL HISTORY:  - Denied smoking/vaping/alcohol/recreational drug use    FAMILY HISTORY    Allergies  No Known Allergies    ANTIMICROBIALS:    ANTIMICROBIALS (past 90 days):  MEDICATIONS  (STANDING):    OTHER MEDS:   MEDICATIONS  (STANDING):  acetaminophen     Tablet .. 650 every 6 hours PRN  apixaban 10 every 12 hours  aspirin  chewable 81 daily  atorvastatin 20 at bedtime  metoprolol succinate ER 25 daily  mirtazapine 7.5 daily  NIFEdipine XL 30 daily  tamsulosin 0.4 at bedtime      VITALS:  Vital Signs Last 24 Hrs  T(F): 101.4 (02-17-25 @ 12:22), Max: 101.4 (02-17-25 @ 12:22)    Vital Signs Last 24 Hrs  HR: 79 (02-17-25 @ 11:58) (60 - 79)  BP: 160/77 (02-17-25 @ 11:58) (132/63 - 160/77)  RR: 18 (02-17-25 @ 11:58)  SpO2: 93% (02-17-25 @ 11:58) (92% - 93%)  Wt(kg): --    EXAM:  General: Patient in no acute distress   HEENT: NCAT  CV: S1+S2, no m/r/g appreciated   Lungs: No respiratory distress, CTAB  Abd: Soft, nontender, no guarding  Ext: No cyanosis, no edema  Neuro: Alert and oriented  Skin: No rash   IV: No phlebitis      Labs:                        10.0   6.05  )-----------( 261      ( 17 Feb 2025 07:23 )             31.6     02-17    139  |  105  |  24[H]  ----------------------------<  80  4.6   |  23  |  1.29    Ca    8.5      17 Feb 2025 07:23  Phos  2.5     02-16  Mg     2.2     02-16        WBC Trend:  WBC Count: 6.05 (02-17-25 @ 07:23)  WBC Count: 7.10 (02-16-25 @ 07:11)  WBC Count: 5.87 (02-15-25 @ 06:08)  WBC Count: 6.60 (02-14-25 @ 15:03)    Creatine Trend:  Creatinine: 1.29 (02-17)  Creatinine: 1.17 (02-16)  Creatinine: 1.63 (02-15)  Creatinine: 1.36 (02-14)      Liver Biochemical Testing Trend:  Alanine Aminotransferase (ALT/SGPT): 8 *L* (02-15)  Alanine Aminotransferase (ALT/SGPT): 9 *L* (02-14)  Aspartate Aminotransferase (AST/SGOT): 11 (02-15-25 @ 06:08)  Aspartate Aminotransferase (AST/SGOT): 30 (02-14-25 @ 15:03)  Bilirubin Total: 0.2 (02-15)  Bilirubin Total: 0.3 (02-14)      Trend LDH  02-17-25 @ 07:23  171    Urinalysis Basic - ( 17 Feb 2025 07:23 )    Color: x / Appearance: x / SG: x / pH: x  Gluc: 80 mg/dL / Ketone: x  / Bili: x / Urobili: x   Blood: x / Protein: x / Nitrite: x   Leuk Esterase: x / RBC: x / WBC x   Sq Epi: x / Non Sq Epi: x / Bacteria: x    MICROBIOLOGY:    Procalcitonin: 0.17 (02-14)  Ferritin: 76 (02-17)  Lactate Dehydrogenase, Serum: 171 (02-17)  Blood Gas Venous - Lactate: 0.7 (02-14 @ 15:44)    RADIOLOGY:  imaging below personally reviewed    < from:  Renal (02.15.25 @ 15:12) >  IMPRESSION:  No hydronephrosis.    Bilateral renal cysts, larger on the left, some of which demonstrate thin   septations.    Diffuse bladder wall thickening, out of proportion to the level of   underdistention; etiologies include chronic bladder outlet obstruction if   patient has an enlarged prostate versus underlying cystitis. Urinalysis   and clinical correlation may be helpful for clarification.    < end of copied text >  < from: VA Duplex Low Ext Arterial, Ltd, Left (02.14.25 @ 20:02) >  IMPRESSION:    Left common femoral artery to proximal tibial artery bypass graft is   occluded.    Left external iliac artery to deep femoral artery bypass graft with   markedly elevated velocities at the proximal anastomosis (565 cm/s)   concerning for stenosis.    < end of copied text > Patient is a 84y old  Male who presents with a chief complaint of Referred by Vascular for Weakness Diarrhea (17 Feb 2025 12:51)    HPI:  85 y/o M with pmhx htn, hld, dvt, pad presents to the ER from vascular surgery office with diarrhea and generalized weakness over the last few days.   Patient reports that he has been having diarrhea x few days, associated with weakness, went to see [facility] Vascular surgeon as he noticed LLE swelling who referred him to the ED.   No hx fevers/ nausea/ vomiting.   Able to tolerate PO, No sick contacts.  (15 Feb 2025 14:52)       REVIEW OF SYSTEMS  Constitutional: No fevers, chills, weight loss or fatigue   Skin: No rash, no phlebitis	  Eyes: No discharge	  ENMT: No sore throat, oral thrush, ulcers or exudate  Respiratory: No cough, no SOB  Cardiovascular:  No chest pain, palpitations or edema   Gastrointestinal: +diarrhea  Genitourinary: No dysuria, discharge or flank pain  MSK: No arthralgias or back pain   Neurological: No HA, no weakness, no seizures, no AMS       prior hospital charts reviewed [V]  primary team notes reviewed [V]  other consultant notes reviewed [V]    PAST MEDICAL & SURGICAL HISTORY:  HTN (hypertension)  HLD (hyperlipidemia)  Deep vein thrombosis (DVT)      SOCIAL HISTORY:  lives alone  no recent travel    FAMILY HISTORY  no recent febrile illness in family  Allergies  No Known Allergies    ANTIMICROBIALS:    ANTIMICROBIALS (past 90 days):  MEDICATIONS  (STANDING):    OTHER MEDS:   MEDICATIONS  (STANDING):  acetaminophen     Tablet .. 650 every 6 hours PRN  apixaban 10 every 12 hours  aspirin  chewable 81 daily  atorvastatin 20 at bedtime  metoprolol succinate ER 25 daily  mirtazapine 7.5 daily  NIFEdipine XL 30 daily  tamsulosin 0.4 at bedtime      VITALS:  Vital Signs Last 24 Hrs  T(F): 101.4 (02-17-25 @ 12:22), Max: 101.4 (02-17-25 @ 12:22)    Vital Signs Last 24 Hrs  HR: 79 (02-17-25 @ 11:58) (60 - 79)  BP: 160/77 (02-17-25 @ 11:58) (132/63 - 160/77)  RR: 18 (02-17-25 @ 11:58)  SpO2: 93% (02-17-25 @ 11:58) (92% - 93%)  Wt(kg): --    EXAM:  General: Patient in no acute distress   HEENT: NCAT  CV: S1+S2, no m/r/g appreciated   Lungs: No respiratory distress, CTAB  Abd: Soft, nontender, no guarding  Ext: No cyanosis, no edema  Neuro: Alert and oriented  Skin: No rash   IV: No phlebitis      Labs:                        10.0   6.05  )-----------( 261      ( 17 Feb 2025 07:23 )             31.6     02-17    139  |  105  |  24[H]  ----------------------------<  80  4.6   |  23  |  1.29    Ca    8.5      17 Feb 2025 07:23  Phos  2.5     02-16  Mg     2.2     02-16        WBC Trend:  WBC Count: 6.05 (02-17-25 @ 07:23)  WBC Count: 7.10 (02-16-25 @ 07:11)  WBC Count: 5.87 (02-15-25 @ 06:08)  WBC Count: 6.60 (02-14-25 @ 15:03)    Creatine Trend:  Creatinine: 1.29 (02-17)  Creatinine: 1.17 (02-16)  Creatinine: 1.63 (02-15)  Creatinine: 1.36 (02-14)      Liver Biochemical Testing Trend:  Alanine Aminotransferase (ALT/SGPT): 8 *L* (02-15)  Alanine Aminotransferase (ALT/SGPT): 9 *L* (02-14)  Aspartate Aminotransferase (AST/SGOT): 11 (02-15-25 @ 06:08)  Aspartate Aminotransferase (AST/SGOT): 30 (02-14-25 @ 15:03)  Bilirubin Total: 0.2 (02-15)  Bilirubin Total: 0.3 (02-14)      Trend LDH  02-17-25 @ 07:23  171    Urinalysis Basic - ( 17 Feb 2025 07:23 )    Color: x / Appearance: x / SG: x / pH: x  Gluc: 80 mg/dL / Ketone: x  / Bili: x / Urobili: x   Blood: x / Protein: x / Nitrite: x   Leuk Esterase: x / RBC: x / WBC x   Sq Epi: x / Non Sq Epi: x / Bacteria: x    MICROBIOLOGY:    Procalcitonin: 0.17 (02-14)  Ferritin: 76 (02-17)  Lactate Dehydrogenase, Serum: 171 (02-17)  Blood Gas Venous - Lactate: 0.7 (02-14 @ 15:44)    RADIOLOGY:  imaging below personally reviewed    < from:  Renal (02.15.25 @ 15:12) >  IMPRESSION:  No hydronephrosis.    Bilateral renal cysts, larger on the left, some of which demonstrate thin   septations.    Diffuse bladder wall thickening, out of proportion to the level of   underdistention; etiologies include chronic bladder outlet obstruction if   patient has an enlarged prostate versus underlying cystitis. Urinalysis   and clinical correlation may be helpful for clarification.    < end of copied text >  < from: VA Duplex Low Ext Arterial, Ltd, Left (02.14.25 @ 20:02) >  IMPRESSION:    Left common femoral artery to proximal tibial artery bypass graft is   occluded.    Left external iliac artery to deep femoral artery bypass graft with   markedly elevated velocities at the proximal anastomosis (565 cm/s)   concerning for stenosis.    < end of copied text >

## 2025-02-17 NOTE — CONSULT NOTE ADULT - ASSESSMENT
83yo M PMHx htn, hld, pad who was sent in to the hospital for diarrhea. Associated with generalized weakness and poor PO intake. Patient developed a fever and ID was asked to evaluate.    Intially afebrile, but then patient developed a fever to to 101.4 two days after admission. No associated leukocytosis. BMP showed an DAX on admission that has since improved with fluids.  UA negative  GI PCR negative  flu/covid/RSV negative    Overall, diarrhea, fever, DAX    - obtain blood cultures x2, CXR, UA with urine culture, full RVP  - can monitor off antibiotics unless patient clinically worsens  - monitor temp and WBC    d/w attending.    Yuri Wheeler, PGY5  ID Fellow  [employer] Teams Preferred  After 5pm/weekends call [phone]

## 2025-02-18 LAB
ANION GAP SERPL CALC-SCNC: 14 MMOL/L — SIGNIFICANT CHANGE UP (ref 5–17)
BASOPHILS # BLD AUTO: 0.05 K/UL — SIGNIFICANT CHANGE UP (ref 0–0.2)
BASOPHILS NFR BLD AUTO: 0.4 % — SIGNIFICANT CHANGE UP (ref 0–2)
BUN SERPL-MCNC: 29 MG/DL — HIGH (ref 7–23)
CALCIUM SERPL-MCNC: 8.7 MG/DL — SIGNIFICANT CHANGE UP (ref 8.4–10.5)
CHLORIDE SERPL-SCNC: 104 MMOL/L — SIGNIFICANT CHANGE UP (ref 96–108)
CO2 SERPL-SCNC: 23 MMOL/L — SIGNIFICANT CHANGE UP (ref 22–31)
CREAT SERPL-MCNC: 1.35 MG/DL — HIGH (ref 0.5–1.3)
EGFR: 51 ML/MIN/1.73M2 — LOW
EGFR: 51 ML/MIN/1.73M2 — LOW
EOSINOPHIL # BLD AUTO: 0.2 K/UL — SIGNIFICANT CHANGE UP (ref 0–0.5)
EOSINOPHIL NFR BLD AUTO: 1.7 % — SIGNIFICANT CHANGE UP (ref 0–6)
GLUCOSE SERPL-MCNC: 82 MG/DL — SIGNIFICANT CHANGE UP (ref 70–99)
HCT VFR BLD CALC: 32.7 % — LOW (ref 39–50)
HGB BLD-MCNC: 10.2 G/DL — LOW (ref 13–17)
IMM GRANULOCYTES NFR BLD AUTO: 0.5 % — SIGNIFICANT CHANGE UP (ref 0–0.9)
LYMPHOCYTES # BLD AUTO: 1.57 K/UL — SIGNIFICANT CHANGE UP (ref 1–3.3)
LYMPHOCYTES # BLD AUTO: 13.4 % — SIGNIFICANT CHANGE UP (ref 13–44)
MCHC RBC-ENTMCNC: 26.8 PG — LOW (ref 27–34)
MCHC RBC-ENTMCNC: 31.2 G/DL — LOW (ref 32–36)
MCV RBC AUTO: 85.8 FL — SIGNIFICANT CHANGE UP (ref 80–100)
MONOCYTES # BLD AUTO: 0.82 K/UL — SIGNIFICANT CHANGE UP (ref 0–0.9)
MONOCYTES NFR BLD AUTO: 7 % — SIGNIFICANT CHANGE UP (ref 2–14)
NEUTROPHILS # BLD AUTO: 9.03 K/UL — HIGH (ref 1.8–7.4)
NEUTROPHILS NFR BLD AUTO: 77 % — SIGNIFICANT CHANGE UP (ref 43–77)
NRBC BLD AUTO-RTO: 0 /100 WBCS — SIGNIFICANT CHANGE UP (ref 0–0)
PLATELET # BLD AUTO: 281 K/UL — SIGNIFICANT CHANGE UP (ref 150–400)
POTASSIUM SERPL-MCNC: 4.1 MMOL/L — SIGNIFICANT CHANGE UP (ref 3.5–5.3)
POTASSIUM SERPL-SCNC: 4.1 MMOL/L — SIGNIFICANT CHANGE UP (ref 3.5–5.3)
RBC # BLD: 3.81 M/UL — LOW (ref 4.2–5.8)
RBC # FLD: 16.3 % — HIGH (ref 10.3–14.5)
SODIUM SERPL-SCNC: 141 MMOL/L — SIGNIFICANT CHANGE UP (ref 135–145)
WBC # BLD: 11.73 K/UL — HIGH (ref 3.8–10.5)
WBC # FLD AUTO: 11.73 K/UL — HIGH (ref 3.8–10.5)

## 2025-02-18 PROCEDURE — G0545: CPT

## 2025-02-18 PROCEDURE — 99232 SBSQ HOSP IP/OBS MODERATE 35: CPT

## 2025-02-18 RX ADMIN — Medication 325 MILLIGRAM(S): at 11:07

## 2025-02-18 RX ADMIN — METOPROLOL SUCCINATE 25 MILLIGRAM(S): 50 TABLET, EXTENDED RELEASE ORAL at 05:29

## 2025-02-18 RX ADMIN — TAMSULOSIN HYDROCHLORIDE 0.4 MILLIGRAM(S): 0.4 CAPSULE ORAL at 21:05

## 2025-02-18 RX ADMIN — APIXABAN 10 MILLIGRAM(S): 2.5 TABLET, FILM COATED ORAL at 17:08

## 2025-02-18 RX ADMIN — ATORVASTATIN CALCIUM 20 MILLIGRAM(S): 80 TABLET, FILM COATED ORAL at 21:05

## 2025-02-18 RX ADMIN — Medication 75 MILLILITER(S): at 12:50

## 2025-02-18 RX ADMIN — MIRTAZAPINE 7.5 MILLIGRAM(S): 30 TABLET, FILM COATED ORAL at 11:07

## 2025-02-18 RX ADMIN — Medication 81 MILLIGRAM(S): at 11:07

## 2025-02-18 RX ADMIN — Medication 30 MILLIGRAM(S): at 05:29

## 2025-02-18 RX ADMIN — APIXABAN 10 MILLIGRAM(S): 2.5 TABLET, FILM COATED ORAL at 05:25

## 2025-02-18 NOTE — CONSULT NOTE ADULT - SUBJECTIVE AND OBJECTIVE BOX
[facility]    Nolberto Ana Crow NP    [address] [place] [address]  [phone]      Chief Complaint:  Patient is a 84y old  Male who presents with a chief complaint of Referred by Vascular for Weakness Diarrhea (18 Feb 2025 15:30)      HPI:    Allergies:  No Known Allergies      Medications:  acetaminophen     Tablet .. 650 milliGRAM(s) Oral every 6 hours PRN  apixaban 10 milliGRAM(s) Oral every 12 hours  aspirin  chewable 81 milliGRAM(s) Oral daily  atorvastatin 20 milliGRAM(s) Oral at bedtime  ferrous    sulfate 325 milliGRAM(s) Oral daily  metoprolol succinate ER 25 milliGRAM(s) Oral daily  mirtazapine 7.5 milliGRAM(s) Oral daily  NIFEdipine XL 30 milliGRAM(s) Oral daily  sodium chloride 0.9%. 1000 milliLiter(s) IV Continuous <Continuous>  tamsulosin 0.4 milliGRAM(s) Oral at bedtime      PMHX/PSHX:  HTN (hypertension)    HLD (hyperlipidemia)    Deep vein thrombosis (DVT)        Family history:      Social History:     ROS:     General:  No wt loss, fevers, chills, night sweats, fatigue,   Eyes:  Good vision, no reported pain  ENT:  No sore throat, pain, runny nose, dysphagia  CV:  No pain, palpitations, hypo/hypertension  Resp:  No dyspnea, cough, tachypnea, wheezing  GI:  No pain, No nausea, No vomiting, No diarrhea, No constipation, No weight loss, No fever, No pruritis, No rectal bleeding, No tarry stools, No dysphagia,  :  No pain, bleeding, incontinence, nocturia  Muscle:  No pain, weakness  Neuro:  No weakness, tingling, memory problems  Psych:  No fatigue, insomnia, mood problems, depression  Endocrine:  No polyuria, polydipsia, cold/heat intolerance  Heme:  No petechiae, ecchymosis, easy bruisability  Skin:  No rash, tattoos, scars, edema      PHYSICAL EXAM:   Vital Signs:  Vital Signs Last 24 Hrs  T(C): 36.7 (18 Feb 2025 12:19), Max: 36.7 (18 Feb 2025 08:20)  T(F): 98.1 (18 Feb 2025 12:19), Max: 98.1 (18 Feb 2025 12:19)  HR: 59 (18 Feb 2025 12:19) (52 - 76)  BP: 144/60 (18 Feb 2025 12:19) (124/68 - 154/70)  BP(mean): --  RR: 18 (18 Feb 2025 12:19) (18 - 18)  SpO2: 93% (18 Feb 2025 12:19) (92% - 94%)    Parameters below as of 18 Feb 2025 12:19  Patient On (Oxygen Delivery Method): room air      Daily     Daily     GENERAL:  Appears stated age, well-groomed, well-nourished, no distress  HEENT:  NC/AT,  conjunctivae clear and pink, no thyromegaly, nodules, adenopathy, no JVD, sclera -anicteric  CHEST:  Full & symmetric excursion, no increased effort, breath sounds clear  HEART:  Regular rhythm, S1, S2, no murmur/rub/S3/S4, no abdominal bruit, no edema  ABDOMEN:  Soft, non-tender, non-distended, normoactive bowel sounds,  no masses ,no hepato-splenomegaly, no signs of chronic liver disease  EXTEREMITIES:  no cyanosis,clubbing or edema  SKIN:  No rash/erythema/ecchymoses/petechiae/wounds/abscess/warm/dry  NEURO:  Alert, oriented, no asterixis, no tremor, no encephalopathy    LABS:                        10.2   11.73 )-----------( 281      ( 18 Feb 2025 06:55 )             32.7     02-18    141  |  104  |  29[H]  ----------------------------<  82  4.1   |  23  |  1.35[H]    Ca    8.7      18 Feb 2025 06:56          Urinalysis Basic - ( 18 Feb 2025 06:56 )    Color: x / Appearance: x / SG: x / pH: x  Gluc: 82 mg/dL / Ketone: x  / Bili: x / Urobili: x   Blood: x / Protein: x / Nitrite: x   Leuk Esterase: x / RBC: x / WBC x   Sq Epi: x / Non Sq Epi: x / Bacteria: x          Imaging:               [facility]    Nolberto Ana Crow NP    [address] [place] [address] [phone]      Chief Complaint:  Patient is a 84y old  Male who presents with a chief complaint of Referred by Vascular for Weakness Diarrhea (18 Feb 2025 15:30)      HPI:85 y/o M with pmhx htn, hld, dvt, pad presents to the ER from vascular surgery office with diarrhea and generalized weakness over the last few days.   Patient reports that he has been having diarrhea x few days, associated with weakness, went to see [facility] Vascular surgeon as he noticed LLE swelling who referred him to the ED.     Allergies:  No Known Allergies      Medications:  acetaminophen     Tablet .. 650 milliGRAM(s) Oral every 6 hours PRN  apixaban 10 milliGRAM(s) Oral every 12 hours  aspirin  chewable 81 milliGRAM(s) Oral daily  atorvastatin 20 milliGRAM(s) Oral at bedtime  ferrous    sulfate 325 milliGRAM(s) Oral daily  metoprolol succinate ER 25 milliGRAM(s) Oral daily  mirtazapine 7.5 milliGRAM(s) Oral daily  NIFEdipine XL 30 milliGRAM(s) Oral daily  sodium chloride 0.9%. 1000 milliLiter(s) IV Continuous <Continuous>  tamsulosin 0.4 milliGRAM(s) Oral at bedtime      PMHX/PSHX:  HTN (hypertension)    HLD (hyperlipidemia)    Deep vein thrombosis (DVT)        Family history:      Social History:     ROS:     General:  No wt loss, fevers, chills, night sweats, fatigue,   Eyes:  Good vision, no reported pain  ENT:  No sore throat, pain, runny nose, dysphagia  CV:  No pain, palpitations, hypo/hypertension  Resp:  No dyspnea, cough, tachypnea, wheezing  GI:  No pain, No nausea, No vomiting, No diarrhea, No constipation, No weight loss, No fever, No pruritis, No rectal bleeding, No tarry stools, No dysphagia,  :  No pain, bleeding, incontinence, nocturia  Muscle:  No pain, weakness  Neuro:  No weakness, tingling, memory problems  Psych:  No fatigue, insomnia, mood problems, depression  Endocrine:  No polyuria, polydipsia, cold/heat intolerance  Heme:  No petechiae, ecchymosis, easy bruisability  Skin:  No rash, tattoos, scars, edema      PHYSICAL EXAM:   Vital Signs:  Vital Signs Last 24 Hrs  T(C): 36.7 (18 Feb 2025 12:19), Max: 36.7 (18 Feb 2025 08:20)  T(F): 98.1 (18 Feb 2025 12:19), Max: 98.1 (18 Feb 2025 12:19)  HR: 59 (18 Feb 2025 12:19) (52 - 76)  BP: 144/60 (18 Feb 2025 12:19) (124/68 - 154/70)  BP(mean): --  RR: 18 (18 Feb 2025 12:19) (18 - 18)  SpO2: 93% (18 Feb 2025 12:19) (92% - 94%)    Parameters below as of 18 Feb 2025 12:19  Patient On (Oxygen Delivery Method): room air      Daily     Daily     GENERAL:  Appears stated age, well-groomed, well-nourished, no distress  HEENT:  NC/AT,  conjunctivae clear and pink, no thyromegaly, nodules, adenopathy, no JVD, sclera -anicteric  CHEST:  Full & symmetric excursion, no increased effort, breath sounds clear  HEART:  Regular rhythm, S1, S2, no murmur/rub/S3/S4, no abdominal bruit, no edema  ABDOMEN:  Soft, non-tender, non-distended, normoactive bowel sounds,  no masses ,no hepato-splenomegaly, no signs of chronic liver disease  EXTEREMITIES:  no cyanosis,clubbing or edema  SKIN:  No rash/erythema/ecchymoses/petechiae/wounds/abscess/warm/dry  NEURO:  Alert, oriented, no asterixis, no tremor, no encephalopathy    LABS:                        10.2   11.73 )-----------( 281      ( 18 Feb 2025 06:55 )             32.7     02-18    141  |  104  |  29[H]  ----------------------------<  82  4.1   |  23  |  1.35[H]    Ca    8.7      18 Feb 2025 06:56          Urinalysis Basic - ( 18 Feb 2025 06:56 )    Color: x / Appearance: x / SG: x / pH: x  Gluc: 82 mg/dL / Ketone: x  / Bili: x / Urobili: x   Blood: x / Protein: x / Nitrite: x   Leuk Esterase: x / RBC: x / WBC x   Sq Epi: x / Non Sq Epi: x / Bacteria: x          Imaging:

## 2025-02-18 NOTE — PROGRESS NOTE ADULT - ASSESSMENT
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 Patient presented to Dr. Landers's office today for followup and evaluation of left leg swelling, but was referred to the ED given reported 3-4 days of weakness, poor PO intake, and diarrhea. He is found to have DVT and DAX    A/P:  DAX:  FENa>1  Work up suggest ATN  renal US-no hydro  s/p IVF  Holding losartan  Scr slightly worsened today, patient febrile 2/17 - can give NS @ 75cc/hr x1 day  Monitor I/O  Monitor renal function closely    HTN:  acceptable  Hold losartan in view of DAX  Amlodipine d/c'ed, patient has been started on Nifedipine by team  Monitor BP    DVT:  Follow up vascular

## 2025-02-18 NOTE — DIETITIAN INITIAL EVALUATION ADULT - PERSON TAUGHT/METHOD
provided diet education on benefit of oral nutrition supplements to promote PO intake and healing of skin impairments. Discussed importance of adequate protein intake and starting with protein portion of meals first. Provided diet education on High Calorie High Protein Nutrition Therapy to optimize PO intake at home. Estimated good understanding of education provided. Pt agreeable to oral nutrition supplements. Pt was made aware RD remains available and he expressed understanding./patient instructed

## 2025-02-18 NOTE — DIETITIAN INITIAL EVALUATION ADULT - ENERGY INTAKE
Pt reported good appetite at visit. % PO intake per flowsheets   Mirtazapine ordered which may increase appetite

## 2025-02-18 NOTE — PROGRESS NOTE ADULT - ASSESSMENT
84-year-old male with acute on chronic DVT. Getting Eliquis.    Anemia mixed iron deficiency and AOCD process. getting oral iron. GI eval.     had a fever yesterday and now mild leucocytosis. care per primary team.

## 2025-02-18 NOTE — DIETITIAN INITIAL EVALUATION ADULT - OTHER CALCULATIONS
defer fluid needs to medical team discretion   Estimated protein-energy needs calculated using dosing weight with consideration for underweight/malnutrition

## 2025-02-18 NOTE — DIETITIAN INITIAL EVALUATION ADULT - REASON INDICATOR FOR ASSESSMENT
RD assessment warranted for BMI < 19   Source: Patient, Electronic Medical Record  Chart reviewed, events noted.

## 2025-02-18 NOTE — PROGRESS NOTE ADULT - SUBJECTIVE AND OBJECTIVE BOX
Follow Up:  fever, diarrhea    Interval History/ROS: no more fever or diarrhea, u/a negative          Allergies  No Known Allergies        ANTIMICROBIALS:      OTHER MEDS:  acetaminophen     Tablet .. 650 milliGRAM(s) Oral every 6 hours PRN  apixaban 10 milliGRAM(s) Oral every 12 hours  aspirin  chewable 81 milliGRAM(s) Oral daily  atorvastatin 20 milliGRAM(s) Oral at bedtime  ferrous    sulfate 325 milliGRAM(s) Oral daily  metoprolol succinate ER 25 milliGRAM(s) Oral daily  mirtazapine 7.5 milliGRAM(s) Oral daily  NIFEdipine XL 30 milliGRAM(s) Oral daily  sodium chloride 0.9%. 1000 milliLiter(s) IV Continuous <Continuous>  tamsulosin 0.4 milliGRAM(s) Oral at bedtime      Vital Signs Last 24 Hrs  T(C): 36.7 (18 Feb 2025 12:19), Max: 36.7 (18 Feb 2025 08:20)  T(F): 98.1 (18 Feb 2025 12:19), Max: 98.1 (18 Feb 2025 12:19)  HR: 59 (18 Feb 2025 12:19) (52 - 76)  BP: 144/60 (18 Feb 2025 12:19) (124/68 - 154/70)  BP(mean): --  RR: 18 (18 Feb 2025 12:19) (18 - 18)  SpO2: 93% (18 Feb 2025 12:19) (92% - 94%)    Parameters below as of 18 Feb 2025 12:19  Patient On (Oxygen Delivery Method): room air        Physical Exam:  General:    NAD,  non toxic  Respiratory:    comfortable on RA  abd:     soft,   no tenderness  :     no  kimbrough  Musculoskeletal:   no joint swelling  vascular: no phlebitis  Skin:    no rash                        10.2   11.73 )-----------( 281      ( 18 Feb 2025 06:55 )             32.7       02-18    141  |  104  |  29[H]  ----------------------------<  82  4.1   |  23  |  1.35[H]    Ca    8.7      18 Feb 2025 06:56        Urinalysis Basic - ( 18 Feb 2025 06:56 )    Color: x / Appearance: x / SG: x / pH: x  Gluc: 82 mg/dL / Ketone: x  / Bili: x / Urobili: x   Blood: x / Protein: x / Nitrite: x   Leuk Esterase: x / RBC: x / WBC x   Sq Epi: x / Non Sq Epi: x / Bacteria: x        MICROBIOLOGY:  v      Rapid RVP Result: NotDetec (02-17 @ 15:25)        RADIOLOGY:  Images independently visualized and reviewed personally, findings as below  < from: US Renal (02.15.25 @ 15:12) >  IMPRESSION:  No hydronephrosis.    Bilateral renal cysts, larger on the left, some of which demonstrate thin   septations.    Diffuse bladder wall thickening, out of proportion to the level of   underdistention; etiologies include chronic bladder outlet obstruction if   patient has an enlarged prostate versus underlying cystitis. Urinalysis   and clinical correlation may be helpful for clarification.    < end of copied text >  < from: VA Duplex Low Ext Arterial, Ltd, Left (02.14.25 @ 20:02) >  IMPRESSION:    Left common femoral artery to proximal tibial artery bypass graft is   occluded.    Left external iliac artery to deep femoral artery bypass graft with   markedly elevated velocities at the proximal anastomosis (565 cm/s)   concerning for stenosis.    < end of copied text >

## 2025-02-18 NOTE — DIETITIAN INITIAL EVALUATION ADULT - PERTINENT LABORATORY DATA
02-18    141  |  104  |  29[H]  ----------------------------<  82  4.1   |  23  |  1.35[H]    Ca    8.7      18 Feb 2025 06:56

## 2025-02-18 NOTE — DIETITIAN INITIAL EVALUATION ADULT - REASON FOR ADMISSION
Diarrhea    per H&P: "85 y/o M with pmhx htn, hld, dvt, pad presents to the ER from vascular surgery office with diarrhea and generalized weakness over the last few days. "

## 2025-02-18 NOTE — PROGRESS NOTE ADULT - ASSESSMENT
83 y/o M with pmhx htn, hld, dvt, pad presents to the ER from vascular surgery office with diarrhea and generalized weakness over the last few days.         Fevers   Blood cx   ID following   Monitor off abx     Anemia GI consult appreciated   Iron def Anemia Stable       VA Dopplers LLE shows Acute deep venous thrombosis: above the knee.    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  Occluded left femoral bypass and popliteal artery. This will be further   evaluated with dedicated lower extremity arterial ultrasound.    Renal Sono shows Bilateral renal cysts, larger on the left, some of which demonstrate thin   septations.    Diffuse bladder wall thickening, out of proportion to the level of   underdistention; etiologies include chronic bladder outlet obstruction if   patient has an enlarged prostate versus underlying cystitis    Acute DVT   Eliquis   Vascular Surgery Consulted     DAX pre renal /ATN     Renal consulted   Reviewed Renal sono   No hydronephrosis   Diarrhea resolved   HTN Hold Olmesartan   Continue with Norvasc and Toprol     HLD Statins

## 2025-02-18 NOTE — DIETITIAN INITIAL EVALUATION ADULT - PERTINENT MEDS FT
MEDICATIONS  (STANDING):  apixaban 10 milliGRAM(s) Oral every 12 hours  aspirin  chewable 81 milliGRAM(s) Oral daily  atorvastatin 20 milliGRAM(s) Oral at bedtime  ferrous    sulfate 325 milliGRAM(s) Oral daily  metoprolol succinate ER 25 milliGRAM(s) Oral daily  mirtazapine 7.5 milliGRAM(s) Oral daily  NIFEdipine XL 30 milliGRAM(s) Oral daily  sodium chloride 0.9%. 1000 milliLiter(s) (75 mL/Hr) IV Continuous <Continuous>  tamsulosin 0.4 milliGRAM(s) Oral at bedtime    MEDICATIONS  (PRN):  acetaminophen     Tablet .. 650 milliGRAM(s) Oral every 6 hours PRN Temp greater or equal to 38C (100.4F)

## 2025-02-18 NOTE — DIETITIAN INITIAL EVALUATION ADULT - ORAL INTAKE PTA/DIET HISTORY
Pt reports having a poor appetite and PO intake PTA for months. Follows regular diet; denied following any therapeutic diet restrictions. Pt denies any known food allergies or intolerances. Pt denies any micronutrient supplementation at home. Pt reported being told to consume Ensure supplements, has case at home but yet to start drinking Ensures. Denies any difficulty chewing/swallowing at this time.

## 2025-02-18 NOTE — PROGRESS NOTE ADULT - SUBJECTIVE AND OBJECTIVE BOX
Patient is a 84y old  Male who presents with a chief complaint of Referred by Vascular for Weakness Diarrhea (17 Feb 2025 18:22)      Medication:   acetaminophen     Tablet .. 650 milliGRAM(s) Oral every 6 hours PRN  apixaban 10 milliGRAM(s) Oral every 12 hours  aspirin  chewable 81 milliGRAM(s) Oral daily  atorvastatin 20 milliGRAM(s) Oral at bedtime  ferrous    sulfate 325 milliGRAM(s) Oral daily  metoprolol succinate ER 25 milliGRAM(s) Oral daily  mirtazapine 7.5 milliGRAM(s) Oral daily  NIFEdipine XL 30 milliGRAM(s) Oral daily  tamsulosin 0.4 milliGRAM(s) Oral at bedtime      Physical exam    T(C): 36.5 (02-18-25 @ 04:22), Max: 38.6 (02-17-25 @ 12:22)  HR: 76 (02-18-25 @ 04:22) (52 - 79)  BP: 145/67 (02-18-25 @ 04:22) (127/65 - 160/77)  RR: 18 (02-18-25 @ 04:22) (18 - 18)  SpO2: 92% (02-18-25 @ 04:22) (92% - 94%)  Wt(kg): --    alert NAD  EOMI anicteric sclera  Neck Supple No LNA  Cv s1 S2 RRR  Lungs clear B/L  abd soft NT ND +BS  No LE edema or tenderness    Labs                              10.2   11.73 )-----------( 281      ( 18 Feb 2025 06:55 )             32.7       02-18    141  |  104  |  29[H]  ----------------------------<  82  4.1   |  23  |  1.35[H]    Ca    8.7      18 Feb 2025 06:56            [id number]

## 2025-02-18 NOTE — PROGRESS NOTE ADULT - SUBJECTIVE AND OBJECTIVE BOX
Patient is a 84y old  Male who presents with a chief complaint of Referred by Vascular for Weakness Diarrhea (16 Feb 2025 14:47)      SUBJECTIVE / OVERNIGHT EVENTS: no events     T(C): 36.8 (02-18-25 @ 17:21), Max: 36.8 (02-18-25 @ 17:21)  HR: 59 (02-18-25 @ 17:21) (57 - 59)  BP: 154/70 (02-18-25 @ 17:21) (124/68 - 154/70)  RR: 17 (02-18-25 @ 17:21) (17 - 18)  SpO2: 93% (02-18-25 @ 17:21) (92% - 93%)      MEDICATIONS  (STANDING):  apixaban 10 milliGRAM(s) Oral every 12 hours  aspirin  chewable 81 milliGRAM(s) Oral daily  atorvastatin 20 milliGRAM(s) Oral at bedtime  ferrous    sulfate 325 milliGRAM(s) Oral daily  metoprolol succinate ER 25 milliGRAM(s) Oral daily  mirtazapine 7.5 milliGRAM(s) Oral daily  NIFEdipine XL 30 milliGRAM(s) Oral daily  sodium chloride 0.9%. 1000 milliLiter(s) (75 mL/Hr) IV Continuous <Continuous>  tamsulosin 0.4 milliGRAM(s) Oral at bedtime    MEDICATIONS  (PRN):  acetaminophen     Tablet .. 650 milliGRAM(s) Oral every 6 hours PRN Temp greater or equal to 38C (100.4F)  PHYSICAL EXAM:  GENERAL: NAD, well-developed  HEAD:  Atraumatic, Normocephalic  EYES: EOMI, PERRLA, conjunctiva and sclera clear  NECK: Supple, No JVD  CHEST/LUNG: Clear to auscultation bilaterally; No wheeze  HEART: Regular rate and rhythm; No murmurs, rubs, or gallops  ABDOMEN: Soft, Nontender, Nondistended; Bowel sounds present  EXTREMITIES:  2+ Peripheral Pulses, No clubbing, cyanosis, or edema  PSYCH: AAOx3  NEUROLOGY: non-focal  SKIN: No rashes or lesions                          10.2   11.73 )-----------( 281      ( 18 Feb 2025 06:55 )             32.7               141|104|29<82  4.1|23|1.35  8.7,--,--  02-18 @ 06:56

## 2025-02-18 NOTE — PROGRESS NOTE ADULT - ASSESSMENT
84 m with HTN, HLD, PAD, DVT, sent from vascular office for diarrhea, generalized weakness and poor PO intake.   initially afebrile, no WBC, DAX normal LFT  GI PCR negative  on 2/17 pt had chills then fever and vomiting    admitted with diarrhea and DAX on 2/15 with negative GI PCR, no WBC, now fever and vomiting, WBC slightly higher to 11  u/a negative and no more fevers  * follow the blood cx  * if fever again will need abd/pelvis CT with contrast and start zosyn    The above assessment and plan was discussed with the primary team    Alissa Forman MD  contact on teams  After 5pm and on weekends call [phone]

## 2025-02-18 NOTE — DIETITIAN INITIAL EVALUATION ADULT - NSFNSGIIOFT_GEN_A_CORE
Pt denies nausea, vomiting, diarrhea, constipation at time of visit.  Diarrhea upon admission noted per chart.

## 2025-02-18 NOTE — PROGRESS NOTE ADULT - SUBJECTIVE AND OBJECTIVE BOX
[facility] [place] [facility]   MD INDERJIT NELSON MD MARIA SANTIAGO, NP    TEL:  OFFICE: [phone]  From 5pm-7am Answering Service [phone]    -- RENAL FOLLOW UP NOTE ---Date of Service 02-18-25 @ 15:30    Patient is a 84y old  Male who presents with a chief complaint of Referred by Vascular for Weakness Diarrhea       Patient seen and examined at bedside. No chest pain/sob    VITALS:  T(F): 98.1 (02-18-25 @ 12:19), Max: 99.2 (02-17-25 @ 15:31)  HR: 59 (02-18-25 @ 12:19)  BP: 144/60 (02-18-25 @ 12:19)  RR: 18 (02-18-25 @ 12:19)  SpO2: 93% (02-18-25 @ 12:19)  Wt(kg): --    02-17 @ 07:01  -  02-18 @ 07:00  --------------------------------------------------------  IN: 600 mL / OUT: 950 mL / NET: -350 mL          PHYSICAL EXAM:  General: NAD  Neck: No JVD  Respiratory: CTAB, no wheezes, rales or rhonchi  Cardiovascular: S1, S2, RRR  Gastrointestinal: BS+, soft, NT/ND  Extremities: No peripheral edema    Hospital Medications:   MEDICATIONS  (STANDING):  apixaban 10 milliGRAM(s) Oral every 12 hours  aspirin  chewable 81 milliGRAM(s) Oral daily  atorvastatin 20 milliGRAM(s) Oral at bedtime  ferrous    sulfate 325 milliGRAM(s) Oral daily  metoprolol succinate ER 25 milliGRAM(s) Oral daily  mirtazapine 7.5 milliGRAM(s) Oral daily  NIFEdipine XL 30 milliGRAM(s) Oral daily  sodium chloride 0.9%. 1000 milliLiter(s) (75 mL/Hr) IV Continuous <Continuous>  tamsulosin 0.4 milliGRAM(s) Oral at bedtime      LABS:  02-18    141  |  104  |  29[H]  ----------------------------<  82  4.1   |  23  |  1.35[H]    Ca    8.7      18 Feb 2025 06:56      Creatinine Trend: 1.35 <--, 1.29 <--, 1.17 <--, 1.63 <--, 1.36 <--, 1.30 <--                                10.2   11.73 )-----------( 281      ( 18 Feb 2025 06:55 )             32.7     Urine Studies:  Urinalysis - [02-18-25 @ 06:56]      Color  / Appearance  / SG  / pH       Gluc 82 / Ketone   / Bili  / Urobili        Blood  / Protein  / Leuk Est  / Nitrite       RBC  / WBC  / Hyaline  / Gran  / Sq Epi  / Non Sq Epi  / Bacteria     Urine Creatinine 59      [02-15-25 @ 22:28]  Urine Sodium 151      [02-15-25 @ 22:28]    Iron 25, TIBC 249, %sat 10      [02-17-25 @ 07:23]  Ferritin 76      [02-17-25 @ 07:23]        RADIOLOGY & ADDITIONAL STUDIES:

## 2025-02-18 NOTE — CONSULT NOTE ADULT - ASSESSMENT
DVT  anemia    hgb stable  started on noac  monitor GI fn  cbc daily  inpatient endoscopic eval would entail holding noac for > 48h; given no overt gi bleed he can be followed up as outpatient   d/w patient

## 2025-02-18 NOTE — DIETITIAN INITIAL EVALUATION ADULT - ADD RECOMMEND
1. Recommend liberalize current therapeutic diet restriction to Low Sodium  2. Recommend Ensure Plus High Protein once daily to provide 350 kcal, 20 grams protein per 8 oz  3. Recommend adding Multivitamin pending no medical contraindications, to optimize nutrient intake.   4. Monitor PO intake, PO diet tolerance, skin, weight, nutrition related labs, GI function, goals of care  5. Malnutrition Sticker Placed in Chart

## 2025-02-18 NOTE — DIETITIAN INITIAL EVALUATION ADULT - OTHER INFO
Weights:  - UBW (per patient): 127 pounds 8 months ago, reported current weight as 112 pounds. Pt endorsed weight loss over 8 months due to poor PO intake. Reflects ~12% weight loss over 8 months   - Dosing Weight (per chart): 115 pounds (2/15)   -  pounds +/- 10%   - Per [facility] HIE: 112 pounds (2/14/25), 112 pounds (2/6/25), 116 pounds (2/22/24)   RD to continue to monitor weights and trends as able.

## 2025-02-19 LAB
ANION GAP SERPL CALC-SCNC: 10 MMOL/L — SIGNIFICANT CHANGE UP (ref 5–17)
APPEARANCE UR: ABNORMAL
BACTERIA # UR AUTO: ABNORMAL /HPF
BILIRUB UR-MCNC: NEGATIVE — SIGNIFICANT CHANGE UP
BUN SERPL-MCNC: 37 MG/DL — HIGH (ref 7–23)
CALCIUM SERPL-MCNC: 8.2 MG/DL — LOW (ref 8.4–10.5)
CAST: 1 /LPF — SIGNIFICANT CHANGE UP (ref 0–4)
CHLORIDE SERPL-SCNC: 106 MMOL/L — SIGNIFICANT CHANGE UP (ref 96–108)
CO2 SERPL-SCNC: 24 MMOL/L — SIGNIFICANT CHANGE UP (ref 22–31)
COLOR SPEC: YELLOW — SIGNIFICANT CHANGE UP
CREAT ?TM UR-MCNC: 46 MG/DL — SIGNIFICANT CHANGE UP
CREAT SERPL-MCNC: 1.61 MG/DL — HIGH (ref 0.5–1.3)
DIFF PNL FLD: ABNORMAL
EGFR: 41 ML/MIN/1.73M2 — LOW
EGFR: 41 ML/MIN/1.73M2 — LOW
GLUCOSE SERPL-MCNC: 105 MG/DL — HIGH (ref 70–99)
GLUCOSE UR QL: NEGATIVE MG/DL — SIGNIFICANT CHANGE UP
HCT VFR BLD CALC: 29.7 % — LOW (ref 39–50)
HGB BLD-MCNC: 9.1 G/DL — LOW (ref 13–17)
KETONES UR-MCNC: NEGATIVE MG/DL — SIGNIFICANT CHANGE UP
LEUKOCYTE ESTERASE UR-ACNC: NEGATIVE — SIGNIFICANT CHANGE UP
MAGNESIUM SERPL-MCNC: 2.3 MG/DL — SIGNIFICANT CHANGE UP (ref 1.6–2.6)
MCHC RBC-ENTMCNC: 26.4 PG — LOW (ref 27–34)
MCHC RBC-ENTMCNC: 30.6 G/DL — LOW (ref 32–36)
MCV RBC AUTO: 86.1 FL — SIGNIFICANT CHANGE UP (ref 80–100)
NITRITE UR-MCNC: NEGATIVE — SIGNIFICANT CHANGE UP
NRBC BLD AUTO-RTO: 0 /100 WBCS — SIGNIFICANT CHANGE UP (ref 0–0)
PH UR: 5.5 — SIGNIFICANT CHANGE UP (ref 5–8)
PHOSPHATE SERPL-MCNC: 3.3 MG/DL — SIGNIFICANT CHANGE UP (ref 2.5–4.5)
PLATELET # BLD AUTO: 286 K/UL — SIGNIFICANT CHANGE UP (ref 150–400)
POTASSIUM SERPL-MCNC: 4.6 MMOL/L — SIGNIFICANT CHANGE UP (ref 3.5–5.3)
POTASSIUM SERPL-SCNC: 4.6 MMOL/L — SIGNIFICANT CHANGE UP (ref 3.5–5.3)
PROT UR-MCNC: NEGATIVE MG/DL — SIGNIFICANT CHANGE UP
RBC # BLD: 3.45 M/UL — LOW (ref 4.2–5.8)
RBC # FLD: 16.5 % — HIGH (ref 10.3–14.5)
RBC CASTS # UR COMP ASSIST: 14 /HPF — HIGH (ref 0–4)
SODIUM SERPL-SCNC: 140 MMOL/L — SIGNIFICANT CHANGE UP (ref 135–145)
SODIUM UR-SCNC: 80 MMOL/L — SIGNIFICANT CHANGE UP
SP GR SPEC: 1.01 — SIGNIFICANT CHANGE UP (ref 1–1.03)
SQUAMOUS # UR AUTO: 3 /HPF — SIGNIFICANT CHANGE UP (ref 0–5)
UROBILINOGEN FLD QL: 0.2 MG/DL — SIGNIFICANT CHANGE UP (ref 0.2–1)
WBC # BLD: 7.86 K/UL — SIGNIFICANT CHANGE UP (ref 3.8–10.5)
WBC # FLD AUTO: 7.86 K/UL — SIGNIFICANT CHANGE UP (ref 3.8–10.5)
WBC UR QL: 1 /HPF — SIGNIFICANT CHANGE UP (ref 0–5)

## 2025-02-19 PROCEDURE — G0545: CPT

## 2025-02-19 PROCEDURE — 93010 ELECTROCARDIOGRAM REPORT: CPT

## 2025-02-19 PROCEDURE — 99232 SBSQ HOSP IP/OBS MODERATE 35: CPT

## 2025-02-19 RX ORDER — NIFEDIPINE 30 MG
60 TABLET, EXTENDED RELEASE 24 HR ORAL DAILY
Refills: 0 | Status: DISCONTINUED | OUTPATIENT
Start: 2025-02-20 | End: 2025-03-07

## 2025-02-19 RX ORDER — PIPERACILLIN-TAZO-DEXTROSE,ISO 3.375G/5
3.38 IV SOLUTION, PIGGYBACK PREMIX FROZEN(ML) INTRAVENOUS ONCE
Refills: 0 | Status: COMPLETED | OUTPATIENT
Start: 2025-02-19 | End: 2025-02-19

## 2025-02-19 RX ORDER — PIPERACILLIN-TAZO-DEXTROSE,ISO 3.375G/5
3.38 IV SOLUTION, PIGGYBACK PREMIX FROZEN(ML) INTRAVENOUS EVERY 8 HOURS
Refills: 0 | Status: DISCONTINUED | OUTPATIENT
Start: 2025-02-20 | End: 2025-02-21

## 2025-02-19 RX ORDER — PIPERACILLIN-TAZO-DEXTROSE,ISO 3.375G/5
3.38 IV SOLUTION, PIGGYBACK PREMIX FROZEN(ML) INTRAVENOUS ONCE
Refills: 0 | Status: COMPLETED | OUTPATIENT
Start: 2025-02-20 | End: 2025-02-19

## 2025-02-19 RX ADMIN — Medication 650 MILLIGRAM(S): at 13:01

## 2025-02-19 RX ADMIN — Medication 30 MILLIGRAM(S): at 05:02

## 2025-02-19 RX ADMIN — Medication 25 GRAM(S): at 23:53

## 2025-02-19 RX ADMIN — Medication 25 GRAM(S): at 17:10

## 2025-02-19 RX ADMIN — TAMSULOSIN HYDROCHLORIDE 0.4 MILLIGRAM(S): 0.4 CAPSULE ORAL at 21:56

## 2025-02-19 RX ADMIN — Medication 325 MILLIGRAM(S): at 11:16

## 2025-02-19 RX ADMIN — MIRTAZAPINE 7.5 MILLIGRAM(S): 30 TABLET, FILM COATED ORAL at 11:16

## 2025-02-19 RX ADMIN — Medication 200 GRAM(S): at 13:43

## 2025-02-19 RX ADMIN — APIXABAN 10 MILLIGRAM(S): 2.5 TABLET, FILM COATED ORAL at 17:10

## 2025-02-19 RX ADMIN — ATORVASTATIN CALCIUM 20 MILLIGRAM(S): 80 TABLET, FILM COATED ORAL at 21:56

## 2025-02-19 RX ADMIN — APIXABAN 10 MILLIGRAM(S): 2.5 TABLET, FILM COATED ORAL at 05:01

## 2025-02-19 RX ADMIN — Medication 81 MILLIGRAM(S): at 11:16

## 2025-02-19 RX ADMIN — Medication 650 MILLIGRAM(S): at 14:06

## 2025-02-19 NOTE — PROGRESS NOTE ADULT - ASSESSMENT
83 y/o M with pmhx htn, hld, dvt, pad presents to the ER from vascular surgery office with diarrhea and generalized weakness over the last few days.         Fevers   Blood cx   ID following   Monitor off abx     Anemia GI consult appreciated   Iron def Anemia Stable       VA Dopplers LLE shows Acute deep venous thrombosis: above the knee.    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  Occluded left femoral bypass and popliteal artery. This will be further   evaluated with dedicated lower extremity arterial ultrasound.    Renal Sono shows Bilateral renal cysts, larger on the left, some of which demonstrate thin   septations.    Diffuse bladder wall thickening, out of proportion to the level of   underdistention; etiologies include chronic bladder outlet obstruction if   patient has an enlarged prostate versus underlying cystitis    Acute DVT   Eliquis   Vascular Surgery Consulted     DAX pre renal /ATN     Renal consulted   Reviewed Renal sono   No hydronephrosis   Diarrhea resolved   HTN Hold Olmesartan   Continue with Norvasc and Toprol     HLD Statins        85 y/o M with pmhx htn, hld, dvt, pad presents to the ER from vascular surgery office with diarrhea and generalized weakness over the last few days.         Fevers   Blood cx no growth to date   ID following   Monitor off abx   * will need chest/abd/pelvis CT  * start zosyn after the cultures    Anemia GI consult appreciated   Iron def Anemia Stable       VA Dopplers LLE shows Acute deep venous thrombosis: above the knee.    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  Occluded left femoral bypass and popliteal artery. This will be further   evaluated with dedicated lower extremity arterial ultrasound.    Renal Sono shows Bilateral renal cysts, larger on the left, some of which demonstrate thin   septations.    Diffuse bladder wall thickening, out of proportion to the level of   underdistention; etiologies include chronic bladder outlet obstruction if   patient has an enlarged prostate versus underlying cystitis    Acute DVT   Eliquis   Vascular Surgery Consulted     DAX pre renal /ATN     Renal consulted   Reviewed Renal sono   No hydronephrosis   Diarrhea resolved   HTN Hold Olmesartan   Continue with Norvasc and Toprol     HLD Statins

## 2025-02-19 NOTE — CHART NOTE - NSCHARTNOTEFT_GEN_A_CORE
Medicine PA Note     GIANNI MURPHY  MRN-[id number]  Allergies    No Known Allergies    Intolerances      Notified by RN pt c/o chest discomfort. Pt seen and examined at bedside in NAD. Pt states he isn't necessarily experiencing pain or discomfort, but rather an odd feeling. Pt explains that he just feels his heart beating, more so to the point where he truly noticed it. Per pt, he noticed it laying in bed. Denies any recent activity or movement. States he has felt it before but only on occasion, mainly when laying in bed. As per pt, his doctor told him he has an irregular heart beat. Pt denies headache, chest pain, sob, n/v/d, weakness, dizziness.    Vital Signs Last 24 Hrs  T(C): 36.8 (02-19-25 @ 01:21), Max: 36.8 (02-18-25 @ 17:21)  T(F): 98.3 (02-19-25 @ 01:21), Max: 98.3 (02-18-25 @ 17:21)  HR: 56 (02-19-25 @ 01:21) (56 - 76)  BP: 130/60 (02-19-25 @ 01:21) (124/68 - 154/70)  BP(mean): --  RR: 17 (02-19-25 @ 01:21) (17 - 18)  SpO2: 92% (02-19-25 @ 01:21) (92% - 94%)                        10.2   11.73 )-----------( 281      ( 18 Feb 2025 06:55 )             32.7     02-18    141  |  104  |  29[H]  ----------------------------<  82  4.1   |  23  |  1.35[H]    Ca    8.7      18 Feb 2025 06:56            PHYSICAL EXAM:  GENERAL: NAD, well-developed  CHEST/LUNG: Clear to auscultation bilaterally; No wheezes, rhonchi or rales appreciated  HEART: Regular rate and rhythm; No murmurs, rubs, or gallops  ABDOMEN: Soft, Nontender, Nondistended; Bowel sounds present. No rebound, or guarding noted.  EXTREMITIES:  2+ Peripheral Pulses, No clubbing, cyanosis, or edema  NEUROLOGY: CN 2-12 grossly intact, Muscle strength 5/5 in all extremities and sensation intact in all extremities.      Assessment/Plan: HPI:  83 y/o M with pmhx htn, hld, dvt, pad presents to the ER from vascular surgery office with diarrhea and generalized weakness over the last few days.   Patient reports that he has been having diarrhea x few days, associated with weakness, went to see [facility] Vascular surgeon as he noticed LLE swelling who referred him to the ED.   No hx fevers/ nausea/ vomiting.   Able to tolerate PO, No sick contacts.  (15 Feb 2025 14:52)    Now c/o chest discomfort.    #Chest discomfort 2/2 palpitations (?arrythmia)   > VS hemodynamically stable aside from HR 56  > EKG done showing sinus bradycardia w/ PACs; no acute changes noted compared to EKG done 2/15   > BMP w/ lytes ordered to assess for any electrolyte abnormalities   > If pt starts to c/o chest pain, will order cardiac enzymes and repeat EKG   > Will endorse to AM team, attending to follow    Deandre Ruelas PA-C,   Department of Medicine Medicine PA Note     GIANNI MURPHY  MRN-[id number]  Allergies    No Known Allergies    Intolerances      Notified by RN pt c/o chest discomfort. Pt seen and examined at bedside in NAD. Pt states he isn't necessarily experiencing pain or discomfort, but rather an odd feeling. Pt explains that he just feels his heart beating, more so to the point where he truly noticed it. Per pt, he noticed it laying in bed. Denies any recent activity or movement. States he has felt it before but only on occasion, mainly when laying in bed. As per pt, his doctor told him he has an irregular heart beat. Pt denies headache, chest pain, sob, n/v/d, weakness, dizziness.    Vital Signs Last 24 Hrs  T(C): 36.8 (02-19-25 @ 01:21), Max: 36.8 (02-18-25 @ 17:21)  T(F): 98.3 (02-19-25 @ 01:21), Max: 98.3 (02-18-25 @ 17:21)  HR: 56 (02-19-25 @ 01:21) (56 - 76)  BP: 130/60 (02-19-25 @ 01:21) (124/68 - 154/70)  BP(mean): --  RR: 17 (02-19-25 @ 01:21) (17 - 18)  SpO2: 92% (02-19-25 @ 01:21) (92% - 94%)                        10.2   11.73 )-----------( 281      ( 18 Feb 2025 06:55 )             32.7     02-18    141  |  104  |  29[H]  ----------------------------<  82  4.1   |  23  |  1.35[H]    Ca    8.7      18 Feb 2025 06:56            PHYSICAL EXAM:  GENERAL: NAD, well-developed  CHEST/LUNG: Clear to auscultation bilaterally; No wheezes, rhonchi or rales appreciated  HEART: Regular rate and rhythm; No murmurs, rubs, or gallops  ABDOMEN: Soft, Nontender, Nondistended; Bowel sounds present. No rebound, or guarding noted.  EXTREMITIES:  2+ Peripheral Pulses, No clubbing, cyanosis, or edema  NEUROLOGY: CN 2-12 grossly intact, Muscle strength 5/5 in all extremities and sensation intact in all extremities.      Assessment/Plan: HPI:  85 y/o M with pmhx htn, hld, dvt, pad presents to the ER from vascular surgery office with diarrhea and generalized weakness over the last few days.   Patient reports that he has been having diarrhea x few days, associated with weakness, went to see [facility] Vascular surgeon as he noticed LLE swelling who referred him to the ED.   No hx fevers/ nausea/ vomiting.   Able to tolerate PO, No sick contacts.  (15 Feb 2025 14:52)    Now c/o chest discomfort.    #Chest discomfort 2/2 palpitations (?arrythmia)   > VS hemodynamically stable aside from HR 56  > EKG done showing sinus bradycardia w/ PACs; no acute changes noted compared to EKG done 2/15   > BMP w/ lytes ordered to assess for any electrolyte abnormalities   > If pt starts to c/o chest pain, will order cardiac enzymes and repeat EKG   > Will endorse to AM team, attending to follow    Deandre Ruelas PA-C,   Department of Medicine      ADDENDUM: BMP w/ lytes stable. Will continue to monitor.

## 2025-02-19 NOTE — PROGRESS NOTE ADULT - ASSESSMENT
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 Has anemia and hgb is trending down (no need for a transfusion right now.) Has iron deficiency and getting oral iron. Seen by GI and they plan for out-pt EGD. Rising creatinine and being seen by renal. That is also contributing to the anemia. Trend the CBC.

## 2025-02-19 NOTE — PROGRESS NOTE ADULT - ASSESSMENT
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 Patient presented to Dr. Landers's office today for followup and evaluation of left leg swelling, but was referred to the ED given reported 3-4 days of weakness, poor PO intake, and diarrhea. He is found to have DVT and DAX    A/P:    DAX:  FENa>1  DAX workup from 2/15 suggestive of ATN  Losartan on hold  Renal US: Neg for Hydro   sCr worsening at present, pt intermittently febrile and with bradycardia   s/p IVF   Repeat Urine sodium and urine creatinine   Bladder scan 2/19 with 215cc urine, continue to monitor bladder scan q6h   Pt on Zosyn, Check CBC w/ Diff in AM   Monitor I/O  Monitor renal function closely    HTN:  BP suboptimal  Losartan on hold and Amlodipine discontinued  Currently on Nifedipine 30mg qd, would recommend increasing to Nifedipine 60mg qd   Monitor BP    DVT:  Follow up vascular

## 2025-02-19 NOTE — PROGRESS NOTE ADULT - ASSESSMENT
84 m with HTN, HLD, PAD, DVT, sent from vascular office for diarrhea, generalized weakness and poor PO intake.   initially afebrile, no WBC, DAX normal LFT  GI PCR negative  on 2/17 pt had chills then fever and vomiting, negative u/a and blood cx, again fever 100.4 2/19    admitted with diarrhea and DAX on 2/15 with negative GI PCR, no WBC, then developed a fever with vomiting 2/17, WBC slightly higher to 11 and worsening renal function but negative u/a and blood cx, monitored off antibiotics again fever to 100.4 on 2/19 with no symptoms    * repeat blood cx  * will need chest/abd/pelvis CT  * start zosyn after the cultures  * monitor CBC/diff and CMP    The above assessment and plan was discussed with the primary team    Alissa Forman MD  contact on teams  After 5pm and on weekends call [phone]

## 2025-02-19 NOTE — PROGRESS NOTE ADULT - SUBJECTIVE AND OBJECTIVE BOX
Patient is a 84y old  Male who presents with a chief complaint of Referred by Vascular for Weakness Diarrhea (16 Feb 2025 14:47)      SUBJECTIVE / OVERNIGHT EVENTS: no events       T(C): 36.9 (02-19-25 @ 20:23), Max: 38 (02-19-25 @ 12:58)  HR: 67 (02-19-25 @ 20:23) (67 - 69)  BP: 131/63 (02-19-25 @ 20:23) (131/63 - 168/71)  RR: 17 (02-19-25 @ 20:23) (17 - 17)  SpO2: 94% (02-19-25 @ 20:23) (92% - 94%)    MEDICATIONS  (STANDING):  apixaban 10 milliGRAM(s) Oral every 12 hours  aspirin  chewable 81 milliGRAM(s) Oral daily  atorvastatin 20 milliGRAM(s) Oral at bedtime  ferrous    sulfate 325 milliGRAM(s) Oral daily  metoprolol succinate ER 25 milliGRAM(s) Oral daily  mirtazapine 7.5 milliGRAM(s) Oral daily  NIFEdipine XL 30 milliGRAM(s) Oral daily  sodium chloride 0.9%. 1000 milliLiter(s) (75 mL/Hr) IV Continuous <Continuous>  tamsulosin 0.4 milliGRAM(s) Oral at bedtime    MEDICATIONS  (PRN):  acetaminophen     Tablet .. 650 milliGRAM(s) Oral every 6 hours PRN Temp greater or equal to 38C (100.4F  PHYSICAL EXAM:  GENERAL: NAD, well-developed  HEAD:  Atraumatic, Normocephalic  EYES: EOMI, PERRLA, conjunctiva and sclera clear  NECK: Supple, No JVD  CHEST/LUNG: Clear to auscultation bilaterally; No wheeze  HEART: Regular rate and rhythm; No murmurs, rubs, or gallops  ABDOMEN: Soft, Nontender, Nondistended; Bowel sounds present  EXTREMITIES:  2+ Peripheral Pulses, No clubbing, cyanosis, or edema  PSYCH: AAOx3  NEUROLOGY: non-focal  SKIN: No rashes or lesions                                    9.1    7.86  )-----------( 286      ( 19 Feb 2025 07:28 )             29.7               140|106|37<105  4.6|24|1.61  8.2,2.3,3.3  02-19 @ 02:25

## 2025-02-19 NOTE — PROGRESS NOTE ADULT - SUBJECTIVE AND OBJECTIVE BOX
Patient is a 85y old  Male who presents with a chief complaint of Referred by Vascular for Weakness Diarrhea (18 Feb 2025 17:26)    Says that he feels okay. No further chills or fever. Eating and no N/V/D, abd pain, BRBPR. Ne nosebleeds. No HA or dizziness. No hematuria. No CP or SOB    Medication:   acetaminophen     Tablet .. 650 milliGRAM(s) Oral every 6 hours PRN  apixaban 10 milliGRAM(s) Oral every 12 hours  aspirin  chewable 81 milliGRAM(s) Oral daily  atorvastatin 20 milliGRAM(s) Oral at bedtime  ferrous    sulfate 325 milliGRAM(s) Oral daily  metoprolol succinate ER 25 milliGRAM(s) Oral daily  mirtazapine 7.5 milliGRAM(s) Oral daily  NIFEdipine XL 30 milliGRAM(s) Oral daily  sodium chloride 0.9%. 1000 milliLiter(s) IV Continuous <Continuous>  tamsulosin 0.4 milliGRAM(s) Oral at bedtime      Physical exam    T(C): 36.7 (02-19-25 @ 08:16), Max: 37.2 (02-19-25 @ 04:10)  HR: 54 (02-19-25 @ 08:16) (54 - 62)  BP: 178/88 (02-19-25 @ 08:16) (130/60 - 178/88)  RR: 17 (02-19-25 @ 08:16) (17 - 18)  SpO2: 92% (02-19-25 @ 08:16) (92% - 94%)  Wt(kg): --    alert NAD  EOMI anicteric sclera  Cv RRR  Resp non labored  abd soft NT ND  No LE edema or tenderness    Labs                        9.1    7.86  )-----------( 286      ( 19 Feb 2025 07:28 )             29.7       02-19    140  |  106  |  37[H]  ----------------------------<  105[H]  4.6   |  24  |  1.61[H]    Ca    8.2[L]      19 Feb 2025 02:25  Phos  3.3     02-19  Mg     2.3     02-19            [id number]

## 2025-02-19 NOTE — PROGRESS NOTE ADULT - SUBJECTIVE AND OBJECTIVE BOX
Follow Up:  fever, diarrhea    Interval History/ROS: pt just had a temp of 100.4 but denied vomiting, diarrhea, cough        Allergies  No Known Allergies        ANTIMICROBIALS:      OTHER MEDS:  acetaminophen     Tablet .. 650 milliGRAM(s) Oral every 6 hours PRN  apixaban 10 milliGRAM(s) Oral every 12 hours  aspirin  chewable 81 milliGRAM(s) Oral daily  atorvastatin 20 milliGRAM(s) Oral at bedtime  ferrous    sulfate 325 milliGRAM(s) Oral daily  metoprolol succinate ER 25 milliGRAM(s) Oral daily  mirtazapine 7.5 milliGRAM(s) Oral daily  NIFEdipine XL 30 milliGRAM(s) Oral daily  sodium chloride 0.9%. 1000 milliLiter(s) IV Continuous <Continuous>  tamsulosin 0.4 milliGRAM(s) Oral at bedtime      Vital Signs Last 24 Hrs  T(C): 38 (19 Feb 2025 12:58), Max: 38 (19 Feb 2025 12:58)  T(F): 100.4 (19 Feb 2025 12:58), Max: 100.4 (19 Feb 2025 12:58)  HR: 54 (19 Feb 2025 08:16) (54 - 62)  BP: 178/88 (19 Feb 2025 08:16) (130/60 - 178/88)  BP(mean): --  RR: 17 (19 Feb 2025 08:16) (17 - 17)  SpO2: 92% (19 Feb 2025 08:16) (92% - 94%)    Parameters below as of 19 Feb 2025 08:16  Patient On (Oxygen Delivery Method): room air        Physical Exam:  General:    NAD,  non toxic  Respiratory:    comfortable on RA  abd:     soft,   no tenderness  :     no  kimbrough  Musculoskeletal:   no joint swelling  vascular: no phlebitis  Skin:    no rash                            9.1    7.86  )-----------( 286      ( 19 Feb 2025 07:28 )             29.7       02-19    140  |  106  |  37[H]  ----------------------------<  105[H]  4.6   |  24  |  1.61[H]    Ca    8.2[L]      19 Feb 2025 02:25  Phos  3.3     02-19  Mg     2.3     02-19        Urinalysis Basic - ( 19 Feb 2025 02:25 )    Color: x / Appearance: x / SG: x / pH: x  Gluc: 105 mg/dL / Ketone: x  / Bili: x / Urobili: x   Blood: x / Protein: x / Nitrite: x   Leuk Esterase: x / RBC: x / WBC x   Sq Epi: x / Non Sq Epi: x / Bacteria: x        MICROBIOLOGY:  v  .Blood BLOOD  02-17-25   No growth at 24 hours  --  --          Rapid RVP Result: NotDetec (02-17 @ 15:25)        RADIOLOGY:  Images independently visualized and reviewed personally, findings as below

## 2025-02-20 LAB
-  BLOOD PCR PANEL: SIGNIFICANT CHANGE UP
ALBUMIN SERPL ELPH-MCNC: 2.7 G/DL — LOW (ref 3.3–5)
ALP SERPL-CCNC: 57 U/L — SIGNIFICANT CHANGE UP (ref 40–120)
ALT FLD-CCNC: 11 U/L — SIGNIFICANT CHANGE UP (ref 10–45)
ANION GAP SERPL CALC-SCNC: 10 MMOL/L — SIGNIFICANT CHANGE UP (ref 5–17)
AST SERPL-CCNC: 10 U/L — SIGNIFICANT CHANGE UP (ref 10–40)
BASOPHILS # BLD AUTO: 0.06 K/UL — SIGNIFICANT CHANGE UP (ref 0–0.2)
BASOPHILS NFR BLD AUTO: 0.3 % — SIGNIFICANT CHANGE UP (ref 0–2)
BILIRUB SERPL-MCNC: 0.2 MG/DL — SIGNIFICANT CHANGE UP (ref 0.2–1.2)
BUN SERPL-MCNC: 32 MG/DL — HIGH (ref 7–23)
CALCIUM SERPL-MCNC: 8.1 MG/DL — LOW (ref 8.4–10.5)
CHLORIDE SERPL-SCNC: 106 MMOL/L — SIGNIFICANT CHANGE UP (ref 96–108)
CO2 SERPL-SCNC: 23 MMOL/L — SIGNIFICANT CHANGE UP (ref 22–31)
CREAT SERPL-MCNC: 1.34 MG/DL — HIGH (ref 0.5–1.3)
EGFR: 51 ML/MIN/1.73M2 — LOW
EGFR: 51 ML/MIN/1.73M2 — LOW
EOSINOPHIL # BLD AUTO: 0.16 K/UL — SIGNIFICANT CHANGE UP (ref 0–0.5)
EOSINOPHIL NFR BLD AUTO: 0.9 % — SIGNIFICANT CHANGE UP (ref 0–6)
GLUCOSE SERPL-MCNC: 85 MG/DL — SIGNIFICANT CHANGE UP (ref 70–99)
GRAM STN FLD: ABNORMAL
HCT VFR BLD CALC: 26.2 % — LOW (ref 39–50)
HGB BLD-MCNC: 8.2 G/DL — LOW (ref 13–17)
IMM GRANULOCYTES NFR BLD AUTO: 0.7 % — SIGNIFICANT CHANGE UP (ref 0–0.9)
LYMPHOCYTES # BLD AUTO: 1.77 K/UL — SIGNIFICANT CHANGE UP (ref 1–3.3)
LYMPHOCYTES # BLD AUTO: 10.2 % — LOW (ref 13–44)
MCHC RBC-ENTMCNC: 26.7 PG — LOW (ref 27–34)
MCHC RBC-ENTMCNC: 31.3 G/DL — LOW (ref 32–36)
MCV RBC AUTO: 85.3 FL — SIGNIFICANT CHANGE UP (ref 80–100)
METHOD TYPE: SIGNIFICANT CHANGE UP
MONOCYTES # BLD AUTO: 1.39 K/UL — HIGH (ref 0–0.9)
MONOCYTES NFR BLD AUTO: 8 % — SIGNIFICANT CHANGE UP (ref 2–14)
NEUTROPHILS # BLD AUTO: 13.84 K/UL — HIGH (ref 1.8–7.4)
NEUTROPHILS NFR BLD AUTO: 79.9 % — HIGH (ref 43–77)
NRBC BLD AUTO-RTO: 0 /100 WBCS — SIGNIFICANT CHANGE UP (ref 0–0)
PLATELET # BLD AUTO: 269 K/UL — SIGNIFICANT CHANGE UP (ref 150–400)
POTASSIUM SERPL-MCNC: 3.9 MMOL/L — SIGNIFICANT CHANGE UP (ref 3.5–5.3)
POTASSIUM SERPL-SCNC: 3.9 MMOL/L — SIGNIFICANT CHANGE UP (ref 3.5–5.3)
PROT SERPL-MCNC: 5.8 G/DL — LOW (ref 6–8.3)
RBC # BLD: 3.07 M/UL — LOW (ref 4.2–5.8)
RBC # FLD: 16.3 % — HIGH (ref 10.3–14.5)
SODIUM SERPL-SCNC: 139 MMOL/L — SIGNIFICANT CHANGE UP (ref 135–145)
UUN UR-MCNC: 581 MG/DL — SIGNIFICANT CHANGE UP
WBC # BLD: 17.34 K/UL — HIGH (ref 3.8–10.5)
WBC # FLD AUTO: 17.34 K/UL — HIGH (ref 3.8–10.5)

## 2025-02-20 PROCEDURE — 74176 CT ABD & PELVIS W/O CONTRAST: CPT | Mod: 26

## 2025-02-20 PROCEDURE — 71250 CT THORAX DX C-: CPT | Mod: 26

## 2025-02-20 PROCEDURE — 99232 SBSQ HOSP IP/OBS MODERATE 35: CPT

## 2025-02-20 PROCEDURE — G0545: CPT

## 2025-02-20 RX ORDER — VANCOMYCIN HCL IN 5 % DEXTROSE 1.5G/250ML
1000 PLASTIC BAG, INJECTION (ML) INTRAVENOUS ONCE
Refills: 0 | Status: COMPLETED | OUTPATIENT
Start: 2025-02-20 | End: 2025-02-20

## 2025-02-20 RX ADMIN — ATORVASTATIN CALCIUM 20 MILLIGRAM(S): 80 TABLET, FILM COATED ORAL at 21:47

## 2025-02-20 RX ADMIN — Medication 81 MILLIGRAM(S): at 11:06

## 2025-02-20 RX ADMIN — Medication 25 GRAM(S): at 21:47

## 2025-02-20 RX ADMIN — Medication 40 MILLIGRAM(S): at 17:12

## 2025-02-20 RX ADMIN — TAMSULOSIN HYDROCHLORIDE 0.4 MILLIGRAM(S): 0.4 CAPSULE ORAL at 21:47

## 2025-02-20 RX ADMIN — Medication 60 MILLIGRAM(S): at 05:57

## 2025-02-20 RX ADMIN — Medication 25 GRAM(S): at 05:57

## 2025-02-20 RX ADMIN — Medication 325 MILLIGRAM(S): at 11:06

## 2025-02-20 RX ADMIN — Medication 250 MILLIGRAM(S): at 11:06

## 2025-02-20 RX ADMIN — Medication 25 GRAM(S): at 13:29

## 2025-02-20 RX ADMIN — APIXABAN 10 MILLIGRAM(S): 2.5 TABLET, FILM COATED ORAL at 17:11

## 2025-02-20 RX ADMIN — MIRTAZAPINE 7.5 MILLIGRAM(S): 30 TABLET, FILM COATED ORAL at 11:06

## 2025-02-20 RX ADMIN — APIXABAN 10 MILLIGRAM(S): 2.5 TABLET, FILM COATED ORAL at 05:58

## 2025-02-20 NOTE — PROGRESS NOTE ADULT - ASSESSMENT
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 Has anemia and hgb is trending down (no need for a transfusion right now.) Has iron deficiency and getting oral iron. Seen by GI and they plan for out-pt EGD. Rising creatinine and being seen by renal. That is also contributing to the anemia. Trend the CBC.     Pt now with elevated white blood count. h/h down. + Fever and + cx, which are likely contributing to hematologic findings.

## 2025-02-20 NOTE — PROGRESS NOTE ADULT - SUBJECTIVE AND OBJECTIVE BOX
GIANNI MURPHY  MRN-[id number]    Patient is a 85y old  Male who presents with a chief complaint of Referred by Vascular for Weakness Diarrhea (20 Feb 2025 11:44)      Review of System    currently comfortable, without complaints    General:	Denies , chills, sweats, decreased appetite.    Skin/Breast: denies pruritis, rash  	  Ophthalmologic: no change in vision or blurring  	  HEENT	Denies dry mouth, oral sores, dysphagia,  change in hearing.    Respiratory and Thorax:  cough, sob, wheeze, hemoptysis  	  Cardiovascular:	no cp , palp, orthopnea    Gastrointestinal:	no n/v/d constipation    Genitourinary:	no dysuria of frequency, no hematuria, no flank pain    Musculoskeletal:	no bone or joint pain. no muscle aches.     Neurological:	no change in sensory or motor function. no headache. no weakness.     Psychiatric:	no depression, no anxiety, insomnia.     Hematology/Lymphatics:	no bleeding or bruising      MEDICATIONS  (STANDING):  apixaban 10 milliGRAM(s) Oral every 12 hours  aspirin  chewable 81 milliGRAM(s) Oral daily  atorvastatin 20 milliGRAM(s) Oral at bedtime  ferrous    sulfate 325 milliGRAM(s) Oral daily  metoprolol succinate ER 25 milliGRAM(s) Oral daily  mirtazapine 7.5 milliGRAM(s) Oral daily  NIFEdipine XL 60 milliGRAM(s) Oral daily  piperacillin/tazobactam IVPB.. 3.375 Gram(s) IV Intermittent every 8 hours  sodium chloride 0.9%. 1000 milliLiter(s) (75 mL/Hr) IV Continuous <Continuous>  tamsulosin 0.4 milliGRAM(s) Oral at bedtime    MEDICATIONS  (PRN):  acetaminophen     Tablet .. 650 milliGRAM(s) Oral every 6 hours PRN Temp greater or equal to 38C (100.4F)      Vital Signs Last 24 Hrs  T(C): 37.1 (20 Feb 2025 12:31), Max: 38 (19 Feb 2025 12:58)  T(F): 98.7 (20 Feb 2025 12:31), Max: 100.4 (19 Feb 2025 12:58)  HR: 67 (20 Feb 2025 12:31) (57 - 67)  BP: 125/62 (20 Feb 2025 12:31) (125/62 - 149/61)  BP(mean): --  RR: 18 (20 Feb 2025 12:31) (17 - 18)  SpO2: 92% (20 Feb 2025 12:31) (92% - 94%)    Parameters below as of 20 Feb 2025 12:31  Patient On (Oxygen Delivery Method): room air      PHYSICAL EXAM:      Constitutional: NAD    Eyes: PERRLA EOMI, anicteric sclera    Heent :No oral sores, no pharyngeal injection. moist mucosa.    Neck: supple, no jvd, no LAD    Respiratory: CTA b/l     Cardiovascular: s1s2, no m/g/r    Gastrointestinal: soft, nt, nd, + BS    Extremities: no c/c/e    Neurological:A&O x 3 moves all ext.    Skin: no rash on exposed skin    Lymph Nodes: no lymphadenopathy.      Lab  CBC Full  -  ( 20 Feb 2025 06:39 )  WBC Count : 17.34 K/uL  RBC Count : 3.07 M/uL  Hemoglobin : 8.2 g/dL  Hematocrit : 26.2 %  Platelet Count - Automated : 269 K/uL  Mean Cell Volume : 85.3 fl  Mean Cell Hemoglobin : 26.7 pg  Mean Cell Hemoglobin Concentration : 31.3 g/dL  Auto Neutrophil # : x  Auto Lymphocyte # : x  Auto Monocyte # : x  Auto Eosinophil # : x  Auto Basophil # : x  Auto Neutrophil % : x  Auto Lymphocyte % : x  Auto Monocyte % : x  Auto Eosinophil % : x  Auto Basophil % : x    02-20    139  |  106  |  32[H]  ----------------------------<  85  3.9   |  23  |  1.34[H]    Ca    8.1[L]      20 Feb 2025 06:39  Phos  3.3     02-19  Mg     2.3     02-19    TPro  5.8[L]  /  Alb  2.7[L]  /  TBili  0.2  /  DBili  x   /  AST  10  /  ALT  11  /  AlkPhos  57  02-20        Rad:    Assessment/Plan

## 2025-02-20 NOTE — PROGRESS NOTE ADULT - ASSESSMENT
85 y/o M with pmhx htn, hld, dvt, pad presents to the ER from vascular surgery office with diarrhea and generalized weakness over the last few days.         Fevers   Blood cx blood cx on 2/17 showed GPC on 2/19    * repeat blood cx s/p vanco 1 qd  **CT chest/abd/pelvis CT  no growth to date   ID following   Continue with Zosyn for now       Anemia GI consult appreciated   Iron def Anemia Stable       VA Dopplers LLE shows Acute deep venous thrombosis: above the knee.    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  Occluded left femoral bypass and popliteal artery. This will be further   evaluated with dedicated lower extremity arterial ultrasound.    Renal Sono shows Bilateral renal cysts, larger on the left, some of which demonstrate thin   septations.    Diffuse bladder wall thickening, out of proportion to the level of   underdistention; etiologies include chronic bladder outlet obstruction if   patient has an enlarged prostate versus underlying cystitis    Acute DVT   Eliquis   Vascular Surgery Consulted     DAX pre renal /ATN     Renal consulted   Reviewed Renal sono   No hydronephrosis   Diarrhea resolved   HTN Hold Olmesartan   Continue with Norvasc and Toprol     HLD Statins

## 2025-02-20 NOTE — PROGRESS NOTE ADULT - ASSESSMENT
anemia  dvt    cont a/c and asa  start proton pump inhibitor bid  dark stool may be 2/2 iron  monitor cbc  will follow

## 2025-02-20 NOTE — PROGRESS NOTE ADULT - ASSESSMENT
84 m with HTN, HLD, PAD, DVT, sent from vascular office for diarrhea, generalized weakness and poor PO intake.   initially afebrile, no WBC, DAX normal LFT  GI PCR negative  on 2/17 pt had chills then fever and vomiting, negative u/a and blood cx, again fever 100.4 2/19    admitted with diarrhea and DAX on 2/15 with negative GI PCR, no WBC, then developed a fever with vomiting 2/17, WBC slightly higher to 11 and worsening renal function but negative u/a and blood cx, monitored off antibiotics again fever to 100.4 on 2/19 with no symptoms  blood cx on 2/17 showed GPC on 2/19    * repeat blood cx then give a dose of vanco 1 qd  * follow the identification of the GPC  * will need chest/abd/pelvis CT  * c/w zosyn, started 2/19  * monitor CBC/diff and CMP    The above assessment and plan was discussed with the primary team    Alissa Forman MD  contact on teams  After 5pm and on weekends call [phone]

## 2025-02-20 NOTE — PROGRESS NOTE ADULT - SUBJECTIVE AND OBJECTIVE BOX
[facility]      Nolberto Crow NP    [address] [place] [id number]  [phone]      INTERVAL HPI/OVERNIGHT EVENTS:    tolerating diet  states stool is dark    MEDICATIONS  (STANDING):  apixaban 10 milliGRAM(s) Oral every 12 hours  aspirin  chewable 81 milliGRAM(s) Oral daily  atorvastatin 20 milliGRAM(s) Oral at bedtime  ferrous    sulfate 325 milliGRAM(s) Oral daily  metoprolol succinate ER 25 milliGRAM(s) Oral daily  mirtazapine 7.5 milliGRAM(s) Oral daily  NIFEdipine XL 60 milliGRAM(s) Oral daily  piperacillin/tazobactam IVPB.. 3.375 Gram(s) IV Intermittent every 8 hours  sodium chloride 0.9%. 1000 milliLiter(s) (75 mL/Hr) IV Continuous <Continuous>  tamsulosin 0.4 milliGRAM(s) Oral at bedtime    MEDICATIONS  (PRN):  acetaminophen     Tablet .. 650 milliGRAM(s) Oral every 6 hours PRN Temp greater or equal to 38C (100.4F)      Allergies    No Known Allergies    Intolerances        ROS:   General:  No  fevers, chills, night sweats, fatigue,   Eyes:  Good vision, no reported pain  ENT:  No sore throat, pain, runny nose, dysphagia  CV:  No pain, palpitations, hypo/hypertension  Resp:  No dyspnea, cough, tachypnea, wheezing  GI:  No pain, No nausea, No vomiting, No diarrhea, No constipation, No weight loss, No fever, No pruritis, No rectal bleeding, No tarry stools, No dysphagia,  :  No pain, bleeding, incontinence, nocturia  Muscle:  No pain, weakness  Neuro:  No weakness, tingling, memory problems  Psych:  No fatigue, insomnia, mood problems, depression  Endocrine:  No polyuria, polydipsia, cold/heat intolerance  Heme:  No petechiae, ecchymosis, easy bruisability  Skin:  No rash, tattoos, scars, edema      PHYSICAL EXAM:   Vital Signs:  Vital Signs Last 24 Hrs  T(C): 36.4 (20 Feb 2025 16:50), Max: 37.1 (20 Feb 2025 12:31)  T(F): 97.6 (20 Feb 2025 16:50), Max: 98.7 (20 Feb 2025 12:31)  HR: 59 (20 Feb 2025 16:50) (57 - 67)  BP: 132/68 (20 Feb 2025 16:50) (125/62 - 149/61)  BP(mean): --  RR: 17 (20 Feb 2025 16:50) (17 - 18)  SpO2: 93% (20 Feb 2025 16:50) (92% - 94%)    Parameters below as of 20 Feb 2025 16:50  Patient On (Oxygen Delivery Method): room air      Daily     Daily     GENERAL:  Appears stated age,   HEENT:  NC/AT,    CHEST:  Full & symmetric excursion,   HEART:  Regular rhythm,  ABDOMEN:  Soft, non-tender, non-distended,  EXTEREMITIES:  no cyanosis  SKIN:  No rash  NEURO:  Alert,       LABS:                        8.2    17.34 )-----------( 269      ( 20 Feb 2025 06:39 )             26.2     02-20    139  |  106  |  32[H]  ----------------------------<  85  3.9   |  23  |  1.34[H]    Ca    8.1[L]      20 Feb 2025 06:39  Phos  3.3     02-19  Mg     2.3     02-19    TPro  5.8[L]  /  Alb  2.7[L]  /  TBili  0.2  /  DBili  x   /  AST  10  /  ALT  11  /  AlkPhos  57  02-20      Urinalysis Basic - ( 20 Feb 2025 06:39 )    Color: x / Appearance: x / SG: x / pH: x  Gluc: 85 mg/dL / Ketone: x  / Bili: x / Urobili: x   Blood: x / Protein: x / Nitrite: x   Leuk Esterase: x / RBC: x / WBC x   Sq Epi: x / Non Sq Epi: x / Bacteria: x        RADIOLOGY & ADDITIONAL TESTS:

## 2025-02-20 NOTE — PROGRESS NOTE ADULT - SUBJECTIVE AND OBJECTIVE BOX
[facility]   MD INDERJIT NELSON MD ANGELA WONG, PA QIAN CHEN, NP    TEL:  OFFICE: [phone]  From 5pm-7am Answering Service [phone]    -- RENAL FOLLOW UP NOTE ---Date of Service 02-20-25 @ 13:52    Patient is a 85y old  Male who presents with a chief complaint of Referred by Vascular for Weakness Diarrhea.    Patient seen and examined at bedside. No chest pain/SOB.    VITALS:  T(F): 98.7 (02-20-25 @ 12:31), Max: 98.7 (02-20-25 @ 12:31)  HR: 67 (02-20-25 @ 12:31)  BP: 125/62 (02-20-25 @ 12:31)  RR: 18 (02-20-25 @ 12:31)  SpO2: 92% (02-20-25 @ 12:31)  Wt(kg): --    02-19 @ 07:01  -  02-20 @ 07:00  --------------------------------------------------------  IN: 350 mL / OUT: 1350 mL / NET: -1000 mL    02-20 @ 07:01  -  02-20 @ 13:52  --------------------------------------------------------  IN: 0 mL / OUT: 150 mL / NET: -150 mL      PHYSICAL EXAM:  General: NAD  Neck: No JVD  Respiratory: CTAB, no wheezes, rales or rhonchi  Cardiovascular: S1, S2, RRR  Gastrointestinal: BS+, soft, NT/ND  Extremities: No peripheral edema    Hospital Medications:   MEDICATIONS  (STANDING):  apixaban 10 milliGRAM(s) Oral every 12 hours  aspirin  chewable 81 milliGRAM(s) Oral daily  atorvastatin 20 milliGRAM(s) Oral at bedtime  ferrous    sulfate 325 milliGRAM(s) Oral daily  metoprolol succinate ER 25 milliGRAM(s) Oral daily  mirtazapine 7.5 milliGRAM(s) Oral daily  NIFEdipine XL 60 milliGRAM(s) Oral daily  piperacillin/tazobactam IVPB.. 3.375 Gram(s) IV Intermittent every 8 hours  sodium chloride 0.9%. 1000 milliLiter(s) (75 mL/Hr) IV Continuous <Continuous>  tamsulosin 0.4 milliGRAM(s) Oral at bedtime      LABS:  02-20    139  |  106  |  32[H]  ----------------------------<  85  3.9   |  23  |  1.34[H]    Ca    8.1[L]      20 Feb 2025 06:39  Phos  3.3     02-19  Mg     2.3     02-19    TPro  5.8[L]  /  Alb  2.7[L]  /  TBili  0.2  /  DBili      /  AST  10  /  ALT  11  /  AlkPhos  57  02-20    Creatinine Trend: 1.34 <--, 1.61 <--, 1.35 <--, 1.29 <--, 1.17 <--, 1.63 <--, 1.36 <--, 1.30 <--    Albumin: 2.7 g/dL (02-20 @ 06:39)                          8.2    17.34 )-----------( 269      ( 20 Feb 2025 06:39 )             26.2     Urine Studies:  Urinalysis - [02-20-25 @ 06:39]      Color  / Appearance  / SG  / pH       Gluc 85 / Ketone   / Bili  / Urobili        Blood  / Protein  / Leuk Est  / Nitrite       RBC  / WBC  / Hyaline  / Gran  / Sq Epi  / Non Sq Epi  / Bacteria     Urine Creatinine 46      [02-19-25 @ 16:56]  Urine Sodium 80      [02-19-25 @ 16:56]  Urine Urea Nitrogen 581      [02-19-25 @ 16:51]    Iron 25, TIBC 249, %sat 10      [02-17-25 @ 07:23]  Ferritin 76      [02-17-25 @ 07:23]         69

## 2025-02-20 NOTE — PROGRESS NOTE ADULT - SUBJECTIVE AND OBJECTIVE BOX
Follow Up:  fever, diarrhea    Interval History/ROS: pt febrile to 100.4 yesterday, but novomiting, diarrhea, cough, the blood cx from 2/17 now with GPC in clusters          Allergies  No Known Allergies        ANTIMICROBIALS:  piperacillin/tazobactam IVPB.. 3.375 every 8 hours      OTHER MEDS:  acetaminophen     Tablet .. 650 milliGRAM(s) Oral every 6 hours PRN  apixaban 10 milliGRAM(s) Oral every 12 hours  aspirin  chewable 81 milliGRAM(s) Oral daily  atorvastatin 20 milliGRAM(s) Oral at bedtime  ferrous    sulfate 325 milliGRAM(s) Oral daily  metoprolol succinate ER 25 milliGRAM(s) Oral daily  mirtazapine 7.5 milliGRAM(s) Oral daily  NIFEdipine XL 60 milliGRAM(s) Oral daily  sodium chloride 0.9%. 1000 milliLiter(s) IV Continuous <Continuous>  tamsulosin 0.4 milliGRAM(s) Oral at bedtime      Vital Signs Last 24 Hrs  T(C): 36.5 (20 Feb 2025 04:18), Max: 38 (19 Feb 2025 12:58)  T(F): 97.7 (20 Feb 2025 04:18), Max: 100.4 (19 Feb 2025 12:58)  HR: 57 (20 Feb 2025 04:18) (57 - 69)  BP: 149/61 (20 Feb 2025 04:18) (131/63 - 168/71)  BP(mean): --  RR: 17 (20 Feb 2025 04:18) (17 - 17)  SpO2: 93% (20 Feb 2025 04:18) (92% - 94%)    Parameters below as of 20 Feb 2025 04:18  Patient On (Oxygen Delivery Method): room air        Physical Exam:  General:    NAD,  non toxic  Respiratory:    comfortable on RA  abd:     soft,   no tenderness  :     no  kimbrough  Musculoskeletal:   no joint swelling  vascular: no phlebitis  Skin:    no rash                        8.2    17.34 )-----------( 269      ( 20 Feb 2025 06:39 )             26.2       02-20    139  |  106  |  32[H]  ----------------------------<  85  3.9   |  23  |  1.34[H]    Ca    8.1[L]      20 Feb 2025 06:39  Phos  3.3     02-19  Mg     2.3     02-19    TPro  5.8[L]  /  Alb  2.7[L]  /  TBili  0.2  /  DBili  x   /  AST  10  /  ALT  11  /  AlkPhos  57  02-20      Urinalysis Basic - ( 20 Feb 2025 06:39 )    Color: x / Appearance: x / SG: x / pH: x  Gluc: 85 mg/dL / Ketone: x  / Bili: x / Urobili: x   Blood: x / Protein: x / Nitrite: x   Leuk Esterase: x / RBC: x / WBC x   Sq Epi: x / Non Sq Epi: x / Bacteria: x        MICROBIOLOGY:  v  .Blood BLOOD  02-17-25   No growth at 48 Hours  --    Growth in aerobic bottle: Gram Positive Cocci in Clusters          Rapid RVP Result: NotDetec (02-17 @ 15:25)        RADIOLOGY:  Images independently visualized and reviewed personally, findings as below  < from: Xray Chest 1 View AP/PA (02.17.25 @ 23:43) >  IMPRESSION:  No active pulmonary disease.    < end of copied text >  < from: US Renal (02.15.25 @ 15:12) >    IMPRESSION:  No hydronephrosis.    Bilateral renal cysts, larger on the left, some of which demonstrate thin   septations.    Diffuse bladder wall thickening, out of proportion to the level of   underdistention; etiologies include chronic bladder outlet obstruction if   patient has an enlarged prostate versus underlying cystitis. Urinalysis   and clinical correlation may be helpful for clarification.    --- End of Report ---    < end of copied text >

## 2025-02-20 NOTE — PROGRESS NOTE ADULT - ASSESSMENT
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 Patient presented to Dr. Landers's office today for followup and evaluation of left leg swelling, but was referred to the ED given reported 3-4 days of weakness, poor PO intake, and diarrhea. He is found to have DVT and DAX.    A/P:    DAX:  FENa>1  DAX workup from 2/15 suggestive of ATN  Losartan on hold  Renal US: Neg for Hydro   sCr worsening at present, pt intermittently febrile and with bradycardia   s/p IVF   FeNa 2% - indeterminate.  Bladder scan 2/19 with 215cc urine, continue to monitor bladder scan q6h   Pt on Zosyn - CBC neg for eosinophilia.  S.Cr improving today; however noted leukocytosis.  Monitor I/O  Monitor renal function closely    HTN:  BP fluctuating; better today.  Losartan on hold and Amlodipine discontinued  Nifedipine increased 60mg qd   Low sodium diet.  Monitor BP    Anemia:  Iron deficient vs other etiology.  On PO iron supplements.  Hgb worsening.  Heme/onc following.  Planned for outpt EGD.  Transfuse for Hgb <8.    DVT:  Follow up vascular  On Apixaban.    Diarrhea  Improving per pt 84M with history of HTN, HLD, bilateral LE bypass grafts in the 1980s, L EIA to profunda bypass graft and thrombectomy of prior L fem-pop bypass graft in 2/2019 with postoperative L femoral DVT previously on Eliquis presenting to ED for diarrhea and weakness. Patient presented to Dr. Landers's office today for followup and evaluation of left leg swelling, but was referred to the ED given reported 3-4 days of weakness, poor PO intake, and diarrhea. He is found to have DVT and DAX.    A/P:    DAX:  FENa>1  DAX workup from 2/15 suggestive of ATN  Losartan on hold  Renal US: Neg for Hydro   sCr worsening at present, pt intermittently febrile and with bradycardia   s/p IVF   FeNa 2% - indeterminate.  Bladder scan 2/19 with 215cc urine, continue to monitor bladder scan q6h   Pt on Zosyn - CBC neg for eosinophilia.  S.Cr improving today; however noted leukocytosis.  Monitor I/O  Monitor renal function closely    HTN:  BP fluctuating; better today.  Losartan on hold and Amlodipine discontinued  Nifedipine increased 60mg qd   Low sodium diet.  Monitor BP    Anemia:  Iron deficient vs other etiology.  On PO iron supplements.  Hgb worsening.  Heme/onc following.  Planned for outpt EGD.  Transfuse for Hgb <8.    Hypocalcemia:  In setting of hypoalbuminemia.  Optimize albumin.  Corrected Ca acceptable.  Monitor Ca.    DVT:  Follow up vascular  On Apixaban.    Diarrhea  Improving per pt

## 2025-02-21 LAB
ALBUMIN SERPL ELPH-MCNC: 2.5 G/DL — LOW (ref 3.3–5)
ALP SERPL-CCNC: 59 U/L — SIGNIFICANT CHANGE UP (ref 40–120)
ALT FLD-CCNC: 12 U/L — SIGNIFICANT CHANGE UP (ref 10–45)
ANION GAP SERPL CALC-SCNC: 14 MMOL/L — SIGNIFICANT CHANGE UP (ref 5–17)
AST SERPL-CCNC: 12 U/L — SIGNIFICANT CHANGE UP (ref 10–40)
BASOPHILS # BLD AUTO: 0.07 K/UL — SIGNIFICANT CHANGE UP (ref 0–0.2)
BASOPHILS NFR BLD AUTO: 0.8 % — SIGNIFICANT CHANGE UP (ref 0–2)
BILIRUB SERPL-MCNC: 0.1 MG/DL — LOW (ref 0.2–1.2)
BUN SERPL-MCNC: 33 MG/DL — HIGH (ref 7–23)
CALCIUM SERPL-MCNC: 8.2 MG/DL — LOW (ref 8.4–10.5)
CHLORIDE SERPL-SCNC: 101 MMOL/L — SIGNIFICANT CHANGE UP (ref 96–108)
CO2 SERPL-SCNC: 22 MMOL/L — SIGNIFICANT CHANGE UP (ref 22–31)
CREAT SERPL-MCNC: 1.54 MG/DL — HIGH (ref 0.5–1.3)
EGFR: 43 ML/MIN/1.73M2 — LOW
EGFR: 43 ML/MIN/1.73M2 — LOW
EOSINOPHIL # BLD AUTO: 0.25 K/UL — SIGNIFICANT CHANGE UP (ref 0–0.5)
EOSINOPHIL NFR BLD AUTO: 2.7 % — SIGNIFICANT CHANGE UP (ref 0–6)
GLUCOSE SERPL-MCNC: 247 MG/DL — HIGH (ref 70–99)
GRAM STN FLD: ABNORMAL
HCT VFR BLD CALC: 25.8 % — LOW (ref 39–50)
HGB BLD-MCNC: 7.8 G/DL — LOW (ref 13–17)
IMM GRANULOCYTES NFR BLD AUTO: 0.5 % — SIGNIFICANT CHANGE UP (ref 0–0.9)
LYMPHOCYTES # BLD AUTO: 1.5 K/UL — SIGNIFICANT CHANGE UP (ref 1–3.3)
LYMPHOCYTES # BLD AUTO: 16.4 % — SIGNIFICANT CHANGE UP (ref 13–44)
MCHC RBC-ENTMCNC: 26.1 PG — LOW (ref 27–34)
MCHC RBC-ENTMCNC: 30.2 G/DL — LOW (ref 32–36)
MCV RBC AUTO: 86.3 FL — SIGNIFICANT CHANGE UP (ref 80–100)
MONOCYTES # BLD AUTO: 0.93 K/UL — HIGH (ref 0–0.9)
MONOCYTES NFR BLD AUTO: 10.2 % — SIGNIFICANT CHANGE UP (ref 2–14)
NEUTROPHILS # BLD AUTO: 6.32 K/UL — SIGNIFICANT CHANGE UP (ref 1.8–7.4)
NEUTROPHILS NFR BLD AUTO: 69.4 % — SIGNIFICANT CHANGE UP (ref 43–77)
NRBC BLD AUTO-RTO: 0 /100 WBCS — SIGNIFICANT CHANGE UP (ref 0–0)
PLATELET # BLD AUTO: 274 K/UL — SIGNIFICANT CHANGE UP (ref 150–400)
POTASSIUM SERPL-MCNC: 4 MMOL/L — SIGNIFICANT CHANGE UP (ref 3.5–5.3)
POTASSIUM SERPL-SCNC: 4 MMOL/L — SIGNIFICANT CHANGE UP (ref 3.5–5.3)
PROT SERPL-MCNC: 6 G/DL — SIGNIFICANT CHANGE UP (ref 6–8.3)
RBC # BLD: 2.99 M/UL — LOW (ref 4.2–5.8)
RBC # FLD: 16.4 % — HIGH (ref 10.3–14.5)
SODIUM SERPL-SCNC: 137 MMOL/L — SIGNIFICANT CHANGE UP (ref 135–145)
VANCOMYCIN TROUGH SERPL-MCNC: 6.8 UG/ML — LOW (ref 10–20)
WBC # BLD: 9.12 K/UL — SIGNIFICANT CHANGE UP (ref 3.8–10.5)
WBC # FLD AUTO: 9.12 K/UL — SIGNIFICANT CHANGE UP (ref 3.8–10.5)

## 2025-02-21 PROCEDURE — 99233 SBSQ HOSP IP/OBS HIGH 50: CPT

## 2025-02-21 PROCEDURE — G0545: CPT

## 2025-02-21 RX ORDER — APIXABAN 2.5 MG/1
5 TABLET, FILM COATED ORAL EVERY 12 HOURS
Refills: 0 | Status: DISCONTINUED | OUTPATIENT
Start: 2025-02-22 | End: 2025-02-27

## 2025-02-21 RX ORDER — VANCOMYCIN HCL IN 5 % DEXTROSE 1.5G/250ML
1000 PLASTIC BAG, INJECTION (ML) INTRAVENOUS EVERY 24 HOURS
Refills: 0 | Status: DISCONTINUED | OUTPATIENT
Start: 2025-02-21 | End: 2025-02-21

## 2025-02-21 RX ORDER — CEFTRIAXONE 500 MG/1
2000 INJECTION, POWDER, FOR SOLUTION INTRAMUSCULAR; INTRAVENOUS EVERY 24 HOURS
Refills: 0 | Status: COMPLETED | OUTPATIENT
Start: 2025-02-21 | End: 2025-02-28

## 2025-02-21 RX ADMIN — Medication 40 MILLIGRAM(S): at 17:23

## 2025-02-21 RX ADMIN — Medication 81 MILLIGRAM(S): at 12:00

## 2025-02-21 RX ADMIN — APIXABAN 10 MILLIGRAM(S): 2.5 TABLET, FILM COATED ORAL at 05:11

## 2025-02-21 RX ADMIN — Medication 40 MILLIGRAM(S): at 05:11

## 2025-02-21 RX ADMIN — Medication 50 MILLILITER(S): at 10:38

## 2025-02-21 RX ADMIN — Medication 25 GRAM(S): at 14:16

## 2025-02-21 RX ADMIN — Medication 250 MILLIGRAM(S): at 14:45

## 2025-02-21 RX ADMIN — CEFTRIAXONE 100 MILLIGRAM(S): 500 INJECTION, POWDER, FOR SOLUTION INTRAMUSCULAR; INTRAVENOUS at 17:23

## 2025-02-21 RX ADMIN — TAMSULOSIN HYDROCHLORIDE 0.4 MILLIGRAM(S): 0.4 CAPSULE ORAL at 21:20

## 2025-02-21 RX ADMIN — ATORVASTATIN CALCIUM 20 MILLIGRAM(S): 80 TABLET, FILM COATED ORAL at 21:20

## 2025-02-21 RX ADMIN — Medication 60 MILLIGRAM(S): at 05:10

## 2025-02-21 RX ADMIN — METOPROLOL SUCCINATE 25 MILLIGRAM(S): 50 TABLET, EXTENDED RELEASE ORAL at 05:10

## 2025-02-21 RX ADMIN — Medication 25 GRAM(S): at 05:12

## 2025-02-21 RX ADMIN — MIRTAZAPINE 7.5 MILLIGRAM(S): 30 TABLET, FILM COATED ORAL at 12:00

## 2025-02-21 RX ADMIN — Medication 325 MILLIGRAM(S): at 12:00

## 2025-02-21 RX ADMIN — APIXABAN 10 MILLIGRAM(S): 2.5 TABLET, FILM COATED ORAL at 17:23

## 2025-02-21 NOTE — PROGRESS NOTE ADULT - ASSESSMENT
85 y/o M with pmhx htn, hld, dvt, pad presents to the ER from vascular surgery office with diarrhea and generalized weakness over the last few days.         Fevers   Blood cx blood cx on 2/17 showed GPC on 2/19  * s/p zosyn 2/19-2/21 and a vanco 2/20-2/21, switch to ceftriaxone 2 qd  * follow the repeat blood cx and repeat q 48h until clear      * repeat blood cx s/p vanco 1 qd  **CT chest/abd/pelvis CT  no growth to date   ID following   Continue with Zosyn for now       Anemia GI consult appreciated   Iron def Anemia Stable       VA Dopplers LLE shows Acute deep venous thrombosis: above the knee.    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  Occluded left femoral bypass and popliteal artery. This will be further   evaluated with dedicated lower extremity arterial ultrasound.    Renal Sono shows Bilateral renal cysts, larger on the left, some of which demonstrate thin   septations.    Diffuse bladder wall thickening, out of proportion to the level of   underdistention; etiologies include chronic bladder outlet obstruction if   patient has an enlarged prostate versus underlying cystitis    Acute DVT   Eliquis   Vascular Surgery Consulted     DAX pre renal /ATN     Renal consulted   Reviewed Renal sono   No hydronephrosis   Diarrhea resolved   HTN Hold Olmesartan   Continue with Norvasc and Toprol     HLD Statins

## 2025-02-21 NOTE — PROGRESS NOTE ADULT - SUBJECTIVE AND OBJECTIVE BOX
Follow Up:  fever, diarrhea    Interval History/ROS: no more fever, vomiting, diarrhea, cough, the blood cx from 2/19 also positive with GPC in clusters          Allergies  No Known Allergies        ANTIMICROBIALS:  piperacillin/tazobactam IVPB.. 3.375 every 8 hours  vancomycin  IVPB 1000 every 24 hours      OTHER MEDS:  acetaminophen     Tablet .. 650 milliGRAM(s) Oral every 6 hours PRN  apixaban 10 milliGRAM(s) Oral every 12 hours  aspirin  chewable 81 milliGRAM(s) Oral daily  atorvastatin 20 milliGRAM(s) Oral at bedtime  ferrous    sulfate 325 milliGRAM(s) Oral daily  metoprolol succinate ER 25 milliGRAM(s) Oral daily  mirtazapine 7.5 milliGRAM(s) Oral daily  NIFEdipine XL 60 milliGRAM(s) Oral daily  pantoprazole    Tablet 40 milliGRAM(s) Oral two times a day  sodium chloride 0.9%. 1000 milliLiter(s) IV Continuous <Continuous>  tamsulosin 0.4 milliGRAM(s) Oral at bedtime      Vital Signs Last 24 Hrs  T(C): 36.8 (21 Feb 2025 08:46), Max: 36.8 (20 Feb 2025 20:20)  T(F): 98.3 (21 Feb 2025 08:46), Max: 98.3 (21 Feb 2025 00:11)  HR: 59 (21 Feb 2025 08:46) (59 - 71)  BP: 125/66 (21 Feb 2025 08:46) (103/53 - 136/61)  BP(mean): --  RR: 17 (21 Feb 2025 08:46) (17 - 17)  SpO2: 92% (21 Feb 2025 08:46) (92% - 100%)    Parameters below as of 21 Feb 2025 08:46  Patient On (Oxygen Delivery Method): room air        Physical Exam:  General:    NAD,  non toxic  Respiratory:    comfortable on RA  abd:     soft,   no tenderness  :     no  kimbrough  Musculoskeletal:   no joint swelling  vascular: no phlebitis  Skin:    no rash                          7.8    9.12  )-----------( 274      ( 21 Feb 2025 07:19 )             25.8       02-21    137  |  101  |  33[H]  ----------------------------<  247[H]  4.0   |  22  |  1.54[H]    Ca    8.2[L]      21 Feb 2025 07:19    TPro  6.0  /  Alb  2.5[L]  /  TBili  0.1[L]  /  DBili  x   /  AST  12  /  ALT  12  /  AlkPhos  59  02-21      Urinalysis Basic - ( 21 Feb 2025 07:19 )    Color: x / Appearance: x / SG: x / pH: x  Gluc: 247 mg/dL / Ketone: x  / Bili: x / Urobili: x   Blood: x / Protein: x / Nitrite: x   Leuk Esterase: x / RBC: x / WBC x   Sq Epi: x / Non Sq Epi: x / Bacteria: x        MICROBIOLOGY:  Vancomycin Level, Trough: 6.8 ug/mL (02-21-25 @ 11:48)  v  .Blood Blood-Peripheral  02-19-25   Growth in aerobic bottle: Gram Positive Cocci in Clusters  --    Growth in aerobic bottle: Gram Positive Cocci in Clusters      .Blood Blood-Peripheral  02-19-25   Growth in aerobic bottle: Gram Positive Cocci in Clusters  --    Growth in aerobic bottle: Gram Positive Cocci in Clusters      .Blood BLOOD  02-17-25   No growth at 72 Hours  --  Blood Culture PCR          Rapid RVP Result: NotDetec (02-17 @ 15:25)        RADIOLOGY:  Images independently visualized and reviewed personally, findings as below  < from: CT Abdomen and Pelvis No Cont (02.20.25 @ 13:06) >  IMPRESSION:  No acute intrathoracic or intra-abdominal pathology identified on   noncontrast CT.    Soft tissue in the retroperitoneal region may represent collapsed bowel   or lymphadenopathy.    < end of copied text >

## 2025-02-21 NOTE — PROGRESS NOTE ADULT - SUBJECTIVE AND OBJECTIVE BOX
Patient is a 85y old  Male who presents with a chief complaint of Referred by Vascular for Weakness Diarrhea (20 Feb 2025 17:11)    says that he feels okay. No HA, dizziness, nosebleed, CP, SOB, N/V/D, abd pain, dysuria    Medication:   acetaminophen     Tablet .. 650 milliGRAM(s) Oral every 6 hours PRN  apixaban 10 milliGRAM(s) Oral every 12 hours  aspirin  chewable 81 milliGRAM(s) Oral daily  atorvastatin 20 milliGRAM(s) Oral at bedtime  ferrous    sulfate 325 milliGRAM(s) Oral daily  metoprolol succinate ER 25 milliGRAM(s) Oral daily  mirtazapine 7.5 milliGRAM(s) Oral daily  NIFEdipine XL 60 milliGRAM(s) Oral daily  pantoprazole    Tablet 40 milliGRAM(s) Oral two times a day  piperacillin/tazobactam IVPB.. 3.375 Gram(s) IV Intermittent every 8 hours  sodium chloride 0.9%. 1000 milliLiter(s) IV Continuous <Continuous>  tamsulosin 0.4 milliGRAM(s) Oral at bedtime      Physical exam    T(C): 36.8 (02-21-25 @ 08:46), Max: 37.1 (02-20-25 @ 12:31)  HR: 59 (02-21-25 @ 08:46) (59 - 71)  BP: 125/66 (02-21-25 @ 08:46) (103/53 - 136/61)  RR: 17 (02-21-25 @ 08:46) (17 - 18)  SpO2: 92% (02-21-25 @ 08:46) (92% - 100%)  Wt(kg): --    alert NAD  EOMI anicteric sclera  Cv s1 S2 RRR  Lungs clear B/L  abd soft NT ND +BS  No LE edema or tenderness    Labs                        7.8    9.12  )-----------( 274      ( 21 Feb 2025 07:19 )             25.8       02-21    137  |  101  |  33[H]  ----------------------------<  247[H]  4.0   |  22  |  1.54[H]    Ca    8.2[L]      21 Feb 2025 07:19    TPro  6.0  /  Alb  2.5[L]  /  TBili  0.1[L]  /  DBili  x   /  AST  12  /  ALT  12  /  AlkPhos  59  02-21      LIVER FUNCTIONS - ( 21 Feb 2025 07:19 )  Alb: 2.5 g/dL / Pro: 6.0 g/dL / ALK PHOS: 59 U/L / ALT: 12 U/L / AST: 12 U/L / GGT: x             [id number]

## 2025-02-21 NOTE — PROVIDER CONTACT NOTE (CRITICAL VALUE NOTIFICATION) - ASSESSMENT
Pt it A&O x 4. VS stable. Pt is receiving Zosyn Q8 as ordered. Pt denies any pain or discomfort.
vss
aox4, vss. denies pain. repeat bcx already drawn.
Pt is A&O x 4. Pt is receiving zosyn. Pt denies any pain or discomfort.

## 2025-02-21 NOTE — PROGRESS NOTE ADULT - ASSESSMENT
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 Patient presented to Dr. Landers's office today for followup and evaluation of left leg swelling, but was referred to the ED given reported 3-4 days of weakness, poor PO intake, and diarrhea. He is found to have DVT and DAX.    A/P:  DAX:  FENa>1  DAX workup from 2/15 suggestive of ATN  Losartan on hold  Renal US: Neg for Hydro   sCr worsening at present, pt intermittently febrile and with bradycardia   s/p IVF   FeNa 2% - indeterminate.  Bladder scan 2/19 with 215cc urine, continue to monitor bladder scan q6h   Pt on Zosyn - CBC neg for eosinophilia.  S.Cr fluctuating; worsened today -- possibly d/t worsening anemia.  Pt eating and drinking well; BP acceptable.    Started on NS 0.9% @75mL/hr x1L.  Monitor I/O  Monitor renal function closely    HTN:  BP controlled.  Losartan on hold and Amlodipine discontinued  C/W nifedipine 60mg qd   Low sodium diet.  Monitor BP    Anemia:  Iron deficient vs other etiology.  On PO iron supplements.  Hgb worsening.  Heme/onc and GI following.  Consider checking OB.  Planned for outpt EGD.  Transfuse for Hgb <8.    Hypocalcemia:  In setting of hypoalbuminemia.  Optimize albumin.  Corrected Ca acceptable.  Monitor Ca.    DVT:  Follow up vascular  On Apixaban.    Diarrhea  Resolved.

## 2025-02-21 NOTE — PROGRESS NOTE ADULT - ASSESSMENT
84 m with HTN, HLD, PAD, DVT, sent from vascular office for diarrhea, generalized weakness and poor PO intake.   initially afebrile, no WBC, DAX normal LFT  GI PCR negative  on 2/17 pt had chills then fever and vomiting, negative u/a and blood cx, again fever 100.4 2/19    admitted with diarrhea and DAX on 2/15 with negative GI PCR, no WBC, then developed a fever with vomiting 2/17, WBC slightly higher to 11 and worsening renal function but negative u/a and blood cx, monitored off antibiotics again fever to 100.4 on 2/19 with no symptoms  blood cx on 2/17 showed GPC negative PCR and blood cx 2/19, also GPC in cluster  chest/abd CT but without contrast showed No acute intrathoracic or intra-abdominal pathology identified on noncontrast CT. Soft tissue in the retroperitoneal region may represent collapsed bowel or lymphadenopathy.    * vanco 1 qd  * follow the repeat blood cx and repeat q 48h until clear  * follow the identification of the GPC  * echo  * c/w zosyn, started 2/19  * monitor CBC/diff and CMP    The above assessment and plan was discussed with the primary team    Alissa Forman MD  contact on teams  After 5pm and on weekends call [phone]     84 m with HTN, HLD, PAD, DVT, sent from vascular office for diarrhea, generalized weakness and poor PO intake.   initially afebrile, no WBC, DAX normal LFT  GI PCR negative  on 2/17 pt had chills then fever and vomiting, negative u/a and blood cx, again fever 100.4 2/19    admitted with diarrhea and DAX on 2/15 with negative GI PCR, no WBC, then developed a fever with vomiting 2/17, WBC slightly higher to 11 and worsening renal function but negative u/a and blood cx, monitored off antibiotics again fever to 100.4 on 2/19 with no symptoms  blood cx on 2/17 showed gemella and blood cx 2/19, also GPC in cluster, r/o endocarditis  chest/abd CT but without contrast showed No acute intrathoracic or intra-abdominal pathology identified on noncontrast CT. Soft tissue in the retroperitoneal region may represent collapsed bowel or lymphadenopathy.    * s/p zosyn 2/19-2/21 and a vanco 2/20-2/21, switch to ceftriaxone 2 qd  * follow the repeat blood cx and repeat q 48h until clear  * TTE and might need SEBASTIAN  * dental eval  * monitor CBC/diff and CMP    The above assessment and plan was discussed with the primary team    Alissa Forman MD  contact on teams  After 5pm and on weekends call [phone]

## 2025-02-21 NOTE — PROVIDER CONTACT NOTE (CRITICAL VALUE NOTIFICATION) - TEST AND RESULT REPORTED:
Blood Cultures 2/19/2025: growth in aerobic bottle gram positive cocci in clusters
2/17 blood cx aerobic positive gram pos cocci in clusters
Blood culture 2/19: Preliminary result - growth in aerobic bottle gram positive cocci in clusters.
Blood cultures 2/17 preliminary growth aerobic gram positive coccyx in clusters

## 2025-02-21 NOTE — PROVIDER CONTACT NOTE (CRITICAL VALUE NOTIFICATION) - SITUATION
Blood Cultures 2/19/2025: growth in aerobic bottle gram positive cocci in clusters
2/17 blood cx aerobic positive gram pos cocci in clusters
Blood culture 2/19: Preliminary result - growth in aerobic bottle gram positive cocci in clusters
Blood cultures 2/17 preliminary growth aerobic gram positive coccyx in clusters

## 2025-02-21 NOTE — PROGRESS NOTE ADULT - SUBJECTIVE AND OBJECTIVE BOX
[facility]      Nolberto Crow NP    [address] [place] [address]  [phone]      INTERVAL HPI/OVERNIGHT EVENTS:    tolerating diet  repeat cultures positive    MEDICATIONS  (STANDING):  apixaban 10 milliGRAM(s) Oral every 12 hours  aspirin  chewable 81 milliGRAM(s) Oral daily  atorvastatin 20 milliGRAM(s) Oral at bedtime  ferrous    sulfate 325 milliGRAM(s) Oral daily  metoprolol succinate ER 25 milliGRAM(s) Oral daily  mirtazapine 7.5 milliGRAM(s) Oral daily  NIFEdipine XL 60 milliGRAM(s) Oral daily  piperacillin/tazobactam IVPB.. 3.375 Gram(s) IV Intermittent every 8 hours  sodium chloride 0.9%. 1000 milliLiter(s) (75 mL/Hr) IV Continuous <Continuous>  tamsulosin 0.4 milliGRAM(s) Oral at bedtime    MEDICATIONS  (PRN):  acetaminophen     Tablet .. 650 milliGRAM(s) Oral every 6 hours PRN Temp greater or equal to 38C (100.4F)      Allergies    No Known Allergies    Intolerances        ROS:   General:  No  fevers, chills, night sweats, fatigue,   Eyes:  Good vision, no reported pain  ENT:  No sore throat, pain, runny nose, dysphagia  CV:  No pain, palpitations, hypo/hypertension  Resp:  No dyspnea, cough, tachypnea, wheezing  GI:  No pain, No nausea, No vomiting, No diarrhea, No constipation, No weight loss, No fever, No pruritis, No rectal bleeding, No tarry stools, No dysphagia,  :  No pain, bleeding, incontinence, nocturia  Muscle:  No pain, weakness  Neuro:  No weakness, tingling, memory problems  Psych:  No fatigue, insomnia, mood problems, depression  Endocrine:  No polyuria, polydipsia, cold/heat intolerance  Heme:  No petechiae, ecchymosis, easy bruisability  Skin:  No rash, tattoos, scars, edema      PHYSICAL EXAM:   Vital Signs:  Vital Signs Last 24 Hrs  T(C): 36.4 (20 Feb 2025 16:50), Max: 37.1 (20 Feb 2025 12:31)  T(F): 97.6 (20 Feb 2025 16:50), Max: 98.7 (20 Feb 2025 12:31)  HR: 59 (20 Feb 2025 16:50) (57 - 67)  BP: 132/68 (20 Feb 2025 16:50) (125/62 - 149/61)  BP(mean): --  RR: 17 (20 Feb 2025 16:50) (17 - 18)  SpO2: 93% (20 Feb 2025 16:50) (92% - 94%)    Parameters below as of 20 Feb 2025 16:50  Patient On (Oxygen Delivery Method): room air      Daily     Daily     GENERAL:  Appears stated age,   HEENT:  NC/AT,    CHEST:  Full & symmetric excursion,   HEART:  Regular rhythm,  ABDOMEN:  Soft, non-tender, non-distended,  EXTEREMITIES:  no cyanosis  SKIN:  No rash  NEURO:  Alert,       LABS:                        8.2    17.34 )-----------( 269      ( 20 Feb 2025 06:39 )             26.2     02-20    139  |  106  |  32[H]  ----------------------------<  85  3.9   |  23  |  1.34[H]    Ca    8.1[L]      20 Feb 2025 06:39  Phos  3.3     02-19  Mg     2.3     02-19    TPro  5.8[L]  /  Alb  2.7[L]  /  TBili  0.2  /  DBili  x   /  AST  10  /  ALT  11  /  AlkPhos  57  02-20      Urinalysis Basic - ( 20 Feb 2025 06:39 )    Color: x / Appearance: x / SG: x / pH: x  Gluc: 85 mg/dL / Ketone: x  / Bili: x / Urobili: x   Blood: x / Protein: x / Nitrite: x   Leuk Esterase: x / RBC: x / WBC x   Sq Epi: x / Non Sq Epi: x / Bacteria: x        RADIOLOGY & ADDITIONAL TESTS:

## 2025-02-21 NOTE — PROGRESS NOTE ADULT - ASSESSMENT
85-year-old male with DVT and getting Eliquis.     Anemia mixed iron deficiency and due to kidney disease. Getting oral iron, but Hgb continues to trend down. GI is following. Monitor the Hgb and transfuse as needed.

## 2025-02-21 NOTE — PROGRESS NOTE ADULT - SUBJECTIVE AND OBJECTIVE BOX
Patient is a 84y old  Male who presents with a chief complaint of Referred by Vascular for Weakness Diarrhea (16 Feb 2025 14:47)      SUBJECTIVE / OVERNIGHT EVENTS: no events     T(C): 37 (02-21-25 @ 12:18), Max: 37 (02-21-25 @ 12:18)  HR: 67 (02-21-25 @ 12:18) (59 - 67)  BP: 131/64 (02-21-25 @ 12:18) (125/66 - 131/64)  RR: 17 (02-21-25 @ 12:18) (17 - 17)  SpO2: 97% (02-21-25 @ 12:18) (92% - 97%)      MEDICATIONS  (STANDING):  aspirin  chewable 81 milliGRAM(s) Oral daily  atorvastatin 20 milliGRAM(s) Oral at bedtime  cefTRIAXone   IVPB 2000 milliGRAM(s) IV Intermittent every 24 hours  ferrous    sulfate 325 milliGRAM(s) Oral daily  metoprolol succinate ER 25 milliGRAM(s) Oral daily  mirtazapine 7.5 milliGRAM(s) Oral daily  NIFEdipine XL 60 milliGRAM(s) Oral daily  pantoprazole    Tablet 40 milliGRAM(s) Oral two times a day  sodium chloride 0.9%. 1000 milliLiter(s) (75 mL/Hr) IV Continuous <Continuous>  tamsulosin 0.4 milliGRAM(s) Oral at bedtime    MEDICATIONS  (PRN):  acetaminophen     Tablet .. 650 milliGRAM(s) Oral every 6 hours PRN Temp greater or equal to 38C (100.4F)  PHYSICAL EXAM:  GENERAL: NAD, well-developed  HEAD:  Atraumatic, Normocephalic  EYES: EOMI, PERRLA, conjunctiva and sclera clear  NECK: Supple, No JVD  CHEST/LUNG: Clear to auscultation bilaterally; No wheeze  HEART: Regular rate and rhythm; No murmurs, rubs, or gallops  ABDOMEN: Soft, Nontender, Nondistended; Bowel sounds present  EXTREMITIES:  2+ Peripheral Pulses, No clubbing, cyanosis, or edema  PSYCH: AAOx3  NEUROLOGY: non-focal  SKIN: No rashes or lesions                                  7.8    9.12  )-----------( 274      ( 21 Feb 2025 07:19 )             25.8           LIVER FUNCTIONS - ( 21 Feb 2025 07:19 )  Alb: 2.5 g/dL / Pro: 6.0 g/dL / ALK PHOS: 59 U/L / ALT: 12 U/L / AST: 12 U/L / GGT: x             137|101|33<247  4.0|22|1.54  8.2,--,--  02-21 @ 07:19

## 2025-02-21 NOTE — PROGRESS NOTE ADULT - SUBJECTIVE AND OBJECTIVE BOX
[facility]   MD INDERJIT NELSON MD ANGELA WONG, PA QIAN CHEN, NP    TEL:  OFFICE: [phone]  From 5pm-7am Answering Service [phone]    -- RENAL FOLLOW UP NOTE ---Date of Service 02-21-25 @ 16:06    Patient is a 85y old  Male who presents with a chief complaint of Referred by Vascular for Weakness Diarrhea.    Patient seen and examined at bedside. No chest pain/SOB.    VITALS:  T(F): 98.6 (02-21-25 @ 12:18), Max: 98.6 (02-21-25 @ 12:18)  HR: 67 (02-21-25 @ 12:18)  BP: 131/64 (02-21-25 @ 12:18)  RR: 17 (02-21-25 @ 12:18)  SpO2: 97% (02-21-25 @ 12:18)  Wt(kg): --    02-20 @ 07:01  -  02-21 @ 07:00  --------------------------------------------------------  IN: 0 mL / OUT: 1325 mL / NET: -1325 mL    02-21 @ 07:01  -  02-21 @ 16:06  --------------------------------------------------------  IN: 240 mL / OUT: 300 mL / NET: -60 mL      PHYSICAL EXAM:  General: NAD  Neck: No JVD  Respiratory: CTAB, no wheezes, rales or rhonchi  Cardiovascular: S1, S2, RRR  Gastrointestinal: BS+, soft, NT/ND  Extremities: No peripheral edema    Hospital Medications:   MEDICATIONS  (STANDING):  apixaban 10 milliGRAM(s) Oral every 12 hours  aspirin  chewable 81 milliGRAM(s) Oral daily  atorvastatin 20 milliGRAM(s) Oral at bedtime  cefTRIAXone   IVPB 2000 milliGRAM(s) IV Intermittent every 24 hours  ferrous    sulfate 325 milliGRAM(s) Oral daily  metoprolol succinate ER 25 milliGRAM(s) Oral daily  mirtazapine 7.5 milliGRAM(s) Oral daily  NIFEdipine XL 60 milliGRAM(s) Oral daily  pantoprazole    Tablet 40 milliGRAM(s) Oral two times a day  sodium chloride 0.9%. 1000 milliLiter(s) (75 mL/Hr) IV Continuous <Continuous>  tamsulosin 0.4 milliGRAM(s) Oral at bedtime      LABS:  02-21    137  |  101  |  33[H]  ----------------------------<  247[H]  4.0   |  22  |  1.54[H]    Ca    8.2[L]      21 Feb 2025 07:19    TPro  6.0  /  Alb  2.5[L]  /  TBili  0.1[L]  /  DBili      /  AST  12  /  ALT  12  /  AlkPhos  59  02-21    Creatinine Trend: 1.54 <--, 1.34 <--, 1.61 <--, 1.35 <--, 1.29 <--, 1.17 <--, 1.63 <--, 1.36 <--    Albumin: 2.5 g/dL (02-21 @ 07:19)                            7.8    9.12  )-----------( 274      ( 21 Feb 2025 07:19 )             25.8     Urine Studies:  Urinalysis - [02-21-25 @ 07:19]      Color  / Appearance  / SG  / pH       Gluc 247 / Ketone   / Bili  / Urobili        Blood  / Protein  / Leuk Est  / Nitrite       RBC  / WBC  / Hyaline  / Gran  / Sq Epi  / Non Sq Epi  / Bacteria     Urine Creatinine 46      [02-19-25 @ 16:56]  Urine Sodium 80      [02-19-25 @ 16:56]  Urine Urea Nitrogen 581      [02-19-25 @ 16:51]    Iron 25, TIBC 249, %sat 10      [02-17-25 @ 07:23]  Ferritin 76      [02-17-25 @ 07:23]

## 2025-02-21 NOTE — PROGRESS NOTE ADULT - ASSESSMENT
anemia  dvt    cont a/c and asa  proton pump inhibitor bid  dark stool may be 2/2 iron  monitor cbc  repeat cultures noted  pending echo   antibiotics per ID  will follow     I reviewed the overnight course of events on the unit, re-confirming the patient history. I discussed the care with the patient and their family  The plan of care was discussed with the physician assistant and modifications were made to the notation where appropriate.   Differential diagnosis and plan of care discussed with patient after the evaluation  35 minutes spent on total encounter of which more than fifty percent of the encounter was spent counseling and/or coordinating care by the attending physician.  Advanced care planning was discussed with patient and family.  Advanced care planning forms were reviewed and discussed.  Risks, benefits and alternatives of gastroenterologic procedures were discussed in detail and all questions were answered.

## 2025-02-21 NOTE — PROVIDER CONTACT NOTE (CRITICAL VALUE NOTIFICATION) - BACKGROUND
Pt admitted with diarrhea.
pt admitted on 2/15 w diarrhea
pt admitted for diarrhea, has been febrile
Pt admitted with diarrhea.

## 2025-02-22 LAB
ANION GAP SERPL CALC-SCNC: 11 MMOL/L — SIGNIFICANT CHANGE UP (ref 5–17)
BUN SERPL-MCNC: 36 MG/DL — HIGH (ref 7–23)
CALCIUM SERPL-MCNC: 8.7 MG/DL — SIGNIFICANT CHANGE UP (ref 8.4–10.5)
CHLORIDE SERPL-SCNC: 108 MMOL/L — SIGNIFICANT CHANGE UP (ref 96–108)
CO2 SERPL-SCNC: 22 MMOL/L — SIGNIFICANT CHANGE UP (ref 22–31)
CREAT SERPL-MCNC: 1.53 MG/DL — HIGH (ref 0.5–1.3)
CULTURE RESULTS: ABNORMAL
CULTURE RESULTS: SIGNIFICANT CHANGE UP
EGFR: 44 ML/MIN/1.73M2 — LOW
EGFR: 44 ML/MIN/1.73M2 — LOW
GLUCOSE SERPL-MCNC: 100 MG/DL — HIGH (ref 70–99)
GRAM STN FLD: ABNORMAL
GRAM STN FLD: ABNORMAL
HCT VFR BLD CALC: 27.2 % — LOW (ref 39–50)
HGB BLD-MCNC: 8.2 G/DL — LOW (ref 13–17)
MCHC RBC-ENTMCNC: 25.9 PG — LOW (ref 27–34)
MCHC RBC-ENTMCNC: 30.1 G/DL — LOW (ref 32–36)
MCV RBC AUTO: 85.8 FL — SIGNIFICANT CHANGE UP (ref 80–100)
NRBC BLD AUTO-RTO: 0 /100 WBCS — SIGNIFICANT CHANGE UP (ref 0–0)
OB PNL STL: POSITIVE
ORGANISM # SPEC MICROSCOPIC CNT: ABNORMAL
ORGANISM # SPEC MICROSCOPIC CNT: ABNORMAL
PLATELET # BLD AUTO: 319 K/UL — SIGNIFICANT CHANGE UP (ref 150–400)
POTASSIUM SERPL-MCNC: 4.8 MMOL/L — SIGNIFICANT CHANGE UP (ref 3.5–5.3)
POTASSIUM SERPL-SCNC: 4.8 MMOL/L — SIGNIFICANT CHANGE UP (ref 3.5–5.3)
RBC # BLD: 3.17 M/UL — LOW (ref 4.2–5.8)
RBC # FLD: 16.5 % — HIGH (ref 10.3–14.5)
SODIUM SERPL-SCNC: 141 MMOL/L — SIGNIFICANT CHANGE UP (ref 135–145)
SPECIMEN SOURCE: SIGNIFICANT CHANGE UP
SPECIMEN SOURCE: SIGNIFICANT CHANGE UP
WBC # BLD: 8.94 K/UL — SIGNIFICANT CHANGE UP (ref 3.8–10.5)
WBC # FLD AUTO: 8.94 K/UL — SIGNIFICANT CHANGE UP (ref 3.8–10.5)

## 2025-02-22 PROCEDURE — 99232 SBSQ HOSP IP/OBS MODERATE 35: CPT

## 2025-02-22 PROCEDURE — G0545: CPT

## 2025-02-22 RX ADMIN — CEFTRIAXONE 100 MILLIGRAM(S): 500 INJECTION, POWDER, FOR SOLUTION INTRAMUSCULAR; INTRAVENOUS at 17:21

## 2025-02-22 RX ADMIN — ATORVASTATIN CALCIUM 20 MILLIGRAM(S): 80 TABLET, FILM COATED ORAL at 22:22

## 2025-02-22 RX ADMIN — APIXABAN 5 MILLIGRAM(S): 2.5 TABLET, FILM COATED ORAL at 17:19

## 2025-02-22 RX ADMIN — Medication 81 MILLIGRAM(S): at 12:18

## 2025-02-22 RX ADMIN — Medication 40 MILLIGRAM(S): at 05:21

## 2025-02-22 RX ADMIN — Medication 325 MILLIGRAM(S): at 12:17

## 2025-02-22 RX ADMIN — Medication 40 MILLIGRAM(S): at 17:19

## 2025-02-22 RX ADMIN — METOPROLOL SUCCINATE 25 MILLIGRAM(S): 50 TABLET, EXTENDED RELEASE ORAL at 05:22

## 2025-02-22 RX ADMIN — Medication 60 MILLIGRAM(S): at 05:21

## 2025-02-22 RX ADMIN — APIXABAN 5 MILLIGRAM(S): 2.5 TABLET, FILM COATED ORAL at 05:22

## 2025-02-22 RX ADMIN — TAMSULOSIN HYDROCHLORIDE 0.4 MILLIGRAM(S): 0.4 CAPSULE ORAL at 22:22

## 2025-02-22 RX ADMIN — MIRTAZAPINE 7.5 MILLIGRAM(S): 30 TABLET, FILM COATED ORAL at 12:18

## 2025-02-22 NOTE — PROGRESS NOTE ADULT - ASSESSMENT
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 Patient presented to Dr. Landers's office today for followup and evaluation of left leg swelling, but was referred to the ED given reported 3-4 days of weakness, poor PO intake, and diarrhea. He is found to have DVT and DAX.    A/P:  DAX:  FENa>1  DAX workup from 2/15 suggestive of ATN  Losartan on hold  Renal US: Neg for Hydro   sCr worsening at present, pt intermittently febrile and with bradycardia   s/p IVF   FeNa 2% - indeterminate.  Bladder scan 2/19 with 215cc urine, continue to monitor bladder scan q6h   Pt on Zosyn - CBC neg for eosinophilia.  S.Cr fluctuating; worsened on 2/21 -- possibly d/t worsening anemia.  Pt eating and drinking well; BP acceptable.    S/P NS 0.9% @75mL/hr x1L on 2/21.  Monitor I/O  Monitor renal function closely - no labs today.    HTN:  BP fluctuating.  Losartan on hold and Amlodipine discontinued  C/W nifedipine 60mg qd.  Increase nifedipine to 90mg if SBP persistently >150.  Low sodium diet.  Monitor BP    Anemia:  Iron deficient vs other etiology.  On PO iron supplements.  Hgb worsening.  Heme/onc and GI following.  Consider checking OB.  Planned for outpt EGD.  Transfuse for Hgb <8.    Hypocalcemia:  In setting of hypoalbuminemia.  Optimize albumin.  Corrected Ca acceptable.  Monitor Ca.    DVT:  Follow up vascular  On Apixaban.    Diarrhea  Resolved.

## 2025-02-22 NOTE — PROGRESS NOTE ADULT - ASSESSMENT
84 m with HTN, HLD, PAD, DVT, sent from vascular office for diarrhea, generalized weakness and poor PO intake.   initially afebrile, no WBC, DAX normal LFT  GI PCR negative  on 2/17 pt had chills then fever and vomiting, negative u/a and blood cx, again fever 100.4 2/19    admitted with diarrhea and DAX on 2/15 with negative GI PCR, no WBC, then developed a fever with vomiting 2/17, WBC slightly higher to 11 and worsening renal function but negative u/a and blood cx, monitored off antibiotics again fever to 100.4 on 2/19 with no symptoms  blood cx on 2/17 showed gemella and blood cx 2/19, also GPC in cluster, r/o endocarditis  chest/abd CT but without contrast showed No acute intrathoracic or intra-abdominal pathology identified on noncontrast CT. Soft tissue in the retroperitoneal region may represent collapsed bowel or lymphadenopathy.    * s/p zosyn 2/19-2/21 and a vanco 2/20-2/21,   Continue ceftriaxone 2 qd  * follow the repeat blood cx and repeat q 48h until clear, negative to date from 2/20/25  * Obtain TTE and might need SEBASTIAN  * Obtain dental eval  * monitor CBC/diff and CMP    Shaheed De La Cruz MD  contact on teams  After 5pm and on weekends call [phone]

## 2025-02-22 NOTE — PROGRESS NOTE ADULT - SUBJECTIVE AND OBJECTIVE BOX
Follow Up:  bacteremia    Interval History/ROS:  Overnight: No acute events.  Patient remains afebrile.  Otherwise hemodynamically stable on room air.  Latest labs show resolved leukocytosis, anemia 7 point/25.8, BMP with elevated creatinine 1.54, hepatic function within normal limits.  Blood cultures from 2/20/2025 remain negative to date.  Previous cultures with Gemella.    Patient seen examined at bedside.  No complaints.    Allergies  No Known Allergies        ANTIMICROBIALS:  cefTRIAXone   IVPB 2000 every 24 hours      OTHER MEDS:  MEDICATIONS  (STANDING):  acetaminophen     Tablet .. 650 every 6 hours PRN  apixaban 5 every 12 hours  aspirin  chewable 81 daily  atorvastatin 20 at bedtime  metoprolol succinate ER 25 daily  mirtazapine 7.5 daily  NIFEdipine XL 60 daily  pantoprazole    Tablet 40 two times a day  tamsulosin 0.4 at bedtime      Vital Signs Last 24 Hrs  T(C): 36.8 (22 Feb 2025 12:31), Max: 37 (21 Feb 2025 20:31)  T(F): 98.3 (22 Feb 2025 12:31), Max: 98.6 (21 Feb 2025 20:31)  HR: 57 (22 Feb 2025 12:31) (57 - 71)  BP: 139/66 (22 Feb 2025 12:31) (113/60 - 182/77)  BP(mean): --  RR: 18 (22 Feb 2025 12:31) (17 - 18)  SpO2: 92% (22 Feb 2025 12:31) (92% - 98%)    Parameters below as of 22 Feb 2025 12:31  Patient On (Oxygen Delivery Method): room air        PHYSICAL EXAM:  General:    NAD,  non toxic  Respiratory:    comfortable on RA  abd:     soft,   no tenderness  :     no  kimbrough  Musculoskeletal:   no joint swelling  vascular: no phlebitis  Skin:    no rash                                  7.8    9.12  )-----------( 274      ( 21 Feb 2025 07:19 )             25.8       02-21    137  |  101  |  33[H]  ----------------------------<  247[H]  4.0   |  22  |  1.54[H]    Ca    8.2[L]      21 Feb 2025 07:19    TPro  6.0  /  Alb  2.5[L]  /  TBili  0.1[L]  /  DBili  x   /  AST  12  /  ALT  12  /  AlkPhos  59  02-21      Urinalysis Basic - ( 21 Feb 2025 07:19 )    Color: x / Appearance: x / SG: x / pH: x  Gluc: 247 mg/dL / Ketone: x  / Bili: x / Urobili: x   Blood: x / Protein: x / Nitrite: x   Leuk Esterase: x / RBC: x / WBC x   Sq Epi: x / Non Sq Epi: x / Bacteria: x        MICROBIOLOGY:  v    Culture - Blood (collected 20 Feb 2025 10:45)  Source: .Blood Blood-Peripheral  Preliminary Report (21 Feb 2025 16:01):    No growth at 24 hours    Culture - Blood (collected 20 Feb 2025 10:40)  Source: .Blood Blood-Peripheral  Preliminary Report (21 Feb 2025 16:01):    No growth at 24 hours    Culture - Blood (collected 19 Feb 2025 13:35)  Source: .Blood Blood-Peripheral  Gram Stain (22 Feb 2025 12:58):    Growth in aerobic bottle: Gram Positive Cocci in Clusters    Growth in anaerobic bottle: Gram Positive Cocci in Clusters  Preliminary Report (22 Feb 2025 12:58):    Growth in aerobic bottle: Gram Positive Cocci in Clusters    Growth in anaerobic bottle: Gram Positive Cocci in Clusters    Culture - Blood (collected 19 Feb 2025 13:30)  Source: .Blood Blood-Peripheral  Gram Stain (22 Feb 2025 02:56):    Growth in aerobic bottle: Gram Positive Cocci in Clusters    Growth in anaerobic bottle: Gram Positive Cocci in Clusters  Preliminary Report (22 Feb 2025 02:56):    Growth in aerobic bottle: Gram Positive Cocci in Clusters    Growth in anaerobic bottle: Gram Positive Cocci in Clusters    Culture - Blood (collected 17 Feb 2025 15:26)  Source: .Blood BLOOD  Preliminary Report (21 Feb 2025 19:01):    No growth at 4 days    Culture - Blood (collected 17 Feb 2025 15:26)  Source: .Blood BLOOD  Gram Stain (21 Feb 2025 14:04):    Growth in aerobic bottle: Gram Positive Cocci in Clusters    Growth in anaerobic bottle: Gram Positive Cocci in Clusters  Preliminary Report (21 Feb 2025 14:20):    Growth in aerobic bottle: Gemella haemolysans    "Susceptibilities not performed"    Direct identification is available within approximately 3-5    hours either by Blood Panel Multiplexed PCR or Direct    MALDI-TOF. Details: [email or web address]    Growth in anaerobic bottle: Gram Positive Cocci in Clusters  Organism: Blood Culture PCR (20 Feb 2025 12:13)  Organism: Blood Culture PCR (20 Feb 2025 12:13)      Method Type: PCR      -  Blood PCR Panel: NEG              Rapid RVP Result: NotDetec (02-17 @ 15:25)        RADIOLOGY:  Imaging reviewed

## 2025-02-22 NOTE — PROGRESS NOTE ADULT - ASSESSMENT
85 y/o M with pmhx htn, hld, dvt, pad presents to the ER from vascular surgery office with diarrhea and generalized weakness over the last few days.         Fevers   Blood cx blood cx on 2/17 showed GPC on 2/19  * s/p zosyn 2/19-2/21 and a vanco 2/20-2/21, switch to ceftriaxone 2 qd  * follow the repeat blood cx and repeat q 48h until clear      * repeat blood cx s/p vanco 1 qd  **CT chest/abd/pelvis CT  no growth to date   ID following   Continue with Zosyn for now       Anemia GI consult appreciated   Iron def Anemia Stable       VA Dopplers LLE shows Acute deep venous thrombosis: above the knee.    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  Occluded left femoral bypass and popliteal artery. This will be further   evaluated with dedicated lower extremity arterial ultrasound.    Renal Sono shows Bilateral renal cysts, larger on the left, some of which demonstrate thin   septations.    Diffuse bladder wall thickening, out of proportion to the level of   underdistention; etiologies include chronic bladder outlet obstruction if   patient has an enlarged prostate versus underlying cystitis    Acute DVT   Eliquis   Vascular Surgery Consulted     DAX pre renal /ATN     Renal consulted   Reviewed Renal sono   No hydronephrosis   Diarrhea resolved   HTN Hold Olmesartan   Continue with Norvasc and Toprol     HLD Statins     Discussed with House staff NP team   Note not saved in the system

## 2025-02-22 NOTE — PROGRESS NOTE ADULT - ASSESSMENT
85-year-old male with anemia. This is multifactorial with iron deficiency (getting oral iron) and kidney disease and infection also contributing. Today, Hgb a little higher and no need for a transfusion right now. Stool heme occult + and GI following.    Getting Eliquis for DVT.

## 2025-02-22 NOTE — PROGRESS NOTE ADULT - SUBJECTIVE AND OBJECTIVE BOX
[facility]   MD INDERJIT NELSON MD ANGELA WONG, PA QIAN CHEN, NP    TEL:  OFFICE: [phone]  From 5pm-7am Answering Service [phone]    -- RENAL FOLLOW UP NOTE ---Date of Service 02-22-25 @ 14:31    Patient is a 85y old  Male who presents with a chief complaint of Referred by Vascular for Weakness Diarrhea.    Patient seen and examined at bedside. No chest pain/SOB.    VITALS:  T(F): 98.3 (02-22-25 @ 12:31), Max: 98.6 (02-21-25 @ 20:31)  HR: 57 (02-22-25 @ 12:31)  BP: 139/66 (02-22-25 @ 12:31)  RR: 18 (02-22-25 @ 12:31)  SpO2: 92% (02-22-25 @ 12:31)  Wt(kg): --    02-21 @ 07:01  -  02-22 @ 07:00  --------------------------------------------------------  IN: 480 mL / OUT: 1300 mL / NET: -820 mL    02-22 @ 07:01  -  02-22 @ 14:31  --------------------------------------------------------  IN: 480 mL / OUT: 900 mL / NET: -420 mL      PHYSICAL EXAM:  General: NAD  Neck: No JVD  Respiratory: CTAB, no wheezes, rales or rhonchi  Cardiovascular: S1, S2, RRR  Gastrointestinal: BS+, soft, NT/ND  Extremities: No peripheral edema    Hospital Medications:   MEDICATIONS  (STANDING):  apixaban 5 milliGRAM(s) Oral every 12 hours  aspirin  chewable 81 milliGRAM(s) Oral daily  atorvastatin 20 milliGRAM(s) Oral at bedtime  cefTRIAXone   IVPB 2000 milliGRAM(s) IV Intermittent every 24 hours  ferrous    sulfate 325 milliGRAM(s) Oral daily  metoprolol succinate ER 25 milliGRAM(s) Oral daily  mirtazapine 7.5 milliGRAM(s) Oral daily  NIFEdipine XL 60 milliGRAM(s) Oral daily  pantoprazole    Tablet 40 milliGRAM(s) Oral two times a day  tamsulosin 0.4 milliGRAM(s) Oral at bedtime      LABS:  02-21    137  |  101  |  33[H]  ----------------------------<  247[H]  4.0   |  22  |  1.54[H]    Ca    8.2[L]      21 Feb 2025 07:19    TPro  6.0  /  Alb  2.5[L]  /  TBili  0.1[L]  /  DBili      /  AST  12  /  ALT  12  /  AlkPhos  59  02-21    Creatinine Trend: 1.54 <--, 1.34 <--, 1.61 <--, 1.35 <--, 1.29 <--, 1.17 <--                          7.8    9.12  )-----------( 274      ( 21 Feb 2025 07:19 )             25.8     Urine Studies:  Urinalysis - [02-21-25 @ 07:19]      Color  / Appearance  / SG  / pH       Gluc 247 / Ketone   / Bili  / Urobili        Blood  / Protein  / Leuk Est  / Nitrite       RBC  / WBC  / Hyaline  / Gran  / Sq Epi  / Non Sq Epi  / Bacteria     Urine Creatinine 46      [02-19-25 @ 16:56]  Urine Sodium 80      [02-19-25 @ 16:56]  Urine Urea Nitrogen 581      [02-19-25 @ 16:51]    Iron 25, TIBC 249, %sat 10      [02-17-25 @ 07:23]  Ferritin 76      [02-17-25 @ 07:23]

## 2025-02-22 NOTE — PROGRESS NOTE ADULT - SUBJECTIVE AND OBJECTIVE BOX
Patient is a 85y old  Male who presents with a chief complaint of Referred by Vascular for Weakness Diarrhea (22 Feb 2025 14:30)    Says that he feels okay. No more fever or chills. No pain.. No N/V/D. No cough, SOB, CP. No bleeding.     Medication:   acetaminophen     Tablet .. 650 milliGRAM(s) Oral every 6 hours PRN  apixaban 5 milliGRAM(s) Oral every 12 hours  aspirin  chewable 81 milliGRAM(s) Oral daily  atorvastatin 20 milliGRAM(s) Oral at bedtime  cefTRIAXone   IVPB 2000 milliGRAM(s) IV Intermittent every 24 hours  ferrous    sulfate 325 milliGRAM(s) Oral daily  metoprolol succinate ER 25 milliGRAM(s) Oral daily  mirtazapine 7.5 milliGRAM(s) Oral daily  NIFEdipine XL 60 milliGRAM(s) Oral daily  pantoprazole    Tablet 40 milliGRAM(s) Oral two times a day  tamsulosin 0.4 milliGRAM(s) Oral at bedtime      Physical exam    T(C): 37.1 (02-22-25 @ 20:29), Max: 37.1 (02-22-25 @ 20:29)  HR: 57 (02-22-25 @ 20:29) (57 - 71)  BP: 136/57 (02-22-25 @ 20:29) (136/56 - 182/77)  RR: 18 (02-22-25 @ 20:29) (18 - 18)  SpO2: 92% (02-22-25 @ 20:29) (92% - 93%)  Wt(kg): --    alert NAD  EOMI anicteric sclera  Cv s1 S2 RRR  Lungs clear B/L  abd soft NT ND +BS  No LE edema or tenderness    Labs                        8.2    8.94  )-----------( 319      ( 22 Feb 2025 15:49 )             27.2       02-22    141  |  108  |  36[H]  ----------------------------<  100[H]  4.8   |  22  |  1.53[H]    Ca    8.7      22 Feb 2025 15:49    TPro  6.0  /  Alb  2.5[L]  /  TBili  0.1[L]  /  DBili  x   /  AST  12  /  ALT  12  /  AlkPhos  59  02-21      LIVER FUNCTIONS - ( 21 Feb 2025 07:19 )  Alb: 2.5 g/dL / Pro: 6.0 g/dL / ALK PHOS: 59 U/L / ALT: 12 U/L / AST: 12 U/L / GGT: x             [id number]

## 2025-02-23 LAB
ANION GAP SERPL CALC-SCNC: 10 MMOL/L — SIGNIFICANT CHANGE UP (ref 5–17)
BUN SERPL-MCNC: 35 MG/DL — HIGH (ref 7–23)
CALCIUM SERPL-MCNC: 8.8 MG/DL — SIGNIFICANT CHANGE UP (ref 8.4–10.5)
CHLORIDE SERPL-SCNC: 107 MMOL/L — SIGNIFICANT CHANGE UP (ref 96–108)
CO2 SERPL-SCNC: 22 MMOL/L — SIGNIFICANT CHANGE UP (ref 22–31)
CREAT SERPL-MCNC: 1.33 MG/DL — HIGH (ref 0.5–1.3)
CULTURE RESULTS: ABNORMAL
CULTURE RESULTS: ABNORMAL
EGFR: 52 ML/MIN/1.73M2 — LOW
EGFR: 52 ML/MIN/1.73M2 — LOW
GLUCOSE SERPL-MCNC: 74 MG/DL — SIGNIFICANT CHANGE UP (ref 70–99)
HCT VFR BLD CALC: 26.1 % — LOW (ref 39–50)
HGB BLD-MCNC: 7.9 G/DL — LOW (ref 13–17)
MCHC RBC-ENTMCNC: 25.8 PG — LOW (ref 27–34)
MCHC RBC-ENTMCNC: 30.3 G/DL — LOW (ref 32–36)
MCV RBC AUTO: 85.3 FL — SIGNIFICANT CHANGE UP (ref 80–100)
NRBC BLD AUTO-RTO: 0 /100 WBCS — SIGNIFICANT CHANGE UP (ref 0–0)
PLATELET # BLD AUTO: 320 K/UL — SIGNIFICANT CHANGE UP (ref 150–400)
POTASSIUM SERPL-MCNC: 4.8 MMOL/L — SIGNIFICANT CHANGE UP (ref 3.5–5.3)
POTASSIUM SERPL-SCNC: 4.8 MMOL/L — SIGNIFICANT CHANGE UP (ref 3.5–5.3)
RBC # BLD: 3.06 M/UL — LOW (ref 4.2–5.8)
RBC # FLD: 16.7 % — HIGH (ref 10.3–14.5)
SODIUM SERPL-SCNC: 139 MMOL/L — SIGNIFICANT CHANGE UP (ref 135–145)
SPECIMEN SOURCE: SIGNIFICANT CHANGE UP
SPECIMEN SOURCE: SIGNIFICANT CHANGE UP
WBC # BLD: 8.66 K/UL — SIGNIFICANT CHANGE UP (ref 3.8–10.5)
WBC # FLD AUTO: 8.66 K/UL — SIGNIFICANT CHANGE UP (ref 3.8–10.5)

## 2025-02-23 RX ADMIN — Medication 60 MILLIGRAM(S): at 05:33

## 2025-02-23 RX ADMIN — APIXABAN 5 MILLIGRAM(S): 2.5 TABLET, FILM COATED ORAL at 05:33

## 2025-02-23 RX ADMIN — APIXABAN 5 MILLIGRAM(S): 2.5 TABLET, FILM COATED ORAL at 17:40

## 2025-02-23 RX ADMIN — Medication 325 MILLIGRAM(S): at 08:56

## 2025-02-23 RX ADMIN — Medication 40 MILLIGRAM(S): at 05:39

## 2025-02-23 RX ADMIN — CEFTRIAXONE 100 MILLIGRAM(S): 500 INJECTION, POWDER, FOR SOLUTION INTRAMUSCULAR; INTRAVENOUS at 17:39

## 2025-02-23 RX ADMIN — ATORVASTATIN CALCIUM 20 MILLIGRAM(S): 80 TABLET, FILM COATED ORAL at 21:40

## 2025-02-23 RX ADMIN — Medication 40 MILLIGRAM(S): at 17:40

## 2025-02-23 RX ADMIN — Medication 81 MILLIGRAM(S): at 08:56

## 2025-02-23 RX ADMIN — MIRTAZAPINE 7.5 MILLIGRAM(S): 30 TABLET, FILM COATED ORAL at 08:56

## 2025-02-23 RX ADMIN — TAMSULOSIN HYDROCHLORIDE 0.4 MILLIGRAM(S): 0.4 CAPSULE ORAL at 21:39

## 2025-02-23 RX ADMIN — METOPROLOL SUCCINATE 25 MILLIGRAM(S): 50 TABLET, EXTENDED RELEASE ORAL at 05:33

## 2025-02-23 NOTE — PROGRESS NOTE ADULT - SUBJECTIVE AND OBJECTIVE BOX
Patient is a 85y old  Male who presents with a chief complaint of Referred by Vascular for Weakness Diarrhea (23 Feb 2025 17:12)      INTERVAL HPI/OVERNIGHT EVENTS: seen and examined   T(C): 36.7 (02-23-25 @ 12:05), Max: 37.1 (02-22-25 @ 20:29)  HR: 56 (02-23-25 @ 12:05) (55 - 71)  BP: 141/55 (02-23-25 @ 12:05) (136/57 - 170/68)  RR: 18 (02-23-25 @ 12:05) (18 - 18)  SpO2: 92% (02-23-25 @ 12:05) (92% - 95%)  Wt(kg): --  I&O's Summary    22 Feb 2025 07:01  -  23 Feb 2025 07:00  --------------------------------------------------------  IN: 720 mL / OUT: 1850 mL / NET: -1130 mL    23 Feb 2025 07:01  -  23 Feb 2025 19:46  --------------------------------------------------------  IN: 480 mL / OUT: 550 mL / NET: -70 mL        PAST MEDICAL & SURGICAL HISTORY:  HTN (hypertension)      HLD (hyperlipidemia)      Deep vein thrombosis (DVT)          SOCIAL HISTORY  Alcohol:  Tobacco:  Illicit substance use:    FAMILY HISTORY:    REVIEW OF SYSTEMS:  CONSTITUTIONAL: No fever, weight loss, or fatigue  EYES: No eye pain, visual disturbances, or discharge  ENMT:  No difficulty hearing, tinnitus, vertigo; No sinus or throat pain  NECK: No pain or stiffness  RESPIRATORY: No cough, wheezing, chills or hemoptysis; No shortness of breath  CARDIOVASCULAR: No chest pain, palpitations, dizziness, or leg swelling  GASTROINTESTINAL: No abdominal or epigastric pain. No nausea, vomiting, or hematemesis; No diarrhea or constipation. No melena or hematochezia.  GENITOURINARY: No dysuria, frequency, hematuria, or incontinence  NEUROLOGICAL: No headaches, memory loss, loss of strength, numbness, or tremors  SKIN: No itching, burning, rashes, or lesions   LYMPH NODES: No enlarged glands  ENDOCRINE: No heat or cold intolerance; No hair loss  MUSCULOSKELETAL: No joint pain or swelling; No muscle, back, or extremity pain  PSYCHIATRIC: No depression, anxiety, mood swings, or difficulty sleeping  HEME/LYMPH: No easy bruising, or bleeding gums  ALLERY AND IMMUNOLOGIC: No hives or eczema    RADIOLOGY & ADDITIONAL TESTS:    Imaging Personally Reviewed:  [ ] YES  [ ] NO    Consultant(s) Notes Reviewed:  [ ] YES  [ ] NO    PHYSICAL EXAM:  GENERAL: NAD, well-groomed, well-developed  HEAD:  Atraumatic, Normocephalic  EYES: EOMI, PERRLA, conjunctiva and sclera clear  ENMT: No tonsillar erythema, exudates, or enlargement; Moist mucous membranes, Good dentition, No lesions  NECK: Supple, No JVD, Normal thyroid  NERVOUS SYSTEM:  Alert & Oriented X3, Good concentration; Motor Strength 5/5 B/L upper and lower extremities; DTRs 2+ intact and symmetric  CHEST/LUNG: Clear to percussion bilaterally; No rales, rhonchi, wheezing, or rubs  HEART: Regular rate and rhythm; No murmurs, rubs, or gallops  ABDOMEN: Soft, Nontender, Nondistended; Bowel sounds present  EXTREMITIES:  2+ Peripheral Pulses, No clubbing, cyanosis, or edema  LYMPH: No lymphadenopathy noted  SKIN: No rashes or lesions    LABS:                        7.9    8.66  )-----------( 320      ( 23 Feb 2025 07:03 )             26.1     02-23    139  |  107  |  35[H]  ----------------------------<  74  4.8   |  22  |  1.33[H]    Ca    8.8      23 Feb 2025 07:01        Urinalysis Basic - ( 23 Feb 2025 07:01 )    Color: x / Appearance: x / SG: x / pH: x  Gluc: 74 mg/dL / Ketone: x  / Bili: x / Urobili: x   Blood: x / Protein: x / Nitrite: x   Leuk Esterase: x / RBC: x / WBC x   Sq Epi: x / Non Sq Epi: x / Bacteria: x      CAPILLARY BLOOD GLUCOSE            Urinalysis Basic - ( 23 Feb 2025 07:01 )    Color: x / Appearance: x / SG: x / pH: x  Gluc: 74 mg/dL / Ketone: x  / Bili: x / Urobili: x   Blood: x / Protein: x / Nitrite: x   Leuk Esterase: x / RBC: x / WBC x   Sq Epi: x / Non Sq Epi: x / Bacteria: x        MEDICATIONS  (STANDING):  apixaban 5 milliGRAM(s) Oral every 12 hours  aspirin  chewable 81 milliGRAM(s) Oral daily  atorvastatin 20 milliGRAM(s) Oral at bedtime  cefTRIAXone   IVPB 2000 milliGRAM(s) IV Intermittent every 24 hours  ferrous    sulfate 325 milliGRAM(s) Oral daily  metoprolol succinate ER 25 milliGRAM(s) Oral daily  mirtazapine 7.5 milliGRAM(s) Oral daily  NIFEdipine XL 60 milliGRAM(s) Oral daily  pantoprazole    Tablet 40 milliGRAM(s) Oral two times a day  tamsulosin 0.4 milliGRAM(s) Oral at bedtime    MEDICATIONS  (PRN):  acetaminophen     Tablet .. 650 milliGRAM(s) Oral every 6 hours PRN Temp greater or equal to 38C (100.4F)      Care Discussed with Consultants/Other Providers [ ] YES  [ ] NO

## 2025-02-23 NOTE — PROGRESS NOTE ADULT - ASSESSMENT
85-year-old male with anemia due to GI blood loss, iron def, kidney disease and infection. hgb low but not requiring a transfusion right now. getting oral iron. GI following. has DVT and continues on Eliquis. Hgb relatively stable last few days. If the Hgb starts to drop more acutely, then will have to be held.

## 2025-02-23 NOTE — PROGRESS NOTE ADULT - SUBJECTIVE AND OBJECTIVE BOX
[facility] [place] [facility]   MD INDERJIT NELSON MD ANGELA WONG, PA QIAN CHEN, NP    TEL:  OFFICE: [phone]  From 5pm-7am Answering Service [phone]    -- RENAL FOLLOW UP NOTE ---Date of Service 02-23-25 @ 17:13    Patient is a 85y old  Male who presents with a chief complaint of Referred by Vascular for Weakness Diarrhea.    Patient seen and examined at bedside. No chest pain/SOB.    VITALS:  T(F): 98.1 (02-23-25 @ 12:05), Max: 98.8 (02-22-25 @ 20:29)  HR: 56 (02-23-25 @ 12:05)  BP: 141/55 (02-23-25 @ 12:05)  RR: 18 (02-23-25 @ 12:05)  SpO2: 92% (02-23-25 @ 12:05)  Wt(kg): --    02-22 @ 07:01  -  02-23 @ 07:00  --------------------------------------------------------  IN: 720 mL / OUT: 1850 mL / NET: -1130 mL    02-23 @ 07:01  -  02-23 @ 17:13  --------------------------------------------------------  IN: 480 mL / OUT: 550 mL / NET: -70 mL      PHYSICAL EXAM:  General: NAD  Neck: No JVD  Respiratory: CTAB, no wheezes, rales or rhonchi  Cardiovascular: S1, S2, RRR  Gastrointestinal: BS+, soft, NT/ND  Extremities: No peripheral edema    Hospital Medications:   MEDICATIONS  (STANDING):  apixaban 5 milliGRAM(s) Oral every 12 hours  aspirin  chewable 81 milliGRAM(s) Oral daily  atorvastatin 20 milliGRAM(s) Oral at bedtime  cefTRIAXone   IVPB 2000 milliGRAM(s) IV Intermittent every 24 hours  ferrous    sulfate 325 milliGRAM(s) Oral daily  metoprolol succinate ER 25 milliGRAM(s) Oral daily  mirtazapine 7.5 milliGRAM(s) Oral daily  NIFEdipine XL 60 milliGRAM(s) Oral daily  pantoprazole    Tablet 40 milliGRAM(s) Oral two times a day  tamsulosin 0.4 milliGRAM(s) Oral at bedtime      LABS:  02-23    139  |  107  |  35[H]  ----------------------------<  74  4.8   |  22  |  1.33[H]    Ca    8.8      23 Feb 2025 07:01      Creatinine Trend: 1.33 <--, 1.53 <--, 1.54 <--, 1.34 <--, 1.61 <--, 1.35 <--, 1.29 <--                          7.9    8.66  )-----------( 320      ( 23 Feb 2025 07:03 )             26.1     Urine Studies:  Urinalysis - [02-23-25 @ 07:01]      Color  / Appearance  / SG  / pH       Gluc 74 / Ketone   / Bili  / Urobili        Blood  / Protein  / Leuk Est  / Nitrite       RBC  / WBC  / Hyaline  / Gran  / Sq Epi  / Non Sq Epi  / Bacteria     Urine Creatinine 46      [02-19-25 @ 16:56]  Urine Sodium 80      [02-19-25 @ 16:56]  Urine Urea Nitrogen 581      [02-19-25 @ 16:51]    Iron 25, TIBC 249, %sat 10      [02-17-25 @ 07:23]  Ferritin 76      [02-17-25 @ 07:23]

## 2025-02-23 NOTE — PROGRESS NOTE ADULT - ASSESSMENT
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 Patient presented to Dr. Landers's office today for followup and evaluation of left leg swelling, but was referred to the ED given reported 3-4 days of weakness, poor PO intake, and diarrhea. He is found to have DVT and DAX.    A/P:  DAX:  FENa>1  DAX workup from 2/15 suggestive of ATN  Losartan on hold  Renal US: Neg for Hydro   sCr worsening at present, pt intermittently febrile and with bradycardia   s/p IVF   FeNa 2% - indeterminate.  Bladder scan 2/19 with 215cc urine, continue to monitor bladder scan q6h   Pt on Zosyn - CBC neg for eosinophilia.  S.Cr worsened on 2/21 -- possibly d/t worsening anemia.  Pt eating and drinking well; BP acceptable.    S/P NS 0.9% @75mL/hr x1L on 2/21.  S.Cr better today.  Monitor I/O  Monitor renal function closely.    HTN:  BP fluctuating.  Losartan on hold and Amlodipine discontinued  C/W nifedipine 60mg qd.  Increase nifedipine to 90mg if SBP persistently >150.  Low sodium diet.  Monitor BP    Anemia:  Iron deficient vs GIB.  On PO iron supplements.  OB+  Hgb worsening.  Heme/onc and GI following.  Transfuse for Hgb <8.    Hypocalcemia:  In setting of hypoalbuminemia.  Optimize albumin.  Ca better.  Monitor Ca.    DVT:  Follow up vascular  On Apixaban.    Diarrhea  Resolved.

## 2025-02-23 NOTE — PROGRESS NOTE ADULT - ASSESSMENT
83 y/o M with pmhx htn, hld, dvt, pad presents to the ER from vascular surgery office with diarrhea and generalized weakness over the last few days.     Fevers / Bacteremia   Blood cx blood cx on 2/17 showed GPC on 2/19  * s/p zosyn 2/19-2/21 and a vanco 2/20-2/21, switch to ceftriaxone 2 qd  * follow the repeat blood cx and repeat q 48h until clear  ID following   Continue with Zosyn for now     #Anemia GI consult appreciated   Iron def Anemia Stable     Acute DVT left LE  Eliquis   Vascular Surgery Consulted     DAX pre renal /ATN   Renal following     HTN   Hold Olmesartan   Continue with Norvasc and Toprol     HLD   Statins     fabio gutierrez MD  covering Dr. Ellsworth

## 2025-02-23 NOTE — PROGRESS NOTE ADULT - SUBJECTIVE AND OBJECTIVE BOX
Patient is a 85y old  Male who presents with a chief complaint of Referred by Vascular for Weakness Diarrhea (22 Feb 2025 22:50)    Eating and no N/V/D or Abd pain. No CP or SOB or cough. No HA or dizziness. No fever or chills. No bleeding noted    Medication:   acetaminophen     Tablet .. 650 milliGRAM(s) Oral every 6 hours PRN  apixaban 5 milliGRAM(s) Oral every 12 hours  aspirin  chewable 81 milliGRAM(s) Oral daily  atorvastatin 20 milliGRAM(s) Oral at bedtime  cefTRIAXone   IVPB 2000 milliGRAM(s) IV Intermittent every 24 hours  ferrous    sulfate 325 milliGRAM(s) Oral daily  metoprolol succinate ER 25 milliGRAM(s) Oral daily  mirtazapine 7.5 milliGRAM(s) Oral daily  NIFEdipine XL 60 milliGRAM(s) Oral daily  pantoprazole    Tablet 40 milliGRAM(s) Oral two times a day  tamsulosin 0.4 milliGRAM(s) Oral at bedtime      Physical exam    T(C): 36.7 (02-23-25 @ 12:05), Max: 37.1 (02-22-25 @ 20:29)  HR: 56 (02-23-25 @ 12:05) (55 - 71)  BP: 141/55 (02-23-25 @ 12:05) (136/57 - 170/68)  RR: 18 (02-23-25 @ 12:05) (18 - 18)  SpO2: 92% (02-23-25 @ 12:05) (92% - 95%)  Wt(kg): --    alert NAD  Cv s1 S2 RRR  Lungs clear B/L  abd soft NT ND +BS  No LE edema or tenderness    Labs                        7.9    8.66  )-----------( 320      ( 23 Feb 2025 07:03 )             26.1       02-23    139  |  107  |  35[H]  ----------------------------<  74  4.8   |  22  |  1.33[H]    Ca    8.8      23 Feb 2025 07:01            [id number]

## 2025-02-24 ENCOUNTER — RESULT REVIEW (OUTPATIENT)
Age: 85
End: 2025-02-24

## 2025-02-24 PROCEDURE — 99232 SBSQ HOSP IP/OBS MODERATE 35: CPT

## 2025-02-24 PROCEDURE — 93306 TTE W/DOPPLER COMPLETE: CPT | Mod: 26

## 2025-02-24 PROCEDURE — 93356 MYOCRD STRAIN IMG SPCKL TRCK: CPT

## 2025-02-24 PROCEDURE — G0545: CPT

## 2025-02-24 RX ADMIN — ATORVASTATIN CALCIUM 20 MILLIGRAM(S): 80 TABLET, FILM COATED ORAL at 21:53

## 2025-02-24 RX ADMIN — METOPROLOL SUCCINATE 25 MILLIGRAM(S): 50 TABLET, EXTENDED RELEASE ORAL at 06:18

## 2025-02-24 RX ADMIN — CEFTRIAXONE 100 MILLIGRAM(S): 500 INJECTION, POWDER, FOR SOLUTION INTRAMUSCULAR; INTRAVENOUS at 16:44

## 2025-02-24 RX ADMIN — APIXABAN 5 MILLIGRAM(S): 2.5 TABLET, FILM COATED ORAL at 06:18

## 2025-02-24 RX ADMIN — Medication 60 MILLIGRAM(S): at 06:18

## 2025-02-24 RX ADMIN — Medication 40 MILLIGRAM(S): at 06:18

## 2025-02-24 RX ADMIN — APIXABAN 5 MILLIGRAM(S): 2.5 TABLET, FILM COATED ORAL at 17:39

## 2025-02-24 RX ADMIN — Medication 325 MILLIGRAM(S): at 11:52

## 2025-02-24 RX ADMIN — MIRTAZAPINE 7.5 MILLIGRAM(S): 30 TABLET, FILM COATED ORAL at 11:52

## 2025-02-24 RX ADMIN — Medication 40 MILLIGRAM(S): at 17:39

## 2025-02-24 RX ADMIN — TAMSULOSIN HYDROCHLORIDE 0.4 MILLIGRAM(S): 0.4 CAPSULE ORAL at 21:53

## 2025-02-24 RX ADMIN — Medication 81 MILLIGRAM(S): at 11:52

## 2025-02-24 NOTE — PROGRESS NOTE ADULT - ASSESSMENT
85-year-old male with anemia due to GI blood loss, iron def, kidney disease and infection. hgb low but not requiring a transfusion right now. getting oral iron. GI following. has DVT and continues on Eliquis. Hgb relatively stable last few days. If the Hgb starts to drop more acutely, then will have to be held.  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 Has anemia and hgb had been  trending down (no need for a transfusion right now.) Has iron deficiency and getting oral iron. Seen by GI and they plan for out-pt EGD. Rising creatinine and being seen by renal. That is also contributing to the anemia. Trend the CBC.     s/p elevated white blood count. h/h down. + Fever and + cx, which are likely contributing to hematologic findings. ID following.  WBC improved. Afebrile.

## 2025-02-24 NOTE — PROGRESS NOTE ADULT - SUBJECTIVE AND OBJECTIVE BOX
Patient is a 85y old  Male who presents with a chief complaint of Referred by Vascular for Weakness Diarrhea (23 Feb 2025 17:12)      INTERVAL HPI/OVERNIGHT EVENTS:   No events     T(C): 36.7 (02-24-25 @ 05:44), Max: 36.7 (02-24-25 @ 05:44)  HR: 61 (02-24-25 @ 06:16) (60 - 61)  BP: 142/65 (02-24-25 @ 06:16) (141/60 - 142/65)  RR: 18 (02-24-25 @ 05:44) (18 - 18)  SpO2: 93% (02-24-25 @ 05:44) (93% - 93%)      MEDICATIONS  (STANDING):  apixaban 5 milliGRAM(s) Oral every 12 hours  aspirin  chewable 81 milliGRAM(s) Oral daily  atorvastatin 20 milliGRAM(s) Oral at bedtime  cefTRIAXone   IVPB 2000 milliGRAM(s) IV Intermittent every 24 hours  ferrous    sulfate 325 milliGRAM(s) Oral daily  metoprolol succinate ER 25 milliGRAM(s) Oral daily  mirtazapine 7.5 milliGRAM(s) Oral daily  NIFEdipine XL 60 milliGRAM(s) Oral daily  pantoprazole    Tablet 40 milliGRAM(s) Oral two times a day  tamsulosin 0.4 milliGRAM(s) Oral at bedtime    MEDICATIONS  (PRN):  acetaminophen     Tablet .. 650 milliGRAM(s) Oral every 6 hours PRN Temp greater or equal to 38C (100.4F)    PAST MEDICAL & SURGICAL HISTORY:  HTN (hypertension)      HLD (hyperlipidemia)      Deep vein thrombosis (DVT)          SOCIAL HISTORY  Alcohol:  Tobacco:  Illicit substance use:    FAMILY HISTORY:    REVIEW OF SYSTEMS:  CONSTITUTIONAL: No fever, weight loss, or fatigue  EYES: No eye pain, visual disturbances, or discharge  ENMT:  No difficulty hearing, tinnitus, vertigo; No sinus or throat pain  NECK: No pain or stiffness  RESPIRATORY: No cough, wheezing, chills or hemoptysis; No shortness of breath  CARDIOVASCULAR: No chest pain, palpitations, dizziness, or leg swelling  GASTROINTESTINAL: No abdominal or epigastric pain. No nausea, vomiting, or hematemesis; No diarrhea or constipation. No melena or hematochezia.  GENITOURINARY: No dysuria, frequency, hematuria, or incontinence  NEUROLOGICAL: No headaches, memory loss, loss of strength, numbness, or tremors  SKIN: No itching, burning, rashes, or lesions   LYMPH NODES: No enlarged glands  ENDOCRINE: No heat or cold intolerance; No hair loss  MUSCULOSKELETAL: No joint pain or swelling; No muscle, back, or extremity pain  PSYCHIATRIC: No depression, anxiety, mood swings, or difficulty sleeping  HEME/LYMPH: No easy bruising, or bleeding gums  ALLERY AND IMMUNOLOGIC: No hives or eczema    RADIOLOGY & ADDITIONAL TESTS:    Imaging Personally Reviewed:  [ ] YES  [ ] NO    Consultant(s) Notes Reviewed:  [ ] YES  [ ] NO    PHYSICAL EXAM:  GENERAL: NAD, well-groomed, well-developed  HEAD:  Atraumatic, Normocephalic  EYES: EOMI, PERRLA, conjunctiva and sclera clear  ENMT: No tonsillar erythema, exudates, or enlargement; Moist mucous membranes, Good dentition, No lesions  NECK: Supple, No JVD, Normal thyroid  NERVOUS SYSTEM:  Alert & Oriented X3, Good concentration; Motor Strength 5/5 B/L upper and lower extremities; DTRs 2+ intact and symmetric  CHEST/LUNG: Clear to percussion bilaterally; No rales, rhonchi, wheezing, or rubs  HEART: Regular rate and rhythm; No murmurs, rubs, or gallops  ABDOMEN: Soft, Nontender, Nondistended; Bowel sounds present  EXTREMITIES:  2+ Peripheral Pulses, No clubbing, cyanosis, or edema  LYMPH: No lymphadenopathy noted  SKIN: No rashes or lesions    LABS:                        7.9    8.66  )-----------( 320      ( 23 Feb 2025 07:03 )             26.1     02-23    139  |  107  |  35[H]  ----------------------------<  74  4.8   |  22  |  1.33[H]    Ca    8.8      23 Feb 2025 07:01        Urinalysis Basic - ( 23 Feb 2025 07:01 )    Color: x / Appearance: x / SG: x / pH: x  Gluc: 74 mg/dL / Ketone: x  / Bili: x / Urobili: x   Blood: x / Protein: x / Nitrite: x   Leuk Esterase: x / RBC: x / WBC x   Sq Epi: x / Non Sq Epi: x / Bacteria: x      CAPILLARY BLOOD GLUCOSE            Urinalysis Basic - ( 23 Feb 2025 07:01 )    Color: x / Appearance: x / SG: x / pH: x  Gluc: 74 mg/dL / Ketone: x  / Bili: x / Urobili: x   Blood: x / Protein: x / Nitrite: x   Leuk Esterase: x / RBC: x / WBC x   Sq Epi: x / Non Sq Epi: x / Bacteria: x        MEDICATIONS  (STANDING):  apixaban 5 milliGRAM(s) Oral every 12 hours  aspirin  chewable 81 milliGRAM(s) Oral daily  atorvastatin 20 milliGRAM(s) Oral at bedtime  cefTRIAXone   IVPB 2000 milliGRAM(s) IV Intermittent every 24 hours  ferrous    sulfate 325 milliGRAM(s) Oral daily  metoprolol succinate ER 25 milliGRAM(s) Oral daily  mirtazapine 7.5 milliGRAM(s) Oral daily  NIFEdipine XL 60 milliGRAM(s) Oral daily  pantoprazole    Tablet 40 milliGRAM(s) Oral two times a day  tamsulosin 0.4 milliGRAM(s) Oral at bedtime    MEDICATIONS  (PRN):  acetaminophen     Tablet .. 650 milliGRAM(s) Oral every 6 hours PRN Temp greater or equal to 38C (100.4F)      Care Discussed with Consultants/Other Providers [ ] YES  [ ] NO

## 2025-02-24 NOTE — PROGRESS NOTE ADULT - SUBJECTIVE AND OBJECTIVE BOX
GIANNI MURPHY  MRN-[id number]    Patient is a 85y old  Male who presents with a chief complaint of Referred by Vascular for Weakness Diarrhea (24 Feb 2025 14:56)      Review of System  REVIEW OF SYSTEMS      General:	Denies fatigue, fevers, chills, sweats, decreased appetite.    Skin/Breast: denies pruritis, rash  	  Ophthalmologic: no change in vision or blurring  	  HEENT	Denies dry mouth, oral sores, dysphagia,  change in hearing.    Respiratory and Thorax:  cough, sob, wheeze, hemoptysis  	  Cardiovascular:	no cp , palp, orthopnea    Gastrointestinal:	no n/v/d constipation    Genitourinary:	no dysuria of frequency, no hematuria, no flank pain    Musculoskeletal:	no bone or joint pain. no muscle aches.     Neurological:	no change in sensory or motor function. no headache. no weakness.     Psychiatric:	no depression, no anxiety, insomnia.     Hematology/Lymphatics:	no bleeding or bruising        Current Meds  MEDICATIONS  (STANDING):  apixaban 5 milliGRAM(s) Oral every 12 hours  aspirin  chewable 81 milliGRAM(s) Oral daily  atorvastatin 20 milliGRAM(s) Oral at bedtime  cefTRIAXone   IVPB 2000 milliGRAM(s) IV Intermittent every 24 hours  ferrous    sulfate 325 milliGRAM(s) Oral daily  metoprolol succinate ER 25 milliGRAM(s) Oral daily  mirtazapine 7.5 milliGRAM(s) Oral daily  NIFEdipine XL 60 milliGRAM(s) Oral daily  pantoprazole    Tablet 40 milliGRAM(s) Oral two times a day  tamsulosin 0.4 milliGRAM(s) Oral at bedtime    MEDICATIONS  (PRN):  acetaminophen     Tablet .. 650 milliGRAM(s) Oral every 6 hours PRN Temp greater or equal to 38C (100.4F)      Vitals  Vital Signs Last 24 Hrs  T(C): 36.5 (24 Feb 2025 12:13), Max: 37.1 (23 Feb 2025 20:43)  T(F): 97.7 (24 Feb 2025 12:13), Max: 98.7 (23 Feb 2025 20:43)  HR: 55 (24 Feb 2025 12:13) (53 - 61)  BP: 158/72 (24 Feb 2025 12:13) (141/60 - 158/72)  BP(mean): --  RR: 18 (24 Feb 2025 12:13) (18 - 18)  SpO2: 93% (24 Feb 2025 12:13) (92% - 93%)    Parameters below as of 24 Feb 2025 12:13  Patient On (Oxygen Delivery Method): room air        Physical Exam  PHYSICAL EXAM:      Constitutional: NAD    Eyes: PERRLA EOMI, anicteric sclera    Heent :No oral sores, no pharyngeal injection. moist mucosa.    Neck: supple, no jvd, no LAD    Respiratory: CTA b/l     Cardiovascular: s1s2, no m/g/r    Gastrointestinal: soft, nt, nd, + BS    Extremities: no c/c/e    Neurological:A&O x 3 moves all ext.    Skin: no rash on exposed skin    Lymph Nodes: no lymphadenopathy.              Lab  CBC Full  -  ( 23 Feb 2025 07:03 )  WBC Count : 8.66 K/uL  RBC Count : 3.06 M/uL  Hemoglobin : 7.9 g/dL  Hematocrit : 26.1 %  Platelet Count - Automated : 320 K/uL  Mean Cell Volume : 85.3 fl  Mean Cell Hemoglobin : 25.8 pg  Mean Cell Hemoglobin Concentration : 30.3 g/dL  Auto Neutrophil # : x  Auto Lymphocyte # : x  Auto Monocyte # : x  Auto Eosinophil # : x  Auto Basophil # : x  Auto Neutrophil % : x  Auto Lymphocyte % : x  Auto Monocyte % : x  Auto Eosinophil % : x  Auto Basophil % : x    02-23    139  |  107  |  35[H]  ----------------------------<  74  4.8   |  22  |  1.33[H]    Ca    8.8      23 Feb 2025 07:01          Rad:    Assessment/Plan   GIANNI MURPHY  MRN-[id number]    Patient is a 85y old  Male who presents with a chief complaint of Referred by Vascular for Weakness Diarrhea (24 Feb 2025 14:56)      Review of System    feeling well.     General:	Denies fatigue, fevers, chills, sweats, decreased appetite.    Skin/Breast: denies pruritis, rash  	  Ophthalmologic: no change in vision or blurring  	  HEENT	Denies dry mouth, oral sores, dysphagia,  change in hearing.    Respiratory and Thorax:  no cough, sob, wheeze, hemoptysis  	  Cardiovascular:	no cp , palp, orthopnea    Gastrointestinal:	no n/v/d constipation    Genitourinary:	no dysuria of frequency, no hematuria, no flank pain    Musculoskeletal:	no bone or joint pain. no muscle aches.     Neurological:	no change in sensory or motor function. no headache. no weakness.     Psychiatric:	no depression, no anxiety, insomnia.     Hematology/Lymphatics:	no bleeding or bruising      MEDICATIONS  (STANDING):  apixaban 5 milliGRAM(s) Oral every 12 hours  aspirin  chewable 81 milliGRAM(s) Oral daily  atorvastatin 20 milliGRAM(s) Oral at bedtime  cefTRIAXone   IVPB 2000 milliGRAM(s) IV Intermittent every 24 hours  ferrous    sulfate 325 milliGRAM(s) Oral daily  metoprolol succinate ER 25 milliGRAM(s) Oral daily  mirtazapine 7.5 milliGRAM(s) Oral daily  NIFEdipine XL 60 milliGRAM(s) Oral daily  pantoprazole    Tablet 40 milliGRAM(s) Oral two times a day  tamsulosin 0.4 milliGRAM(s) Oral at bedtime    MEDICATIONS  (PRN):  acetaminophen     Tablet .. 650 milliGRAM(s) Oral every 6 hours PRN Temp greater or equal to 38C (100.4F)    Vital Signs Last 24 Hrs  T(C): 36.5 (24 Feb 2025 12:13), Max: 37.1 (23 Feb 2025 20:43)  T(F): 97.7 (24 Feb 2025 12:13), Max: 98.7 (23 Feb 2025 20:43)  HR: 55 (24 Feb 2025 12:13) (53 - 61)  BP: 158/72 (24 Feb 2025 12:13) (141/60 - 158/72)  BP(mean): --  RR: 18 (24 Feb 2025 12:13) (18 - 18)  SpO2: 93% (24 Feb 2025 12:13) (92% - 93%)    Parameters below as of 24 Feb 2025 12:13  Patient On (Oxygen Delivery Method): room air      PHYSICAL EXAM:    Constitutional: NAD    Eyes: PERRLA EOMI, anicteric sclera    Heent :No oral sores, no pharyngeal injection. moist mucosa.    Neck: supple, no jvd, no LAD    Respiratory: CTA b/l     Cardiovascular: s1s2, no m/g/r    Gastrointestinal: soft, nt, nd, + BS    Extremities: no c/c/e    Neurological:A&O x 3 moves all ext.    Skin: no rash on exposed skin    Lymph Nodes: no lymphadenopathy.      Lab  CBC Full  -  ( 23 Feb 2025 07:03 )  WBC Count : 8.66 K/uL  RBC Count : 3.06 M/uL  Hemoglobin : 7.9 g/dL  Hematocrit : 26.1 %  Platelet Count - Automated : 320 K/uL  Mean Cell Volume : 85.3 fl  Mean Cell Hemoglobin : 25.8 pg  Mean Cell Hemoglobin Concentration : 30.3 g/dL  Auto Neutrophil # : x  Auto Lymphocyte # : x  Auto Monocyte # : x  Auto Eosinophil # : x  Auto Basophil # : x  Auto Neutrophil % : x  Auto Lymphocyte % : x  Auto Monocyte % : x  Auto Eosinophil % : x  Auto Basophil % : x    02-23    139  |  107  |  35[H]  ----------------------------<  74  4.8   |  22  |  1.33[H]    Ca    8.8      23 Feb 2025 07:01          Rad:    Assessment/Plan

## 2025-02-24 NOTE — PROGRESS NOTE ADULT - ASSESSMENT
85 y/o M with pmhx htn, hld, dvt, pad presents to the ER from vascular surgery office with diarrhea and generalized weakness over the last few days.     Fevers / Bacteremia   Blood cx blood cx on 2/17 showed GPC on 2/19  * s/p zosyn 2/19-2/21 and a vanco 2/20-2/21, switch to ceftriaxone 2 qd  * follow the repeat blood cx and repeat q 48h until clear  ID following   Continue with Zosyn for now     #Anemia GI consult appreciated   Iron def Anemia Stable     Acute DVT left LE  Eliquis   Vascular Surgery Consulted     DAX pre renal /ATN   Renal following     HTN   Hold Olmesartan   Continue with Norvasc and Toprol     HLD   Statins

## 2025-02-24 NOTE — CHART NOTE - NSCHARTNOTEFT_GEN_A_CORE
NUTRITION FOLLOW UP NOTE    PATIENT SEEN FOR: Malnutrition follow up.     SOURCE: [] Patient  [x] Current Medical Record  [] RN  [] Family/support person at bedside  [x] Patient unavailable/inappropriate  [x] Other: Team    CHART REVIEWED/EVENTS NOTED.  [] No changes to nutrition care plan to note  [x] Nutrition Status:  - Neph: DAX.    DIET ORDER:   Diet, Regular:   Low Sodium  Supplement Feeding Modality:  Oral  Ensure Plus High Protein Cans or Servings Per Day:  1       Frequency:  Daily (02-18-25)      CURRENT DIET ORDER IS:  [x] Appropriate.     NUTRITION INTAKE/PROVISION:  [x] PO: Flowsheets indicate pt consuming >75% of meals.     ANTHROPOMETRICS:  Drug Dosing Weight  Height (cm): 170.2 (15 Feb 2025 14:52)  Weight (kg): 52.2 (15 Feb 2025 14:52)  BMI (kg/m2): 18 (15 Feb 2025 14:52)  Weights:   Daily      MEDICATIONS:  MEDICATIONS  (STANDING):  apixaban 5 milliGRAM(s) Oral every 12 hours  aspirin  chewable 81 milliGRAM(s) Oral daily  atorvastatin 20 milliGRAM(s) Oral at bedtime  cefTRIAXone   IVPB 2000 milliGRAM(s) IV Intermittent every 24 hours  ferrous    sulfate 325 milliGRAM(s) Oral daily  metoprolol succinate ER 25 milliGRAM(s) Oral daily  mirtazapine 7.5 milliGRAM(s) Oral daily  NIFEdipine XL 60 milliGRAM(s) Oral daily  pantoprazole    Tablet 40 milliGRAM(s) Oral two times a day  tamsulosin 0.4 milliGRAM(s) Oral at bedtime    MEDICATIONS  (PRN):  acetaminophen     Tablet .. 650 milliGRAM(s) Oral every 6 hours PRN Temp greater or equal to 38C (100.4F)      NUTRITIONALLY PERTINENT LABS:  02-23 Na139 mmol/L Glu 74 mg/dL K+ 4.8 mmol/L Cr  1.33 mg/dL[H] BUN 35 mg/dL[H] 02-19 Phos 3.3 mg/dL 02-21 Alb 2.5 g/dL[L]02-21 ALT 12 U/L AST 12 U/L Alkaline Phosphatase 59 U/L      NUTRITIONALLY PERTINENT MEDICATIONS/LABS:  [x] Reviewed  [x] Relevant notes on medications/labs:  - Remeron ordered - possible appetite stimulant.     EDEMA:  [x] Reviewed - none per chart.     GI/ I&O:  [x] Reviewed  [x] Relevant notes: Last BM 2/23 per chart; No current bowel regimen in place. Diarrhea resolved per chart.     SKIN:   [x] No pressure injuries documented, per nursing flowsheet    ESTIMATED NEEDS:  [x] No change:  Energy: 3533-2446 kcal/day (30-35 kcal/kg)  Protein: 67.73-83.36 g/day (1.3-1.6 g/kg)  Fluid: [x] defer to team  Based on: dosing wt 52.1 kg     NUTRITION DIAGNOSIS:  [x] Prior Dx: Underweight severe chronic Malnutrition     EDUCATION:  [x] Not feasible at this time.     NUTRITION CARE PLAN:  1. Diet: Continue current Low Sodium diet as tolerated. defer texture/consistency changes to SLP/Team.   2. Supplements: Continue Ensure Plus (350 kcal, 20 g pro) oral nutrition supplement 1x/day.   3. Multivitamin/mineral supplementation: Recommend Multivitamin daily pending no medical contraindications.     [] Achieved - Continue current nutrition intervention(s)  [] Current medical condition precludes nutrition intervention at this time.    MONITORING AND EVALUATION:   RD remains available upon request and will follow up per protocol.    Maria Kirk RDN, CDN / TEAMS   Available on MS TEAMS

## 2025-02-24 NOTE — PROGRESS NOTE ADULT - SUBJECTIVE AND OBJECTIVE BOX
[facility]      Nolberto Crow NP    [address] [place] [id number]  [phone]      INTERVAL HPI/OVERNIGHT EVENTS:    tolerating diet  repeat cultures noted  no gi bleeding    MEDICATIONS  (STANDING):  apixaban 10 milliGRAM(s) Oral every 12 hours  aspirin  chewable 81 milliGRAM(s) Oral daily  atorvastatin 20 milliGRAM(s) Oral at bedtime  ferrous    sulfate 325 milliGRAM(s) Oral daily  metoprolol succinate ER 25 milliGRAM(s) Oral daily  mirtazapine 7.5 milliGRAM(s) Oral daily  NIFEdipine XL 60 milliGRAM(s) Oral daily  piperacillin/tazobactam IVPB.. 3.375 Gram(s) IV Intermittent every 8 hours  sodium chloride 0.9%. 1000 milliLiter(s) (75 mL/Hr) IV Continuous <Continuous>  tamsulosin 0.4 milliGRAM(s) Oral at bedtime    MEDICATIONS  (PRN):  acetaminophen     Tablet .. 650 milliGRAM(s) Oral every 6 hours PRN Temp greater or equal to 38C (100.4F)      Allergies    No Known Allergies    Intolerances        ROS:   General:  No  fevers, chills, night sweats, fatigue,   Eyes:  Good vision, no reported pain  ENT:  No sore throat, pain, runny nose, dysphagia  CV:  No pain, palpitations, hypo/hypertension  Resp:  No dyspnea, cough, tachypnea, wheezing  GI:  No pain, No nausea, No vomiting, No diarrhea, No constipation, No weight loss, No fever, No pruritis, No rectal bleeding, No tarry stools, No dysphagia,  :  No pain, bleeding, incontinence, nocturia  Muscle:  No pain, weakness  Neuro:  No weakness, tingling, memory problems  Psych:  No fatigue, insomnia, mood problems, depression  Endocrine:  No polyuria, polydipsia, cold/heat intolerance  Heme:  No petechiae, ecchymosis, easy bruisability  Skin:  No rash, tattoos, scars, edema      PHYSICAL EXAM:   Vital Signs:  Vital Signs Last 24 Hrs  T(C): 36.4 (20 Feb 2025 16:50), Max: 37.1 (20 Feb 2025 12:31)  T(F): 97.6 (20 Feb 2025 16:50), Max: 98.7 (20 Feb 2025 12:31)  HR: 59 (20 Feb 2025 16:50) (57 - 67)  BP: 132/68 (20 Feb 2025 16:50) (125/62 - 149/61)  BP(mean): --  RR: 17 (20 Feb 2025 16:50) (17 - 18)  SpO2: 93% (20 Feb 2025 16:50) (92% - 94%)    Parameters below as of 20 Feb 2025 16:50  Patient On (Oxygen Delivery Method): room air      Daily     Daily     GENERAL:  Appears stated age,   HEENT:  NC/AT,    CHEST:  Full & symmetric excursion,   HEART:  Regular rhythm,  ABDOMEN:  Soft, non-tender, non-distended,  EXTEREMITIES:  no cyanosis  SKIN:  No rash  NEURO:  Alert,       LABS:                        8.2    17.34 )-----------( 269      ( 20 Feb 2025 06:39 )             26.2     02-20    139  |  106  |  32[H]  ----------------------------<  85  3.9   |  23  |  1.34[H]    Ca    8.1[L]      20 Feb 2025 06:39  Phos  3.3     02-19  Mg     2.3     02-19    TPro  5.8[L]  /  Alb  2.7[L]  /  TBili  0.2  /  DBili  x   /  AST  10  /  ALT  11  /  AlkPhos  57  02-20      Urinalysis Basic - ( 20 Feb 2025 06:39 )    Color: x / Appearance: x / SG: x / pH: x  Gluc: 85 mg/dL / Ketone: x  / Bili: x / Urobili: x   Blood: x / Protein: x / Nitrite: x   Leuk Esterase: x / RBC: x / WBC x   Sq Epi: x / Non Sq Epi: x / Bacteria: x        RADIOLOGY & ADDITIONAL TESTS:

## 2025-02-24 NOTE — PROGRESS NOTE ADULT - ASSESSMENT
84 m with HTN, HLD, PAD, DVT, sent from vascular office for diarrhea, generalized weakness and poor PO intake.   initially afebrile, no WBC, DAX normal LFT  GI PCR negative  on 2/17 pt had chills then fever and vomiting, negative u/a and blood cx, again fever 100.4 2/19    admitted with diarrhea and DAX on 2/15 with negative GI PCR, no WBC, then developed a fever with vomiting 2/17, WBC slightly higher to 11 and worsening renal function but negative u/a and blood cx, monitored off antibiotics again fever to 100.4 on 2/19 with no symptoms  blood cx on 2/17 showed gemella and blood cx 2/19, also Gemeela in cluster, r/o endocarditis  chest/abd CT but without contrast showed No acute intrathoracic or intra-abdominal pathology identified on noncontrast CT. Soft tissue in the retroperitoneal region may represent collapsed bowel or lymphadenopathy.    * repeat blood cx 2/20 negative  * s/p zosyn 2/19-2/21 and vanco 2/20-2/21, switched to ceftriaxone 2 qd 2/21  * TTE and might need SEBASTIAN  * dental eval  * monitor CBC/diff and CMP    The above assessment and plan was discussed with the primary team    Alissa Forman MD  contact on teams  After 5pm and on weekends call [phone]

## 2025-02-24 NOTE — PROGRESS NOTE ADULT - SUBJECTIVE AND OBJECTIVE BOX
Follow Up:  fever, diarrhea, gemella bacteremia    Interval History/ROS: pt remains afebrile now and no cough, vomiting, diarrhea, last blood cx negative          Allergies  No Known Allergies        ANTIMICROBIALS:  cefTRIAXone   IVPB 2000 every 24 hours      OTHER MEDS:  acetaminophen     Tablet .. 650 milliGRAM(s) Oral every 6 hours PRN  apixaban 5 milliGRAM(s) Oral every 12 hours  aspirin  chewable 81 milliGRAM(s) Oral daily  atorvastatin 20 milliGRAM(s) Oral at bedtime  ferrous    sulfate 325 milliGRAM(s) Oral daily  metoprolol succinate ER 25 milliGRAM(s) Oral daily  mirtazapine 7.5 milliGRAM(s) Oral daily  NIFEdipine XL 60 milliGRAM(s) Oral daily  pantoprazole    Tablet 40 milliGRAM(s) Oral two times a day  tamsulosin 0.4 milliGRAM(s) Oral at bedtime      Vital Signs Last 24 Hrs  T(C): 36.7 (24 Feb 2025 05:44), Max: 37.1 (23 Feb 2025 20:43)  T(F): 98 (24 Feb 2025 05:44), Max: 98.7 (23 Feb 2025 20:43)  HR: 61 (24 Feb 2025 06:16) (53 - 61)  BP: 142/65 (24 Feb 2025 06:16) (141/55 - 154/55)  BP(mean): --  RR: 18 (24 Feb 2025 05:44) (18 - 18)  SpO2: 93% (24 Feb 2025 05:44) (92% - 93%)    Parameters below as of 24 Feb 2025 05:44  Patient On (Oxygen Delivery Method): room air        Physical Exam:  General:    NAD,  non toxic  Respiratory:    comfortable on RA  abd:     soft,   no tenderness  :     no  kimbrough  Musculoskeletal:   no joint swelling  vascular: no phlebitis  Skin:    no rash                          7.9    8.66  )-----------( 320      ( 23 Feb 2025 07:03 )             26.1       02-23    139  |  107  |  35[H]  ----------------------------<  74  4.8   |  22  |  1.33[H]    Ca    8.8      23 Feb 2025 07:01        Urinalysis Basic - ( 23 Feb 2025 07:01 )    Color: x / Appearance: x / SG: x / pH: x  Gluc: 74 mg/dL / Ketone: x  / Bili: x / Urobili: x   Blood: x / Protein: x / Nitrite: x   Leuk Esterase: x / RBC: x / WBC x   Sq Epi: x / Non Sq Epi: x / Bacteria: x        MICROBIOLOGY:  v  .Blood Blood-Peripheral  02-20-25   No growth at 72 Hours  --  --      .Blood Blood-Peripheral  02-20-25   No growth at 72 Hours  --  --      .Blood Blood-Peripheral  02-19-25   Growth in aerobic and anaerobic bottles: Gemella haemolysans  "Susceptibilities not performed"  --    Growth in aerobic bottle: Gram Positive Cocci in Clusters  Growth in anaerobic bottle: Gram Positive Cocci in Clusters      .Blood Blood-Peripheral  02-19-25   Growth in aerobic and anaerobic bottles: Gemella haemolysans  "Susceptibilities not performed"  --    Growth in aerobic bottle: Gram Positive Cocci in Clusters  Growth in anaerobic bottle: Gram Positive Cocci in Clusters      .Blood BLOOD  02-17-25   No growth at 5 days  --  Blood Culture PCR          [facility] Result: NotDetec (02-17 @ 15:25)        RADIOLOGY:  Images independently visualized and reviewed personally, findings as below  < from: CT Abdomen and Pelvis No Cont (02.20.25 @ 13:06) >  IMPRESSION:  No acute intrathoracic or intra-abdominal pathology identified on   noncontrast CT.    Soft tissue in the retroperitoneal region may represent collapsed bowel   or lymphadenopathy.      < end of copied text >

## 2025-02-24 NOTE — PROGRESS NOTE ADULT - ASSESSMENT
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 Patient presented to Dr. Landers's office today for followup and evaluation of left leg swelling, but was referred to the ED given reported 3-4 days of weakness, poor PO intake, and diarrhea. He is found to have DVT and DAX.    A/P:  DAX:  FENa>1  DAX workup from 2/15 suggestive of ATN  Losartan on hold  Renal US: Neg for Hydro   sCr worsening at present, pt intermittently febrile and with bradycardia   s/p IVF   FeNa 2% - indeterminate.  Bladder scan 2/19 with 215cc urine, continue to monitor bladder scan as needed   Pt on Zosyn - CBC neg for eosinophilia.  S.Cr worsened on 2/21 -- possibly d/t worsening anemia.  Pt eating and drinking well; BP acceptable.    S/P NS 0.9% @75mL/hr x1L on 2/21.  S.Cr Improved continue to monitor pending labs today  Monitor I/O  Monitor renal function closely.    HTN:  Losartan on hold and Amlodipine discontinued  Nifedipine increased to 90 mg daily BP better  Low sodium diet.  Monitor BP    Anemia:  Iron deficient vs GIB.  On PO iron supplements.  OB+  Hgb worsening.  Heme/onc and GI following.  Transfuse for Hgb <8.    Hypocalcemia:  In setting of hypoalbuminemia.  Optimize albumin.  Ca better.  Monitor Ca.    DVT:  Follow up vascular  On Apixaban.    Diarrhea  Resolved.

## 2025-02-24 NOTE — PROGRESS NOTE ADULT - SUBJECTIVE AND OBJECTIVE BOX
[facility]    Dr. Gabriel Betancourt     Office [phone] (9 am to 5 pm)  Service: [phone] (5pm to 9am)  Also Available on TEAMS      RENAL PROGRESS NOTE: DATE OF SERVICE 02-24-25 @ 10:37    Patient is a 85y old  Male who presents with a chief complaint of Referred by Vascular for Weakness Diarrhea (23 Feb 2025 17:12)      Patient seen and examined at bedside. No chest pain/sob    VITALS:  T(F): 98 (02-24-25 @ 05:44), Max: 98.7 (02-23-25 @ 20:43)  HR: 61 (02-24-25 @ 06:16)  BP: 142/65 (02-24-25 @ 06:16)  RR: 18 (02-24-25 @ 05:44)  SpO2: 93% (02-24-25 @ 05:44)  Wt(kg): --    02-23 @ 07:01  -  02-24 @ 07:00  --------------------------------------------------------  IN: 1200 mL / OUT: 1650 mL / NET: -450 mL    02-24 @ 07:01  -  02-24 @ 10:37  --------------------------------------------------------  IN: 240 mL / OUT: 0 mL / NET: 240 mL          PHYSICAL EXAM:  Constitutional: NAD  Neck: No JVD  Respiratory: CTAB, no wheezes, rales or rhonchi  Cardiovascular: S1, S2, RRR  Gastrointestinal: BS+, soft, NT/ND  Extremities: No peripheral edema    Hospital Medications:   MEDICATIONS  (STANDING):  apixaban 5 milliGRAM(s) Oral every 12 hours  aspirin  chewable 81 milliGRAM(s) Oral daily  atorvastatin 20 milliGRAM(s) Oral at bedtime  cefTRIAXone   IVPB 2000 milliGRAM(s) IV Intermittent every 24 hours  ferrous    sulfate 325 milliGRAM(s) Oral daily  metoprolol succinate ER 25 milliGRAM(s) Oral daily  mirtazapine 7.5 milliGRAM(s) Oral daily  NIFEdipine XL 60 milliGRAM(s) Oral daily  pantoprazole    Tablet 40 milliGRAM(s) Oral two times a day  tamsulosin 0.4 milliGRAM(s) Oral at bedtime      LABS:  02-23    139  |  107  |  35[H]  ----------------------------<  74  4.8   |  22  |  1.33[H]    Ca    8.8      23 Feb 2025 07:01      Creatinine Trend: 1.33 <--, 1.53 <--, 1.54 <--, 1.34 <--, 1.61 <--, 1.35 <--                                7.9    8.66  )-----------( 320      ( 23 Feb 2025 07:03 )             26.1     Urine Studies:  Urinalysis - [02-23-25 @ 07:01]      Color  / Appearance  / SG  / pH       Gluc 74 / Ketone   / Bili  / Urobili        Blood  / Protein  / Leuk Est  / Nitrite       RBC  / WBC  / Hyaline  / Gran  / Sq Epi  / Non Sq Epi  / Bacteria     Urine Creatinine 46      [02-19-25 @ 16:56]  Urine Sodium 80      [02-19-25 @ 16:56]  Urine Urea Nitrogen 581      [02-19-25 @ 16:51]    Iron 25, TIBC 249, %sat 10      [02-17-25 @ 07:23]  Ferritin 76      [02-17-25 @ 07:23]        RADIOLOGY & ADDITIONAL STUDIES:

## 2025-02-25 LAB
ANION GAP SERPL CALC-SCNC: 11 MMOL/L — SIGNIFICANT CHANGE UP (ref 5–17)
BLD GP AB SCN SERPL QL: NEGATIVE — SIGNIFICANT CHANGE UP
BUN SERPL-MCNC: 42 MG/DL — HIGH (ref 7–23)
CALCIUM SERPL-MCNC: 8.9 MG/DL — SIGNIFICANT CHANGE UP (ref 8.4–10.5)
CHLORIDE SERPL-SCNC: 104 MMOL/L — SIGNIFICANT CHANGE UP (ref 96–108)
CO2 SERPL-SCNC: 23 MMOL/L — SIGNIFICANT CHANGE UP (ref 22–31)
CREAT ?TM UR-MCNC: 82 MG/DL — SIGNIFICANT CHANGE UP
CREAT SERPL-MCNC: 1.79 MG/DL — HIGH (ref 0.5–1.3)
CULTURE RESULTS: SIGNIFICANT CHANGE UP
CULTURE RESULTS: SIGNIFICANT CHANGE UP
EGFR: 36 ML/MIN/1.73M2 — LOW
EGFR: 36 ML/MIN/1.73M2 — LOW
GLUCOSE SERPL-MCNC: 77 MG/DL — SIGNIFICANT CHANGE UP (ref 70–99)
HCT VFR BLD CALC: 24.2 % — LOW (ref 39–50)
HCT VFR BLD CALC: 25.1 % — LOW (ref 39–50)
HGB BLD-MCNC: 7.4 G/DL — LOW (ref 13–17)
HGB BLD-MCNC: 7.7 G/DL — LOW (ref 13–17)
MCHC RBC-ENTMCNC: 26.3 PG — LOW (ref 27–34)
MCHC RBC-ENTMCNC: 26.7 PG — LOW (ref 27–34)
MCHC RBC-ENTMCNC: 30.6 G/DL — LOW (ref 32–36)
MCHC RBC-ENTMCNC: 30.7 G/DL — LOW (ref 32–36)
MCV RBC AUTO: 86.1 FL — SIGNIFICANT CHANGE UP (ref 80–100)
MCV RBC AUTO: 87.2 FL — SIGNIFICANT CHANGE UP (ref 80–100)
NRBC BLD AUTO-RTO: 0 /100 WBCS — SIGNIFICANT CHANGE UP (ref 0–0)
NRBC BLD AUTO-RTO: 0 /100 WBCS — SIGNIFICANT CHANGE UP (ref 0–0)
PLATELET # BLD AUTO: 332 K/UL — SIGNIFICANT CHANGE UP (ref 150–400)
PLATELET # BLD AUTO: 348 K/UL — SIGNIFICANT CHANGE UP (ref 150–400)
POTASSIUM SERPL-MCNC: 4.8 MMOL/L — SIGNIFICANT CHANGE UP (ref 3.5–5.3)
POTASSIUM SERPL-SCNC: 4.8 MMOL/L — SIGNIFICANT CHANGE UP (ref 3.5–5.3)
RBC # BLD: 2.81 M/UL — LOW (ref 4.2–5.8)
RBC # BLD: 2.88 M/UL — LOW (ref 4.2–5.8)
RBC # FLD: 16.5 % — HIGH (ref 10.3–14.5)
RBC # FLD: 16.6 % — HIGH (ref 10.3–14.5)
RH IG SCN BLD-IMP: POSITIVE — SIGNIFICANT CHANGE UP
SODIUM SERPL-SCNC: 138 MMOL/L — SIGNIFICANT CHANGE UP (ref 135–145)
SODIUM UR-SCNC: 75 MMOL/L — SIGNIFICANT CHANGE UP
SPECIMEN SOURCE: SIGNIFICANT CHANGE UP
SPECIMEN SOURCE: SIGNIFICANT CHANGE UP
WBC # BLD: 7.65 K/UL — SIGNIFICANT CHANGE UP (ref 3.8–10.5)
WBC # BLD: 8.32 K/UL — SIGNIFICANT CHANGE UP (ref 3.8–10.5)
WBC # FLD AUTO: 7.65 K/UL — SIGNIFICANT CHANGE UP (ref 3.8–10.5)
WBC # FLD AUTO: 8.32 K/UL — SIGNIFICANT CHANGE UP (ref 3.8–10.5)

## 2025-02-25 PROCEDURE — G0545: CPT

## 2025-02-25 PROCEDURE — 99233 SBSQ HOSP IP/OBS HIGH 50: CPT

## 2025-02-25 RX ADMIN — Medication 60 MILLIGRAM(S): at 05:51

## 2025-02-25 RX ADMIN — Medication 325 MILLIGRAM(S): at 12:08

## 2025-02-25 RX ADMIN — CEFTRIAXONE 100 MILLIGRAM(S): 500 INJECTION, POWDER, FOR SOLUTION INTRAMUSCULAR; INTRAVENOUS at 15:30

## 2025-02-25 RX ADMIN — APIXABAN 5 MILLIGRAM(S): 2.5 TABLET, FILM COATED ORAL at 05:50

## 2025-02-25 RX ADMIN — METOPROLOL SUCCINATE 25 MILLIGRAM(S): 50 TABLET, EXTENDED RELEASE ORAL at 05:50

## 2025-02-25 RX ADMIN — Medication 40 MILLIGRAM(S): at 17:50

## 2025-02-25 RX ADMIN — MIRTAZAPINE 7.5 MILLIGRAM(S): 30 TABLET, FILM COATED ORAL at 12:08

## 2025-02-25 RX ADMIN — APIXABAN 5 MILLIGRAM(S): 2.5 TABLET, FILM COATED ORAL at 17:50

## 2025-02-25 RX ADMIN — TAMSULOSIN HYDROCHLORIDE 0.4 MILLIGRAM(S): 0.4 CAPSULE ORAL at 21:12

## 2025-02-25 RX ADMIN — ATORVASTATIN CALCIUM 20 MILLIGRAM(S): 80 TABLET, FILM COATED ORAL at 21:12

## 2025-02-25 RX ADMIN — Medication 81 MILLIGRAM(S): at 12:08

## 2025-02-25 RX ADMIN — Medication 40 MILLIGRAM(S): at 05:52

## 2025-02-25 NOTE — PROGRESS NOTE ADULT - SUBJECTIVE AND OBJECTIVE BOX
GIANNI MURPHY  MRN-[id number]    Patient is a 85y old  Male who presents with a chief complaint of Referred by Vascular for Weakness Diarrhea (25 Feb 2025 15:13)      Review of System  REVIEW OF SYSTEMS      General:	Denies fatigue, fevers, chills, sweats, decreased appetite.    Skin/Breast: denies pruritis, rash  	  Ophthalmologic: no change in vision or blurring  	  HEENT	Denies dry mouth, oral sores, dysphagia,  change in hearing.    Respiratory and Thorax:  cough, sob, wheeze, hemoptysis  	  Cardiovascular:	no cp , palp, orthopnea    Gastrointestinal:	no n/v/d constipation    Genitourinary:	no dysuria of frequency, no hematuria, no flank pain    Musculoskeletal:	no bone or joint pain. no muscle aches.     Neurological:	no change in sensory or motor function. no headache. no weakness.     Psychiatric:	no depression, no anxiety, insomnia.     Hematology/Lymphatics:	no bleeding or bruising        Current Meds  MEDICATIONS  (STANDING):  apixaban 5 milliGRAM(s) Oral every 12 hours  aspirin  chewable 81 milliGRAM(s) Oral daily  atorvastatin 20 milliGRAM(s) Oral at bedtime  cefTRIAXone   IVPB 2000 milliGRAM(s) IV Intermittent every 24 hours  ferrous    sulfate 325 milliGRAM(s) Oral daily  metoprolol succinate ER 25 milliGRAM(s) Oral daily  mirtazapine 7.5 milliGRAM(s) Oral daily  NIFEdipine XL 60 milliGRAM(s) Oral daily  pantoprazole    Tablet 40 milliGRAM(s) Oral two times a day  tamsulosin 0.4 milliGRAM(s) Oral at bedtime    MEDICATIONS  (PRN):  acetaminophen     Tablet .. 650 milliGRAM(s) Oral every 6 hours PRN Temp greater or equal to 38C (100.4F)      Vitals  Vital Signs Last 24 Hrs  T(C): 36.4 (25 Feb 2025 12:17), Max: 36.9 (24 Feb 2025 22:08)  T(F): 97.6 (25 Feb 2025 12:17), Max: 98.5 (24 Feb 2025 22:08)  HR: 57 (25 Feb 2025 12:17) (55 - 61)  BP: 127/58 (25 Feb 2025 12:17) (127/58 - 169/61)  BP(mean): --  RR: 18 (25 Feb 2025 12:17) (18 - 18)  SpO2: 92% (25 Feb 2025 12:17) (92% - 93%)    Parameters below as of 25 Feb 2025 12:17  Patient On (Oxygen Delivery Method): room air        Physical Exam  PHYSICAL EXAM:      Constitutional: NAD    Eyes: PERRLA EOMI, anicteric sclera    Heent :No oral sores, no pharyngeal injection. moist mucosa.    Neck: supple, no jvd, no LAD    Respiratory: CTA b/l     Cardiovascular: s1s2, no m/g/r    Gastrointestinal: soft, nt, nd, + BS    Extremities: no c/c/e    Neurological:A&O x 3 moves all ext.    Skin: no rash on exposed skin    Lymph Nodes: no lymphadenopathy.              Lab  CBC Full  -  ( 25 Feb 2025 14:31 )  WBC Count : 7.65 K/uL  RBC Count : 2.81 M/uL  Hemoglobin : 7.4 g/dL  Hematocrit : 24.2 %  Platelet Count - Automated : 332 K/uL  Mean Cell Volume : 86.1 fl  Mean Cell Hemoglobin : 26.3 pg  Mean Cell Hemoglobin Concentration : 30.6 g/dL  Auto Neutrophil # : x  Auto Lymphocyte # : x  Auto Monocyte # : x  Auto Eosinophil # : x  Auto Basophil # : x  Auto Neutrophil % : x  Auto Lymphocyte % : x  Auto Monocyte % : x  Auto Eosinophil % : x  Auto Basophil % : x    02-25    138  |  104  |  42[H]  ----------------------------<  77  4.8   |  23  |  1.79[H]    Ca    8.9      25 Feb 2025 07:08          Rad:    Assessment/Plan   GIANNI MURPHY  MRN-[id number]    Patient is a 85y old  Male who presents with a chief complaint of Referred by Vascular for Weakness Diarrhea (25 Feb 2025 15:13)      Review of System    comfortable    Current Meds  MEDICATIONS  (STANDING):  apixaban 5 milliGRAM(s) Oral every 12 hours  aspirin  chewable 81 milliGRAM(s) Oral daily  atorvastatin 20 milliGRAM(s) Oral at bedtime  cefTRIAXone   IVPB 2000 milliGRAM(s) IV Intermittent every 24 hours  ferrous    sulfate 325 milliGRAM(s) Oral daily  metoprolol succinate ER 25 milliGRAM(s) Oral daily  mirtazapine 7.5 milliGRAM(s) Oral daily  NIFEdipine XL 60 milliGRAM(s) Oral daily  pantoprazole    Tablet 40 milliGRAM(s) Oral two times a day  tamsulosin 0.4 milliGRAM(s) Oral at bedtime    MEDICATIONS  (PRN):  acetaminophen     Tablet .. 650 milliGRAM(s) Oral every 6 hours PRN Temp greater or equal to 38C (100.4F)      Vitals  Vital Signs Last 24 Hrs  T(C): 36.4 (25 Feb 2025 12:17), Max: 36.9 (24 Feb 2025 22:08)  T(F): 97.6 (25 Feb 2025 12:17), Max: 98.5 (24 Feb 2025 22:08)  HR: 57 (25 Feb 2025 12:17) (55 - 61)  BP: 127/58 (25 Feb 2025 12:17) (127/58 - 169/61)  BP(mean): --  RR: 18 (25 Feb 2025 12:17) (18 - 18)  SpO2: 92% (25 Feb 2025 12:17) (92% - 93%)    Parameters below as of 25 Feb 2025 12:17  Patient On (Oxygen Delivery Method): room air      PHYSICAL EXAM:     NAD      Lab  CBC Full  -  ( 25 Feb 2025 14:31 )  WBC Count : 7.65 K/uL  RBC Count : 2.81 M/uL  Hemoglobin : 7.4 g/dL  Hematocrit : 24.2 %  Platelet Count - Automated : 332 K/uL  Mean Cell Volume : 86.1 fl  Mean Cell Hemoglobin : 26.3 pg  Mean Cell Hemoglobin Concentration : 30.6 g/dL  Auto Neutrophil # : x  Auto Lymphocyte # : x  Auto Monocyte # : x  Auto Eosinophil # : x  Auto Basophil # : x  Auto Neutrophil % : x  Auto Lymphocyte % : x  Auto Monocyte % : x  Auto Eosinophil % : x  Auto Basophil % : x    02-25    138  |  104  |  42[H]  ----------------------------<  77  4.8   |  23  |  1.79[H]    Ca    8.9      25 Feb 2025 07:08          Rad:    Assessment/Plan

## 2025-02-25 NOTE — PROGRESS NOTE ADULT - ASSESSMENT
anemia  dvt    will need to be off noac for 48-72h prior to endoscopic eval  echo shows moderate pulm htn  will need cardiac clearance prior to endoscopic eval  proton pump inhibitor bid  monitor cbc  repeat cultures noted  echo noted  antibiotics per ID  will follow     I reviewed the overnight course of events on the unit, re-confirming the patient history. I discussed the care with the patient and their family  The plan of care was discussed with the physician assistant and modifications were made to the notation where appropriate.   Differential diagnosis and plan of care discussed with patient after the evaluation  35 minutes spent on total encounter of which more than fifty percent of the encounter was spent counseling and/or coordinating care by the attending physician.  Advanced care planning was discussed with patient and family.  Advanced care planning forms were reviewed and discussed.  Risks, benefits and alternatives of gastroenterologic procedures were discussed in detail and all questions were answered.

## 2025-02-25 NOTE — PROGRESS NOTE ADULT - SUBJECTIVE AND OBJECTIVE BOX
Follow Up:  fever, diarrhea, gemella bacteremia    Interval History/ROS: pt remains afebrile and no cough, vomiting, diarrhea          Allergies  No Known Allergies        ANTIMICROBIALS:  cefTRIAXone   IVPB 2000 every 24 hours      OTHER MEDS:  acetaminophen     Tablet .. 650 milliGRAM(s) Oral every 6 hours PRN  apixaban 5 milliGRAM(s) Oral every 12 hours  aspirin  chewable 81 milliGRAM(s) Oral daily  atorvastatin 20 milliGRAM(s) Oral at bedtime  ferrous    sulfate 325 milliGRAM(s) Oral daily  metoprolol succinate ER 25 milliGRAM(s) Oral daily  mirtazapine 7.5 milliGRAM(s) Oral daily  NIFEdipine XL 60 milliGRAM(s) Oral daily  pantoprazole    Tablet 40 milliGRAM(s) Oral two times a day  tamsulosin 0.4 milliGRAM(s) Oral at bedtime      Vital Signs Last 24 Hrs  T(C): 36.4 (25 Feb 2025 12:17), Max: 36.9 (24 Feb 2025 22:08)  T(F): 97.6 (25 Feb 2025 12:17), Max: 98.5 (24 Feb 2025 22:08)  HR: 57 (25 Feb 2025 12:17) (55 - 61)  BP: 127/58 (25 Feb 2025 12:17) (127/58 - 169/61)  BP(mean): --  RR: 18 (25 Feb 2025 12:17) (18 - 18)  SpO2: 92% (25 Feb 2025 12:17) (92% - 93%)    Parameters below as of 25 Feb 2025 12:17  Patient On (Oxygen Delivery Method): room air        Physical Exam:  General:    NAD,  non toxic  Respiratory:    comfortable on RA  abd:     soft,   no tenderness  :     no  kimbrough  Musculoskeletal:   no joint swelling  vascular: no phlebitis  Skin:    no rash                          7.7    8.32  )-----------( 348      ( 25 Feb 2025 07:12 )             25.1       02-25    138  |  104  |  42[H]  ----------------------------<  77  4.8   |  23  |  1.79[H]    Ca    8.9      25 Feb 2025 07:08        Urinalysis Basic - ( 25 Feb 2025 07:08 )    Color: x / Appearance: x / SG: x / pH: x  Gluc: 77 mg/dL / Ketone: x  / Bili: x / Urobili: x   Blood: x / Protein: x / Nitrite: x   Leuk Esterase: x / RBC: x / WBC x   Sq Epi: x / Non Sq Epi: x / Bacteria: x        MICROBIOLOGY:  v  .Blood Blood-Peripheral  02-20-25   No growth at 4 days  --  --      .Blood Blood-Peripheral  02-20-25   No growth at 4 days  --  --      .Blood Blood-Peripheral  02-19-25   Growth in aerobic and anaerobic bottles: Gemella haemolysans  "Susceptibilities not performed"  --    Growth in aerobic bottle: Gram Positive Cocci in Clusters  Growth in anaerobic bottle: Gram Positive Cocci in Clusters      .Blood Blood-Peripheral  02-19-25   Growth in aerobic and anaerobic bottles: Gemella haemolysans  "Susceptibilities not performed"  --    Growth in aerobic bottle: Gram Positive Cocci in Clusters  Growth in anaerobic bottle: Gram Positive Cocci in Clusters      .Blood BLOOD  02-17-25   No growth at 5 days  --  Blood Culture PCR                RADIOLOGY:  Images independently visualized and reviewed personally, findings as below  < from: CT Abdomen and Pelvis No Cont (02.20.25 @ 13:06) >  No acute intrathoracic or intra-abdominal pathology identified on   noncontrast CT.    Soft tissue in the retroperitoneal region may represent collapsed bowel   or lymphadenopathy.    < end of copied text >  < from: TTE W or WO Ultrasound Enhancing Agent (02.24.25 @ 12:45) >  CONCLUSIONS:      1. Left ventricular cavity is normal in size. Left ventricular systolic function is normal with an ejection fraction visually estimated at 55 to 60 %. There are no regional wall motion abnormalities seen.   2. Normal right ventricular cavity size and normal right ventricular systolic function.   3. Estimated pulmonary artery systolic pressure is 52 mmHg.   4. There is left to right shunting through the interatrial septum.   5. There is calcification of the aortic valve leaflets. Mild aortic stenosis.   6. Moderate aortic regurgitation.   7. Ascending aorta is dilated, measuring 4.10 cm (indexed 2.57 cm/m²).   8. Small pericardial effusion.   9. Compared to the transthoracic echocardiogram performed on 1/27/2024, the estimated RVSP has increased. There is mild aortic stenosis. The ascending aorta measures larger on this study, though that may be because we have imaged more distally.    < end of copied text >

## 2025-02-25 NOTE — PROGRESS NOTE ADULT - SUBJECTIVE AND OBJECTIVE BOX
[facility] [place] [facility]   MD INDERJIT NELSON MD SAKIL BHUIYAN, MD MANOJ GREENWOOD, NP    TEL:  OFFICE: [phone]  From 5pm-7am Answering Service [phone]    -- RENAL FOLLOW UP NOTE ---Date of Service 02-25-25 @ 13:39    Patient is a 85y old  Male who presents with a chief complaint of Referred by Vascular for Weakness Diarrhea      Patient seen and examined at bedside. No chest pain/sob    VITALS:  T(F): 97.6 (02-25-25 @ 12:17), Max: 98.5 (02-24-25 @ 22:08)  HR: 57 (02-25-25 @ 12:17)  BP: 127/58 (02-25-25 @ 12:17)  RR: 18 (02-25-25 @ 12:17)  SpO2: 92% (02-25-25 @ 12:17)  Wt(kg): --    02-24 @ 07:01  -  02-25 @ 07:00  --------------------------------------------------------  IN: 530 mL / OUT: 600 mL / NET: -70 mL    02-25 @ 07:01  -  02-25 @ 13:39  --------------------------------------------------------  IN: 360 mL / OUT: 300 mL / NET: 60 mL          PHYSICAL EXAM:  General: NAD  Neck: No JVD  Respiratory: CTAB, no wheezes, rales or rhonchi  Cardiovascular: S1, S2, RRR  Gastrointestinal: BS+, soft, NT/ND  Extremities: No peripheral edema    Hospital Medications:   MEDICATIONS  (STANDING):  apixaban 5 milliGRAM(s) Oral every 12 hours  aspirin  chewable 81 milliGRAM(s) Oral daily  atorvastatin 20 milliGRAM(s) Oral at bedtime  cefTRIAXone   IVPB 2000 milliGRAM(s) IV Intermittent every 24 hours  ferrous    sulfate 325 milliGRAM(s) Oral daily  metoprolol succinate ER 25 milliGRAM(s) Oral daily  mirtazapine 7.5 milliGRAM(s) Oral daily  NIFEdipine XL 60 milliGRAM(s) Oral daily  pantoprazole    Tablet 40 milliGRAM(s) Oral two times a day  tamsulosin 0.4 milliGRAM(s) Oral at bedtime      LABS:  02-25    138  |  104  |  42[H]  ----------------------------<  77  4.8   |  23  |  1.79[H]    Ca    8.9      25 Feb 2025 07:08      Creatinine Trend: 1.79 <--, 1.33 <--, 1.53 <--, 1.54 <--, 1.34 <--, 1.61 <--                                7.7    8.32  )-----------( 348      ( 25 Feb 2025 07:12 )             25.1     Urine Studies:  Urinalysis - [02-25-25 @ 07:08]      Color  / Appearance  / SG  / pH       Gluc 77 / Ketone   / Bili  / Urobili        Blood  / Protein  / Leuk Est  / Nitrite       RBC  / WBC  / Hyaline  / Gran  / Sq Epi  / Non Sq Epi  / Bacteria     Urine Creatinine 46      [02-19-25 @ 16:56]  Urine Sodium 80      [02-19-25 @ 16:56]  Urine Urea Nitrogen 581      [02-19-25 @ 16:51]    Iron 25, TIBC 249, %sat 10      [02-17-25 @ 07:23]  Ferritin 76      [02-17-25 @ 07:23]        RADIOLOGY & ADDITIONAL STUDIES:

## 2025-02-25 NOTE — PROGRESS NOTE ADULT - ASSESSMENT
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 Patient presented to Dr. Landers's office today for followup and evaluation of left leg swelling, but was referred to the ED given reported 3-4 days of weakness, poor PO intake, and diarrhea. He is found to have DVT and DAX.    A/P:  DAX:  FENa>1  DAX workup from 2/15 suggestive of ATN  Losartan on hold  Renal US: Neg for Hydro   sCr worsening at present, pt intermittently febrile and with bradycardia   s/p IVF   FeNa 2% - indeterminate.  Bladder scan 2/19 with 215cc urine, continue to monitor bladder scan as needed   Pt was on Zosyn - CBC neg for eosinophilia.  S.Cr worsened on 2/21 -- possibly d/t worsening anemia.  Pt eating and drinking well; BP acceptable.    S/P NS 0.9% @75mL/hr x1L on 2/21.  S.Cr Improved, then worsened again today  Check urine sodium and urine creatinine  Bladder scan negative for retention  Monitor I/O  Monitor renal function closely.    HTN:  Losartan on hold and Amlodipine discontinued  Nifedipine increased to 60 mg daily BP better  Low sodium diet.  Monitor BP    Anemia:  Iron deficient vs GIB.  On PO iron supplements.  OB+  Hgb worsening.  Heme/onc and GI following.  Transfuse for Hgb <8.    Hypocalcemia:  In setting of hypoalbuminemia.  Optimize albumin.  Ca better.  Monitor Ca.    DVT:  Follow up vascular  On Apixaban.    Diarrhea  Resolved.

## 2025-02-25 NOTE — PROGRESS NOTE ADULT - SUBJECTIVE AND OBJECTIVE BOX
Patient is a 85y old  Male who presents with a chief complaint of Referred by Vascular for Weakness Diarrhea (23 Feb 2025 17:12)      INTERVAL HPI/OVERNIGHT EVENTS:   No events     T(C): 36.4 (02-25-25 @ 12:17), Max: 36.7 (02-25-25 @ 05:01)  HR: 57 (02-25-25 @ 12:17) (57 - 61)  BP: 127/58 (02-25-25 @ 12:17) (127/58 - 147/62)  RR: 18 (02-25-25 @ 12:17) (18 - 18)  SpO2: 92% (02-25-25 @ 12:17) (92% - 93%)      MEDICATIONS  (STANDING):  apixaban 5 milliGRAM(s) Oral every 12 hours  aspirin  chewable 81 milliGRAM(s) Oral daily  atorvastatin 20 milliGRAM(s) Oral at bedtime  cefTRIAXone   IVPB 2000 milliGRAM(s) IV Intermittent every 24 hours  ferrous    sulfate 325 milliGRAM(s) Oral daily  metoprolol succinate ER 25 milliGRAM(s) Oral daily  mirtazapine 7.5 milliGRAM(s) Oral daily  NIFEdipine XL 60 milliGRAM(s) Oral daily  pantoprazole    Tablet 40 milliGRAM(s) Oral two times a day  tamsulosin 0.4 milliGRAM(s) Oral at bedtime    MEDICATIONS  (PRN):  acetaminophen     Tablet .. 650 milliGRAM(s) Oral every 6 hours PRN Temp greater or equal to 38C (100.4F)      PAST MEDICAL & SURGICAL HISTORY:  HTN (hypertension)      HLD (hyperlipidemia)      Deep vein thrombosis (DVT)          SOCIAL HISTORY  Alcohol:  Tobacco:  Illicit substance use:    FAMILY HISTORY:    REVIEW OF SYSTEMS:  CONSTITUTIONAL: No fever, weight loss, or fatigue  EYES: No eye pain, visual disturbances, or discharge  ENMT:  No difficulty hearing, tinnitus, vertigo; No sinus or throat pain  NECK: No pain or stiffness  RESPIRATORY: No cough, wheezing, chills or hemoptysis; No shortness of breath  CARDIOVASCULAR: No chest pain, palpitations, dizziness, or leg swelling  GASTROINTESTINAL: No abdominal or epigastric pain. No nausea, vomiting, or hematemesis; No diarrhea or constipation. No melena or hematochezia.  GENITOURINARY: No dysuria, frequency, hematuria, or incontinence  NEUROLOGICAL: No headaches, memory loss, loss of strength, numbness, or tremors  SKIN: No itching, burning, rashes, or lesions   LYMPH NODES: No enlarged glands  ENDOCRINE: No heat or cold intolerance; No hair loss  MUSCULOSKELETAL: No joint pain or swelling; No muscle, back, or extremity pain  PSYCHIATRIC: No depression, anxiety, mood swings, or difficulty sleeping  HEME/LYMPH: No easy bruising, or bleeding gums  ALLERY AND IMMUNOLOGIC: No hives or eczema    RADIOLOGY & ADDITIONAL TESTS:    Imaging Personally Reviewed:  [ ] YES  [ ] NO    Consultant(s) Notes Reviewed:  [ ] YES  [ ] NO    PHYSICAL EXAM:  GENERAL: NAD, well-groomed, well-developed  HEAD:  Atraumatic, Normocephalic  NECK: Supple, No JVD, Normal thyroid  NERVOUS SYSTEM:  Alert & Oriented X3, Good concentration; Motor Strength 5/5 B/L upper and lower extremities; DTRs 2+ intact and symmetric  CHEST/LUNG: Clear to percussion bilaterally; No rales, rhonchi, wheezing, or rubs  HEART: Regular rate and rhythm; No murmurs, rubs, or gallops  ABDOMEN: Soft, Nontender, Nondistended; Bowel sounds present  EXTREMITIES:  2+ Peripheral Pulses, No clubbing, cyanosis, or edema  LYMPH: No lymphadenopathy noted  SKIN: No rashes or lesions                            7.7    8.32  )-----------( 348      ( 25 Feb 2025 07:12 )             25.1               138|104|42<77  4.8|23|1.79  8.9,--,--  02-25 @ 07:08

## 2025-02-25 NOTE — PROGRESS NOTE ADULT - ASSESSMENT
84 m with HTN, HLD, PAD, DVT, sent from vascular office for diarrhea, generalized weakness and poor PO intake.   initially afebrile, no WBC, DAX normal LFT  GI PCR negative  on 2/17 pt had chills then fever and vomiting, negative u/a and blood cx, again fever 100.4 2/19    admitted with diarrhea and DAX on 2/15 with negative GI PCR, no WBC, then developed a fever with vomiting 2/17, WBC slightly higher to 11 and worsening renal function but negative u/a and blood cx, monitored off antibiotics again fever to 100.4 on 2/19 with no symptoms  blood cx on 2/17 showed gemella and blood cx 2/19, also Gemella, r/o endocarditis, TTE showed ASD and PFO but no veg  chest/abd CT but without contrast showed No acute intrathoracic or intra-abdominal pathology identified on noncontrast CT. Soft tissue in the retroperitoneal region may represent collapsed bowel or lymphadenopathy.    * repeat blood cx 2/20 negative  * s/p zosyn 2/19-2/21 and vanco 2/20-2/21, switched to ceftriaxone 2 qd 2/21  * SEBASTIAN  * dental eval  * monitor CBC/diff and CMP    The above assessment and plan was discussed with the primary team    Alissa Forman MD  contact on teams  After 5pm and on weekends call [phone]

## 2025-02-25 NOTE — PROGRESS NOTE ADULT - ASSESSMENT
83 y/o M with pmhx htn, hld, dvt, pad presents to the ER from vascular surgery office with diarrhea and generalized weakness over the last few days.     Fevers / Bacteremia   Blood cx blood cx on 2/17 showed GPC on 2/19  * s/p zosyn 2/19-2/21 and a vanco 2/20-2/21, switch to ceftriaxone 2 qd  * follow the repeat blood cx and repeat q 48h until clear  ID following   Continue with Ceftriaxone for now     TTE no evegetations     SEBASTIAN   Dental Eval   #Anemia GI consult appreciated   Iron def Anemia   repeat   GI work up after inf work up done     Acute DVT left LE  Eliquis   Vascular Surgery Consulted     DAX pre renal /ATN   Renal following     HTN   Hold Olmesartan   Continue with Norvasc and Toprol     HLD   Statins

## 2025-02-25 NOTE — PROGRESS NOTE ADULT - ASSESSMENT
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 Has anemia and hgb had been  trending down (no need for a transfusion right now.) Has iron deficiency and getting oral iron. Seen by GI and they plan for out-pt EGD. Rising creatinine and being seen by renal. That is also contributing to the anemia. Trend the CBC.     s/p elevated white blood count. h/h down. + Fever and + cx, which are likely contributing to hematologic findings. ID following.  WBC improved. Afebrile.

## 2025-02-25 NOTE — PROGRESS NOTE ADULT - SUBJECTIVE AND OBJECTIVE BOX
[facility]      Nolberto Crow NP    [address] [place] [address]  [phone]      INTERVAL HPI/OVERNIGHT EVENTS:    tolerating diet  hgb stable  no gi bleeding  heme requesting endoscopic eval    MEDICATIONS  (STANDING):  apixaban 10 milliGRAM(s) Oral every 12 hours  aspirin  chewable 81 milliGRAM(s) Oral daily  atorvastatin 20 milliGRAM(s) Oral at bedtime  ferrous    sulfate 325 milliGRAM(s) Oral daily  metoprolol succinate ER 25 milliGRAM(s) Oral daily  mirtazapine 7.5 milliGRAM(s) Oral daily  NIFEdipine XL 60 milliGRAM(s) Oral daily  piperacillin/tazobactam IVPB.. 3.375 Gram(s) IV Intermittent every 8 hours  sodium chloride 0.9%. 1000 milliLiter(s) (75 mL/Hr) IV Continuous <Continuous>  tamsulosin 0.4 milliGRAM(s) Oral at bedtime    MEDICATIONS  (PRN):  acetaminophen     Tablet .. 650 milliGRAM(s) Oral every 6 hours PRN Temp greater or equal to 38C (100.4F)      Allergies    No Known Allergies    Intolerances        ROS:   General:  No  fevers, chills, night sweats, fatigue,   Eyes:  Good vision, no reported pain  ENT:  No sore throat, pain, runny nose, dysphagia  CV:  No pain, palpitations, hypo/hypertension  Resp:  No dyspnea, cough, tachypnea, wheezing  GI:  No pain, No nausea, No vomiting, No diarrhea, No constipation, No weight loss, No fever, No pruritis, No rectal bleeding, No tarry stools, No dysphagia,  :  No pain, bleeding, incontinence, nocturia  Muscle:  No pain, weakness  Neuro:  No weakness, tingling, memory problems  Psych:  No fatigue, insomnia, mood problems, depression  Endocrine:  No polyuria, polydipsia, cold/heat intolerance  Heme:  No petechiae, ecchymosis, easy bruisability  Skin:  No rash, tattoos, scars, edema      PHYSICAL EXAM:   Vital Signs:  Vital Signs Last 24 Hrs  T(C): 36.4 (20 Feb 2025 16:50), Max: 37.1 (20 Feb 2025 12:31)  T(F): 97.6 (20 Feb 2025 16:50), Max: 98.7 (20 Feb 2025 12:31)  HR: 59 (20 Feb 2025 16:50) (57 - 67)  BP: 132/68 (20 Feb 2025 16:50) (125/62 - 149/61)  BP(mean): --  RR: 17 (20 Feb 2025 16:50) (17 - 18)  SpO2: 93% (20 Feb 2025 16:50) (92% - 94%)    Parameters below as of 20 Feb 2025 16:50  Patient On (Oxygen Delivery Method): room air      Daily     Daily     GENERAL:  Appears stated age,   HEENT:  NC/AT,    CHEST:  Full & symmetric excursion,   HEART:  Regular rhythm,  ABDOMEN:  Soft, non-tender, non-distended,  EXTEREMITIES:  no cyanosis  SKIN:  No rash  NEURO:  Alert,       LABS:                        8.2    17.34 )-----------( 269      ( 20 Feb 2025 06:39 )             26.2     02-20    139  |  106  |  32[H]  ----------------------------<  85  3.9   |  23  |  1.34[H]    Ca    8.1[L]      20 Feb 2025 06:39  Phos  3.3     02-19  Mg     2.3     02-19    TPro  5.8[L]  /  Alb  2.7[L]  /  TBili  0.2  /  DBili  x   /  AST  10  /  ALT  11  /  AlkPhos  57  02-20      Urinalysis Basic - ( 20 Feb 2025 06:39 )    Color: x / Appearance: x / SG: x / pH: x  Gluc: 85 mg/dL / Ketone: x  / Bili: x / Urobili: x   Blood: x / Protein: x / Nitrite: x   Leuk Esterase: x / RBC: x / WBC x   Sq Epi: x / Non Sq Epi: x / Bacteria: x        RADIOLOGY & ADDITIONAL TESTS:

## 2025-02-26 LAB
ANION GAP SERPL CALC-SCNC: 9 MMOL/L — SIGNIFICANT CHANGE UP (ref 5–17)
BUN SERPL-MCNC: 38 MG/DL — HIGH (ref 7–23)
CALCIUM SERPL-MCNC: 8.7 MG/DL — SIGNIFICANT CHANGE UP (ref 8.4–10.5)
CHLORIDE SERPL-SCNC: 104 MMOL/L — SIGNIFICANT CHANGE UP (ref 96–108)
CO2 SERPL-SCNC: 25 MMOL/L — SIGNIFICANT CHANGE UP (ref 22–31)
CREAT SERPL-MCNC: 1.44 MG/DL — HIGH (ref 0.5–1.3)
EGFR: 47 ML/MIN/1.73M2 — LOW
EGFR: 47 ML/MIN/1.73M2 — LOW
GLUCOSE SERPL-MCNC: 80 MG/DL — SIGNIFICANT CHANGE UP (ref 70–99)
HCT VFR BLD CALC: 24 % — LOW (ref 39–50)
HGB BLD-MCNC: 7.4 G/DL — LOW (ref 13–17)
MCHC RBC-ENTMCNC: 26.9 PG — LOW (ref 27–34)
MCHC RBC-ENTMCNC: 30.8 G/DL — LOW (ref 32–36)
MCV RBC AUTO: 87.3 FL — SIGNIFICANT CHANGE UP (ref 80–100)
NRBC BLD AUTO-RTO: 0 /100 WBCS — SIGNIFICANT CHANGE UP (ref 0–0)
PLATELET # BLD AUTO: 350 K/UL — SIGNIFICANT CHANGE UP (ref 150–400)
POTASSIUM SERPL-MCNC: 4.9 MMOL/L — SIGNIFICANT CHANGE UP (ref 3.5–5.3)
POTASSIUM SERPL-SCNC: 4.9 MMOL/L — SIGNIFICANT CHANGE UP (ref 3.5–5.3)
RBC # BLD: 2.75 M/UL — LOW (ref 4.2–5.8)
RBC # FLD: 16.7 % — HIGH (ref 10.3–14.5)
SODIUM SERPL-SCNC: 138 MMOL/L — SIGNIFICANT CHANGE UP (ref 135–145)
WBC # BLD: 7.31 K/UL — SIGNIFICANT CHANGE UP (ref 3.8–10.5)
WBC # FLD AUTO: 7.31 K/UL — SIGNIFICANT CHANGE UP (ref 3.8–10.5)

## 2025-02-26 PROCEDURE — G0545: CPT

## 2025-02-26 PROCEDURE — 99232 SBSQ HOSP IP/OBS MODERATE 35: CPT | Mod: GC

## 2025-02-26 RX ADMIN — APIXABAN 5 MILLIGRAM(S): 2.5 TABLET, FILM COATED ORAL at 05:07

## 2025-02-26 RX ADMIN — Medication 40 MILLIGRAM(S): at 05:07

## 2025-02-26 RX ADMIN — ATORVASTATIN CALCIUM 20 MILLIGRAM(S): 80 TABLET, FILM COATED ORAL at 21:15

## 2025-02-26 RX ADMIN — Medication 60 MILLIGRAM(S): at 05:06

## 2025-02-26 RX ADMIN — MIRTAZAPINE 7.5 MILLIGRAM(S): 30 TABLET, FILM COATED ORAL at 11:10

## 2025-02-26 RX ADMIN — CEFTRIAXONE 100 MILLIGRAM(S): 500 INJECTION, POWDER, FOR SOLUTION INTRAMUSCULAR; INTRAVENOUS at 17:14

## 2025-02-26 RX ADMIN — APIXABAN 5 MILLIGRAM(S): 2.5 TABLET, FILM COATED ORAL at 17:15

## 2025-02-26 RX ADMIN — Medication 325 MILLIGRAM(S): at 11:10

## 2025-02-26 RX ADMIN — Medication 40 MILLIGRAM(S): at 17:15

## 2025-02-26 RX ADMIN — METOPROLOL SUCCINATE 25 MILLIGRAM(S): 50 TABLET, EXTENDED RELEASE ORAL at 05:07

## 2025-02-26 RX ADMIN — TAMSULOSIN HYDROCHLORIDE 0.4 MILLIGRAM(S): 0.4 CAPSULE ORAL at 21:15

## 2025-02-26 RX ADMIN — Medication 81 MILLIGRAM(S): at 11:11

## 2025-02-26 NOTE — PROGRESS NOTE ADULT - SUBJECTIVE AND OBJECTIVE BOX
Patient is a 85y old  Male who presents with a chief complaint of Referred by Vascular for Weakness Diarrhea (26 Feb 2025 16:38)    Feels okay. NPO for procedure. EGD? No bleeding. No HA or dizziness. No fever. No CP or SOB or cough. No N/V    Medication:   acetaminophen     Tablet .. 650 milliGRAM(s) Oral every 6 hours PRN  apixaban 5 milliGRAM(s) Oral every 12 hours  aspirin  chewable 81 milliGRAM(s) Oral daily  atorvastatin 20 milliGRAM(s) Oral at bedtime  cefTRIAXone   IVPB 2000 milliGRAM(s) IV Intermittent every 24 hours  ferrous    sulfate 325 milliGRAM(s) Oral daily  metoprolol succinate ER 25 milliGRAM(s) Oral daily  mirtazapine 7.5 milliGRAM(s) Oral daily  NIFEdipine XL 60 milliGRAM(s) Oral daily  pantoprazole    Tablet 40 milliGRAM(s) Oral two times a day  tamsulosin 0.4 milliGRAM(s) Oral at bedtime      Physical exam    T(C): 36.6 (02-26-25 @ 14:26), Max: 36.8 (02-25-25 @ 20:59)  HR: 50 (02-26-25 @ 14:26) (50 - 60)  BP: 129/53 (02-26-25 @ 14:26) (129/53 - 149/64)  RR: 18 (02-26-25 @ 14:26) (18 - 18)  SpO2: 93% (02-26-25 @ 14:26) (92% - 94%)  Wt(kg): --    alert NAD  EOMI anicteric sclera  Cv s1 S2 RRR  Lungs clear B/L  abd soft NT ND +BS  No LE edema or tenderness    Labs                        7.4    7.31  )-----------( 350      ( 26 Feb 2025 07:14 )             24.0       02-26    138  |  104  |  38[H]  ----------------------------<  80  4.9   |  25  |  1.44[H]    Ca    8.7      26 Feb 2025 07:13            [id number]

## 2025-02-26 NOTE — PROGRESS NOTE ADULT - ASSESSMENT
84 m with HTN, HLD, PAD, DVT, sent from vascular office for diarrhea, generalized weakness and poor PO intake.   initially afebrile, no WBC, DAX normal LFT  GI PCR negative  on 2/17 pt had chills then fever and vomiting, negative u/a and blood cx, again fever 100.4 2/19    admitted with diarrhea and DAX on 2/15 with negative GI PCR, no WBC, then developed a fever with vomiting 2/17, WBC slightly higher to 11 and worsening renal function but negative u/a and blood cx, monitored off antibiotics again fever to 100.4 on 2/19 with no symptoms  blood cx on 2/17 showed gemella and blood cx 2/19, also Gemella, r/o endocarditis, TTE showed ASD and PFO but no veg  chest/abd CT but without contrast showed No acute intrathoracic or intra-abdominal pathology identified on noncontrast CT. Soft tissue in the retroperitoneal region may represent collapsed bowel or lymphadenopathy.  dental stated no clinical evidence of infection  * repeat blood cx 2/20 negative  * s/p zosyn 2/19-2/21 and vanco 2/20-2/21, switched to ceftriaxone 2 qd 2/21  * SEBASTIAN  * monitor CBC/diff and CMP    The above assessment and plan was discussed with the primary team    Alissa Forman MD  contact on teams  After 5pm and on weekends call [phone]

## 2025-02-26 NOTE — PROGRESS NOTE ADULT - SUBJECTIVE AND OBJECTIVE BOX
Follow Up:  fever, diarrhea, gemella bacteremia    Interval History/ROS: pt remains afebrile and no cough, vomiting, diarrhea, dental stated no clinical evidence of infection      Allergies  No Known Allergies        ANTIMICROBIALS:  cefTRIAXone   IVPB 2000 every 24 hours      OTHER MEDS:  acetaminophen     Tablet .. 650 milliGRAM(s) Oral every 6 hours PRN  apixaban 5 milliGRAM(s) Oral every 12 hours  aspirin  chewable 81 milliGRAM(s) Oral daily  atorvastatin 20 milliGRAM(s) Oral at bedtime  ferrous    sulfate 325 milliGRAM(s) Oral daily  metoprolol succinate ER 25 milliGRAM(s) Oral daily  mirtazapine 7.5 milliGRAM(s) Oral daily  NIFEdipine XL 60 milliGRAM(s) Oral daily  pantoprazole    Tablet 40 milliGRAM(s) Oral two times a day  tamsulosin 0.4 milliGRAM(s) Oral at bedtime      Vital Signs Last 24 Hrs  T(C): 36.6 (26 Feb 2025 14:26), Max: 36.8 (25 Feb 2025 20:59)  T(F): 97.8 (26 Feb 2025 14:26), Max: 98.3 (26 Feb 2025 00:54)  HR: 50 (26 Feb 2025 14:26) (50 - 60)  BP: 129/53 (26 Feb 2025 14:26) (129/53 - 149/64)  BP(mean): --  RR: 18 (26 Feb 2025 14:26) (18 - 18)  SpO2: 93% (26 Feb 2025 14:26) (92% - 94%)    Parameters below as of 26 Feb 2025 14:26  Patient On (Oxygen Delivery Method): room air        Physical Exam:  General:    NAD,  non toxic  Respiratory:    comfortable on RA  abd:     soft,   no tenderness  :     no  kimbrough  Musculoskeletal:   no joint swelling  vascular: no phlebitis  Skin:    no rash                          7.4    7.31  )-----------( 350      ( 26 Feb 2025 07:14 )             24.0       02-26    138  |  104  |  38[H]  ----------------------------<  80  4.9   |  25  |  1.44[H]    Ca    8.7      26 Feb 2025 07:13        Urinalysis Basic - ( 26 Feb 2025 07:13 )    Color: x / Appearance: x / SG: x / pH: x  Gluc: 80 mg/dL / Ketone: x  / Bili: x / Urobili: x   Blood: x / Protein: x / Nitrite: x   Leuk Esterase: x / RBC: x / WBC x   Sq Epi: x / Non Sq Epi: x / Bacteria: x        MICROBIOLOGY:  v  .Blood Blood-Peripheral  02-20-25   No growth at 5 days  --  --      .Blood Blood-Peripheral  02-20-25   No growth at 5 days  --  --      .Blood Blood-Peripheral  02-19-25   Growth in aerobic and anaerobic bottles: Gemella haemolysans  "Susceptibilities not performed"  --    Growth in aerobic bottle: Gram Positive Cocci in Clusters  Growth in anaerobic bottle: Gram Positive Cocci in Clusters      .Blood Blood-Peripheral  02-19-25   Growth in aerobic and anaerobic bottles: Gemella haemolysans  "Susceptibilities not performed"  --    Growth in aerobic bottle: Gram Positive Cocci in Clusters  Growth in anaerobic bottle: Gram Positive Cocci in Clusters      .Blood BLOOD  02-17-25   No growth at 5 days  --  Blood Culture PCR                RADIOLOGY:  Images independently visualized and reviewed personally, findings as below  < from: CT Abdomen and Pelvis No Cont (02.20.25 @ 13:06) >  IMPRESSION:  No acute intrathoracic or intra-abdominal pathology identified on   noncontrast CT.    Soft tissue in the retroperitoneal region may represent collapsed bowel   or lymphadenopathy.    < end of copied text >

## 2025-02-26 NOTE — PROGRESS NOTE ADULT - ASSESSMENT
85-year-old male with anemia. Mixed iron def (stool heme occult positive) and kidney insufficiency. Possibly for EGD. Hgb trending down. Transfuse as needed. Getting oral iron. Infection is also contributing.    DVT - getting Eliquis. That my need to be held if Hgb continues to drop.

## 2025-02-26 NOTE — PROGRESS NOTE ADULT - SUBJECTIVE AND OBJECTIVE BOX
[facility]      Nolberto Crow NP    [address] [place] [address]  [phone]      INTERVAL HPI/OVERNIGHT EVENTS:    tolerating diet  hgb stable  no gi bleeding      MEDICATIONS  (STANDING):  apixaban 10 milliGRAM(s) Oral every 12 hours  aspirin  chewable 81 milliGRAM(s) Oral daily  atorvastatin 20 milliGRAM(s) Oral at bedtime  ferrous    sulfate 325 milliGRAM(s) Oral daily  metoprolol succinate ER 25 milliGRAM(s) Oral daily  mirtazapine 7.5 milliGRAM(s) Oral daily  NIFEdipine XL 60 milliGRAM(s) Oral daily  piperacillin/tazobactam IVPB.. 3.375 Gram(s) IV Intermittent every 8 hours  sodium chloride 0.9%. 1000 milliLiter(s) (75 mL/Hr) IV Continuous <Continuous>  tamsulosin 0.4 milliGRAM(s) Oral at bedtime    MEDICATIONS  (PRN):  acetaminophen     Tablet .. 650 milliGRAM(s) Oral every 6 hours PRN Temp greater or equal to 38C (100.4F)      Allergies    No Known Allergies    Intolerances        ROS:   General:  No  fevers, chills, night sweats, fatigue,   Eyes:  Good vision, no reported pain  ENT:  No sore throat, pain, runny nose, dysphagia  CV:  No pain, palpitations, hypo/hypertension  Resp:  No dyspnea, cough, tachypnea, wheezing  GI:  No pain, No nausea, No vomiting, No diarrhea, No constipation, No weight loss, No fever, No pruritis, No rectal bleeding, No tarry stools, No dysphagia,  :  No pain, bleeding, incontinence, nocturia  Muscle:  No pain, weakness  Neuro:  No weakness, tingling, memory problems  Psych:  No fatigue, insomnia, mood problems, depression  Endocrine:  No polyuria, polydipsia, cold/heat intolerance  Heme:  No petechiae, ecchymosis, easy bruisability  Skin:  No rash, tattoos, scars, edema      PHYSICAL EXAM:   Vital Signs:  Vital Signs Last 24 Hrs  T(C): 36.4 (20 Feb 2025 16:50), Max: 37.1 (20 Feb 2025 12:31)  T(F): 97.6 (20 Feb 2025 16:50), Max: 98.7 (20 Feb 2025 12:31)  HR: 59 (20 Feb 2025 16:50) (57 - 67)  BP: 132/68 (20 Feb 2025 16:50) (125/62 - 149/61)  BP(mean): --  RR: 17 (20 Feb 2025 16:50) (17 - 18)  SpO2: 93% (20 Feb 2025 16:50) (92% - 94%)    Parameters below as of 20 Feb 2025 16:50  Patient On (Oxygen Delivery Method): room air      Daily     Daily     GENERAL:  Appears stated age,   HEENT:  NC/AT,    CHEST:  Full & symmetric excursion,   HEART:  Regular rhythm,  ABDOMEN:  Soft, non-tender, non-distended,  EXTEREMITIES:  no cyanosis  SKIN:  No rash  NEURO:  Alert,       LABS:                        8.2    17.34 )-----------( 269      ( 20 Feb 2025 06:39 )             26.2     02-20    139  |  106  |  32[H]  ----------------------------<  85  3.9   |  23  |  1.34[H]    Ca    8.1[L]      20 Feb 2025 06:39  Phos  3.3     02-19  Mg     2.3     02-19    TPro  5.8[L]  /  Alb  2.7[L]  /  TBili  0.2  /  DBili  x   /  AST  10  /  ALT  11  /  AlkPhos  57  02-20      Urinalysis Basic - ( 20 Feb 2025 06:39 )    Color: x / Appearance: x / SG: x / pH: x  Gluc: 85 mg/dL / Ketone: x  / Bili: x / Urobili: x   Blood: x / Protein: x / Nitrite: x   Leuk Esterase: x / RBC: x / WBC x   Sq Epi: x / Non Sq Epi: x / Bacteria: x        RADIOLOGY & ADDITIONAL TESTS:

## 2025-02-26 NOTE — PROGRESS NOTE ADULT - SUBJECTIVE AND OBJECTIVE BOX
[facility]    Dr. Gabriel Betancourt     Office [phone] (9 am to 5 pm)  Service: [phone] (5pm to 9am)  Also Available on TEAMS      RENAL PROGRESS NOTE: DATE OF SERVICE 02-26-25 @ 10:18    Patient is a 85y old  Male who presents with a chief complaint of Referred by Vascular for Weakness Diarrhea (26 Feb 2025 09:36)      Patient seen and examined at bedside. No chest pain/sob    VITALS:  T(F): 97.5 (02-26-25 @ 05:15), Max: 98.3 (02-26-25 @ 00:54)  HR: 58 (02-26-25 @ 05:15)  BP: 149/64 (02-26-25 @ 05:15)  RR: 18 (02-26-25 @ 05:15)  SpO2: 94% (02-26-25 @ 05:15)  Wt(kg): --    02-25 @ 07:01  -  02-26 @ 07:00  --------------------------------------------------------  IN: 890 mL / OUT: 1300 mL / NET: -410 mL          PHYSICAL EXAM:  Constitutional: NAD  Neck: No JVD  Respiratory: CTAB, no wheezes, rales or rhonchi  Cardiovascular: S1, S2, RRR  Gastrointestinal: BS+, soft, NT/ND  Extremities: No peripheral edema    Hospital Medications:   MEDICATIONS  (STANDING):  apixaban 5 milliGRAM(s) Oral every 12 hours  aspirin  chewable 81 milliGRAM(s) Oral daily  atorvastatin 20 milliGRAM(s) Oral at bedtime  cefTRIAXone   IVPB 2000 milliGRAM(s) IV Intermittent every 24 hours  ferrous    sulfate 325 milliGRAM(s) Oral daily  metoprolol succinate ER 25 milliGRAM(s) Oral daily  mirtazapine 7.5 milliGRAM(s) Oral daily  NIFEdipine XL 60 milliGRAM(s) Oral daily  pantoprazole    Tablet 40 milliGRAM(s) Oral two times a day  tamsulosin 0.4 milliGRAM(s) Oral at bedtime      LABS:  02-26    138  |  104  |  38[H]  ----------------------------<  80  4.9   |  25  |  1.44[H]    Ca    8.7      26 Feb 2025 07:13      Creatinine Trend: 1.44 <--, 1.79 <--, 1.33 <--, 1.53 <--, 1.54 <--, 1.34 <--                                7.4    7.31  )-----------( 350      ( 26 Feb 2025 07:14 )             24.0     Urine Studies:  Urinalysis - [02-26-25 @ 07:13]      Color  / Appearance  / SG  / pH       Gluc 80 / Ketone   / Bili  / Urobili        Blood  / Protein  / Leuk Est  / Nitrite       RBC  / WBC  / Hyaline  / Gran  / Sq Epi  / Non Sq Epi  / Bacteria     Urine Creatinine 82      [02-25-25 @ 12:46]  Urine Sodium 75      [02-25-25 @ 12:46]  Urine Urea Nitrogen 581      [02-19-25 @ 16:51]    Iron 25, TIBC 249, %sat 10      [02-17-25 @ 07:23]  Ferritin 76      [02-17-25 @ 07:23]        RADIOLOGY & ADDITIONAL STUDIES:

## 2025-02-26 NOTE — CONSULT NOTE ADULT - SUBJECTIVE AND OBJECTIVE BOX
2/25/2025  Patient is a 85y old Male who presents with a chief complaint of Referred by Vascular for Weakness Diarrhea (25 Feb 2025 15:13). Dental paged to rule out odontogenic source of infection.     HPI:  85 y/o M with pmhx htn, hld, dvt, pad presents to the ER from vascular surgery office with diarrhea and generalized weakness over the last few days.  Patient reports that he has been having diarrhea x few days, associated with weakness, went to see [facility] surgeon as he noticed LLE swelling who referred him to the ED.  No hx fevers/ nausea/ vomiting.  Able to tolerate PO, No sick contacts.  (15 Feb 2025 14:52)    PAST MEDICAL & SURGICAL HISTORY:  HTN (hypertension)    HLD (hyperlipidemia)    Deep vein thrombosis (DVT)    (   ) heart valve replacement  (   ) joint replacement  (   ) pregnancy  MEDICATIONS (STANDING):  apixaban 5 milliGRAM(s) Oral every 12 hours  aspirin  chewable 81 milliGRAM(s) Oral daily  atorvastatin 20 milliGRAM(s) Oral at bedtime  cefTRIAXone   IVPB 2000 milliGRAM(s) IV Intermittent every 24 hours  ferrous    sulfate 325 milliGRAM(s) Oral daily  metoprolol succinate ER 25 milliGRAM(s) Oral daily  mirtazapine 7.5 milliGRAM(s) Oral daily  NIFEdipine XL 60 milliGRAM(s) Oral daily  pantoprazole   Tablet 40 milliGRAM(s) Oral two times a day  tamsulosin 0.4 milliGRAM(s) Oral at bedtime  MEDICATIONS  (PRN):  acetaminophen    Tablet .. 650 milliGRAM(s) Oral every 6 hours PRN Temp greater or equal to 38C (100.4F)    Allergies  No Known Allergies  Intolerances    FAMILY HISTORY:    *SOCIAL HISTORY: (  ) Tobacco; (   ) ETOH  *Last Dental Visit: many years ago  Vital Signs Last 24 Hrs  T(C): 36.4 (25 Feb 2025 12:17), Max: 36.9 (24 Feb 2025 22:08)  T(F): 97.6 (25 Feb 2025 12:17), Max: 98.5 (24 Feb 2025 22:08)  HR: 57 (25 Feb 2025 12:17) (55 - 61)  BP: 127/58 (25 Feb 2025 12:17) (127/58 - 169/61)  BP(mean): --  RR: 18 (25 Feb 2025 12:17) (18 - 18)  SpO2: 92% (25 Feb 2025 12:17) (92% - 93%)  Parameters below as of 25 Feb 2025 12:17  Patient On (Oxygen Delivery Method): room air    LABS:            7.4  7.65  )-----------( 332      ( 25 Feb 2025 14:31 )            24.2  02-25  138  |  104 |  42[H]  ----------------------------<  77  4.8   |  23 |  1.79[H]  Ca    8.9      25 Feb 2025 07:08    WBC Count: 7.65 K/uL [3.80 - 10.50] (02-25 @ 14:31)  Platelet Count - Automated: 332 K/uL [150 - 400] (02-25 @ 14:31)  WBC Count: 8.32 K/uL [3.80 - 10.50] (02-25 @ 07:12)  Platelet Count - Automated: 348 K/uL [150 - 400] (02-25 @ 07:12)  WBC Count: 8.66 K/uL [3.80 - 10.50] (02-23 @ 07:03)  Platelet Count - Automated: 320 K/uL [150 - 400] (02-23 @ 07:03)  WBC Count: 8.94 K/uL [3.80 - 10.50] (02-22 @ 15:49)  Platelet Count - Automated: 319 K/uL [150 - 400] (02-22 @ 15:49)  Urinalysis Basic - ( 25 Feb 2025 07:08 )  Color: x / Appearance: x / SG: x / pH: x  Gluc: 77 mg/dL / Ketone: x  / Bili: x / Urobili: x  Blood: x / Protein: x / Nitrite: x  Leuk Esterase: x / RBC: x / WBC x  Sq Epi: x / Non Sq Epi: x / Bacteria: x  EOE:              Facial bones and MOM grossly             (  - ) lymphadenopathy             (  - ) swelling             (  - ) asymmetry             (  - ) palpation  IOE:  permanent dentition: multiple carious teeth, multiple missing teeth           hard/soft palate:  ( -  ) palatal torus, No pathology noted           tongue/FOM: No pathology noted           labial/buccal mucosa:  No pathology noted           (  - ) swelling            (  - ) abscess           (  - ) sinus tract         ( - ) palpation          (   ) percussion – patient reports tooth #12 feels ‘different’ than the other teeth when                percussed but not painful          ( - ) grossly mobile teeth  *DENTAL RADIOGRAPHS: Panoramic image taken – not of diagnostic quality  RADIOLOGY & ADDITIONAL STUDIES: N/A  ASSESSMENT: No clinical evidence of swelling, abscess or fistula. No clinical evidence of acute dental infection. However, odontogenic infection cannot be fully ruled out without diagnostic dental radiographs.  RECOMMENDATIONS:  1) Rule out other sources of infection.  2) Dental F/U with outpatient dentist for comprehensive dental care.  Mile Lomax, DDS #[id number]  Consulted with Dr. Pratt

## 2025-02-26 NOTE — PROGRESS NOTE ADULT - ASSESSMENT
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 Patient presented to Dr. Landers's office today for followup and evaluation of left leg swelling, but was referred to the ED given reported 3-4 days of weakness, poor PO intake, and diarrhea. He is found to have DVT and DAX.    A/P:  DAX:  FENa>1  DAX workup from 2/15 suggestive of ATN  Losartan on hold  Renal US: Neg for Hydro   sCr worsening at present, pt intermittently febrile and with bradycardia   s/p IVF   FeNa 2% - indeterminate.  Bladder scan 2/19 with 215cc urine, continue to monitor bladder scan as needed   Pt was on Zosyn - CBC neg for eosinophilia.  S.Cr worsened on 2/21 -- possibly d/t worsening anemia.  Pt eating and drinking well; BP acceptable.    S/P NS 0.9% @75mL/hr x1L on 2/21.  SCR fluctuating monitor for now  Fena inconclusive 1.2%  Bladder scan negative for retention  Monitor I/O  Monitor renal function closely.    HTN:  Losartan on hold and Amlodipine discontinued  Nifedipine increased to 60 mg daily BP better  Low sodium diet.  Monitor BP    Anemia:  Iron deficient vs GIB.  On PO iron supplements.  OB+  Hgb worsening.  Heme/onc and GI following.  Transfuse for Hgb <8.    Hypocalcemia:  In setting of hypoalbuminemia.  Optimize albumin.  Ca better.  Monitor Ca.    DVT:  Follow up vascular  On Apixaban.    Diarrhea  Resolved.

## 2025-02-26 NOTE — PROGRESS NOTE ADULT - ASSESSMENT
85 y/o M with pmhx htn, hld, dvt, pad presents to the ER from vascular surgery office with diarrhea and generalized weakness over the last few days.     Fevers / Bacteremia   Blood cx blood cx on 2/17 showed GPC on 2/19  * s/p zosyn 2/19-2/21 and a vanco 2/20-2/21, switch to ceftriaxone 2 qd  * follow the repeat blood cx and repeat q 48h until clear  ID following   Continue with Ceftriaxone for now     TTE no evegetations     SEBASTIAN   Dental Eval   #Anemia GI consult appreciated   Iron def Anemia   repeat   GI work up after inf work up done     Acute DVT left LE  Eliquis   Vascular Surgery Consulted     DAX pre renal /ATN   Renal following     HTN   Hold Olmesartan   Continue with Norvasc and Toprol     HLD   Statins

## 2025-02-27 LAB
ANION GAP SERPL CALC-SCNC: 10 MMOL/L — SIGNIFICANT CHANGE UP (ref 5–17)
APTT BLD: 57.5 SEC — HIGH (ref 24.5–35.6)
BUN SERPL-MCNC: 44 MG/DL — HIGH (ref 7–23)
CALCIUM SERPL-MCNC: 8.7 MG/DL — SIGNIFICANT CHANGE UP (ref 8.4–10.5)
CHLORIDE SERPL-SCNC: 103 MMOL/L — SIGNIFICANT CHANGE UP (ref 96–108)
CO2 SERPL-SCNC: 26 MMOL/L — SIGNIFICANT CHANGE UP (ref 22–31)
CREAT SERPL-MCNC: 1.73 MG/DL — HIGH (ref 0.5–1.3)
EGFR: 38 ML/MIN/1.73M2 — LOW
EGFR: 38 ML/MIN/1.73M2 — LOW
GLUCOSE SERPL-MCNC: 78 MG/DL — SIGNIFICANT CHANGE UP (ref 70–99)
HCT VFR BLD CALC: 23.7 % — LOW (ref 39–50)
HGB BLD-MCNC: 7.2 G/DL — LOW (ref 13–17)
INR BLD: 1.57 RATIO — HIGH (ref 0.85–1.16)
MCHC RBC-ENTMCNC: 26.5 PG — LOW (ref 27–34)
MCHC RBC-ENTMCNC: 30.4 G/DL — LOW (ref 32–36)
MCV RBC AUTO: 87.1 FL — SIGNIFICANT CHANGE UP (ref 80–100)
NRBC BLD AUTO-RTO: 0 /100 WBCS — SIGNIFICANT CHANGE UP (ref 0–0)
PLATELET # BLD AUTO: 351 K/UL — SIGNIFICANT CHANGE UP (ref 150–400)
POTASSIUM SERPL-MCNC: 5.1 MMOL/L — SIGNIFICANT CHANGE UP (ref 3.5–5.3)
POTASSIUM SERPL-SCNC: 5.1 MMOL/L — SIGNIFICANT CHANGE UP (ref 3.5–5.3)
PROTHROM AB SERPL-ACNC: 18 SEC — HIGH (ref 9.9–13.4)
RBC # BLD: 2.72 M/UL — LOW (ref 4.2–5.8)
RBC # FLD: 17 % — HIGH (ref 10.3–14.5)
SODIUM SERPL-SCNC: 139 MMOL/L — SIGNIFICANT CHANGE UP (ref 135–145)
WBC # BLD: 7.27 K/UL — SIGNIFICANT CHANGE UP (ref 3.8–10.5)
WBC # FLD AUTO: 7.27 K/UL — SIGNIFICANT CHANGE UP (ref 3.8–10.5)

## 2025-02-27 PROCEDURE — 99232 SBSQ HOSP IP/OBS MODERATE 35: CPT

## 2025-02-27 PROCEDURE — G0545: CPT

## 2025-02-27 RX ADMIN — Medication 60 MILLIGRAM(S): at 05:08

## 2025-02-27 RX ADMIN — Medication 40 MILLIGRAM(S): at 05:10

## 2025-02-27 RX ADMIN — APIXABAN 5 MILLIGRAM(S): 2.5 TABLET, FILM COATED ORAL at 05:08

## 2025-02-27 RX ADMIN — METOPROLOL SUCCINATE 25 MILLIGRAM(S): 50 TABLET, EXTENDED RELEASE ORAL at 05:08

## 2025-02-27 RX ADMIN — MIRTAZAPINE 7.5 MILLIGRAM(S): 30 TABLET, FILM COATED ORAL at 11:19

## 2025-02-27 RX ADMIN — Medication 325 MILLIGRAM(S): at 11:18

## 2025-02-27 RX ADMIN — TAMSULOSIN HYDROCHLORIDE 0.4 MILLIGRAM(S): 0.4 CAPSULE ORAL at 22:23

## 2025-02-27 RX ADMIN — CEFTRIAXONE 100 MILLIGRAM(S): 500 INJECTION, POWDER, FOR SOLUTION INTRAMUSCULAR; INTRAVENOUS at 17:17

## 2025-02-27 RX ADMIN — Medication 40 MILLIGRAM(S): at 17:16

## 2025-02-27 RX ADMIN — ATORVASTATIN CALCIUM 20 MILLIGRAM(S): 80 TABLET, FILM COATED ORAL at 22:23

## 2025-02-27 RX ADMIN — Medication 81 MILLIGRAM(S): at 11:19

## 2025-02-27 NOTE — PROGRESS NOTE ADULT - ASSESSMENT
85-year-old male with anemia. Mixed iron def (stool heme occult positive) and kidney insufficiency. Possibly for EGD. Hgb trending down. Transfuse as needed. Getting oral iron. Infection is also contributing.    DVT - getting Eliquis. That my need to be held if Hgb continues to drop.  85-year-old male with anemia. Mixed iron def (stool heme occult positive) and kidney insufficiency. Possibly for EGD. Hgb trending down. Transfuse as needed. Getting oral iron. Infection is also contributing.    DVT - getting Eliquis, asa. That my need to be held if Hgb continues to drop.   mgmt as per med, GI

## 2025-02-27 NOTE — PROGRESS NOTE ADULT - ASSESSMENT
anemia  dvt    will need to be off noac for 48-72h prior to endoscopic eval  being transitioned to hep gtt  echo shows moderate pulm htn  will need cardiac clearance prior to endoscopic eval  will consider early next week  proton pump inhibitor bid  monitor cbc  repeat cultures noted  echo noted  antibiotics per ID  will follow     I reviewed the overnight course of events on the unit, re-confirming the patient history. I discussed the care with the patient and their family  The plan of care was discussed with the physician assistant and modifications were made to the notation where appropriate.   Differential diagnosis and plan of care discussed with patient after the evaluation  35 minutes spent on total encounter of which more than fifty percent of the encounter was spent counseling and/or coordinating care by the attending physician.  Advanced care planning was discussed with patient and family.  Advanced care planning forms were reviewed and discussed.  Risks, benefits and alternatives of gastroenterologic procedures were discussed in detail and all questions were answered.

## 2025-02-27 NOTE — PROGRESS NOTE ADULT - ASSESSMENT
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 Patient presented to Dr. Landers's office today for followup and evaluation of left leg swelling, but was referred to the ED given reported 3-4 days of weakness, poor PO intake, and diarrhea. He is found to have DVT and DAX.    A/P:  DAX:  [place] baseline however fluctuating 1.3-1.7 probably with some degree of CKD  OP nephrologist Dr. Rodriguez  FENa>1  DAX workup from 2/15 suggestive of ATN  Losartan on hold  Renal US: Neg for Hydro   sCr worsening at present, pt intermittently febrile and with bradycardia   s/p IVF   FeNa 2% - indeterminate.  Bladder scan 2/19 with 215cc urine, continue to monitor bladder scan as needed   Pt was on Zosyn - CBC neg for eosinophilia.  S.Cr worsened on 2/21 -- possibly d/t worsening anemia.  Pt eating and drinking well; BP acceptable.    S/P NS 0.9% @75mL/hr x1L on 2/21.  SCR fluctuating monitor for now  Repeat Fena inconclusive 1.2%, ECHO shows dilated CVP so no role for fluids unless hypotensive  Will continue to monitor  Bladder scan negative for retention  Monitor I/O  Monitor renal function closely.    HTN:  Losartan on hold and Amlodipine discontinued  Nifedipine increased to 60 mg daily BP better  Low sodium diet.  Monitor BP    Anemia:  Iron deficient vs GIB.  On PO iron supplements.  OB+  Hgb worsening.  Heme/onc and GI following.  Transfuse for Hgb <8.    Hypocalcemia:  In setting of hypoalbuminemia.  Optimize albumin.  Ca better.  Monitor Ca.    DVT:  Follow up vascular  On Apixaban.    Diarrhea  Resolved.

## 2025-02-27 NOTE — PROGRESS NOTE ADULT - SUBJECTIVE AND OBJECTIVE BOX
Patient is a 85y old  Male who presents with a chief complaint of Referred by Vascular for Weakness Diarrhea (23 Feb 2025 17:12)      INTERVAL HPI/OVERNIGHT EVENTS:   No events     T(C): 36.7 (02-27-25 @ 19:42), Max: 36.7 (02-27-25 @ 16:01)  HR: 64 (02-27-25 @ 19:42) (50 - 64)  BP: 134/63 (02-27-25 @ 19:42) (115/52 - 138/68)  RR: 17 (02-27-25 @ 19:42) (17 - 18)  SpO2: 94% (02-27-25 @ 19:42) (92% - 94%)      MEDICATIONS  (STANDING):  aspirin  chewable 81 milliGRAM(s) Oral daily  atorvastatin 20 milliGRAM(s) Oral at bedtime  cefTRIAXone   IVPB 2000 milliGRAM(s) IV Intermittent every 24 hours  ferrous    sulfate 325 milliGRAM(s) Oral daily  metoprolol succinate ER 25 milliGRAM(s) Oral daily  mirtazapine 7.5 milliGRAM(s) Oral daily  NIFEdipine XL 60 milliGRAM(s) Oral daily  pantoprazole    Tablet 40 milliGRAM(s) Oral two times a day  tamsulosin 0.4 milliGRAM(s) Oral at bedtime    MEDICATIONS  (PRN):  acetaminophen     Tablet .. 650 milliGRAM(s) Oral every 6 hours PRN Temp greater or equal to 38C (100.4F)    PAST MEDICAL & SURGICAL HISTORY:  HTN (hypertension)      HLD (hyperlipidemia)      Deep vein thrombosis (DVT)          SOCIAL HISTORY  Alcohol:  Tobacco:  Illicit substance use:    FAMILY HISTORY:    REVIEW OF SYSTEMS:  CONSTITUTIONAL: No fever, weight loss, or fatigue  EYES: No eye pain, visual disturbances, or discharge  ENMT:  No difficulty hearing, tinnitus, vertigo; No sinus or throat pain  NECK: No pain or stiffness  RESPIRATORY: No cough, wheezing, chills or hemoptysis; No shortness of breath  CARDIOVASCULAR: No chest pain, palpitations, dizziness, or leg swelling  GASTROINTESTINAL: No abdominal or epigastric pain. No nausea, vomiting, or hematemesis; No diarrhea or constipation. No melena or hematochezia.  GENITOURINARY: No dysuria, frequency, hematuria, or incontinence  NEUROLOGICAL: No headaches, memory loss, loss of strength, numbness, or tremors  SKIN: No itching, burning, rashes, or lesions   LYMPH NODES: No enlarged glands  ENDOCRINE: No heat or cold intolerance; No hair loss  MUSCULOSKELETAL: No joint pain or swelling; No muscle, back, or extremity pain  PSYCHIATRIC: No depression, anxiety, mood swings, or difficulty sleeping  HEME/LYMPH: No easy bruising, or bleeding gums  ALLERY AND IMMUNOLOGIC: No hives or eczema    RADIOLOGY & ADDITIONAL TESTS:    Imaging Personally Reviewed:  [ ] YES  [ ] NO    Consultant(s) Notes Reviewed:  [ ] YES  [ ] NO    PHYSICAL EXAM:  GENERAL: NAD, well-groomed, well-developed  HEAD:  Atraumatic, Normocephalic  NECK: Supple, No JVD, Normal thyroid  NERVOUS SYSTEM:  Alert & Oriented X3, Good concentration; Motor Strength 5/5 B/L upper and lower extremities; DTRs 2+ intact and symmetric  CHEST/LUNG: Clear to percussion bilaterally; No rales, rhonchi, wheezing, or rubs  HEART: Regular rate and rhythm; No murmurs, rubs, or gallops  ABDOMEN: Soft, Nontender, Nondistended; Bowel sounds present  EXTREMITIES:  2+ Peripheral Pulses, No clubbing, cyanosis, or edema  LYMPH: No lymphadenopathy noted  SKIN: No rashes or lesions                                   7.2    7.27  )-----------( 351      ( 27 Feb 2025 07:09 )             23.7             PT/INR - ( 27 Feb 2025 18:41 )   PT: 18.0 sec;   INR: 1.57 ratio         PTT - ( 27 Feb 2025 18:41 )  PTT:57.5 sec  139|103|44<78  5.1|26|1.73  8.7,--,--  02-27 @ 07:03

## 2025-02-27 NOTE — PROGRESS NOTE ADULT - SUBJECTIVE AND OBJECTIVE BOX
[facility]    Dr. Gabriel Betancourt     Office [phone] (9 am to 5 pm)  Service: [phone] (5pm to 9am)  Also Available on TEAMS      RENAL PROGRESS NOTE: DATE OF SERVICE 02-27-25 @ 12:58    Patient is a 85y old  Male who presents with a chief complaint of Referred by Vascular for Weakness Diarrhea (27 Feb 2025 04:51)      Patient seen and examined at bedside. No chest pain/sob    VITALS:  T(F): 98.1 (02-27-25 @ 04:46), Max: 98.5 (02-26-25 @ 20:39)  HR: 62 (02-27-25 @ 04:46)  BP: 157/64 (02-27-25 @ 04:46)  RR: 17 (02-27-25 @ 04:46)  SpO2: 93% (02-27-25 @ 04:46)  Wt(kg): --    02-26 @ 07:01  -  02-27 @ 07:00  --------------------------------------------------------  IN: 240 mL / OUT: 250 mL / NET: -10 mL    02-27 @ 07:01  -  02-27 @ 12:58  --------------------------------------------------------  IN: 240 mL / OUT: 0 mL / NET: 240 mL          PHYSICAL EXAM:  Constitutional: NAD  Neck: No JVD  Respiratory: CTAB, no wheezes, rales or rhonchi  Cardiovascular: S1, S2, RRR  Gastrointestinal: BS+, soft, NT/ND  Extremities: No peripheral edema    Hospital Medications:   MEDICATIONS  (STANDING):  apixaban 5 milliGRAM(s) Oral every 12 hours  aspirin  chewable 81 milliGRAM(s) Oral daily  atorvastatin 20 milliGRAM(s) Oral at bedtime  cefTRIAXone   IVPB 2000 milliGRAM(s) IV Intermittent every 24 hours  ferrous    sulfate 325 milliGRAM(s) Oral daily  metoprolol succinate ER 25 milliGRAM(s) Oral daily  mirtazapine 7.5 milliGRAM(s) Oral daily  NIFEdipine XL 60 milliGRAM(s) Oral daily  pantoprazole    Tablet 40 milliGRAM(s) Oral two times a day  tamsulosin 0.4 milliGRAM(s) Oral at bedtime      LABS:  02-27    139  |  103  |  44[H]  ----------------------------<  78  5.1   |  26  |  1.73[H]    Ca    8.7      27 Feb 2025 07:03      Creatinine Trend: 1.73 <--, 1.44 <--, 1.79 <--, 1.33 <--, 1.53 <--, 1.54 <--                                7.2    7.27  )-----------( 351      ( 27 Feb 2025 07:09 )             23.7     Urine Studies:  Urinalysis - [02-27-25 @ 07:03]      Color  / Appearance  / SG  / pH       Gluc 78 / Ketone   / Bili  / Urobili        Blood  / Protein  / Leuk Est  / Nitrite       RBC  / WBC  / Hyaline  / Gran  / Sq Epi  / Non Sq Epi  / Bacteria     Urine Creatinine 82      [02-25-25 @ 12:46]  Urine Sodium 75      [02-25-25 @ 12:46]    Iron 25, TIBC 249, %sat 10      [02-17-25 @ 07:23]  Ferritin 76      [02-17-25 @ 07:23]        RADIOLOGY & ADDITIONAL STUDIES:

## 2025-02-27 NOTE — PROGRESS NOTE ADULT - ASSESSMENT
85 y/o M with pmhx htn, hld, dvt, pad presents to the ER from vascular surgery office with diarrhea and generalized weakness over the last few days.     Fevers / Bacteremia   Blood cx blood cx on 2/17 showed GPC on 2/19  * s/p zosyn 2/19-2/21 and a vanco 2/20-2/21, switch to ceftriaxone 2 qd  * follow the repeat blood cx and repeat q 48h until clear  ID following   Continue with Ceftriaxone for now     TTE no evegetations     SEBASTIAN   Dental Eval done   #Anemia GI consult appreciated   Iron def Anemia plan for in patient scope   repeat   GI work up after inf work up done     Acute DVT left LE  Hep drip    Vascular Surgery Consulted     DAX pre renal /ATN   Renal following     HTN   Hold Olmesartan   Continue with Norvasc and Toprol     HLD   Statins

## 2025-02-27 NOTE — PROGRESS NOTE ADULT - ASSESSMENT
84 m with HTN, HLD, PAD, DVT, sent from vascular office for diarrhea, generalized weakness and poor PO intake.   initially afebrile, no WBC, DAX normal LFT  GI PCR negative  on 2/17 pt had chills then fever and vomiting, negative u/a and blood cx, again fever 100.4 2/19    admitted with diarrhea and DAX on 2/15 with negative GI PCR, no WBC, then developed a fever with vomiting 2/17, WBC slightly higher to 11 and worsening renal function but negative u/a and blood cx, monitored off antibiotics again fever to 100.4 on 2/19 with no symptoms  blood cx on 2/17 showed gemella and blood cx 2/19, also Gemella, r/o endocarditis, TTE showed ASD and PFO but no veg  chest/abd CT but without contrast showed No acute intrathoracic or intra-abdominal pathology identified on noncontrast CT. Soft tissue in the retroperitoneal region may represent collapsed bowel or lymphadenopathy.  dental stated no clinical evidence of infection  * repeat blood cx 2/20 negative  * s/p zosyn 2/19-2/21 and vanco 2/20-2/21, switched to ceftriaxone 2 qd 2/21  * SEBASTIAN (awaiting EGD to clear)  * monitor CBC/diff and CMP    The above assessment and plan was discussed with the primary team    Alissa Forman MD  contact on teams  After 5pm and on weekends call [phone]

## 2025-02-27 NOTE — PROGRESS NOTE ADULT - SUBJECTIVE AND OBJECTIVE BOX
GIANNI MURPHY  MRN-[id number]    Patient is a 85y old  Male who presents with a chief complaint of Referred by Vascular for Weakness Diarrhea (26 Feb 2025 20:23)      Review of System  REVIEW OF SYSTEMS      General:	Denies fatigue, fevers, chills, sweats, decreased appetite.    Skin/Breast: denies pruritis, rash  	  Ophthalmologic: no change in vision or blurring  	  HEENT	Denies dry mouth, oral sores, dysphagia,  change in hearing.    Respiratory and Thorax:  cough, sob, wheeze, hemoptysis  	  Cardiovascular:	no cp , palp, orthopnea    Gastrointestinal:	no n/v/d constipation    Genitourinary:	no dysuria of frequency, no hematuria, no flank pain    Musculoskeletal:	no bone or joint pain. no muscle aches.     Neurological:	no change in sensory or motor function. no headache. no weakness.     Psychiatric:	no depression, no anxiety, insomnia.     Hematology/Lymphatics:	no bleeding or bruising        Current Meds  MEDICATIONS  (STANDING):  apixaban 5 milliGRAM(s) Oral every 12 hours  aspirin  chewable 81 milliGRAM(s) Oral daily  atorvastatin 20 milliGRAM(s) Oral at bedtime  cefTRIAXone   IVPB 2000 milliGRAM(s) IV Intermittent every 24 hours  ferrous    sulfate 325 milliGRAM(s) Oral daily  metoprolol succinate ER 25 milliGRAM(s) Oral daily  mirtazapine 7.5 milliGRAM(s) Oral daily  NIFEdipine XL 60 milliGRAM(s) Oral daily  pantoprazole    Tablet 40 milliGRAM(s) Oral two times a day  tamsulosin 0.4 milliGRAM(s) Oral at bedtime    MEDICATIONS  (PRN):  acetaminophen     Tablet .. 650 milliGRAM(s) Oral every 6 hours PRN Temp greater or equal to 38C (100.4F)      Vitals  Vital Signs Last 24 Hrs  T(C): 36.7 (27 Feb 2025 04:46), Max: 36.9 (26 Feb 2025 20:39)  T(F): 98.1 (27 Feb 2025 04:46), Max: 98.5 (26 Feb 2025 20:39)  HR: 62 (27 Feb 2025 04:46) (50 - 62)  BP: 157/64 (27 Feb 2025 04:46) (121/67 - 157/64)  BP(mean): --  RR: 17 (27 Feb 2025 04:46) (17 - 18)  SpO2: 93% (27 Feb 2025 04:46) (93% - 94%)    Parameters below as of 27 Feb 2025 04:46  Patient On (Oxygen Delivery Method): room air        Physical Exam  PHYSICAL EXAM:      Constitutional: NAD    Eyes: PERRLA EOMI, anicteric sclera    Heent :No oral sores, no pharyngeal injection. moist mucosa.    Neck: supple, no jvd, no LAD    Respiratory: CTA b/l     Cardiovascular: s1s2, no m/g/r    Gastrointestinal: soft, nt, nd, + BS    Extremities: no c/c/e    Neurological:A&O x 3 moves all ext.    Skin: no rash on exposed skin    Lymph Nodes: no lymphadenopathy.              Lab  CBC Full  -  ( 26 Feb 2025 07:14 )  WBC Count : 7.31 K/uL  RBC Count : 2.75 M/uL  Hemoglobin : 7.4 g/dL  Hematocrit : 24.0 %  Platelet Count - Automated : 350 K/uL  Mean Cell Volume : 87.3 fl  Mean Cell Hemoglobin : 26.9 pg  Mean Cell Hemoglobin Concentration : 30.8 g/dL  Auto Neutrophil # : x  Auto Lymphocyte # : x  Auto Monocyte # : x  Auto Eosinophil # : x  Auto Basophil # : x  Auto Neutrophil % : x  Auto Lymphocyte % : x  Auto Monocyte % : x  Auto Eosinophil % : x  Auto Basophil % : x    02-26    138  |  104  |  38[H]  ----------------------------<  80  4.9   |  25  |  1.44[H]    Ca    8.7      26 Feb 2025 07:13          Rad:    Assessment/Plan   GIANNI MURPHY  MRN-[id number]    Patient is a 85y old  Male who presents with a chief complaint of Referred by Vascular for Weakness Diarrhea (26 Feb 2025 20:23)      Review of System    resting comfortably    Current Meds  MEDICATIONS  (STANDING):  apixaban 5 milliGRAM(s) Oral every 12 hours  aspirin  chewable 81 milliGRAM(s) Oral daily  atorvastatin 20 milliGRAM(s) Oral at bedtime  cefTRIAXone   IVPB 2000 milliGRAM(s) IV Intermittent every 24 hours  ferrous    sulfate 325 milliGRAM(s) Oral daily  metoprolol succinate ER 25 milliGRAM(s) Oral daily  mirtazapine 7.5 milliGRAM(s) Oral daily  NIFEdipine XL 60 milliGRAM(s) Oral daily  pantoprazole    Tablet 40 milliGRAM(s) Oral two times a day  tamsulosin 0.4 milliGRAM(s) Oral at bedtime    MEDICATIONS  (PRN):  acetaminophen     Tablet .. 650 milliGRAM(s) Oral every 6 hours PRN Temp greater or equal to 38C (100.4F)      Vital Signs Last 24 Hrs  T(C): 36.7 (27 Feb 2025 04:46), Max: 36.9 (26 Feb 2025 20:39)  T(F): 98.1 (27 Feb 2025 04:46), Max: 98.5 (26 Feb 2025 20:39)  HR: 62 (27 Feb 2025 04:46) (50 - 62)  BP: 157/64 (27 Feb 2025 04:46) (121/67 - 157/64)  BP(mean): --  RR: 17 (27 Feb 2025 04:46) (17 - 18)  SpO2: 93% (27 Feb 2025 04:46) (93% - 94%)    Parameters below as of 27 Feb 2025 04:46  Patient On (Oxygen Delivery Method): room air      PHYSICAL EXAM:     NAD        Lab  CBC Full  -  ( 26 Feb 2025 07:14 )  WBC Count : 7.31 K/uL  RBC Count : 2.75 M/uL  Hemoglobin : 7.4 g/dL  Hematocrit : 24.0 %  Platelet Count - Automated : 350 K/uL  Mean Cell Volume : 87.3 fl  Mean Cell Hemoglobin : 26.9 pg  Mean Cell Hemoglobin Concentration : 30.8 g/dL  Auto Neutrophil # : x  Auto Lymphocyte # : x  Auto Monocyte # : x  Auto Eosinophil # : x  Auto Basophil # : x  Auto Neutrophil % : x  Auto Lymphocyte % : x  Auto Monocyte % : x  Auto Eosinophil % : x  Auto Basophil % : x    02-26    138  |  104  |  38[H]  ----------------------------<  80  4.9   |  25  |  1.44[H]    Ca    8.7      26 Feb 2025 07:13          Rad:    Assessment/Plan

## 2025-02-27 NOTE — PROGRESS NOTE ADULT - SUBJECTIVE AND OBJECTIVE BOX
Follow Up:  fever, diarrhea, gemella bacteremia    Interval History/ROS: pt remains afebrile and no cough, vomiting, diarrhea      Allergies  No Known Allergies        ANTIMICROBIALS:  cefTRIAXone   IVPB 2000 every 24 hours      OTHER MEDS:  acetaminophen     Tablet .. 650 milliGRAM(s) Oral every 6 hours PRN  aspirin  chewable 81 milliGRAM(s) Oral daily  atorvastatin 20 milliGRAM(s) Oral at bedtime  ferrous    sulfate 325 milliGRAM(s) Oral daily  metoprolol succinate ER 25 milliGRAM(s) Oral daily  mirtazapine 7.5 milliGRAM(s) Oral daily  NIFEdipine XL 60 milliGRAM(s) Oral daily  pantoprazole    Tablet 40 milliGRAM(s) Oral two times a day  tamsulosin 0.4 milliGRAM(s) Oral at bedtime      Vital Signs Last 24 Hrs  T(C): 36.6 (27 Feb 2025 13:08), Max: 36.9 (26 Feb 2025 20:39)  T(F): 97.9 (27 Feb 2025 13:08), Max: 98.5 (26 Feb 2025 20:39)  HR: 50 (27 Feb 2025 13:08) (50 - 62)  BP: 115/52 (27 Feb 2025 13:08) (115/52 - 157/64)  BP(mean): --  RR: 18 (27 Feb 2025 13:08) (17 - 18)  SpO2: 92% (27 Feb 2025 13:08) (92% - 94%)    Parameters below as of 27 Feb 2025 13:08  Patient On (Oxygen Delivery Method): room air        Physical Exam:  General:    NAD,  non toxic  Respiratory:    comfortable on RA  abd:     soft,   no tenderness  :     no  kimbrough  Musculoskeletal:   no joint swelling  vascular: no phlebitis  Skin:    no rash                          7.2    7.27  )-----------( 351      ( 27 Feb 2025 07:09 )             23.7       02-27    139  |  103  |  44[H]  ----------------------------<  78  5.1   |  26  |  1.73[H]    Ca    8.7      27 Feb 2025 07:03        Urinalysis Basic - ( 27 Feb 2025 07:03 )    Color: x / Appearance: x / SG: x / pH: x  Gluc: 78 mg/dL / Ketone: x  / Bili: x / Urobili: x   Blood: x / Protein: x / Nitrite: x   Leuk Esterase: x / RBC: x / WBC x   Sq Epi: x / Non Sq Epi: x / Bacteria: x        MICROBIOLOGY:  v  .Blood Blood-Peripheral  02-20-25   No growth at 5 days  --  --      .Blood Blood-Peripheral  02-20-25   No growth at 5 days  --  --      .Blood Blood-Peripheral  02-19-25   Growth in aerobic and anaerobic bottles: Gemella haemolysans  "Susceptibilities not performed"  --    Growth in aerobic bottle: Gram Positive Cocci in Clusters  Growth in anaerobic bottle: Gram Positive Cocci in Clusters      .Blood Blood-Peripheral  02-19-25   Growth in aerobic and anaerobic bottles: Gemella haemolysans  "Susceptibilities not performed"  --    Growth in aerobic bottle: Gram Positive Cocci in Clusters  Growth in anaerobic bottle: Gram Positive Cocci in Clusters      .Blood BLOOD  02-17-25   No growth at 5 days  --  Blood Culture PCR                RADIOLOGY:  Images independently visualized and reviewed personally, findings as below  < from: CT Abdomen and Pelvis No Cont (02.20.25 @ 13:06) >  IMPRESSION:  No acute intrathoracic or intra-abdominal pathology identified on   noncontrast CT.    Soft tissue in the retroperitoneal region may represent collapsed bowel   or lymphadenopathy.      < end of copied text >  < from: TTE W or WO Ultrasound Enhancing Agent (02.24.25 @ 12:45) >  CONCLUSIONS:      1. Left ventricular cavity is normal in size. Left ventricular systolic function is normal with an ejection fraction visually estimated at 55 to 60 %. There are no regional wall motion abnormalities seen.   2. Normal right ventricular cavity size and normal right ventricular systolic function.   3. Estimated pulmonary artery systolic pressure is 52 mmHg.   4. There is left to right shunting through the interatrial septum.   5. There is calcification of the aortic valve leaflets. Mild aortic stenosis.   6. Moderate aortic regurgitation.   7. Ascending aorta is dilated, measuring 4.10 cm (indexed 2.57 cm/m²).   8. Small pericardial effusion.   9. Compared to the transthoracic echocardiogram performed on 1/27/2024, the estimated RVSP has increased. There is mild aortic stenosis. The ascending aorta measures larger on this study, though that may be because we have imaged more distally.    < end of copied text >

## 2025-02-27 NOTE — PROGRESS NOTE ADULT - SUBJECTIVE AND OBJECTIVE BOX
[facility]      Nolberto Crow NP    [address] [place] [address]  [phone]      INTERVAL HPI/OVERNIGHT EVENTS:    tolerating diet  hgb stable  no gi bleeding      MEDICATIONS  (STANDING):  apixaban 10 milliGRAM(s) Oral every 12 hours  aspirin  chewable 81 milliGRAM(s) Oral daily  atorvastatin 20 milliGRAM(s) Oral at bedtime  ferrous    sulfate 325 milliGRAM(s) Oral daily  metoprolol succinate ER 25 milliGRAM(s) Oral daily  mirtazapine 7.5 milliGRAM(s) Oral daily  NIFEdipine XL 60 milliGRAM(s) Oral daily  piperacillin/tazobactam IVPB.. 3.375 Gram(s) IV Intermittent every 8 hours  sodium chloride 0.9%. 1000 milliLiter(s) (75 mL/Hr) IV Continuous <Continuous>  tamsulosin 0.4 milliGRAM(s) Oral at bedtime    MEDICATIONS  (PRN):  acetaminophen     Tablet .. 650 milliGRAM(s) Oral every 6 hours PRN Temp greater or equal to 38C (100.4F)      Allergies    No Known Allergies    Intolerances        ROS:   General:  No  fevers, chills, night sweats, fatigue,   Eyes:  Good vision, no reported pain  ENT:  No sore throat, pain, runny nose, dysphagia  CV:  No pain, palpitations, hypo/hypertension  Resp:  No dyspnea, cough, tachypnea, wheezing  GI:  No pain, No nausea, No vomiting, No diarrhea, No constipation, No weight loss, No fever, No pruritis, No rectal bleeding, No tarry stools, No dysphagia,  :  No pain, bleeding, incontinence, nocturia  Muscle:  No pain, weakness  Neuro:  No weakness, tingling, memory problems  Psych:  No fatigue, insomnia, mood problems, depression  Endocrine:  No polyuria, polydipsia, cold/heat intolerance  Heme:  No petechiae, ecchymosis, easy bruisability  Skin:  No rash, tattoos, scars, edema      PHYSICAL EXAM:   Vital Signs:  Vital Signs Last 24 Hrs  T(C): 36.4 (20 Feb 2025 16:50), Max: 37.1 (20 Feb 2025 12:31)  T(F): 97.6 (20 Feb 2025 16:50), Max: 98.7 (20 Feb 2025 12:31)  HR: 59 (20 Feb 2025 16:50) (57 - 67)  BP: 132/68 (20 Feb 2025 16:50) (125/62 - 149/61)  BP(mean): --  RR: 17 (20 Feb 2025 16:50) (17 - 18)  SpO2: 93% (20 Feb 2025 16:50) (92% - 94%)    Parameters below as of 20 Feb 2025 16:50  Patient On (Oxygen Delivery Method): room air      Daily     Daily     GENERAL:  Appears stated age,   HEENT:  NC/AT,    CHEST:  Full & symmetric excursion,   HEART:  Regular rhythm,  ABDOMEN:  Soft, non-tender, non-distended,  EXTEREMITIES:  no cyanosis  SKIN:  No rash  NEURO:  Alert,       LABS:                        8.2    17.34 )-----------( 269      ( 20 Feb 2025 06:39 )             26.2     02-20    139  |  106  |  32[H]  ----------------------------<  85  3.9   |  23  |  1.34[H]    Ca    8.1[L]      20 Feb 2025 06:39  Phos  3.3     02-19  Mg     2.3     02-19    TPro  5.8[L]  /  Alb  2.7[L]  /  TBili  0.2  /  DBili  x   /  AST  10  /  ALT  11  /  AlkPhos  57  02-20      Urinalysis Basic - ( 20 Feb 2025 06:39 )    Color: x / Appearance: x / SG: x / pH: x  Gluc: 85 mg/dL / Ketone: x  / Bili: x / Urobili: x   Blood: x / Protein: x / Nitrite: x   Leuk Esterase: x / RBC: x / WBC x   Sq Epi: x / Non Sq Epi: x / Bacteria: x        RADIOLOGY & ADDITIONAL TESTS:

## 2025-02-27 NOTE — CHART NOTE - NSCHARTNOTEFT_GEN_A_CORE
Attending requesting to stop eliquis and start heparin gtt, as patient is planned to go for a scope with GI.  Coags obtained prior to Heparin gtt being started, and aPTT noted to be 57.5.  Notified hematologist, Dr. Tavera, who stated that AC could be held overnight, coags could be drawn again in am, and then, if aPTT decreases, heparin gtt could be started.  Notified Dr. Juan of discussion with hematology - awaiting response for plan.  Notified night ACP, who will follow up with Dr. Juan.

## 2025-02-28 LAB
ANION GAP SERPL CALC-SCNC: 11 MMOL/L — SIGNIFICANT CHANGE UP (ref 5–17)
APTT BLD: 45.5 SEC — HIGH (ref 24.5–35.6)
APTT BLD: 52.3 SEC — HIGH (ref 24.5–35.6)
BLD GP AB SCN SERPL QL: NEGATIVE — SIGNIFICANT CHANGE UP
BUN SERPL-MCNC: 41 MG/DL — HIGH (ref 7–23)
CALCIUM SERPL-MCNC: 8.8 MG/DL — SIGNIFICANT CHANGE UP (ref 8.4–10.5)
CHLORIDE SERPL-SCNC: 104 MMOL/L — SIGNIFICANT CHANGE UP (ref 96–108)
CO2 SERPL-SCNC: 23 MMOL/L — SIGNIFICANT CHANGE UP (ref 22–31)
CREAT SERPL-MCNC: 1.62 MG/DL — HIGH (ref 0.5–1.3)
EGFR: 41 ML/MIN/1.73M2 — LOW
EGFR: 41 ML/MIN/1.73M2 — LOW
GLUCOSE SERPL-MCNC: 78 MG/DL — SIGNIFICANT CHANGE UP (ref 70–99)
HCT VFR BLD CALC: 23.2 % — LOW (ref 39–50)
HCT VFR BLD CALC: 27.1 % — LOW (ref 39–50)
HGB BLD-MCNC: 7.1 G/DL — LOW (ref 13–17)
HGB BLD-MCNC: 8.4 G/DL — LOW (ref 13–17)
INR BLD: 1.37 RATIO — HIGH (ref 0.85–1.16)
INR BLD: 1.48 RATIO — HIGH (ref 0.85–1.16)
MCHC RBC-ENTMCNC: 26.8 PG — LOW (ref 27–34)
MCHC RBC-ENTMCNC: 27.5 PG — SIGNIFICANT CHANGE UP (ref 27–34)
MCHC RBC-ENTMCNC: 30.6 G/DL — LOW (ref 32–36)
MCHC RBC-ENTMCNC: 31 G/DL — LOW (ref 32–36)
MCV RBC AUTO: 87.5 FL — SIGNIFICANT CHANGE UP (ref 80–100)
MCV RBC AUTO: 88.6 FL — SIGNIFICANT CHANGE UP (ref 80–100)
NRBC BLD AUTO-RTO: 0 /100 WBCS — SIGNIFICANT CHANGE UP (ref 0–0)
NRBC BLD AUTO-RTO: 0 /100 WBCS — SIGNIFICANT CHANGE UP (ref 0–0)
PLATELET # BLD AUTO: 322 K/UL — SIGNIFICANT CHANGE UP (ref 150–400)
PLATELET # BLD AUTO: 353 K/UL — SIGNIFICANT CHANGE UP (ref 150–400)
POTASSIUM SERPL-MCNC: 5.1 MMOL/L — SIGNIFICANT CHANGE UP (ref 3.5–5.3)
POTASSIUM SERPL-SCNC: 5.1 MMOL/L — SIGNIFICANT CHANGE UP (ref 3.5–5.3)
PROTHROM AB SERPL-ACNC: 15.6 SEC — HIGH (ref 9.9–13.4)
PROTHROM AB SERPL-ACNC: 16.9 SEC — HIGH (ref 9.9–13.4)
RBC # BLD: 2.65 M/UL — LOW (ref 4.2–5.8)
RBC # BLD: 3.06 M/UL — LOW (ref 4.2–5.8)
RBC # FLD: 16.6 % — HIGH (ref 10.3–14.5)
RBC # FLD: 17.4 % — HIGH (ref 10.3–14.5)
RH IG SCN BLD-IMP: POSITIVE — SIGNIFICANT CHANGE UP
SODIUM SERPL-SCNC: 138 MMOL/L — SIGNIFICANT CHANGE UP (ref 135–145)
WBC # BLD: 6.89 K/UL — SIGNIFICANT CHANGE UP (ref 3.8–10.5)
WBC # BLD: 9.38 K/UL — SIGNIFICANT CHANGE UP (ref 3.8–10.5)
WBC # FLD AUTO: 6.89 K/UL — SIGNIFICANT CHANGE UP (ref 3.8–10.5)
WBC # FLD AUTO: 9.38 K/UL — SIGNIFICANT CHANGE UP (ref 3.8–10.5)

## 2025-02-28 PROCEDURE — G0545: CPT

## 2025-02-28 PROCEDURE — 99232 SBSQ HOSP IP/OBS MODERATE 35: CPT

## 2025-02-28 PROCEDURE — 99222 1ST HOSP IP/OBS MODERATE 55: CPT

## 2025-02-28 RX ORDER — HEPARIN SODIUM 1000 [USP'U]/ML
INJECTION INTRAVENOUS; SUBCUTANEOUS
Qty: 25000 | Refills: 0 | Status: DISCONTINUED | OUTPATIENT
Start: 2025-02-28 | End: 2025-03-03

## 2025-02-28 RX ORDER — HEPARIN SODIUM 1000 [USP'U]/ML
4000 INJECTION INTRAVENOUS; SUBCUTANEOUS EVERY 6 HOURS
Refills: 0 | Status: DISCONTINUED | OUTPATIENT
Start: 2025-02-28 | End: 2025-03-03

## 2025-02-28 RX ORDER — HEPARIN SODIUM 1000 [USP'U]/ML
2000 INJECTION INTRAVENOUS; SUBCUTANEOUS EVERY 6 HOURS
Refills: 0 | Status: DISCONTINUED | OUTPATIENT
Start: 2025-02-28 | End: 2025-03-03

## 2025-02-28 RX ADMIN — Medication 40 MILLIGRAM(S): at 17:01

## 2025-02-28 RX ADMIN — TAMSULOSIN HYDROCHLORIDE 0.4 MILLIGRAM(S): 0.4 CAPSULE ORAL at 21:16

## 2025-02-28 RX ADMIN — HEPARIN SODIUM 900 UNIT(S)/HR: 1000 INJECTION INTRAVENOUS; SUBCUTANEOUS at 21:13

## 2025-02-28 RX ADMIN — Medication 60 MILLIGRAM(S): at 05:23

## 2025-02-28 RX ADMIN — Medication 81 MILLIGRAM(S): at 12:09

## 2025-02-28 RX ADMIN — MIRTAZAPINE 7.5 MILLIGRAM(S): 30 TABLET, FILM COATED ORAL at 12:09

## 2025-02-28 RX ADMIN — ATORVASTATIN CALCIUM 20 MILLIGRAM(S): 80 TABLET, FILM COATED ORAL at 21:16

## 2025-02-28 RX ADMIN — CEFTRIAXONE 100 MILLIGRAM(S): 500 INJECTION, POWDER, FOR SOLUTION INTRAMUSCULAR; INTRAVENOUS at 17:03

## 2025-02-28 RX ADMIN — Medication 325 MILLIGRAM(S): at 12:09

## 2025-02-28 RX ADMIN — Medication 40 MILLIGRAM(S): at 05:23

## 2025-02-28 NOTE — PROGRESS NOTE ADULT - ASSESSMENT
85-year-old male with anemia due to blood loss (iron deficiency) and renal insufficiency. Hgb low, but relatively stable last few days. Transfuse for Hgb < 7. Getting oral iron. Infection is also contributing.     DVT - was getting Eliquis. It is being held in the setting of scheduled EGD. Plan is to start IV Heparin, but aPTT still high (as are PT and INR) and that may be related to prior Eliquis. Trend the aPTT to see if goes back to normal off of the Eliquis.

## 2025-02-28 NOTE — PROGRESS NOTE ADULT - SUBJECTIVE AND OBJECTIVE BOX
Patient is a 85y old  Male who presents with a chief complaint of Referred by Vascular for Weakness Diarrhea (23 Feb 2025 17:12)      INTERVAL HPI/OVERNIGHT EVENTS:   No events     T(C): 36.2 (02-28-25 @ 12:19), Max: 36.2 (02-28-25 @ 12:19)  HR: 57 (02-28-25 @ 12:19) (57 - 57)  BP: 120/54 (02-28-25 @ 12:19) (120/54 - 120/54)  RR: 17 (02-28-25 @ 12:19) (17 - 17)  SpO2: 93% (02-28-25 @ 12:19) (93% - 93%)    MEDICATIONS  (STANDING):  aspirin  chewable 81 milliGRAM(s) Oral daily  atorvastatin 20 milliGRAM(s) Oral at bedtime  ferrous    sulfate 325 milliGRAM(s) Oral daily  heparin  Infusion.  Unit(s)/Hr (9 mL/Hr) IV Continuous <Continuous>  metoprolol succinate ER 25 milliGRAM(s) Oral daily  mirtazapine 7.5 milliGRAM(s) Oral daily  NIFEdipine XL 60 milliGRAM(s) Oral daily  pantoprazole    Tablet 40 milliGRAM(s) Oral two times a day  tamsulosin 0.4 milliGRAM(s) Oral at bedtime    MEDICATIONS  (PRN):  acetaminophen     Tablet .. 650 milliGRAM(s) Oral every 6 hours PRN Temp greater or equal to 38C (100.4F)  heparin   Injectable 4000 Unit(s) IV Push every 6 hours PRN For aPTT less than 40  heparin   Injectable 2000 Unit(s) IV Push every 6 hours PRN For aPTT between 40 - 57          PAST MEDICAL & SURGICAL HISTORY:  HTN (hypertension)      HLD (hyperlipidemia)      Deep vein thrombosis (DVT)          SOCIAL HISTORY  Alcohol:  Tobacco:  Illicit substance use:    FAMILY HISTORY:    REVIEW OF SYSTEMS:  CONSTITUTIONAL: No fever, weight loss, or fatigue  EYES: No eye pain, visual disturbances, or discharge  ENMT:  No difficulty hearing, tinnitus, vertigo; No sinus or throat pain  NECK: No pain or stiffness  RESPIRATORY: No cough, wheezing, chills or hemoptysis; No shortness of breath  CARDIOVASCULAR: No chest pain, palpitations, dizziness, or leg swelling  GASTROINTESTINAL: No abdominal or epigastric pain. No nausea, vomiting, or hematemesis; No diarrhea or constipation. No melena or hematochezia.  GENITOURINARY: No dysuria, frequency, hematuria, or incontinence  NEUROLOGICAL: No headaches, memory loss, loss of strength, numbness, or tremors  SKIN: No itching, burning, rashes, or lesions   LYMPH NODES: No enlarged glands  ENDOCRINE: No heat or cold intolerance; No hair loss  MUSCULOSKELETAL: No joint pain or swelling; No muscle, back, or extremity pain  PSYCHIATRIC: No depression, anxiety, mood swings, or difficulty sleeping  HEME/LYMPH: No easy bruising, or bleeding gums  ALLERY AND IMMUNOLOGIC: No hives or eczema    RADIOLOGY & ADDITIONAL TESTS:    Imaging Personally Reviewed:  [ ] YES  [ ] NO    Consultant(s) Notes Reviewed:  [ ] YES  [ ] NO    PHYSICAL EXAM:  GENERAL: NAD, well-groomed, well-developed  HEAD:  Atraumatic, Normocephalic  NECK: Supple, No JVD, Normal thyroid  NERVOUS SYSTEM:  Alert & Oriented X3, Good concentration; Motor Strength 5/5 B/L upper and lower extremities; DTRs 2+ intact and symmetric  CHEST/LUNG: Clear to percussion bilaterally; No rales, rhonchi, wheezing, or rubs  HEART: Regular rate and rhythm; No murmurs, rubs, or gallops  ABDOMEN: Soft, Nontender, Nondistended; Bowel sounds present  EXTREMITIES:  2+ Peripheral Pulses, No clubbing, cyanosis, or edema  LYMPH: No lymphadenopathy noted  SKIN: No rashes or lesions                                   7.2    7.27  )-----------( 351      ( 27 Feb 2025 07:09 )             23.7             PT/INR - ( 27 Feb 2025 18:41 )   PT: 18.0 sec;   INR: 1.57 ratio         PTT - ( 27 Feb 2025 18:41 )  PTT:57.5 sec  139|103|44<78  5.1|26|1.73  8.7,--,--  02-27 @ 07:03

## 2025-02-28 NOTE — PROGRESS NOTE ADULT - ASSESSMENT
83 y/o M with pmhx htn, hld, dvt, pad presents to the ER from vascular surgery office with diarrhea and generalized weakness over the last few days.     Acute Blood loss Anemia   Transfuse   GI to scope   Cards clearance   Resume Heparin drip   Eliquis on hold     Fevers / Bacteremia   Blood cx blood cx on 2/17 showed GPC on 2/19  * s/p zosyn 2/19-2/21 and a vanco 2/20-2/21, switch to ceftriaxone 2 qd  *ID following   Continue with Ceftriaxone for now     TTE no evegetations     SEBASTIAN   Dental Eval done   Acute DVT left LE  Hep drip    Vascular Surgery Consulted     DAX pre renal /ATN   Renal following     HTN   Hold Olmesartan   Continue with Norvasc and Toprol     HLD   Statins

## 2025-02-28 NOTE — CONSULT NOTE ADULT - REASON FOR ADMISSION
Referred by Vascular for Weakness Diarrhea

## 2025-02-28 NOTE — PROGRESS NOTE ADULT - SUBJECTIVE AND OBJECTIVE BOX
[facility]      Nolberto Crow NP    [address] [place] [address]  [phone]      INTERVAL HPI/OVERNIGHT EVENTS:    tolerating diet  hgb stable  no gi bleeding      MEDICATIONS  (STANDING):  apixaban 10 milliGRAM(s) Oral every 12 hours  aspirin  chewable 81 milliGRAM(s) Oral daily  atorvastatin 20 milliGRAM(s) Oral at bedtime  ferrous    sulfate 325 milliGRAM(s) Oral daily  metoprolol succinate ER 25 milliGRAM(s) Oral daily  mirtazapine 7.5 milliGRAM(s) Oral daily  NIFEdipine XL 60 milliGRAM(s) Oral daily  piperacillin/tazobactam IVPB.. 3.375 Gram(s) IV Intermittent every 8 hours  sodium chloride 0.9%. 1000 milliLiter(s) (75 mL/Hr) IV Continuous <Continuous>  tamsulosin 0.4 milliGRAM(s) Oral at bedtime    MEDICATIONS  (PRN):  acetaminophen     Tablet .. 650 milliGRAM(s) Oral every 6 hours PRN Temp greater or equal to 38C (100.4F)      Allergies    No Known Allergies    Intolerances        ROS:   General:  No  fevers, chills, night sweats, fatigue,   Eyes:  Good vision, no reported pain  ENT:  No sore throat, pain, runny nose, dysphagia  CV:  No pain, palpitations, hypo/hypertension  Resp:  No dyspnea, cough, tachypnea, wheezing  GI:  No pain, No nausea, No vomiting, No diarrhea, No constipation, No weight loss, No fever, No pruritis, No rectal bleeding, No tarry stools, No dysphagia,  :  No pain, bleeding, incontinence, nocturia  Muscle:  No pain, weakness  Neuro:  No weakness, tingling, memory problems  Psych:  No fatigue, insomnia, mood problems, depression  Endocrine:  No polyuria, polydipsia, cold/heat intolerance  Heme:  No petechiae, ecchymosis, easy bruisability  Skin:  No rash, tattoos, scars, edema      PHYSICAL EXAM:   Vital Signs:  Vital Signs Last 24 Hrs  T(C): 36.4 (20 Feb 2025 16:50), Max: 37.1 (20 Feb 2025 12:31)  T(F): 97.6 (20 Feb 2025 16:50), Max: 98.7 (20 Feb 2025 12:31)  HR: 59 (20 Feb 2025 16:50) (57 - 67)  BP: 132/68 (20 Feb 2025 16:50) (125/62 - 149/61)  BP(mean): --  RR: 17 (20 Feb 2025 16:50) (17 - 18)  SpO2: 93% (20 Feb 2025 16:50) (92% - 94%)    Parameters below as of 20 Feb 2025 16:50  Patient On (Oxygen Delivery Method): room air      Daily     Daily     GENERAL:  Appears stated age,   HEENT:  NC/AT,    CHEST:  Full & symmetric excursion,   HEART:  Regular rhythm,  ABDOMEN:  Soft, non-tender, non-distended,  EXTEREMITIES:  no cyanosis  SKIN:  No rash  NEURO:  Alert,       LABS:                        8.2    17.34 )-----------( 269      ( 20 Feb 2025 06:39 )             26.2     02-20    139  |  106  |  32[H]  ----------------------------<  85  3.9   |  23  |  1.34[H]    Ca    8.1[L]      20 Feb 2025 06:39  Phos  3.3     02-19  Mg     2.3     02-19    TPro  5.8[L]  /  Alb  2.7[L]  /  TBili  0.2  /  DBili  x   /  AST  10  /  ALT  11  /  AlkPhos  57  02-20      Urinalysis Basic - ( 20 Feb 2025 06:39 )    Color: x / Appearance: x / SG: x / pH: x  Gluc: 85 mg/dL / Ketone: x  / Bili: x / Urobili: x   Blood: x / Protein: x / Nitrite: x   Leuk Esterase: x / RBC: x / WBC x   Sq Epi: x / Non Sq Epi: x / Bacteria: x        RADIOLOGY & ADDITIONAL TESTS:

## 2025-02-28 NOTE — CONSULT NOTE ADULT - SUBJECTIVE AND OBJECTIVE BOX
[facility] Attending Consultation Note     Patient seen and evaluated at bedside    Chief Complaint:    HPI:  85 y/o M with pmhx htn, hld, dvt, pad presents to the ER from vascular surgery office with diarrhea and generalized weakness over the last few days.   Patient reports that he has been having diarrhea x few days, associated with weakness, went to see [facility] V[facility] surgeon as he noticed LLE swelling who referred him to the ED.   No hx fevers/ nausea/ vomiting.   Able to tolerate PO, No sick contacts.  (15 Feb 2025 14:52)      PMHx:   HTN (hypertension)    HLD (hyperlipidemia)    Deep vein thrombosis (DVT)        PSHx:       Allergies:  No Known Allergies      Home Meds:    Current Medications:   acetaminophen     Tablet .. 650 milliGRAM(s) Oral every 6 hours PRN  aspirin  chewable 81 milliGRAM(s) Oral daily  atorvastatin 20 milliGRAM(s) Oral at bedtime  ferrous    sulfate 325 milliGRAM(s) Oral daily  heparin   Injectable 4000 Unit(s) IV Push every 6 hours PRN  heparin   Injectable 2000 Unit(s) IV Push every 6 hours PRN  heparin  Infusion.  Unit(s)/Hr IV Continuous <Continuous>  metoprolol succinate ER 25 milliGRAM(s) Oral daily  mirtazapine 7.5 milliGRAM(s) Oral daily  NIFEdipine XL 60 milliGRAM(s) Oral daily  pantoprazole    Tablet 40 milliGRAM(s) Oral two times a day  tamsulosin 0.4 milliGRAM(s) Oral at bedtime      FAMILY HISTORY:      Social History: Personally reviewed   No tobacco, EtOH or IVDU     REVIEW OF SYSTEMS:  Constitutional:     [x ] negative [ ] fevers [ ] chills [ ] weight loss [ ] weight gain  HEENT:                  [x ] negative [ ] dry eyes [ ] eye irritation [ ] postnasal drip [ ] nasal congestion  CV:                         [ x] negative  [ ] chest pain [ ] orthopnea [ ] palpitations [ ] murmur  Resp:                     [x ] negative [ ] cough [ ] shortness of breath [ ] dyspnea [ ] wheezing [ ] sputum [ ]hemoptysis  GI:                          [ x] negative [ ] nausea [ ] vomiting [ ] diarrhea [ ] constipation [ ] abd pain [ ] dysphagia   :                        [ x] negative [ ] dysuria [ ] nocturia [ ] hematuria [ ] increased urinary frequency  Musculoskeletal: [x ] negative [ ] back pain [ ] myalgias [ ] arthralgias [ ] fracture  Skin:                       [ x] negative [ ] rash [ ] itch  Neurological:        [ x] negative [ ] headache [ ] dizziness [ ] syncope [ ] weakness [ ] numbness  Psychiatric:           [ x] negative [ ] anxiety [ ] depression  Endocrine:            [ x] negative [ ] diabetes [ ] thyroid problem  Heme/Lymph:      [ x] negative [ ] anemia [ ] bleeding problem  Allergic/Immune: [ x] negative [ ] itchy eyes [ ] nasal discharge [ ] hives [ ] angioedema    [ x] All other systems negative  [ ] Unable to assess ROS due to      Physical Exam:  T(F): 97.9 (02-28), Max: 98.2 (02-28)  HR: 58 (02-28) (52 - 60)  BP: 147/56 (02-28) (111/68 - 161/64)  RR: 18 (02-28)  SpO2: 92% (02-28)    Gen: Well appearing   HENNT: No JVP   CV: regular rate, regular rhtyhm, no murmur   Pulm: clear to auscultation bilaterally   Abdomen: Non-tender, non-distended   Ext: no edema b/l   Neuro: grossly non-focal     Cardiovascular Diagnostic Testing:      Labs: Personally reviewed                        8.4    9.38  )-----------( 322      ( 28 Feb 2025 19:55 )             27.1     02-28    138  |  104  |  41[H]  ----------------------------<  78  5.1   |  23  |  1.62[H]    Ca    8.8      28 Feb 2025 06:59      PT/INR - ( 28 Feb 2025 19:55 )   PT: 15.6 sec;   INR: 1.37 ratio         PTT - ( 28 Feb 2025 19:55 )  PTT:45.5 sec         [facility] Attending Consultation Note     Patient seen and evaluated at bedside    Chief Complaint:    HPI:  85 y/o M with pmhx htn, hld, dvt, pad presents to the ER from vascular surgery office with diarrhea and generalized weakness over the last few days.   Patient reports that he has been having diarrhea x few days, associated with weakness, went to see [facility] surgeon as he noticed LLE swelling who referred him to the ED.   No hx fevers/ nausea/ vomiting.   Able to tolerate PO, No sick contacts.  (15 Feb 2025 14:52)      PMHx:   HTN (hypertension)    HLD (hyperlipidemia)    Deep vein thrombosis (DVT)        PSHx:       Allergies:  No Known Allergies      Home Meds:    Current Medications:   acetaminophen     Tablet .. 650 milliGRAM(s) Oral every 6 hours PRN  aspirin  chewable 81 milliGRAM(s) Oral daily  atorvastatin 20 milliGRAM(s) Oral at bedtime  ferrous    sulfate 325 milliGRAM(s) Oral daily  heparin   Injectable 4000 Unit(s) IV Push every 6 hours PRN  heparin   Injectable 2000 Unit(s) IV Push every 6 hours PRN  heparin  Infusion.  Unit(s)/Hr IV Continuous <Continuous>  metoprolol succinate ER 25 milliGRAM(s) Oral daily  mirtazapine 7.5 milliGRAM(s) Oral daily  NIFEdipine XL 60 milliGRAM(s) Oral daily  pantoprazole    Tablet 40 milliGRAM(s) Oral two times a day  tamsulosin 0.4 milliGRAM(s) Oral at bedtime      FAMILY HISTORY:      Social History: Personally reviewed   No tobacco, EtOH or IVDU     REVIEW OF SYSTEMS:  Constitutional:     [x ] negative [ ] fevers [ ] chills [ ] weight loss [ ] weight gain  HEENT:                  [x ] negative [ ] dry eyes [ ] eye irritation [ ] postnasal drip [ ] nasal congestion  CV:                         [ x] negative  [ ] chest pain [ ] orthopnea [ ] palpitations [ ] murmur  Resp:                     [x ] negative [ ] cough [ ] shortness of breath [ ] dyspnea [ ] wheezing [ ] sputum [ ]hemoptysis  GI:                          [ x] negative [ ] nausea [ ] vomiting [ ] diarrhea [ ] constipation [ ] abd pain [ ] dysphagia   :                        [ x] negative [ ] dysuria [ ] nocturia [ ] hematuria [ ] increased urinary frequency  Musculoskeletal: [x ] negative [ ] back pain [ ] myalgias [ ] arthralgias [ ] fracture  Skin:                       [ x] negative [ ] rash [ ] itch  Neurological:        [ x] negative [ ] headache [ ] dizziness [ ] syncope [ ] weakness [ ] numbness  Psychiatric:           [ x] negative [ ] anxiety [ ] depression  Endocrine:            [ x] negative [ ] diabetes [ ] thyroid problem  Heme/Lymph:      [ x] negative [ ] anemia [ ] bleeding problem  Allergic/Immune: [ x] negative [ ] itchy eyes [ ] nasal discharge [ ] hives [ ] angioedema    [ x] All other systems negative  [ ] Unable to assess ROS due to      Physical Exam:  T(F): 97.9 (02-28), Max: 98.2 (02-28)  HR: 58 (02-28) (52 - 60)  BP: 147/56 (02-28) (111/68 - 161/64)  RR: 18 (02-28)  SpO2: 92% (02-28)    Gen: Well appearing   HENNT: No JVP   CV: regular rate, regular rhtyhm, no murmur   Pulm: clear to auscultation bilaterally   Abdomen: Non-tender, non-distended   Ext: no edema b/l   Neuro: grossly non-focal     Cardiovascular Diagnostic Testing:  CONCLUSIONS:      1. Left ventricular cavity is normal in size. Left ventricular systolic function is normal with an ejection fraction visually estimated at 55 to 60 %. There are no regional wall motion abnormalities seen.   2. Normal right ventricular cavity size and normal right ventricular systolic function.   3. Estimated pulmonary artery systolic pressure is 52 mmHg.   4. There is left to right shunting through the interatrial septum.   5. There is calcification of the aortic valve leaflets. Mild aortic stenosis.   6. Moderate aortic regurgitation.   7. Ascending aorta is dilated, measuring 4.10 cm (indexed 2.57 cm/m²).   8. Small pericardial effusion.   9. Compared to the transthoracic echocardiogram performed on 1/27/2024, the estimated RVSP has increased. There is mild aortic stenosis. The ascending aorta measures larger on this study, though that may be because we have imaged more distally.      Labs: Personally reviewed                        8.4    9.38  )-----------( 322      ( 28 Feb 2025 19:55 )             27.1     02-28    138  |  104  |  41[H]  ----------------------------<  78  5.1   |  23  |  1.62[H]    Ca    8.8      28 Feb 2025 06:59      PT/INR - ( 28 Feb 2025 19:55 )   PT: 15.6 sec;   INR: 1.37 ratio         PTT - ( 28 Feb 2025 19:55 )  PTT:45.5 sec

## 2025-02-28 NOTE — PROGRESS NOTE ADULT - SUBJECTIVE AND OBJECTIVE BOX
Follow Up:  fever, diarrhea, gemella bacteremia    Interval History/ROS: pt remains afebrile and no cough, vomiting, diarrhea      Allergies  No Known Allergies        ANTIMICROBIALS:  cefTRIAXone   IVPB 2000 every 24 hours      OTHER MEDS:  acetaminophen     Tablet .. 650 milliGRAM(s) Oral every 6 hours PRN  aspirin  chewable 81 milliGRAM(s) Oral daily  atorvastatin 20 milliGRAM(s) Oral at bedtime  ferrous    sulfate 325 milliGRAM(s) Oral daily  metoprolol succinate ER 25 milliGRAM(s) Oral daily  mirtazapine 7.5 milliGRAM(s) Oral daily  NIFEdipine XL 60 milliGRAM(s) Oral daily  pantoprazole    Tablet 40 milliGRAM(s) Oral two times a day  tamsulosin 0.4 milliGRAM(s) Oral at bedtime      Vital Signs Last 24 Hrs  T(C): 36.2 (28 Feb 2025 12:19), Max: 36.8 (28 Feb 2025 08:27)  T(F): 97.2 (28 Feb 2025 12:19), Max: 98.2 (28 Feb 2025 08:27)  HR: 57 (28 Feb 2025 12:19) (52 - 64)  BP: 120/54 (28 Feb 2025 12:19) (111/68 - 161/64)  BP(mean): --  RR: 17 (28 Feb 2025 12:19) (17 - 17)  SpO2: 93% (28 Feb 2025 12:19) (91% - 98%)    Parameters below as of 28 Feb 2025 12:19  Patient On (Oxygen Delivery Method): room air        Physical Exam:  General:    NAD,  non toxic  Respiratory:    comfortable on RA  abd:     soft,   no tenderness  :     no  kimbrough  Musculoskeletal:   no joint swelling  vascular: no phlebitis  Skin:    no rash                        7.1    6.89  )-----------( 353      ( 28 Feb 2025 06:59 )             23.2       02-28    138  |  104  |  41[H]  ----------------------------<  78  5.1   |  23  |  1.62[H]    Ca    8.8      28 Feb 2025 06:59        Urinalysis Basic - ( 28 Feb 2025 06:59 )    Color: x / Appearance: x / SG: x / pH: x  Gluc: 78 mg/dL / Ketone: x  / Bili: x / Urobili: x   Blood: x / Protein: x / Nitrite: x   Leuk Esterase: x / RBC: x / WBC x   Sq Epi: x / Non Sq Epi: x / Bacteria: x        MICROBIOLOGY:  v  .Blood Blood-Peripheral  02-20-25   No growth at 5 days  --  --      .Blood Blood-Peripheral  02-20-25   No growth at 5 days  --  --      .Blood Blood-Peripheral  02-19-25   Growth in aerobic and anaerobic bottles: Gemella haemolysans  "Susceptibilities not performed"  --    Growth in aerobic bottle: Gram Positive Cocci in Clusters  Growth in anaerobic bottle: Gram Positive Cocci in Clusters      .Blood Blood-Peripheral  02-19-25   Growth in aerobic and anaerobic bottles: Gemella haemolysans  "Susceptibilities not performed"  --    Growth in aerobic bottle: Gram Positive Cocci in Clusters  Growth in anaerobic bottle: Gram Positive Cocci in Clusters      .Blood BLOOD  02-17-25   No growth at 5 days  --  Blood Culture PCR                RADIOLOGY:  Images independently visualized and reviewed personally, findings as below  < from: CT Abdomen and Pelvis No Cont (02.20.25 @ 13:06) >  IMPRESSION:  No acute intrathoracic or intra-abdominal pathology identified on   noncontrast CT.    Soft tissue in the retroperitoneal region may represent collapsed bowel   or lymphadenopathy.    < end of copied text >

## 2025-02-28 NOTE — PROGRESS NOTE ADULT - SUBJECTIVE AND OBJECTIVE BOX
[facility]    Dr. Gabriel Betancourt     Office [phone] (9 am to 5 pm)  Service: [phone] (5pm to 9am)  Also Available on TEAMS      RENAL PROGRESS NOTE: DATE OF SERVICE 02-28-25 @ 10:25    Patient is a 85y old  Male who presents with a chief complaint of Referred by Vascular for Weakness Diarrhea (27 Feb 2025 21:09)      Patient seen and examined at bedside. No chest pain/sob    VITALS:  T(F): 98.2 (02-28-25 @ 08:27), Max: 98.2 (02-28-25 @ 08:27)  HR: 54 (02-28-25 @ 08:27)  BP: 128/63 (02-28-25 @ 08:27)  RR: 17 (02-28-25 @ 08:27)  SpO2: 93% (02-28-25 @ 08:27)  Wt(kg): --    02-27 @ 07:01  -  02-28 @ 07:00  --------------------------------------------------------  IN: 890 mL / OUT: 650 mL / NET: 240 mL    02-28 @ 07:01  -  02-28 @ 10:25  --------------------------------------------------------  IN: 240 mL / OUT: 300 mL / NET: -60 mL        Weight (kg): 48.625 (02-27 @ 18:46)    PHYSICAL EXAM:  Constitutional: NAD  Neck: No JVD  Respiratory: CTAB, no wheezes, rales or rhonchi  Cardiovascular: S1, S2, RRR  Gastrointestinal: BS+, soft, NT/ND  Extremities: No peripheral edema    Hospital Medications:   MEDICATIONS  (STANDING):  aspirin  chewable 81 milliGRAM(s) Oral daily  atorvastatin 20 milliGRAM(s) Oral at bedtime  cefTRIAXone   IVPB 2000 milliGRAM(s) IV Intermittent every 24 hours  ferrous    sulfate 325 milliGRAM(s) Oral daily  metoprolol succinate ER 25 milliGRAM(s) Oral daily  mirtazapine 7.5 milliGRAM(s) Oral daily  NIFEdipine XL 60 milliGRAM(s) Oral daily  pantoprazole    Tablet 40 milliGRAM(s) Oral two times a day  tamsulosin 0.4 milliGRAM(s) Oral at bedtime      LABS:  02-28    138  |  104  |  41[H]  ----------------------------<  78  5.1   |  23  |  1.62[H]    Ca    8.8      28 Feb 2025 06:59      Creatinine Trend: 1.62 <--, 1.73 <--, 1.44 <--, 1.79 <--, 1.33 <--, 1.53 <--                                7.1    6.89  )-----------( 353      ( 28 Feb 2025 06:59 )             23.2     Urine Studies:  Urinalysis - [02-28-25 @ 06:59]      Color  / Appearance  / SG  / pH       Gluc 78 / Ketone   / Bili  / Urobili        Blood  / Protein  / Leuk Est  / Nitrite       RBC  / WBC  / Hyaline  / Gran  / Sq Epi  / Non Sq Epi  / Bacteria     Urine Creatinine 82      [02-25-25 @ 12:46]  Urine Sodium 75      [02-25-25 @ 12:46]    Iron 25, TIBC 249, %sat 10      [02-17-25 @ 07:23]  Ferritin 76      [02-17-25 @ 07:23]        RADIOLOGY & ADDITIONAL STUDIES:

## 2025-02-28 NOTE — CONSULT NOTE ADULT - CONSULT REQUESTED BY NAME
2023   EMPLOYEE INFORMATION: EMPLOYER INFORMATION:   NAME: Camden GÓMEZ   : 1978 [phone]    DATE OF INJURY/EVENT: 2022           Location: [facility]   Treating Provider: Dm Escobedo MD  Time In:  2:41 PM Time Out:  3:33 PM      DIAGNOSIS:   1. Injury of right rotator cuff, subsequent encounter    2. Superior glenoid labrum lesion of right shoulder, subsequent encounter    3. Acute pain of right shoulder    4. Strain of right shoulder, subsequent encounter      STATUS: This injury is determined to be WORK RELATED.    RETURN TO WORK:   Employee is unable to return to work.   Reason: Pain, Pain medication           RESTRICTIONS: Restrictions are to be followed at work and at home.  Restrictions are in effect until next follow-up visit.  Unable to return to work.  See above for reason. Liability risk for further injury or fall due to sedative effects of the medications     TREATMENT PLAN:   Medications for this injury/condition:     Current Outpatient Medications:  predniSONE (DELTASONE) 20 MG tablet, Take 3 tabs po q am x 3 days; then 2 tabs po q am x 3 days then 1 tab po q am x 3 days.  Fill Rx as Work Comp, Disp: 18 tablet, Rfl: 0  HYDROcodone-acetaminophen (NORCO) 5-325 MG per tablet, Take 1 tablet by mouth every 8 hours as needed for Pain., Disp: 21 tablet, Rfl: 0  cyclobenzaprine (FLEXERIL) 10 MG tablet, Take 1 tablet by mouth 3 times daily as needed for Muscle spasms. Fill  RX as Workman's Comp., Disp: 21 tablet, Rfl: 0  acetaminophen (TYLENOL) 500 MG tablet, Take 500 mg by mouth every 6 hours as needed for Pain., Disp: , Rfl:     Pause while on Prednisone - meloxicam (MOBIC) 15 MG tablet, Take 1 tablet by mouth daily for 15 days. Take with food., Disp: 15 tablet, Rfl: 0      No current facility-administered medications for this visit.      Referral/Consult:   Orthopedic Services: Start (Comment: Pending appointment with orthopedic  surgeon.)        Diagnostic Testing: None       Instructions: None     NEXT RETURN VISIT:  Monday 01/23/2022    Thank you for the privilege of providing medical care for this injury/condition.  If there are any questions, please call the occupational health clinic at Dept: [phone].      Electronically signed on 1/17/2023 at 3:33 PM by:   Dm Escobedo MD   [facility]     Dr. Caruso

## 2025-02-28 NOTE — CONSULT NOTE ADULT - CONSULT REQUESTED DATE/TIME
25-Feb-2025
14-Feb-2025 20:35
15-Feb-2025 13:02
18-Feb-2025 15:46
17-Feb-2025 14:17
28-Feb-2025 21:29
16-Feb-2025

## 2025-02-28 NOTE — PROGRESS NOTE ADULT - SUBJECTIVE AND OBJECTIVE BOX
Patient is a 85y old  Male who presents with a chief complaint of Referred by Vascular for Weakness Diarrhea (28 Feb 2025 10:24)    Feels okay. No bleeding. No CP or SOB. NO dizziness.     Medication:   acetaminophen     Tablet .. 650 milliGRAM(s) Oral every 6 hours PRN  aspirin  chewable 81 milliGRAM(s) Oral daily  atorvastatin 20 milliGRAM(s) Oral at bedtime  cefTRIAXone   IVPB 2000 milliGRAM(s) IV Intermittent every 24 hours  ferrous    sulfate 325 milliGRAM(s) Oral daily  metoprolol succinate ER 25 milliGRAM(s) Oral daily  mirtazapine 7.5 milliGRAM(s) Oral daily  NIFEdipine XL 60 milliGRAM(s) Oral daily  pantoprazole    Tablet 40 milliGRAM(s) Oral two times a day  tamsulosin 0.4 milliGRAM(s) Oral at bedtime      Physical exam    T(C): 36.8 (02-28-25 @ 08:27), Max: 36.8 (02-28-25 @ 08:27)  HR: 54 (02-28-25 @ 08:27) (50 - 64)  BP: 128/63 (02-28-25 @ 08:27) (111/68 - 161/64)  RR: 17 (02-28-25 @ 08:27) (17 - 18)  SpO2: 93% (02-28-25 @ 08:27) (91% - 98%)  Wt(kg): --    alert NAD  EOMI anicteric sclera  Cv s1 S2 RRR  Lungs clear B/L  abd soft NT ND +BS  No LE edema or tenderness    Labs                        7.1    6.89  )-----------( 353      ( 28 Feb 2025 06:59 )             23.2       02-28    138  |  104  |  41[H]  ----------------------------<  78  5.1   |  23  |  1.62[H]    Ca    8.8      28 Feb 2025 06:59            [id number]

## 2025-02-28 NOTE — PROGRESS NOTE ADULT - ASSESSMENT
anemia  dvt    a/c on hold  echo shows moderate pulm htn  will need cardiac clearance prior to endoscopic eval  planned for egd/colon monday  clears sunday  prep to be ordered  proton pump inhibitor bid  monitor cbc  repeat cultures noted  echo noted  antibiotics per ID  will follow     I reviewed the overnight course of events on the unit, re-confirming the patient history. I discussed the care with the patient and their family  The plan of care was discussed with the physician assistant and modifications were made to the notation where appropriate.   Differential diagnosis and plan of care discussed with patient after the evaluation  35 minutes spent on total encounter of which more than fifty percent of the encounter was spent counseling and/or coordinating care by the attending physician.  Advanced care planning was discussed with patient and family.  Advanced care planning forms were reviewed and discussed.  Risks, benefits and alternatives of gastroenterologic procedures were discussed in detail and all questions were answered.

## 2025-02-28 NOTE — CONSULT NOTE ADULT - ASSESSMENT
84M with PMH of HTN, HLD, PAD here for diarrhea and generalized weakness. Noted to have GIB, pending EGD/Colonoscopy on Monday. Cardiology consulted for preop cardiac clearance     1. GIB, pending EGD/colonoscopy   2. moderate pHTN   3. Mild AS and AI   4. Preop cardiac clearance     -cont to hold AC   -euvolemic on exam, no evidence of ACS, HF or critical valvular issue, optimized from cardiac standpoint to proceed   -mod pHTN for OP work up   -cont procardia and toprol XL     José Manuel Knapp MD [facility]  Attending Interventional Cardiologist, [facility].   Avaliable on [employer] Team

## 2025-02-28 NOTE — PROGRESS NOTE ADULT - ASSESSMENT
84 m with HTN, HLD, PAD, DVT, sent from vascular office for diarrhea, generalized weakness and poor PO intake.   initially afebrile, no WBC, DAX normal LFT  GI PCR negative  on 2/17 pt had chills then fever and vomiting, negative u/a and blood cx, again fever 100.4 2/19    admitted with diarrhea and DAX on 2/15 with negative GI PCR, no WBC, then developed a fever with vomiting 2/17, WBC slightly higher to 11 and worsening renal function but negative u/a and blood cx, monitored off antibiotics again fever to 100.4 on 2/19 with no symptoms  blood cx on 2/17 showed gemella and blood cx 2/19, also Gemella, r/o endocarditis, TTE showed ASD and PFO but no veg  chest/abd CT but without contrast showed No acute intrathoracic or intra-abdominal pathology identified on noncontrast CT. Soft tissue in the retroperitoneal region may represent collapsed bowel or lymphadenopathy.  dental stated no clinical evidence of infection  * repeat blood cx 2/20 negative, so day 9  * s/p zosyn 2/19-2/21 and vanco 2/20-2/21, switched to ceftriaxone 2 qd 2/21  * SEBASTIAN (awaiting EGD first which will be done on Monday)  * monitor CBC/diff and CMP    The above assessment and plan was discussed with the primary team    Alissa Forman MD  contact on teams  After 5pm and on weekends call [phone]

## 2025-02-28 NOTE — PROGRESS NOTE ADULT - ASSESSMENT
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 Patient presented to Dr. Landers's office today for followup and evaluation of left leg swelling, but was referred to the ED given reported 3-4 days of weakness, poor PO intake, and diarrhea. He is found to have DVT and DAX.    A/P:  DAX:  Unknwon baseline however fluctuating 1.3-1.7 probably with some degree of CKD  OP nephrologist Dr. Rodriguez  FENa>1  DAX workup from 2/15 suggestive of ATN  Losartan on hold  Renal US: Neg for Hydro   sCr worsening at present, pt intermittently febrile and with bradycardia   s/p IVF   FeNa 2% - indeterminate.  Bladder scan 2/19 with 215cc urine, continue to monitor bladder scan as needed   Pt was on Zosyn - CBC neg for eosinophilia.  S.Cr worsened on 2/21 -- possibly d/t worsening anemia.  Pt eating and drinking well; BP acceptable.    S/P NS 0.9% @75mL/hr x1L on 2/21.  SCR fluctuating monitor for now  Repeat Fena inconclusive 1.2%, ECHO shows dilated CVP so no role for fluids unless hypotensive  Renal function fluctuating but stable  Bladder scan negative for retention  Monitor I/O  Monitor renal function closely.    HTN:  Losartan on hold and Amlodipine discontinued  Nifedipine increased to 60 mg daily BP better  Low sodium diet.  Monitor BP    Anemia:  Iron deficient vs GIB.  On PO iron supplements.  S/p prbcs  GI following  Heme/onc and GI following.  Transfuse for Hgb <8.    Hypocalcemia:  In setting of hypoalbuminemia.  Optimize albumin.  Ca better.  Monitor Ca.    DVT:  Follow up vascular  On Apixaban.    Diarrhea  Resolved.

## 2025-03-01 LAB
ANION GAP SERPL CALC-SCNC: 13 MMOL/L — SIGNIFICANT CHANGE UP (ref 5–17)
APTT BLD: 90.8 SEC — HIGH (ref 24.5–35.6)
APTT BLD: 90.9 SEC — HIGH (ref 24.5–35.6)
BUN SERPL-MCNC: 43 MG/DL — HIGH (ref 7–23)
CALCIUM SERPL-MCNC: 9.2 MG/DL — SIGNIFICANT CHANGE UP (ref 8.4–10.5)
CHLORIDE SERPL-SCNC: 103 MMOL/L — SIGNIFICANT CHANGE UP (ref 96–108)
CO2 SERPL-SCNC: 23 MMOL/L — SIGNIFICANT CHANGE UP (ref 22–31)
CREAT SERPL-MCNC: 1.59 MG/DL — HIGH (ref 0.5–1.3)
EGFR: 42 ML/MIN/1.73M2 — LOW
EGFR: 42 ML/MIN/1.73M2 — LOW
GLUCOSE SERPL-MCNC: 63 MG/DL — LOW (ref 70–99)
HCT VFR BLD CALC: 30.7 % — LOW (ref 39–50)
HGB BLD-MCNC: 9.8 G/DL — LOW (ref 13–17)
MCHC RBC-ENTMCNC: 27.5 PG — SIGNIFICANT CHANGE UP (ref 27–34)
MCHC RBC-ENTMCNC: 31.9 G/DL — LOW (ref 32–36)
MCV RBC AUTO: 86 FL — SIGNIFICANT CHANGE UP (ref 80–100)
NRBC BLD AUTO-RTO: 0 /100 WBCS — SIGNIFICANT CHANGE UP (ref 0–0)
PLATELET # BLD AUTO: 333 K/UL — SIGNIFICANT CHANGE UP (ref 150–400)
POTASSIUM SERPL-MCNC: 4.8 MMOL/L — SIGNIFICANT CHANGE UP (ref 3.5–5.3)
POTASSIUM SERPL-SCNC: 4.8 MMOL/L — SIGNIFICANT CHANGE UP (ref 3.5–5.3)
RBC # BLD: 3.57 M/UL — LOW (ref 4.2–5.8)
RBC # FLD: 16.7 % — HIGH (ref 10.3–14.5)
SODIUM SERPL-SCNC: 139 MMOL/L — SIGNIFICANT CHANGE UP (ref 135–145)
WBC # BLD: 9.71 K/UL — SIGNIFICANT CHANGE UP (ref 3.8–10.5)
WBC # FLD AUTO: 9.71 K/UL — SIGNIFICANT CHANGE UP (ref 3.8–10.5)

## 2025-03-01 PROCEDURE — 99232 SBSQ HOSP IP/OBS MODERATE 35: CPT

## 2025-03-01 RX ORDER — BISACODYL 5 MG
5 TABLET, DELAYED RELEASE (ENTERIC COATED) ORAL ONCE
Refills: 0 | Status: COMPLETED | OUTPATIENT
Start: 2025-03-01 | End: 2025-03-01

## 2025-03-01 RX ADMIN — HEPARIN SODIUM 900 UNIT(S)/HR: 1000 INJECTION INTRAVENOUS; SUBCUTANEOUS at 19:19

## 2025-03-01 RX ADMIN — ATORVASTATIN CALCIUM 20 MILLIGRAM(S): 80 TABLET, FILM COATED ORAL at 22:07

## 2025-03-01 RX ADMIN — TAMSULOSIN HYDROCHLORIDE 0.4 MILLIGRAM(S): 0.4 CAPSULE ORAL at 22:07

## 2025-03-01 RX ADMIN — Medication 40 MILLIGRAM(S): at 17:34

## 2025-03-01 RX ADMIN — Medication 5 MILLIGRAM(S): at 17:33

## 2025-03-01 RX ADMIN — Medication 325 MILLIGRAM(S): at 14:17

## 2025-03-01 RX ADMIN — HEPARIN SODIUM 900 UNIT(S)/HR: 1000 INJECTION INTRAVENOUS; SUBCUTANEOUS at 03:23

## 2025-03-01 RX ADMIN — HEPARIN SODIUM 900 UNIT(S)/HR: 1000 INJECTION INTRAVENOUS; SUBCUTANEOUS at 11:12

## 2025-03-01 RX ADMIN — HEPARIN SODIUM 900 UNIT(S)/HR: 1000 INJECTION INTRAVENOUS; SUBCUTANEOUS at 07:03

## 2025-03-01 RX ADMIN — Medication 60 MILLIGRAM(S): at 05:22

## 2025-03-01 RX ADMIN — MIRTAZAPINE 7.5 MILLIGRAM(S): 30 TABLET, FILM COATED ORAL at 14:16

## 2025-03-01 RX ADMIN — Medication 81 MILLIGRAM(S): at 14:16

## 2025-03-01 RX ADMIN — Medication 40 MILLIGRAM(S): at 05:21

## 2025-03-01 RX ADMIN — METOPROLOL SUCCINATE 25 MILLIGRAM(S): 50 TABLET, EXTENDED RELEASE ORAL at 05:21

## 2025-03-01 NOTE — PROGRESS NOTE ADULT - ASSESSMENT
85-year-old male with DVT and getting IV Heparin per primary team    Anemia due to iron def, blood loss, kidney disease. Hgb higher post transfusion. Continue with oral iron.

## 2025-03-01 NOTE — PROGRESS NOTE ADULT - SUBJECTIVE AND OBJECTIVE BOX
[facility] Attending Follow-up Note     Patient seen and examined at bedside.    Overnight Events:     no events     REVIEW OF SYSTEMS:  Constitutional:     [x ] negative [ ] fevers [ ] chills [ ] weight loss [ ] weight gain  HEENT:                  [x ] negative [ ] dry eyes [ ] eye irritation [ ] postnasal drip [ ] nasal congestion  CV:                         [ x] negative  [ ] chest pain [ ] orthopnea [ ] palpitations [ ] murmur  Resp:                     [ x] negative [ ] cough [ ] shortness of breath [ ] dyspnea [ ] wheezing [ ] sputum [ ]hemoptysis  GI:                          [ x] negative [ ] nausea [ ] vomiting [ ] diarrhea [ ] constipation [ ] abd pain [ ] dysphagia   :                        [ x] negative [ ] dysuria [ ] nocturia [ ] hematuria [ ] increased urinary frequency  Musculoskeletal: [ x] negative [ ] back pain [ ] myalgias [ ] arthralgias [ ] fracture  Skin:                       [ x] negative [ ] rash [ ] itch  Neurological:        [x ] negative [ ] headache [ ] dizziness [ ] syncope [ ] weakness [ ] numbness  Psychiatric:           [ x] negative [ ] anxiety [ ] depression  Endocrine:            [ x] negative [ ] diabetes [ ] thyroid problem  Heme/Lymph:      [ x] negative [ ] anemia [ ] bleeding problem  Allergic/Immune: [ x] negative [ ] itchy eyes [ ] nasal discharge [ ] hives [ ] angioedema    [ x] All other systems negative  [ ] Unable to assess ROS due to    Current Meds:  acetaminophen     Tablet .. 650 milliGRAM(s) Oral every 6 hours PRN  aspirin  chewable 81 milliGRAM(s) Oral daily  atorvastatin 20 milliGRAM(s) Oral at bedtime  ferrous    sulfate 325 milliGRAM(s) Oral daily  heparin   Injectable 4000 Unit(s) IV Push every 6 hours PRN  heparin   Injectable 2000 Unit(s) IV Push every 6 hours PRN  heparin  Infusion.  Unit(s)/Hr IV Continuous <Continuous>  metoprolol succinate ER 25 milliGRAM(s) Oral daily  mirtazapine 7.5 milliGRAM(s) Oral daily  NIFEdipine XL 60 milliGRAM(s) Oral daily  pantoprazole    Tablet 40 milliGRAM(s) Oral two times a day  tamsulosin 0.4 milliGRAM(s) Oral at bedtime      PAST MEDICAL & SURGICAL HISTORY:  HTN (hypertension)      HLD (hyperlipidemia)      Deep vein thrombosis (DVT)          Vitals:  T(F): 98.1 (03-01), Max: 98.1 (03-01)  HR: 66 (03-01) (58 - 83)  BP: 137/65 (03-01) (112/75 - 143/60)  RR: 18 (03-01)  SpO2: 95% (03-01)  I&O's Summary    28 Feb 2025 07:01  -  01 Mar 2025 07:00  --------------------------------------------------------  IN: 670 mL / OUT: 1000 mL / NET: -330 mL    01 Mar 2025 07:01  -  01 Mar 2025 21:55  --------------------------------------------------------  IN: 818 mL / OUT: 200 mL / NET: 618 mL        Gen: Well appearing   HENNT: No JVP   CV: regular rate, regular rhtyhm, no murmur   Pulm: clear to auscultation bilaterally   Abdomen: Non-tender, non-distended   Ext: no edema b/l   Neuro: grossly non-focal     Cardiovascular Diagnostic Testing:  CONCLUSIONS:      1. Left ventricular cavity is normal in size. Left ventricular systolic function is normal with an ejection fraction visually estimated at 55 to 60 %. There are no regional wall motion abnormalities seen.   2. Normal right ventricular cavity size and normal right ventricular systolic function.   3. Estimated pulmonary artery systolic pressure is 52 mmHg.   4. There is left to right shunting through the interatrial septum.   5. There is calcification of the aortic valve leaflets. Mild aortic stenosis.   6. Moderate aortic regurgitation.   7. Ascending aorta is dilated, measuring 4.10 cm (indexed 2.57 cm/m²).   8. Small pericardial effusion.   9. Compared to the transthoracic echocardiogram performed on 1/27/2024, the estimated RVSP has increased. There is mild aortic stenosis. The ascending aorta measures larger on this study, though that may be because we have imaged more distally.                          9.8    9.71  )-----------( 333      ( 01 Mar 2025 03:04 )             30.7     03-01    139  |  103  |  43[H]  ----------------------------<  63[L]  4.8   |  23  |  1.59[H]    Ca    9.2      01 Mar 2025 07:12      PT/INR - ( 28 Feb 2025 19:55 )   PT: 15.6 sec;   INR: 1.37 ratio         PTT - ( 01 Mar 2025 10:39 )  PTT:90.8 sec              Cardiovascular Testings:

## 2025-03-01 NOTE — PROGRESS NOTE ADULT - SUBJECTIVE AND OBJECTIVE BOX
[facility]      Nolberto Crow NP    [address] [place] [id number]  [phone]      INTERVAL HPI/OVERNIGHT EVENTS:  seen and examined  tolerating diet  hgb stable  no s/s gi bleeding  offers no gi complaints        MEDICATIONS  (STANDING):  aspirin  chewable 81 milliGRAM(s) Oral daily  atorvastatin 20 milliGRAM(s) Oral at bedtime  ferrous    sulfate 325 milliGRAM(s) Oral daily  heparin  Infusion.  Unit(s)/Hr (9 mL/Hr) IV Continuous <Continuous>  metoprolol succinate ER 25 milliGRAM(s) Oral daily  mirtazapine 7.5 milliGRAM(s) Oral daily  NIFEdipine XL 60 milliGRAM(s) Oral daily  pantoprazole    Tablet 40 milliGRAM(s) Oral two times a day  tamsulosin 0.4 milliGRAM(s) Oral at bedtime    MEDICATIONS  (PRN):  acetaminophen     Tablet .. 650 milliGRAM(s) Oral every 6 hours PRN Temp greater or equal to 38C (100.4F)  heparin   Injectable 4000 Unit(s) IV Push every 6 hours PRN For aPTT less than 40  heparin   Injectable 2000 Unit(s) IV Push every 6 hours PRN For aPTT between 40 - 57      Allergies    No Known Allergies    Intolerances      ROS:   General:  No  fevers, chills, night sweats, fatigue,   Eyes:  Good vision, no reported pain  ENT:  No sore throat, pain, runny nose, dysphagia  CV:  No pain, palpitations, hypo/hypertension  Resp:  No dyspnea, cough, tachypnea, wheezing  GI:  No pain, No nausea, No vomiting, No diarrhea, No constipation, No weight loss, No fever, No pruritis, No rectal bleeding, No tarry stools, No dysphagia,  :  No pain, bleeding, incontinence, nocturia  Muscle:  No pain, weakness  Neuro:  No weakness, tingling, memory problems  Psych:  No fatigue, insomnia, mood problems, depression  Endocrine:  No polyuria, polydipsia, cold/heat intolerance  Heme:  No petechiae, ecchymosis, easy bruisability  Skin:  No rash, tattoos, scars, edema        PHYSICAL EXAM  Vital Signs Last 24 Hrs  T(C): 36.6 (01 Mar 2025 05:13), Max: 36.6 (28 Feb 2025 20:42)  T(F): 97.9 (01 Mar 2025 05:13), Max: 97.9 (28 Feb 2025 20:42)  HR: 83 (01 Mar 2025 05:13) (57 - 83)  BP: 112/75 (01 Mar 2025 05:13) (112/75 - 147/56)  BP(mean): --  RR: 18 (01 Mar 2025 05:13) (17 - 18)  SpO2: 98% (01 Mar 2025 05:13) (92% - 98%)    Parameters below as of 01 Mar 2025 05:13  Patient On (Oxygen Delivery Method): room air          GENERAL:  Appears stated age  HEENT:  NC/AT  CHEST:  Full & symmetric excursion  HEART:  Regular rhythm  ABDOMEN:  Soft, non-tender, non-distended  EXTEREMITIES:  no cyanosis  SKIN:  No rash  NEURO:  Alert            LABS:                        9.8    9.71  )-----------( 333      ( 01 Mar 2025 03:04 )             30.7     03-01    139  |  103  |  43[H]  ----------------------------<  63[L]  4.8   |  23  |  1.59[H]    Ca    9.2      01 Mar 2025 07:12      PT/INR - ( 28 Feb 2025 19:55 )   PT: 15.6 sec;   INR: 1.37 ratio         PTT - ( 01 Mar 2025 03:04 )  PTT:90.9 sec      02-28-25 @ 07:01  -  03-01-25 @ 07:00  --------------------------------------------------------  IN: 670 mL / OUT: 1000 mL / NET: -330 mL                      RADIOLOGY & ADDITIONAL TESTS:

## 2025-03-01 NOTE — PROGRESS NOTE ADULT - SUBJECTIVE AND OBJECTIVE BOX
Patient is a 85y old  Male who presents with a chief complaint of Referred by Vascular for Weakness Diarrhea (01 Mar 2025 22:25)      SUBJECTIVE / OVERNIGHT EVENTS:     MEDICATIONS  (STANDING):  aspirin  chewable 81 milliGRAM(s) Oral daily  atorvastatin 20 milliGRAM(s) Oral at bedtime  ferrous    sulfate 325 milliGRAM(s) Oral daily  heparin  Infusion.  Unit(s)/Hr (9 mL/Hr) IV Continuous <Continuous>  metoprolol succinate ER 25 milliGRAM(s) Oral daily  mirtazapine 7.5 milliGRAM(s) Oral daily  NIFEdipine XL 60 milliGRAM(s) Oral daily  pantoprazole    Tablet 40 milliGRAM(s) Oral two times a day  tamsulosin 0.4 milliGRAM(s) Oral at bedtime    MEDICATIONS  (PRN):  acetaminophen     Tablet .. 650 milliGRAM(s) Oral every 6 hours PRN Temp greater or equal to 38C (100.4F)  heparin   Injectable 4000 Unit(s) IV Push every 6 hours PRN For aPTT less than 40  heparin   Injectable 2000 Unit(s) IV Push every 6 hours PRN For aPTT between 40 - 57      CAPILLARY BLOOD GLUCOSE        I&O's Summary    28 Feb 2025 07:01  -  01 Mar 2025 07:00  --------------------------------------------------------  IN: 670 mL / OUT: 1000 mL / NET: -330 mL    01 Mar 2025 07:01  -  01 Mar 2025 23:53  --------------------------------------------------------  IN: 1076 mL / OUT: 200 mL / NET: 876 mL      T(C): 36.9 (03-01-25 @ 21:55), Max: 36.9 (03-01-25 @ 21:55)  HR: 53 (03-01-25 @ 21:55) (53 - 53)  BP: 147/63 (03-01-25 @ 21:55) (147/63 - 147/63)  RR: 18 (03-01-25 @ 21:55) (18 - 18)  SpO2: 94% (03-01-25 @ 21:55) (94% - 94%)    PHYSICAL EXAM:  GENERAL: NAD, well-developed  HEAD:  Atraumatic, Normocephalic  EYES: EOMI, PERRLA, conjunctiva and sclera clear  NECK: Supple, No JVD  CHEST/LUNG: Clear to auscultation bilaterally; No wheeze  HEART: Regular rate and rhythm; No murmurs, rubs, or gallops  ABDOMEN: Soft, Nontender, Nondistended; Bowel sounds present  EXTREMITIES:  2+ Peripheral Pulses, No clubbing, cyanosis, or edema  PSYCH: AAOx3  NEUROLOGY: non-focal  SKIN: No rashes or lesions    LABS:                        9.8    9.71  )-----------( 333      ( 01 Mar 2025 03:04 )             30.7     03-01    139  |  103  |  43[H]  ----------------------------<  63[L]  4.8   |  23  |  1.59[H]    Ca    9.2      01 Mar 2025 07:12      PT/INR - ( 28 Feb 2025 19:55 )   PT: 15.6 sec;   INR: 1.37 ratio         PTT - ( 01 Mar 2025 10:39 )  PTT:90.8 sec      Urinalysis Basic - ( 01 Mar 2025 07:12 )    Color: x / Appearance: x / SG: x / pH: x  Gluc: 63 mg/dL / Ketone: x  / Bili: x / Urobili: x   Blood: x / Protein: x / Nitrite: x   Leuk Esterase: x / RBC: x / WBC x   Sq Epi: x / Non Sq Epi: x / Bacteria: x        RADIOLOGY & ADDITIONAL TESTS:    Imaging Personally Reviewed:    Consultant(s) Notes Reviewed:      Care Discussed with Consultants/Other Providers:

## 2025-03-01 NOTE — PROGRESS NOTE ADULT - ASSESSMENT
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 Patient presented to Dr. Landers's office today for followup and evaluation of left leg swelling, but was referred to the ED given reported 3-4 days of weakness, poor PO intake, and diarrhea. He is found to have DVT and DAX.    A/P:  DAX:  Unknwon baseline however fluctuating 1.3-1.7 probably with some degree of CKD  OP nephrologist Dr. Rodriguez  FENa>1  DAX workup from 2/15 suggestive of ATN  Losartan on hold  Renal US: Neg for Hydro   sCr worsening at present, pt intermittently febrile and with bradycardia   s/p IVF   FeNa 2% - indeterminate.  Bladder scan 2/19 with 215cc urine, continue to monitor bladder scan as needed   Pt was on Zosyn - CBC neg for eosinophilia.  S.Cr worsened on 2/21 -- possibly d/t worsening anemia.  Pt eating and drinking well; BP acceptable.    S/P NS 0.9% @75mL/hr x1L on 2/21.  SCR fluctuating monitor for now  Repeat Fena inconclusive 1.2%, ECHO shows dilated CVP so no role for fluids unless hypotensive  Renal function fluctuating but stable  Bladder scan negative for retention  Monitor I/O  Monitor renal function closely.    HTN:  Losartan on hold and Amlodipine discontinued  Nifedipine increased to 60 mg daily BP better  Low sodium diet.  Monitor BP    Anemia:  Iron deficient vs GIB.  On PO iron supplements.  S/p prbcs  GI following  Heme/onc and GI following. PLnned for EGD/Colonoscopy  Transfuse for Hgb <8.    Hypocalcemia:  In setting of hypoalbuminemia.  Optimize albumin.  Ca better.  Monitor Ca.    DVT:  Follow up vascular  On Apixaban.    Diarrhea  Resolved.

## 2025-03-01 NOTE — PROGRESS NOTE ADULT - ASSESSMENT
85 y/o M with pmhx htn, hld, dvt, pad presents to the ER from vascular surgery office with diarrhea and generalized weakness over the last few days.     Acute Blood loss Anemia   Transfuse   GI to scope Monday   Cards clearance appreciated   Resume Heparin drip   Eliquis on hold     Fevers / Bacteremia   Blood cx blood cx on 2/17 showed GPC on 2/19  * s/p zosyn 2/19-2/21 and a vanco 2/20-2/21, switch to ceftriaxone 2 qd  *ID following   Continue with Ceftriaxone for now     TTE no evegetations     SEBASTIAN   Dental Eval done   Acute DVT left LE  Hep drip    Vascular Surgery Consulted     DAX pre renal /ATN   Renal following     HTN   Hold Olmesartan   Continue with Norvasc and Toprol     HLD   Statins

## 2025-03-01 NOTE — PROGRESS NOTE ADULT - SUBJECTIVE AND OBJECTIVE BOX
Patient is a 85y old  Male who presents with a chief complaint of Referred by Vascular for Weakness Diarrhea (01 Mar 2025 11:55)      Medication:   acetaminophen     Tablet .. 650 milliGRAM(s) Oral every 6 hours PRN  aspirin  chewable 81 milliGRAM(s) Oral daily  atorvastatin 20 milliGRAM(s) Oral at bedtime  ferrous    sulfate 325 milliGRAM(s) Oral daily  heparin   Injectable 4000 Unit(s) IV Push every 6 hours PRN  heparin   Injectable 2000 Unit(s) IV Push every 6 hours PRN  heparin  Infusion.  Unit(s)/Hr IV Continuous <Continuous>  metoprolol succinate ER 25 milliGRAM(s) Oral daily  mirtazapine 7.5 milliGRAM(s) Oral daily  NIFEdipine XL 60 milliGRAM(s) Oral daily  pantoprazole    Tablet 40 milliGRAM(s) Oral two times a day  tamsulosin 0.4 milliGRAM(s) Oral at bedtime      Physical exam    T(C): 36.9 (03-01-25 @ 21:55), Max: 36.9 (03-01-25 @ 21:55)  HR: 53 (03-01-25 @ 21:55) (53 - 83)  BP: 147/63 (03-01-25 @ 21:55) (112/75 - 147/63)  RR: 18 (03-01-25 @ 21:55) (18 - 18)  SpO2: 94% (03-01-25 @ 21:55) (93% - 98%)  Wt(kg): --    resting NAD  Resp non labored    Labs                        9.8    9.71  )-----------( 333      ( 01 Mar 2025 03:04 )             30.7       03-01    139  |  103  |  43[H]  ----------------------------<  63[L]  4.8   |  23  |  1.59[H]    Ca    9.2      01 Mar 2025 07:12            [id number]

## 2025-03-01 NOTE — PROGRESS NOTE ADULT - ASSESSMENT
84M with PMH of HTN, HLD, PAD here for diarrhea and generalized weakness. Noted to have GIB, pending EGD/Colonoscopy on Monday. Cardiology consulted for preop cardiac clearance     1. GIB, pending EGD/colonoscopy   2. moderate pHTN   3. Mild AS and AI   4. Preop cardiac clearance     -on heparin gtt for above knee DVT, Hgb stable   -euvolemic on exam, no evidence of ACS, HF or critical valvular issue, optimized from cardiac standpoint to proceed   -mod pHTN for OP work up   -cont procardia and toprol XL for bp     José Manuel Knapp MD [facility]  Attending Interventional Cardiologist, [place].   Avaliable on [employer] Team

## 2025-03-01 NOTE — PROGRESS NOTE ADULT - ASSESSMENT
anemia  dvt    a/c on hold  echo shows moderate pulm htn  will need cardiac clearance prior to endoscopic eval  planned for egd/colon monday  clears sunday  - plans for Colonoscopy Monday  - clear liquid diet Sunday, NPO p MN on Sunday   - bowel prep with Moviprep 1L Q4H x 2 doses starting 1800 and Dulcolax 10mg Q4H x 2 doses starting at 1600   proton pump inhibitor bid  monitor cbc  repeat cultures noted  echo noted  antibiotics per ID  will follow     I reviewed the overnight course of events on the unit, re-confirming the patient history. I discussed the care with the patient and their family  The plan of care was discussed with the physician assistant and modifications were made to the notation where appropriate.   Differential diagnosis and plan of care discussed with patient after the evaluation  35 minutes spent on total encounter of which more than fifty percent of the encounter was spent counseling and/or coordinating care by the attending physician.  Advanced care planning was discussed with patient and family.  Advanced care planning forms were reviewed and discussed.  Risks, benefits and alternatives of gastroenterologic procedures were discussed in detail and all questions were answered.

## 2025-03-01 NOTE — PROGRESS NOTE ADULT - SUBJECTIVE AND OBJECTIVE BOX
[facility]    Dr. Gabriel Betancourt     Office [phone] (9 am to 5 pm)  Service: [phone] (5pm to 9am)  Also Available on TEAMS      RENAL PROGRESS NOTE: DATE OF SERVICE 03-01-25 @ 08:46    Patient is a 85y old  Male who presents with a chief complaint of Referred by Vascular for Weakness Diarrhea (28 Feb 2025 21:28)      Patient seen and examined at bedside. No chest pain/sob    VITALS:  T(F): 97.9 (03-01-25 @ 05:13), Max: 97.9 (02-28-25 @ 20:42)  HR: 83 (03-01-25 @ 05:13)  BP: 112/75 (03-01-25 @ 05:13)  RR: 18 (03-01-25 @ 05:13)  SpO2: 98% (03-01-25 @ 05:13)  Wt(kg): --    02-28 @ 07:01  -  03-01 @ 07:00  --------------------------------------------------------  IN: 670 mL / OUT: 1000 mL / NET: -330 mL          PHYSICAL EXAM:  Constitutional: NAD  Neck: No JVD  Respiratory: CTAB, no wheezes, rales or rhonchi  Cardiovascular: S1, S2, RRR  Gastrointestinal: BS+, soft, NT/ND  Extremities: No peripheral edema    Hospital Medications:   MEDICATIONS  (STANDING):  aspirin  chewable 81 milliGRAM(s) Oral daily  atorvastatin 20 milliGRAM(s) Oral at bedtime  ferrous    sulfate 325 milliGRAM(s) Oral daily  heparin  Infusion.  Unit(s)/Hr (9 mL/Hr) IV Continuous <Continuous>  metoprolol succinate ER 25 milliGRAM(s) Oral daily  mirtazapine 7.5 milliGRAM(s) Oral daily  NIFEdipine XL 60 milliGRAM(s) Oral daily  pantoprazole    Tablet 40 milliGRAM(s) Oral two times a day  tamsulosin 0.4 milliGRAM(s) Oral at bedtime      LABS:  03-01    139  |  103  |  43[H]  ----------------------------<  63[L]  4.8   |  23  |  1.59[H]    Ca    9.2      01 Mar 2025 07:12      Creatinine Trend: 1.59 <--, 1.62 <--, 1.73 <--, 1.44 <--, 1.79 <--, 1.33 <--, 1.53 <--                                9.8    9.71  )-----------( 333      ( 01 Mar 2025 03:04 )             30.7     Urine Studies:  Urinalysis - [03-01-25 @ 07:12]      Color  / Appearance  / SG  / pH       Gluc 63 / Ketone   / Bili  / Urobili        Blood  / Protein  / Leuk Est  / Nitrite       RBC  / WBC  / Hyaline  / Gran  / Sq Epi  / Non Sq Epi  / Bacteria     Urine Creatinine 82      [02-25-25 @ 12:46]  Urine Sodium 75      [02-25-25 @ 12:46]    Iron 25, TIBC 249, %sat 10      [02-17-25 @ 07:23]  Ferritin 76      [02-17-25 @ 07:23]        RADIOLOGY & ADDITIONAL STUDIES:

## 2025-03-02 LAB
ANION GAP SERPL CALC-SCNC: 11 MMOL/L — SIGNIFICANT CHANGE UP (ref 5–17)
APTT BLD: 78.2 SEC — HIGH (ref 24.5–35.6)
BUN SERPL-MCNC: 36 MG/DL — HIGH (ref 7–23)
CALCIUM SERPL-MCNC: 8.8 MG/DL — SIGNIFICANT CHANGE UP (ref 8.4–10.5)
CHLORIDE SERPL-SCNC: 103 MMOL/L — SIGNIFICANT CHANGE UP (ref 96–108)
CO2 SERPL-SCNC: 24 MMOL/L — SIGNIFICANT CHANGE UP (ref 22–31)
CREAT SERPL-MCNC: 1.66 MG/DL — HIGH (ref 0.5–1.3)
EGFR: 40 ML/MIN/1.73M2 — LOW
EGFR: 40 ML/MIN/1.73M2 — LOW
GLUCOSE SERPL-MCNC: 68 MG/DL — LOW (ref 70–99)
HCT VFR BLD CALC: 29.6 % — LOW (ref 39–50)
HGB BLD-MCNC: 9.4 G/DL — LOW (ref 13–17)
MCHC RBC-ENTMCNC: 27.8 PG — SIGNIFICANT CHANGE UP (ref 27–34)
MCHC RBC-ENTMCNC: 31.8 G/DL — LOW (ref 32–36)
MCV RBC AUTO: 87.6 FL — SIGNIFICANT CHANGE UP (ref 80–100)
NRBC BLD AUTO-RTO: 0 /100 WBCS — SIGNIFICANT CHANGE UP (ref 0–0)
PLATELET # BLD AUTO: 336 K/UL — SIGNIFICANT CHANGE UP (ref 150–400)
POTASSIUM SERPL-MCNC: 4.5 MMOL/L — SIGNIFICANT CHANGE UP (ref 3.5–5.3)
POTASSIUM SERPL-SCNC: 4.5 MMOL/L — SIGNIFICANT CHANGE UP (ref 3.5–5.3)
RBC # BLD: 3.38 M/UL — LOW (ref 4.2–5.8)
RBC # FLD: 17.2 % — HIGH (ref 10.3–14.5)
SODIUM SERPL-SCNC: 138 MMOL/L — SIGNIFICANT CHANGE UP (ref 135–145)
WBC # BLD: 6.72 K/UL — SIGNIFICANT CHANGE UP (ref 3.8–10.5)
WBC # FLD AUTO: 6.72 K/UL — SIGNIFICANT CHANGE UP (ref 3.8–10.5)

## 2025-03-02 RX ORDER — BISACODYL 5 MG
10 TABLET, DELAYED RELEASE (ENTERIC COATED) ORAL EVERY 4 HOURS
Refills: 0 | Status: DISCONTINUED | OUTPATIENT
Start: 2025-03-02 | End: 2025-03-02

## 2025-03-02 RX ORDER — LACTULOSE 10 G/15ML
10 SOLUTION ORAL EVERY 4 HOURS
Refills: 0 | Status: COMPLETED | OUTPATIENT
Start: 2025-03-02 | End: 2025-03-02

## 2025-03-02 RX ORDER — POLYETHYLENE GLYCOL-3350 AND ELECTROLYTES 236; 6.74; 5.86; 2.97; 22.74 G/274.31G; G/274.31G; G/274.31G; G/274.31G; G/274.31G
1000 POWDER, FOR SOLUTION ORAL EVERY 4 HOURS
Refills: 0 | Status: COMPLETED | OUTPATIENT
Start: 2025-03-02 | End: 2025-03-02

## 2025-03-02 RX ADMIN — LACTULOSE 10 GRAM(S): 10 SOLUTION ORAL at 19:58

## 2025-03-02 RX ADMIN — MIRTAZAPINE 7.5 MILLIGRAM(S): 30 TABLET, FILM COATED ORAL at 13:41

## 2025-03-02 RX ADMIN — POLYETHYLENE GLYCOL-3350 AND ELECTROLYTES 1000 MILLILITER(S): 236; 6.74; 5.86; 2.97; 22.74 POWDER, FOR SOLUTION ORAL at 22:16

## 2025-03-02 RX ADMIN — HEPARIN SODIUM 900 UNIT(S)/HR: 1000 INJECTION INTRAVENOUS; SUBCUTANEOUS at 19:15

## 2025-03-02 RX ADMIN — Medication 325 MILLIGRAM(S): at 13:41

## 2025-03-02 RX ADMIN — TAMSULOSIN HYDROCHLORIDE 0.4 MILLIGRAM(S): 0.4 CAPSULE ORAL at 22:15

## 2025-03-02 RX ADMIN — HEPARIN SODIUM 900 UNIT(S)/HR: 1000 INJECTION INTRAVENOUS; SUBCUTANEOUS at 07:44

## 2025-03-02 RX ADMIN — HEPARIN SODIUM 900 UNIT(S)/HR: 1000 INJECTION INTRAVENOUS; SUBCUTANEOUS at 00:59

## 2025-03-02 RX ADMIN — Medication 81 MILLIGRAM(S): at 13:40

## 2025-03-02 RX ADMIN — Medication 40 MILLIGRAM(S): at 17:20

## 2025-03-02 RX ADMIN — Medication 40 MILLIGRAM(S): at 05:08

## 2025-03-02 RX ADMIN — ATORVASTATIN CALCIUM 20 MILLIGRAM(S): 80 TABLET, FILM COATED ORAL at 22:15

## 2025-03-02 RX ADMIN — LACTULOSE 10 GRAM(S): 10 SOLUTION ORAL at 17:20

## 2025-03-02 RX ADMIN — POLYETHYLENE GLYCOL-3350 AND ELECTROLYTES 1000 MILLILITER(S): 236; 6.74; 5.86; 2.97; 22.74 POWDER, FOR SOLUTION ORAL at 18:41

## 2025-03-02 RX ADMIN — HEPARIN SODIUM 900 UNIT(S)/HR: 1000 INJECTION INTRAVENOUS; SUBCUTANEOUS at 06:55

## 2025-03-02 RX ADMIN — Medication 60 MILLIGRAM(S): at 05:08

## 2025-03-02 NOTE — PROGRESS NOTE ADULT - SUBJECTIVE AND OBJECTIVE BOX
DATE OF SERVICE: 03-02-25 @ 13:15    Patient is a 85y old  Male who presents with a chief complaint of Referred by Vascular for Weakness Diarrhea (02 Mar 2025 10:19)      INTERVAL HISTORY: Feels ok.     REVIEW OF SYSTEMS:  CONSTITUTIONAL: No weakness  EYES/ENT: No visual changes;  No throat pain   NECK: No pain or stiffness  RESPIRATORY: No cough, wheezing; No shortness of breath  CARDIOVASCULAR: No chest pain or palpitations  GASTROINTESTINAL: No abdominal  pain. No nausea, vomiting, or hematemesis  GENITOURINARY: No dysuria, frequency or hematuria  NEUROLOGICAL: No stroke like symptoms  SKIN: No rashes    	  MEDICATIONS:  metoprolol succinate ER 25 milliGRAM(s) Oral daily  NIFEdipine XL 60 milliGRAM(s) Oral daily        PHYSICAL EXAM:  T(C): 36.4 (03-02-25 @ 11:22), Max: 36.9 (03-01-25 @ 21:55)  HR: 58 (03-02-25 @ 11:22) (50 - 58)  BP: 139/56 (03-02-25 @ 11:22) (134/51 - 174/67)  RR: 16 (03-02-25 @ 11:22) (16 - 18)  SpO2: 96% (03-02-25 @ 11:22) (94% - 96%)  Wt(kg): --  I&O's Summary    01 Mar 2025 07:01  -  02 Mar 2025 07:00  --------------------------------------------------------  IN: 1388 mL / OUT: 600 mL / NET: 788 mL    02 Mar 2025 07:01  -  02 Mar 2025 13:15  --------------------------------------------------------  IN: 510 mL / OUT: 400 mL / NET: 110 mL          Appearance: In no distress	  HEENT:    PERRL, EOMI	  Cardiovascular:  S1 S2, No JVD  Respiratory: Lungs clear to auscultation	  Gastrointestinal:  Soft, Non-tender, + BS	  Vascularature:  No edema of LE  Psychiatric: Appropriate affect   Neuro: no acute focal deficits                               9.4    6.72  )-----------( 336      ( 02 Mar 2025 06:28 )             29.6     03-02    138  |  103  |  36[H]  ----------------------------<  68[L]  4.5   |  24  |  1.66[H]    Ca    8.8      02 Mar 2025 06:27          Labs personally reviewed      ASSESSMENT/PLAN: 	      84M with PMH of HTN, HLD, PAD here for diarrhea and generalized weakness. Noted to have GIB, pending EGD/Colonoscopy on Monday. Cardiology consulted for preop cardiac clearance     1. GIB, pending EGD/colonoscopy   2. moderate pHTN   3. Mild AS and AI   4. Preop cardiac clearance     -on heparin gtt for above knee DVT, Hgb stable   -euvolemic on exam, no evidence of ACS, HF or critical valvular issue, optimized from cardiac standpoint to proceed   -mod pHTN for OP work up   -cont procardia and toprol XL for bp       Sariah Bennett, AG-NP   Ancelmo Caruso DO [facility]  Cardiovascular Medicine  [address], [address]  Available through call or text on [employer] TEAM[employer]  Office: [phone]

## 2025-03-02 NOTE — PROGRESS NOTE ADULT - SUBJECTIVE AND OBJECTIVE BOX
[facility]      Nolberto Crow NP    [address] [place] [id number]  [phone]      INTERVAL HPI/OVERNIGHT EVENTS:  seen and examined  hgb stable  no s/s gi bleeding  offers no gi complaints          MEDICATIONS  (STANDING):  aspirin  chewable 81 milliGRAM(s) Oral daily  atorvastatin 20 milliGRAM(s) Oral at bedtime  ferrous    sulfate 325 milliGRAM(s) Oral daily  heparin  Infusion.  Unit(s)/Hr (9 mL/Hr) IV Continuous <Continuous>  metoprolol succinate ER 25 milliGRAM(s) Oral daily  mirtazapine 7.5 milliGRAM(s) Oral daily  NIFEdipine XL 60 milliGRAM(s) Oral daily  pantoprazole    Tablet 40 milliGRAM(s) Oral two times a day  tamsulosin 0.4 milliGRAM(s) Oral at bedtime    MEDICATIONS  (PRN):  acetaminophen     Tablet .. 650 milliGRAM(s) Oral every 6 hours PRN Temp greater or equal to 38C (100.4F)  heparin   Injectable 4000 Unit(s) IV Push every 6 hours PRN For aPTT less than 40  heparin   Injectable 2000 Unit(s) IV Push every 6 hours PRN For aPTT between 40 - 57      Allergies    No Known Allergies    Intolerances                  ROS:   General:  No  fevers, chills, night sweats, fatigue,   Eyes:  Good vision, no reported pain  ENT:  No sore throat, pain, runny nose, dysphagia  CV:  No pain, palpitations, hypo/hypertension  Resp:  No dyspnea, cough, tachypnea, wheezing  GI:  No pain, No nausea, No vomiting, No diarrhea, No constipation, No weight loss, No fever, No pruritis, No rectal bleeding, No tarry stools, No dysphagia,  :  No pain, bleeding, incontinence, nocturia  Muscle:  No pain, weakness  Neuro:  No weakness, tingling, memory problems  Psych:  No fatigue, insomnia, mood problems, depression  Endocrine:  No polyuria, polydipsia, cold/heat intolerance  Heme:  No petechiae, ecchymosis, easy bruisability  Skin:  No rash, tattoos, scars, edema      PHYSICAL EXAM  Vital Signs Last 24 Hrs  T(C): 36.3 (02 Mar 2025 04:28), Max: 36.9 (01 Mar 2025 21:55)  T(F): 97.4 (02 Mar 2025 04:28), Max: 98.5 (01 Mar 2025 21:55)  HR: 50 (02 Mar 2025 04:28) (50 - 66)  BP: 174/67 (02 Mar 2025 04:28) (134/51 - 174/67)  BP(mean): --  RR: 16 (02 Mar 2025 04:28) (16 - 18)  SpO2: 96% (02 Mar 2025 04:28) (94% - 96%)    Parameters below as of 02 Mar 2025 04:28  Patient On (Oxygen Delivery Method): room air          GENERAL:  Appears stated age  HEENT:  NC/AT  CHEST:  Full & symmetric excursion  HEART:  Regular rhythm  ABDOMEN:  Soft, non-tender, non-distended  EXTEREMITIES:  no cyanosis  SKIN:  No rash  NEURO:  Alert            LABS:                        9.4    6.72  )-----------( 336      ( 02 Mar 2025 06:28 )             29.6     03-02    138  |  103  |  36[H]  ----------------------------<  68[L]  4.5   |  24  |  1.66[H]    Ca    8.8      02 Mar 2025 06:27      PT/INR - ( 28 Feb 2025 19:55 )   PT: 15.6 sec;   INR: 1.37 ratio         PTT - ( 02 Mar 2025 06:29 )  PTT:78.2 sec      03-01-25 @ 07:01  -  03-02-25 @ 07:00  --------------------------------------------------------  IN: 1388 mL / OUT: 600 mL / NET: 788 mL                    RADIOLOGY & ADDITIONAL TESTS:

## 2025-03-02 NOTE — PROGRESS NOTE ADULT - SUBJECTIVE AND OBJECTIVE BOX
[facility]    Dr. Gabriel Betancourt     Office [phone] (9 am to 5 pm)  Service: [phone] (5pm to 9am)  Also Available on TEAMS      RENAL PROGRESS NOTE: DATE OF SERVICE 03-02-25 @ 08:41    Patient is a 85y old  Male who presents with a chief complaint of Referred by Vascular for Weakness Diarrhea (01 Mar 2025 22:25)      Patient seen and examined at bedside. No chest pain/sob    VITALS:  T(F): 97.4 (03-02-25 @ 04:28), Max: 98.5 (03-01-25 @ 21:55)  HR: 50 (03-02-25 @ 04:28)  BP: 174/67 (03-02-25 @ 04:28)  RR: 16 (03-02-25 @ 04:28)  SpO2: 96% (03-02-25 @ 04:28)  Wt(kg): --    03-01 @ 07:01  -  03-02 @ 07:00  --------------------------------------------------------  IN: 1388 mL / OUT: 600 mL / NET: 788 mL          PHYSICAL EXAM:  Constitutional: NAD  Neck: No JVD  Respiratory: CTAB, no wheezes, rales or rhonchi  Cardiovascular: S1, S2, RRR  Gastrointestinal: BS+, soft, NT/ND  Extremities: No peripheral edema    Hospital Medications:   MEDICATIONS  (STANDING):  aspirin  chewable 81 milliGRAM(s) Oral daily  atorvastatin 20 milliGRAM(s) Oral at bedtime  ferrous    sulfate 325 milliGRAM(s) Oral daily  heparin  Infusion.  Unit(s)/Hr (9 mL/Hr) IV Continuous <Continuous>  metoprolol succinate ER 25 milliGRAM(s) Oral daily  mirtazapine 7.5 milliGRAM(s) Oral daily  NIFEdipine XL 60 milliGRAM(s) Oral daily  pantoprazole    Tablet 40 milliGRAM(s) Oral two times a day  tamsulosin 0.4 milliGRAM(s) Oral at bedtime      LABS:  03-02    138  |  103  |  36[H]  ----------------------------<  68[L]  4.5   |  24  |  1.66[H]    Ca    8.8      02 Mar 2025 06:27      Creatinine Trend: 1.66 <--, 1.59 <--, 1.62 <--, 1.73 <--, 1.44 <--, 1.79 <--                                9.4    6.72  )-----------( 336      ( 02 Mar 2025 06:28 )             29.6     Urine Studies:  Urinalysis - [03-02-25 @ 06:27]      Color  / Appearance  / SG  / pH       Gluc 68 / Ketone   / Bili  / Urobili        Blood  / Protein  / Leuk Est  / Nitrite       RBC  / WBC  / Hyaline  / Gran  / Sq Epi  / Non Sq Epi  / Bacteria     Urine Creatinine 82      [02-25-25 @ 12:46]  Urine Sodium 75      [02-25-25 @ 12:46]    Iron 25, TIBC 249, %sat 10      [02-17-25 @ 07:23]  Ferritin 76      [02-17-25 @ 07:23]        RADIOLOGY & ADDITIONAL STUDIES:

## 2025-03-02 NOTE — PROGRESS NOTE ADULT - NS ATTEND OPT1 GEN_ALL_CORE

## 2025-03-02 NOTE — PROGRESS NOTE ADULT - ASSESSMENT
85-year-old male with anemia. Getting oral iron for iron def. Has GI blood loss and for EGD tomorrow. Had PRBC a couple of days ago and Hgb has been higher and adequate since then. Also component of AOCD due to renal insufficiency.    DVT was getting ELiquis and now getting Heparin as for procedure. Management per medicine.

## 2025-03-02 NOTE — PROGRESS NOTE ADULT - SUBJECTIVE AND OBJECTIVE BOX
Patient is a 85y old  Male who presents with a chief complaint of Referred by Vascular for Weakness Diarrhea (01 Mar 2025 22:25)      SUBJECTIVE / OVERNIGHT EVENTS: No events     T(C): 36.4 (03-02-25 @ 11:22), Max: 36.4 (03-02-25 @ 11:22)  HR: 58 (03-02-25 @ 11:22) (58 - 58)  BP: 139/56 (03-02-25 @ 11:22) (139/56 - 139/56)  RR: 16 (03-02-25 @ 11:22) (16 - 16)  SpO2: 96% (03-02-25 @ 11:22) (96% - 96%)      MEDICATIONS  (STANDING):  aspirin  chewable 81 milliGRAM(s) Oral daily  atorvastatin 20 milliGRAM(s) Oral at bedtime  ferrous    sulfate 325 milliGRAM(s) Oral daily  heparin  Infusion.  Unit(s)/Hr (9 mL/Hr) IV Continuous <Continuous>  metoprolol succinate ER 25 milliGRAM(s) Oral daily  mirtazapine 7.5 milliGRAM(s) Oral daily  NIFEdipine XL 60 milliGRAM(s) Oral daily  pantoprazole    Tablet 40 milliGRAM(s) Oral two times a day  polyethylene glycol/electrolyte Solution 1000 milliLiter(s) Oral every 4 hours  tamsulosin 0.4 milliGRAM(s) Oral at bedtime    MEDICATIONS  (PRN):  acetaminophen     Tablet .. 650 milliGRAM(s) Oral every 6 hours PRN Temp greater or equal to 38C (100.4F)  heparin   Injectable 4000 Unit(s) IV Push every 6 hours PRN For aPTT less than 40  heparin   Injectable 2000 Unit(s) IV Push every 6 hours PRN For aPTT between 40 - 57      PHYSICAL EXAM:  GENERAL: NAD, well-developed  HEAD:  Atraumatic, Normocephalic  EYES: EOMI, PERRLA, conjunctiva and sclera clear  NECK: Supple, No JVD  CHEST/LUNG: Clear to auscultation bilaterally; No wheeze  HEART: Regular rate and rhythm; No murmurs, rubs, or gallops  ABDOMEN: Soft, Nontender, Nondistended; Bowel sounds present  EXTREMITIES:  2+ Peripheral Pulses, No clubbing, cyanosis, or edema  PSYCH: AAOx3  NEUROLOGY: non-focal  SKIN: No rashes or lesions                          9.4    6.72  )-----------( 336      ( 02 Mar 2025 06:28 )             29.6             PTT - ( 02 Mar 2025 06:29 )  PTT:78.2 sec  138|103|36<68  4.5|24|1.66  8.8,--,--  03-02 @ 06:27      CAPILLARY BLOOD GLUCOSE      CAPILLARY BLOOD GLUCOSE          RADIOLOGY & ADDITIONAL TESTS:    Imaging Personally Reviewed:    Consultant(s) Notes Reviewed:      Care Discussed with Consultants/Other Providers:

## 2025-03-02 NOTE — PROGRESS NOTE ADULT - SUBJECTIVE AND OBJECTIVE BOX
Patient is a 85y old  Male who presents with a chief complaint of Referred by Vascular for Weakness Diarrhea (02 Mar 2025 13:15)    Says that he feels okay. On PO clears. Had dark stool. NO CP or SOB. No N/V/D. NO fever. No HA or dizziness. No nosebleeds    Medication:   acetaminophen     Tablet .. 650 milliGRAM(s) Oral every 6 hours PRN  aspirin  chewable 81 milliGRAM(s) Oral daily  atorvastatin 20 milliGRAM(s) Oral at bedtime  ferrous    sulfate 325 milliGRAM(s) Oral daily  heparin   Injectable 4000 Unit(s) IV Push every 6 hours PRN  heparin   Injectable 2000 Unit(s) IV Push every 6 hours PRN  heparin  Infusion.  Unit(s)/Hr IV Continuous <Continuous>  lactulose Syrup 10 Gram(s) Oral every 4 hours  metoprolol succinate ER 25 milliGRAM(s) Oral daily  mirtazapine 7.5 milliGRAM(s) Oral daily  NIFEdipine XL 60 milliGRAM(s) Oral daily  pantoprazole    Tablet 40 milliGRAM(s) Oral two times a day  polyethylene glycol/electrolyte Solution 1000 milliLiter(s) Oral every 4 hours  tamsulosin 0.4 milliGRAM(s) Oral at bedtime      Physical exam    T(C): 36.4 (03-02-25 @ 11:22), Max: 36.9 (03-01-25 @ 21:55)  HR: 58 (03-02-25 @ 11:22) (50 - 58)  BP: 139/56 (03-02-25 @ 11:22) (134/51 - 174/67)  RR: 16 (03-02-25 @ 11:22) (16 - 18)  SpO2: 96% (03-02-25 @ 11:22) (94% - 96%)  Wt(kg): --    alert NAD  EOMI anicteric sclera  Cv s1 S2 RRR  Lungs clear B/L  abd soft NT ND +BS  No LE edema or tenderness    Labs                        9.4    6.72  )-----------( 336      ( 02 Mar 2025 06:28 )             29.6       03-02    138  |  103  |  36[H]  ----------------------------<  68[L]  4.5   |  24  |  1.66[H]    Ca    8.8      02 Mar 2025 06:27            [id number]

## 2025-03-02 NOTE — PROGRESS NOTE ADULT - NS ATTEND AMEND GEN_ALL_CORE FT
Patient care and plan discussed and reviewed with Advanced Care Provider. Plan as outlined above edited by me to reflect our discussion.   In addition, I participated in    - Ordering, reviewing, and interpreting labs, testing, and imaging.  - Reviewing prior hospitalization and where necessary, outpatient records.  - Counselling and educating patient and/or family regarding interpretation of aforementioned items and plan of care.  - Communicating with other health professionals (when not separately reported), and documenting clinical information in the electronic health record.
as above
scr improving today
Patient discussed in details. Plan as above. Fluctuating creatinine questio  whether component of ATN versus prerenal awaiting urine studies
SCr worsened  etiology?  Was febrile  NS 75cc/hr for 1 lit  Monitor SCr if worsens further will do more work up
Scr relatively stable-monitor
as above
check DAX work up
as above

## 2025-03-02 NOTE — PROGRESS NOTE ADULT - ASSESSMENT
anemia  dvt    a/c on hold  echo shows moderate pulm htn  will need cardiac clearance prior to endoscopic eval  planned for egd/colon monday  clears sunday  - plans for Colonoscopy Monday  - clear liquid diet Sunday, NPO p MN on Sunday   - bowel prep with Moviprep 1L Q4H x 2 doses starting 1800 and Dulcolax 10mg Q4H x 2 doses starting at 1600   proton pump inhibitor bid  monitor cbc  repeat cultures noted  echo noted  antibiotics per ID  will follow     I reviewed the overnight course of events on the unit, re-confirming the patient history. I discussed the care with the patient and their family  The plan of care was discussed with the physician assistant and modifications were made to the notation where appropriate.   Differential diagnosis and plan of care discussed with patient after the evaluation  35 minutes spent on total encounter of which more than fifty percent of the encounter was spent counseling and/or coordinating care by the attending physician.  Advanced care planning was discussed with patient and family.  Advanced care planning forms were reviewed and discussed.  Risks, benefits and alternatives of gastroenterologic procedures were discussed in detail and all questions were answered.   anemia  dvt    a/c on hold  echo shows moderate pulm htn  will need cardiac clearance prior to endoscopic eval  planned for egd/colon monday  clears sunday  - plans for Colonoscopy Monday  - clear liquid diet Sunday, NPO p MN on Sunday   - bowel prep with Moviprep 1L Q4H x 2 doses starting 1800 and lactulose 10 ml Q4H x 2 doses starting at 1600   proton pump inhibitor bid  monitor cbc  repeat cultures noted  echo noted  antibiotics per ID  will follow     I reviewed the overnight course of events on the unit, re-confirming the patient history. I discussed the care with the patient and their family  The plan of care was discussed with the physician assistant and modifications were made to the notation where appropriate.   Differential diagnosis and plan of care discussed with patient after the evaluation  35 minutes spent on total encounter of which more than fifty percent of the encounter was spent counseling and/or coordinating care by the attending physician.  Advanced care planning was discussed with patient and family.  Advanced care planning forms were reviewed and discussed.  Risks, benefits and alternatives of gastroenterologic procedures were discussed in detail and all questions were answered.

## 2025-03-03 LAB
ANION GAP SERPL CALC-SCNC: 12 MMOL/L — SIGNIFICANT CHANGE UP (ref 5–17)
APTT BLD: 83.7 SEC — HIGH (ref 24.5–35.6)
BASOPHILS # BLD AUTO: 0.05 K/UL — SIGNIFICANT CHANGE UP (ref 0–0.2)
BASOPHILS NFR BLD AUTO: 0.7 % — SIGNIFICANT CHANGE UP (ref 0–2)
BUN SERPL-MCNC: 28 MG/DL — HIGH (ref 7–23)
CALCIUM SERPL-MCNC: 9 MG/DL — SIGNIFICANT CHANGE UP (ref 8.4–10.5)
CHLORIDE SERPL-SCNC: 108 MMOL/L — SIGNIFICANT CHANGE UP (ref 96–108)
CO2 SERPL-SCNC: 22 MMOL/L — SIGNIFICANT CHANGE UP (ref 22–31)
CREAT SERPL-MCNC: 1.49 MG/DL — HIGH (ref 0.5–1.3)
EGFR: 45 ML/MIN/1.73M2 — LOW
EGFR: 45 ML/MIN/1.73M2 — LOW
EOSINOPHIL # BLD AUTO: 0.06 K/UL — SIGNIFICANT CHANGE UP (ref 0–0.5)
EOSINOPHIL NFR BLD AUTO: 0.9 % — SIGNIFICANT CHANGE UP (ref 0–6)
GLUCOSE SERPL-MCNC: 95 MG/DL — SIGNIFICANT CHANGE UP (ref 70–99)
HCT VFR BLD CALC: 31.2 % — LOW (ref 39–50)
HGB BLD-MCNC: 9.4 G/DL — LOW (ref 13–17)
IMM GRANULOCYTES NFR BLD AUTO: 0.3 % — SIGNIFICANT CHANGE UP (ref 0–0.9)
INR BLD: 1.26 RATIO — HIGH (ref 0.85–1.16)
LYMPHOCYTES # BLD AUTO: 1.21 K/UL — SIGNIFICANT CHANGE UP (ref 1–3.3)
LYMPHOCYTES # BLD AUTO: 17.6 % — SIGNIFICANT CHANGE UP (ref 13–44)
MCHC RBC-ENTMCNC: 26.9 PG — LOW (ref 27–34)
MCHC RBC-ENTMCNC: 30.1 G/DL — LOW (ref 32–36)
MCV RBC AUTO: 89.1 FL — SIGNIFICANT CHANGE UP (ref 80–100)
MONOCYTES # BLD AUTO: 0.48 K/UL — SIGNIFICANT CHANGE UP (ref 0–0.9)
MONOCYTES NFR BLD AUTO: 7 % — SIGNIFICANT CHANGE UP (ref 2–14)
NEUTROPHILS # BLD AUTO: 5.06 K/UL — SIGNIFICANT CHANGE UP (ref 1.8–7.4)
NEUTROPHILS NFR BLD AUTO: 73.5 % — SIGNIFICANT CHANGE UP (ref 43–77)
NRBC BLD AUTO-RTO: 0 /100 WBCS — SIGNIFICANT CHANGE UP (ref 0–0)
PLATELET # BLD AUTO: 327 K/UL — SIGNIFICANT CHANGE UP (ref 150–400)
POTASSIUM SERPL-MCNC: 5.3 MMOL/L — SIGNIFICANT CHANGE UP (ref 3.5–5.3)
POTASSIUM SERPL-SCNC: 5.3 MMOL/L — SIGNIFICANT CHANGE UP (ref 3.5–5.3)
PROTHROM AB SERPL-ACNC: 14.5 SEC — HIGH (ref 9.9–13.4)
RBC # BLD: 3.5 M/UL — LOW (ref 4.2–5.8)
RBC # FLD: 18 % — HIGH (ref 10.3–14.5)
SODIUM SERPL-SCNC: 142 MMOL/L — SIGNIFICANT CHANGE UP (ref 135–145)
WBC # BLD: 6.88 K/UL — SIGNIFICANT CHANGE UP (ref 3.8–10.5)
WBC # FLD AUTO: 6.88 K/UL — SIGNIFICANT CHANGE UP (ref 3.8–10.5)

## 2025-03-03 PROCEDURE — 99232 SBSQ HOSP IP/OBS MODERATE 35: CPT

## 2025-03-03 PROCEDURE — 99233 SBSQ HOSP IP/OBS HIGH 50: CPT

## 2025-03-03 PROCEDURE — G0545: CPT

## 2025-03-03 DEVICE — NET RETRV ROT ROTH 2.5MMX230CM: Type: IMPLANTABLE DEVICE | Status: FUNCTIONAL

## 2025-03-03 RX ORDER — CEFTRIAXONE 500 MG/1
2000 INJECTION, POWDER, FOR SOLUTION INTRAMUSCULAR; INTRAVENOUS EVERY 24 HOURS
Refills: 0 | Status: DISCONTINUED | OUTPATIENT
Start: 2025-03-04 | End: 2025-03-07

## 2025-03-03 RX ORDER — HEPARIN SODIUM 1000 [USP'U]/ML
4000 INJECTION INTRAVENOUS; SUBCUTANEOUS EVERY 6 HOURS
Refills: 0 | Status: DISCONTINUED | OUTPATIENT
Start: 2025-03-03 | End: 2025-03-07

## 2025-03-03 RX ORDER — HEPARIN SODIUM 1000 [USP'U]/ML
2000 INJECTION INTRAVENOUS; SUBCUTANEOUS EVERY 6 HOURS
Refills: 0 | Status: DISCONTINUED | OUTPATIENT
Start: 2025-03-03 | End: 2025-03-07

## 2025-03-03 RX ORDER — HEPARIN SODIUM 1000 [USP'U]/ML
INJECTION INTRAVENOUS; SUBCUTANEOUS
Qty: 25000 | Refills: 0 | Status: DISCONTINUED | OUTPATIENT
Start: 2025-03-03 | End: 2025-03-07

## 2025-03-03 RX ORDER — CEFTRIAXONE 500 MG/1
2000 INJECTION, POWDER, FOR SOLUTION INTRAMUSCULAR; INTRAVENOUS ONCE
Refills: 0 | Status: COMPLETED | OUTPATIENT
Start: 2025-03-03 | End: 2025-03-03

## 2025-03-03 RX ORDER — NIFEDIPINE 30 MG
30 TABLET, EXTENDED RELEASE 24 HR ORAL ONCE
Refills: 0 | Status: COMPLETED | OUTPATIENT
Start: 2025-03-03 | End: 2025-03-03

## 2025-03-03 RX ORDER — CEFTRIAXONE 500 MG/1
INJECTION, POWDER, FOR SOLUTION INTRAMUSCULAR; INTRAVENOUS
Refills: 0 | Status: DISCONTINUED | OUTPATIENT
Start: 2025-03-03 | End: 2025-03-07

## 2025-03-03 RX ADMIN — ATORVASTATIN CALCIUM 20 MILLIGRAM(S): 80 TABLET, FILM COATED ORAL at 21:29

## 2025-03-03 RX ADMIN — CEFTRIAXONE 100 MILLIGRAM(S): 500 INJECTION, POWDER, FOR SOLUTION INTRAMUSCULAR; INTRAVENOUS at 12:00

## 2025-03-03 RX ADMIN — Medication 30 MILLIGRAM(S): at 13:53

## 2025-03-03 RX ADMIN — Medication 30 MILLIGRAM(S): at 13:39

## 2025-03-03 RX ADMIN — Medication 40 MILLIGRAM(S): at 18:42

## 2025-03-03 RX ADMIN — Medication 5 MILLIGRAM(S): at 14:48

## 2025-03-03 RX ADMIN — HEPARIN SODIUM 900 UNIT(S)/HR: 1000 INJECTION INTRAVENOUS; SUBCUTANEOUS at 01:02

## 2025-03-03 RX ADMIN — HEPARIN SODIUM 900 UNIT(S)/HR: 1000 INJECTION INTRAVENOUS; SUBCUTANEOUS at 07:09

## 2025-03-03 RX ADMIN — TAMSULOSIN HYDROCHLORIDE 0.4 MILLIGRAM(S): 0.4 CAPSULE ORAL at 21:29

## 2025-03-03 RX ADMIN — HEPARIN SODIUM 900 UNIT(S)/HR: 1000 INJECTION INTRAVENOUS; SUBCUTANEOUS at 08:39

## 2025-03-03 RX ADMIN — HEPARIN SODIUM 900 UNIT(S)/HR: 1000 INJECTION INTRAVENOUS; SUBCUTANEOUS at 18:58

## 2025-03-03 NOTE — PROGRESS NOTE ADULT - ASSESSMENT
84 m with HTN, HLD, PAD, DVT, sent from vascular office for diarrhea, generalized weakness and poor PO intake.   initially afebrile, no WBC, DAX normal LFT  GI PCR negative  on 2/17 pt had chills then fever and vomiting, negative u/a and blood cx, again fever 100.4 2/19    admitted with diarrhea and DAX on 2/15 with negative GI PCR, no WBC, then developed a fever with vomiting 2/17, WBC slightly higher to 11 and worsening renal function but negative u/a and blood cx, monitored off antibiotics again fever to 100.4 on 2/19 with no symptoms  blood cx on 2/17 showed gemella and blood cx 2/19, also Gemella, r/o endocarditis, TTE showed ASD and PFO but no veg  chest/abd CT but without contrast showed No acute intrathoracic or intra-abdominal pathology identified on noncontrast CT. Soft tissue in the retroperitoneal region may represent collapsed bowel or lymphadenopathy.  dental stated no clinical evidence of infection, s/p EGD/colon 3/3   * repeat blood cx 2/20 negative, so day 12 (did not receive ceftriaxone 3/1 and 3/2 though)  * s/p zosyn 2/19-2/21 and vanco 2/20-2/21, switched to ceftriaxone 2 qd 2/21  * SEBASTIAN   * monitor CBC/diff and CMP    The above assessment and plan was discussed with the primary team    Alissa Forman MD  contact on teams  After 5pm and on weekends call [phone]

## 2025-03-03 NOTE — PROVIDER CONTACT NOTE (OTHER) - ASSESSMENT
Pt did not finish moviprep by midnight despite consistent pt education on importance, encouragement & monitoring provided. No BM yet at this time. Pt on heparin gtt @ 9mL/hr as per order. Vital signs stable, no signs of acute bleeding noted, no acute distress, AOx4.

## 2025-03-03 NOTE — PROGRESS NOTE ADULT - ASSESSMENT
83 y/o M with pmhx htn, hld, dvt, pad presents to the ER from vascular surgery office with diarrhea and generalized weakness over the last few days.     Acute Blood loss Anemia   Transfuse   GI to scope today    Cards clearance appreciated   Heparin drip   Eliquis on hold     Fevers / Bacteremia   Blood cx blood cx on 2/17 showed GPC on 2/19  * s/p zosyn 2/19-2/21 and a vanco 2/20-2/21, switch to ceftriaxone 2 qd  *ID following   Continue with Ceftriaxone for now     TTE no evegetations     SEBASTIAN   Dental Eval done   Acute DVT left LE  Hep drip    Vascular Surgery Consulted     DAX pre renal /ATN   Renal following     HTN   Hold Olmesartan   Continue with Norvasc and Toprol     HLD   Statins

## 2025-03-03 NOTE — PROGRESS NOTE ADULT - SUBJECTIVE AND OBJECTIVE BOX
Patient is a 85y old  Male who presents with a chief complaint of Referred by Vascular for Weakness Diarrhea (02 Mar 2025 17:44)    Feels okay. NPO for procedure.     Medication:   acetaminophen     Tablet .. 650 milliGRAM(s) Oral every 6 hours PRN  aspirin  chewable 81 milliGRAM(s) Oral daily  atorvastatin 20 milliGRAM(s) Oral at bedtime  ferrous    sulfate 325 milliGRAM(s) Oral daily  heparin   Injectable 4000 Unit(s) IV Push every 6 hours PRN  heparin   Injectable 2000 Unit(s) IV Push every 6 hours PRN  heparin  Infusion.  Unit(s)/Hr IV Continuous <Continuous>  metoprolol succinate ER 25 milliGRAM(s) Oral daily  mirtazapine 7.5 milliGRAM(s) Oral daily  NIFEdipine XL 60 milliGRAM(s) Oral daily  pantoprazole    Tablet 40 milliGRAM(s) Oral two times a day  tamsulosin 0.4 milliGRAM(s) Oral at bedtime      Physical exam    T(C): 36.4 (03-03-25 @ 05:03), Max: 36.7 (03-02-25 @ 21:46)  HR: 55 (03-03-25 @ 05:03) (55 - 62)  BP: 161/64 (03-03-25 @ 05:03) (136/61 - 161/64)  RR: 18 (03-03-25 @ 05:03) (16 - 18)  SpO2: 96% (03-03-25 @ 05:03) (93% - 98%)  Wt(kg): --    alert NAD  EOMI anicteric sclera  Cv s1 S2 RRR  Lungs clear B/L  abd soft NT ND +BS  No LE edema or tenderness    Labs                              9.4    6.88  )-----------( 327      ( 03 Mar 2025 07:05 )             31.2       03-03    142  |  108  |  28[H]  ----------------------------<  95  5.3   |  22  |  1.49[H]    Ca    9.0      03 Mar 2025 07:05            [id number]

## 2025-03-03 NOTE — PRE PROCEDURE NOTE - PRE PROCEDURE EVALUATION
Attending Physician:  Dr An                           Procedure: EGD/colonoscopy     Indication for Procedure: anemia workup   ________________________________________________________  PAST MEDICAL & SURGICAL HISTORY:  HTN (hypertension)      HLD (hyperlipidemia)      Deep vein thrombosis (DVT)        ALLERGIES:  No Known Allergies    HOME MEDICATIONS:  aspirin 81 mg oral tablet: 1 tab(s) orally once a day  metoprolol succinate 25 mg oral tablet, extended release: 1 tab(s) orally once a day  mirtazapine 7.5 mg oral tablet: 1 tab(s) orally once a day  NIFEdipine 30 mg oral tablet, extended release: 1 tab(s) orally once a day  oxyBUTYnin 10 mg/24 hr oral tablet, extended release: 1 tab(s) orally once a day  simvastatin 20 mg oral tablet: 1 tab(s) orally once a day  tamsulosin 0.4 mg oral capsule: 1 cap(s) orally once a day  valACYclovir 500 mg oral tablet: 1 tab(s) orally once a day per pharmacy last fill Nov 2024 #30    AICD/PPM: [ ] yes   [x ] no    PERTINENT LAB DATA:                        9.4    6.88  )-----------( 327      ( 03 Mar 2025 07:05 )             31.2     03-03    142  |  108  |  28[H]  ----------------------------<  95  5.3   |  22  |  1.49[H]    Ca    9.0      03 Mar 2025 07:05      PT/INR - ( 03 Mar 2025 07:05 )   PT: 14.5 sec;   INR: 1.26 ratio         PTT - ( 03 Mar 2025 07:05 )  PTT:83.7 sec            PHYSICAL EXAMINATION:    T(C): 36.3  HR: 51  BP: 173/62  RR: 18  SpO2: 97%    Constitutional: NAD    Neck:  No JVD  Respiratory:  normal effort   Cardiovascular: RRR  Extremities: No peripheral edema  Neurological: A/O x 3, no focal deficits        COMMENTS:    The patient is a suitable candidate for the planned procedure unless box checked [ ]  No, explain:

## 2025-03-03 NOTE — PROGRESS NOTE ADULT - ASSESSMENT
84M with PMH of HTN, HLD, PAD here for diarrhea and generalized weakness. Noted to have GIB, pending EGD/Colonoscopy on Monday. Cardiology consulted for preop cardiac clearance     1. GIB, s/p EGD/colonoscopy 3/3   2. moderate pHTN   3. Mild AS and AI   4. Preop cardiac clearance     -on heparin gtt for above knee DVT, Hgb stable   -euvolemic on exam, no evidence of ACS, HF or critical valvular issue, optimized from cardiac standpoint to proceed   -mod pHTN for OP work up   -cont metoprolol XL 25 mg QD   -cont procardia 60 mg QD   -add hydralazine 25 mg BID    José Manuel Knapp MD [facility]  Attending Interventional Cardiologist, [facility].   Avaliable on [employer] Team

## 2025-03-03 NOTE — PROGRESS NOTE ADULT - ASSESSMENT
85-year-old male with DVT getting IV heparin and anemia with GI blood loss, iron def, renal insufficiency and infection all contributing. Hgb today adequate at 9.4 and continue with oral iron.  85-year-old male with DVT getting IV heparin and anemia with GI blood loss, iron def, renal insufficiency and infection all contributing. Hgb today adequate at 9.4 and continue with oral iron. Foe EGD and colonoscopy today.

## 2025-03-03 NOTE — PROGRESS NOTE ADULT - SUBJECTIVE AND OBJECTIVE BOX
[facility] Attending Follow-up Note     Patient seen and examined at bedside.    Overnight Events:     hypertensive episode noted   for scope with GI today     REVIEW OF SYSTEMS:  Constitutional:     [x ] negative [ ] fevers [ ] chills [ ] weight loss [ ] weight gain  HEENT:                  [x ] negative [ ] dry eyes [ ] eye irritation [ ] postnasal drip [ ] nasal congestion  CV:                         [ x] negative  [ ] chest pain [ ] orthopnea [ ] palpitations [ ] murmur  Resp:                     [ x] negative [ ] cough [ ] shortness of breath [ ] dyspnea [ ] wheezing [ ] sputum [ ]hemoptysis  GI:                          [ x] negative [ ] nausea [ ] vomiting [ ] diarrhea [ ] constipation [ ] abd pain [ ] dysphagia   :                        [ x] negative [ ] dysuria [ ] nocturia [ ] hematuria [ ] increased urinary frequency  Musculoskeletal: [ x] negative [ ] back pain [ ] myalgias [ ] arthralgias [ ] fracture  Skin:                       [ x] negative [ ] rash [ ] itch  Neurological:        [x ] negative [ ] headache [ ] dizziness [ ] syncope [ ] weakness [ ] numbness  Psychiatric:           [ x] negative [ ] anxiety [ ] depression  Endocrine:            [ x] negative [ ] diabetes [ ] thyroid problem  Heme/Lymph:      [ x] negative [ ] anemia [ ] bleeding problem  Allergic/Immune: [ x] negative [ ] itchy eyes [ ] nasal discharge [ ] hives [ ] angioedema    [ x] All other systems negative  [ ] Unable to assess ROS due to    Current Meds:  acetaminophen     Tablet .. 650 milliGRAM(s) Oral every 6 hours PRN  aspirin  chewable 81 milliGRAM(s) Oral daily  atorvastatin 20 milliGRAM(s) Oral at bedtime  cefTRIAXone   IVPB      ferrous    sulfate 325 milliGRAM(s) Oral daily  metoprolol succinate ER 25 milliGRAM(s) Oral daily  mirtazapine 7.5 milliGRAM(s) Oral daily  NIFEdipine XL 60 milliGRAM(s) Oral daily  pantoprazole    Tablet 40 milliGRAM(s) Oral two times a day  sodium chloride 0.9%. 500 milliLiter(s) IV Continuous <Continuous>  tamsulosin 0.4 milliGRAM(s) Oral at bedtime      PAST MEDICAL & SURGICAL HISTORY:  HTN (hypertension)      HLD (hyperlipidemia)      Deep vein thrombosis (DVT)          Vitals:  T(F): 98 (03-03), Max: 98 (03-02)  HR: 57 (03-03) (51 - 93)  BP: 132/57 (03-03) (117/58 - 198/76)  RR: 19 (03-03)  SpO2: 99% (03-03)  I&O's Summary    02 Mar 2025 07:01  -  03 Mar 2025 07:00  --------------------------------------------------------  IN: 3658 mL / OUT: 1301 mL / NET: 2357 mL        Physical Exam:  Appearance: No acute distress  HENT: No JVD   Cardiovascular: RRR, S1/S2, no murmurs  Respiratory: CTABL  Gastrointestinal: soft, NT ND, +BS  Musculoskeletal: No clubbing, no edema   Neurologic: Non-focal  Skin: No rashes, ecchymoses, or cyanosis                          9.4    6.88  )-----------( 327      ( 03 Mar 2025 07:05 )             31.2     03-03    142  |  108  |  28[H]  ----------------------------<  95  5.3   |  22  |  1.49[H]    Ca    9.0      03 Mar 2025 07:05      PT/INR - ( 03 Mar 2025 07:05 )   PT: 14.5 sec;   INR: 1.26 ratio         PTT - ( 03 Mar 2025 07:05 )  PTT:83.7 sec              Cardiovascular Testings:

## 2025-03-03 NOTE — PROGRESS NOTE ADULT - SUBJECTIVE AND OBJECTIVE BOX
Follow Up:  fever, diarrhea, gemella bacteremia    Interval History/ROS: pt remains afebrile and no cough, s/p EGD/colon today          Allergies  No Known Allergies        ANTIMICROBIALS:  cefTRIAXone   IVPB        OTHER MEDS:  acetaminophen     Tablet .. 650 milliGRAM(s) Oral every 6 hours PRN  aspirin  chewable 81 milliGRAM(s) Oral daily  atorvastatin 20 milliGRAM(s) Oral at bedtime  ferrous    sulfate 325 milliGRAM(s) Oral daily  metoprolol succinate ER 25 milliGRAM(s) Oral daily  mirtazapine 7.5 milliGRAM(s) Oral daily  NIFEdipine XL 60 milliGRAM(s) Oral daily  pantoprazole    Tablet 40 milliGRAM(s) Oral two times a day  sodium chloride 0.9%. 500 milliLiter(s) IV Continuous <Continuous>  tamsulosin 0.4 milliGRAM(s) Oral at bedtime      Vital Signs Last 24 Hrs  T(C): 36.7 (03 Mar 2025 16:15), Max: 36.7 (02 Mar 2025 21:46)  T(F): 98 (03 Mar 2025 16:03), Max: 98 (02 Mar 2025 21:46)  HR: 55 (03 Mar 2025 16:15) (51 - 62)  BP: 187/84 (03 Mar 2025 16:15) (136/61 - 198/76)  BP(mean): 98 (03 Mar 2025 16:15) (98 - 98)  RR: 18 (03 Mar 2025 16:15) (18 - 18)  SpO2: 93% (03 Mar 2025 16:15) (93% - 98%)    Parameters below as of 03 Mar 2025 16:03  Patient On (Oxygen Delivery Method): room air        Physical Exam:  General:    NAD,  non toxic  Respiratory:    comfortable on RA  abd:     soft,   no tenderness  :     no  kimbrough  Musculoskeletal:   no joint swelling  vascular: no phlebitis  Skin:    no rash                        9.4    6.88  )-----------( 327      ( 03 Mar 2025 07:05 )             31.2       03-03    142  |  108  |  28[H]  ----------------------------<  95  5.3   |  22  |  1.49[H]    Ca    9.0      03 Mar 2025 07:05        Urinalysis Basic - ( 03 Mar 2025 07:05 )    Color: x / Appearance: x / SG: x / pH: x  Gluc: 95 mg/dL / Ketone: x  / Bili: x / Urobili: x   Blood: x / Protein: x / Nitrite: x   Leuk Esterase: x / RBC: x / WBC x   Sq Epi: x / Non Sq Epi: x / Bacteria: x        MICROBIOLOGY:  v  .Blood Blood-Peripheral  02-20-25   No growth at 5 days  --  --      .Blood Blood-Peripheral  02-20-25   No growth at 5 days  --  --      .Blood Blood-Peripheral  02-19-25   Growth in aerobic and anaerobic bottles: Gemella haemolysans  "Susceptibilities not performed"  --    Growth in aerobic bottle: Gram Positive Cocci in Clusters  Growth in anaerobic bottle: Gram Positive Cocci in Clusters      .Blood Blood-Peripheral  02-19-25   Growth in aerobic and anaerobic bottles: Gemella haemolysans  "Susceptibilities not performed"  --    Growth in aerobic bottle: Gram Positive Cocci in Clusters  Growth in anaerobic bottle: Gram Positive Cocci in Clusters      .Blood BLOOD  02-17-25   No growth at 5 days  --  Blood Culture PCR                RADIOLOGY:  Images independently visualized and reviewed personally, findings as below  < from: CT Abdomen and Pelvis No Cont (02.20.25 @ 13:06) >  IMPRESSION:  No acute intrathoracic or intra-abdominal pathology identified on   noncontrast CT.    Soft tissue in the retroperitoneal region may represent collapsed bowel   or lymphadenopathy.    < end of copied text >

## 2025-03-03 NOTE — PROGRESS NOTE ADULT - SUBJECTIVE AND OBJECTIVE BOX
Patient is a 85y old  Male who presents with a chief complaint of Referred by Vascular for Weakness Diarrhea (01 Mar 2025 22:25)      SUBJECTIVE / OVERNIGHT EVENTS: No events     T(C): 36.5 (03-03-25 @ 21:35), Max: 36.7 (03-03-25 @ 16:03)  HR: 57 (03-03-25 @ 21:35) (51 - 93)  BP: 127/66 (03-03-25 @ 21:35) (117/58 - 198/76)  RR: 18 (03-03-25 @ 21:35) (15 - 19)  SpO2: 92% (03-03-25 @ 21:35) (92% - 100%)      MEDICATIONS  (STANDING):  aspirin  chewable 81 milliGRAM(s) Oral daily  atorvastatin 20 milliGRAM(s) Oral at bedtime  cefTRIAXone   IVPB      ferrous    sulfate 325 milliGRAM(s) Oral daily  heparin  Infusion.  Unit(s)/Hr (9 mL/Hr) IV Continuous <Continuous>  metoprolol succinate ER 25 milliGRAM(s) Oral daily  mirtazapine 7.5 milliGRAM(s) Oral daily  NIFEdipine XL 60 milliGRAM(s) Oral daily  pantoprazole    Tablet 40 milliGRAM(s) Oral two times a day  sodium chloride 0.9%. 500 milliLiter(s) (30 mL/Hr) IV Continuous <Continuous>  tamsulosin 0.4 milliGRAM(s) Oral at bedtime    MEDICATIONS  (PRN):  acetaminophen     Tablet .. 650 milliGRAM(s) Oral every 6 hours PRN Temp greater or equal to 38C (100.4F)  heparin   Injectable 4000 Unit(s) IV Push every 6 hours PRN For aPTT less than 40  heparin   Injectable 2000 Unit(s) IV Push every 6 hours PRN For aPTT between 40 - 57        PHYSICAL EXAM:  GENERAL: NAD, well-developed  HEAD:  Atraumatic, Normocephalic  EYES: EOMI, PERRLA, conjunctiva and sclera clear  NECK: Supple, No JVD  CHEST/LUNG: Clear to auscultation bilaterally; No wheeze  HEART: Regular rate and rhythm; No murmurs, rubs, or gallops  ABDOMEN: Soft, Nontender, Nondistended; Bowel sounds present  EXTREMITIES:  2+ Peripheral Pulses, No clubbing, cyanosis, or edema  PSYCH: AAOx3  NEUROLOGY: non-focal  SKIN: No rashes or lesions                                 9.4    6.88  )-----------( 327      ( 03 Mar 2025 07:05 )             31.2             PT/INR - ( 03 Mar 2025 07:05 )   PT: 14.5 sec;   INR: 1.26 ratio         PTT - ( 03 Mar 2025 07:05 )  PTT:83.7 sec  142|108|28<95  5.3|22|1.49  9.0,--,--  03-03 @ 07:05        CAPILLARY BLOOD GLUCOSE          RADIOLOGY & ADDITIONAL TESTS:    Imaging Personally Reviewed:    Consultant(s) Notes Reviewed:      Care Discussed with Consultants/Other Providers:

## 2025-03-03 NOTE — PROGRESS NOTE ADULT - ASSESSMENT
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 Patient presented to Dr. Landers's office today for followup and evaluation of left leg swelling, but was referred to the ED given reported 3-4 days of weakness, poor PO intake, and diarrhea. He is found to have DVT and DAX.    A/P:  DAX:  Unknown baseline however fluctuating 1.3-1.7 probably with some degree of CKD  OP nephrologist Dr. Rodriguez  FENa>1  DAX workup from 2/15 suggestive of ATN  Losartan on hold  Renal US: Neg for Hydro   sCr worsening at present, pt intermittently febrile and with bradycardia   s/p IVF   FeNa 2% - indeterminate.  Bladder scan 2/19 with 215cc urine, continue to monitor bladder scan as needed   Pt was on Zosyn - CBC neg for eosinophilia.  S.Cr worsened on 2/21 -- possibly d/t worsening anemia.  Pt eating and drinking well; BP acceptable.    S/P NS 0.9% @75mL/hr x1L on 2/21.  SCR fluctuating monitor for now  Repeat Fena inconclusive 1.2%, ECHO shows dilated CVP so no role for fluids unless hypotensive  Renal function fluctuating but stable  Bladder scan negative for retention  Monitor I/O  Monitor renal function closely.    HTN:  Losartan on hold and Amlodipine discontinued  Nifedipine increased to 60 mg daily BP better  Low sodium diet.  Monitor BP    Anemia:  Iron deficient vs GIB.  On PO iron supplements.  S/p prbcs  GI following  Heme/onc and GI following. PLnned for EGD/Colonoscopy  Transfuse for Hgb <8.    Hypocalcemia:  In setting of hypoalbuminemia.  Optimize albumin.  Ca better.  Monitor Ca.    DVT:  Follow up vascular  On Apixaban.    Diarrhea  Resolved.

## 2025-03-03 NOTE — PROVIDER CONTACT NOTE (OTHER) - RECOMMENDATIONS
pt continue w/ moviprep until finished, continue to monitor & encourage pt to drink moviprep as he was a NPO after midnight order. Continue to monitor BM consistency/color & GI follow up in AM. no

## 2025-03-03 NOTE — PROVIDER CONTACT NOTE (OTHER) - BACKGROUND
Pt planned for EGD/colonoscopy today. Pt bowel prep with Moviprep 1L Q4H x 2 doses starting 1800 and lactulose 10G Q4H x 2 doses starting at 1600.

## 2025-03-03 NOTE — PROGRESS NOTE ADULT - SUBJECTIVE AND OBJECTIVE BOX
[facility]    Dr. Gabriel Betancourt     Office [phone] (9 am to 5 pm)  Service: [phone] (5pm to 9am)  Also Available on TEAMS      RENAL PROGRESS NOTE: DATE OF SERVICE 03-03-25 @ 12:32    Patient is a 85y old  Male who presents with a chief complaint of Referred by Vascular for Weakness Diarrhea (03 Mar 2025 08:05)      Patient seen and examined at bedside. No chest pain/sob    VITALS:  T(F): 97.5 (03-03-25 @ 05:03), Max: 98 (03-02-25 @ 21:46)  HR: 55 (03-03-25 @ 05:03)  BP: 161/64 (03-03-25 @ 05:03)  RR: 18 (03-03-25 @ 05:03)  SpO2: 96% (03-03-25 @ 05:03)  Wt(kg): --    03-02 @ 07:01  -  03-03 @ 07:00  --------------------------------------------------------  IN: 3658 mL / OUT: 1301 mL / NET: 2357 mL          PHYSICAL EXAM:  Constitutional: NAD  Neck: No JVD  Respiratory: CTAB, no wheezes, rales or rhonchi  Cardiovascular: S1, S2, RRR  Gastrointestinal: BS+, soft, NT/ND  Extremities: No peripheral edema    Hospital Medications:   MEDICATIONS  (STANDING):  aspirin  chewable 81 milliGRAM(s) Oral daily  atorvastatin 20 milliGRAM(s) Oral at bedtime  cefTRIAXone   IVPB 2000 milliGRAM(s) IV Intermittent once  cefTRIAXone   IVPB      ferrous    sulfate 325 milliGRAM(s) Oral daily  metoprolol succinate ER 25 milliGRAM(s) Oral daily  mirtazapine 7.5 milliGRAM(s) Oral daily  NIFEdipine XL 60 milliGRAM(s) Oral daily  pantoprazole    Tablet 40 milliGRAM(s) Oral two times a day  tamsulosin 0.4 milliGRAM(s) Oral at bedtime      LABS:  03-03    142  |  108  |  28[H]  ----------------------------<  95  5.3   |  22  |  1.49[H]    Ca    9.0      03 Mar 2025 07:05      Creatinine Trend: 1.49 <--, 1.66 <--, 1.59 <--, 1.62 <--, 1.73 <--, 1.44 <--, 1.79 <--                                9.4    6.88  )-----------( 327      ( 03 Mar 2025 07:05 )             31.2     Urine Studies:  Urinalysis - [03-03-25 @ 07:05]      Color  / Appearance  / SG  / pH       Gluc 95 / Ketone   / Bili  / Urobili        Blood  / Protein  / Leuk Est  / Nitrite       RBC  / WBC  / Hyaline  / Gran  / Sq Epi  / Non Sq Epi  / Bacteria     Urine Creatinine 82      [02-25-25 @ 12:46]  Urine Sodium 75      [02-25-25 @ 12:46]    Iron 25, TIBC 249, %sat 10      [02-17-25 @ 07:23]  Ferritin 76      [02-17-25 @ 07:23]        RADIOLOGY & ADDITIONAL STUDIES:

## 2025-03-03 NOTE — PROVIDER CONTACT NOTE (OTHER) - ACTION/TREATMENT ORDERED:
RICARDO Tiwari instructed to have pt continue w/ moviprep until finished for GI to assess in AM. No new orders at this time.

## 2025-03-04 LAB
ANION GAP SERPL CALC-SCNC: 12 MMOL/L — SIGNIFICANT CHANGE UP (ref 5–17)
APTT BLD: 81.7 SEC — HIGH (ref 24.5–35.6)
APTT BLD: 89.1 SEC — HIGH (ref 24.5–35.6)
BUN SERPL-MCNC: 26 MG/DL — HIGH (ref 7–23)
CALCIUM SERPL-MCNC: 9 MG/DL — SIGNIFICANT CHANGE UP (ref 8.4–10.5)
CHLORIDE SERPL-SCNC: 104 MMOL/L — SIGNIFICANT CHANGE UP (ref 96–108)
CO2 SERPL-SCNC: 20 MMOL/L — LOW (ref 22–31)
CREAT SERPL-MCNC: 1.44 MG/DL — HIGH (ref 0.5–1.3)
EGFR: 47 ML/MIN/1.73M2 — LOW
EGFR: 47 ML/MIN/1.73M2 — LOW
GLUCOSE SERPL-MCNC: 107 MG/DL — HIGH (ref 70–99)
HCT VFR BLD CALC: 28.9 % — LOW (ref 39–50)
HCT VFR BLD CALC: 29 % — LOW (ref 39–50)
HGB BLD-MCNC: 8.7 G/DL — LOW (ref 13–17)
HGB BLD-MCNC: 9.1 G/DL — LOW (ref 13–17)
INR BLD: 1.27 RATIO — HIGH (ref 0.85–1.16)
MCHC RBC-ENTMCNC: 27.1 PG — SIGNIFICANT CHANGE UP (ref 27–34)
MCHC RBC-ENTMCNC: 27.7 PG — SIGNIFICANT CHANGE UP (ref 27–34)
MCHC RBC-ENTMCNC: 30.1 G/DL — LOW (ref 32–36)
MCHC RBC-ENTMCNC: 31.4 G/DL — LOW (ref 32–36)
MCV RBC AUTO: 88.1 FL — SIGNIFICANT CHANGE UP (ref 80–100)
MCV RBC AUTO: 90 FL — SIGNIFICANT CHANGE UP (ref 80–100)
NRBC BLD AUTO-RTO: 0 /100 WBCS — SIGNIFICANT CHANGE UP (ref 0–0)
NRBC BLD AUTO-RTO: 0 /100 WBCS — SIGNIFICANT CHANGE UP (ref 0–0)
PLATELET # BLD AUTO: 284 K/UL — SIGNIFICANT CHANGE UP (ref 150–400)
PLATELET # BLD AUTO: 284 K/UL — SIGNIFICANT CHANGE UP (ref 150–400)
POTASSIUM SERPL-MCNC: 4.3 MMOL/L — SIGNIFICANT CHANGE UP (ref 3.5–5.3)
POTASSIUM SERPL-SCNC: 4.3 MMOL/L — SIGNIFICANT CHANGE UP (ref 3.5–5.3)
PROTHROM AB SERPL-ACNC: 14.4 SEC — HIGH (ref 9.9–13.4)
RBC # BLD: 3.21 M/UL — LOW (ref 4.2–5.8)
RBC # BLD: 3.29 M/UL — LOW (ref 4.2–5.8)
RBC # FLD: 18 % — HIGH (ref 10.3–14.5)
RBC # FLD: 18.4 % — HIGH (ref 10.3–14.5)
SODIUM SERPL-SCNC: 136 MMOL/L — SIGNIFICANT CHANGE UP (ref 135–145)
WBC # BLD: 5.59 K/UL — SIGNIFICANT CHANGE UP (ref 3.8–10.5)
WBC # BLD: 5.74 K/UL — SIGNIFICANT CHANGE UP (ref 3.8–10.5)
WBC # FLD AUTO: 5.59 K/UL — SIGNIFICANT CHANGE UP (ref 3.8–10.5)
WBC # FLD AUTO: 5.74 K/UL — SIGNIFICANT CHANGE UP (ref 3.8–10.5)

## 2025-03-04 PROCEDURE — 99232 SBSQ HOSP IP/OBS MODERATE 35: CPT

## 2025-03-04 PROCEDURE — G0545: CPT

## 2025-03-04 RX ADMIN — Medication 40 MILLIGRAM(S): at 05:48

## 2025-03-04 RX ADMIN — HEPARIN SODIUM 900 UNIT(S)/HR: 1000 INJECTION INTRAVENOUS; SUBCUTANEOUS at 09:28

## 2025-03-04 RX ADMIN — ATORVASTATIN CALCIUM 20 MILLIGRAM(S): 80 TABLET, FILM COATED ORAL at 21:29

## 2025-03-04 RX ADMIN — HEPARIN SODIUM 900 UNIT(S)/HR: 1000 INJECTION INTRAVENOUS; SUBCUTANEOUS at 01:51

## 2025-03-04 RX ADMIN — Medication 81 MILLIGRAM(S): at 10:15

## 2025-03-04 RX ADMIN — TAMSULOSIN HYDROCHLORIDE 0.4 MILLIGRAM(S): 0.4 CAPSULE ORAL at 21:29

## 2025-03-04 RX ADMIN — CEFTRIAXONE 100 MILLIGRAM(S): 500 INJECTION, POWDER, FOR SOLUTION INTRAMUSCULAR; INTRAVENOUS at 10:04

## 2025-03-04 RX ADMIN — HEPARIN SODIUM 900 UNIT(S)/HR: 1000 INJECTION INTRAVENOUS; SUBCUTANEOUS at 07:12

## 2025-03-04 RX ADMIN — Medication 60 MILLIGRAM(S): at 05:48

## 2025-03-04 RX ADMIN — HEPARIN SODIUM 900 UNIT(S)/HR: 1000 INJECTION INTRAVENOUS; SUBCUTANEOUS at 19:19

## 2025-03-04 RX ADMIN — HEPARIN SODIUM 900 UNIT(S)/HR: 1000 INJECTION INTRAVENOUS; SUBCUTANEOUS at 16:43

## 2025-03-04 RX ADMIN — Medication 325 MILLIGRAM(S): at 10:16

## 2025-03-04 RX ADMIN — Medication 40 MILLIGRAM(S): at 17:08

## 2025-03-04 RX ADMIN — MIRTAZAPINE 7.5 MILLIGRAM(S): 30 TABLET, FILM COATED ORAL at 10:15

## 2025-03-04 NOTE — PROGRESS NOTE ADULT - SUBJECTIVE AND OBJECTIVE BOX
[facility]      Nolberto Crow NP    [address] [place] [address]  [phone]      INTERVAL HPI/OVERNIGHT EVENTS:  seen and examined  s/p egd/colon  resumed a/c  moise diet          MEDICATIONS  (STANDING):  aspirin  chewable 81 milliGRAM(s) Oral daily  atorvastatin 20 milliGRAM(s) Oral at bedtime  ferrous    sulfate 325 milliGRAM(s) Oral daily  heparin  Infusion.  Unit(s)/Hr (9 mL/Hr) IV Continuous <Continuous>  metoprolol succinate ER 25 milliGRAM(s) Oral daily  mirtazapine 7.5 milliGRAM(s) Oral daily  NIFEdipine XL 60 milliGRAM(s) Oral daily  pantoprazole    Tablet 40 milliGRAM(s) Oral two times a day  tamsulosin 0.4 milliGRAM(s) Oral at bedtime    MEDICATIONS  (PRN):  acetaminophen     Tablet .. 650 milliGRAM(s) Oral every 6 hours PRN Temp greater or equal to 38C (100.4F)  heparin   Injectable 4000 Unit(s) IV Push every 6 hours PRN For aPTT less than 40  heparin   Injectable 2000 Unit(s) IV Push every 6 hours PRN For aPTT between 40 - 57      Allergies    No Known Allergies    Intolerances                  ROS:   General:  No  fevers, chills, night sweats, fatigue,   Eyes:  Good vision, no reported pain  ENT:  No sore throat, pain, runny nose, dysphagia  CV:  No pain, palpitations, hypo/hypertension  Resp:  No dyspnea, cough, tachypnea, wheezing  GI:  No pain, No nausea, No vomiting, No diarrhea, No constipation, No weight loss, No fever, No pruritis, No rectal bleeding, No tarry stools, No dysphagia,  :  No pain, bleeding, incontinence, nocturia  Muscle:  No pain, weakness  Neuro:  No weakness, tingling, memory problems  Psych:  No fatigue, insomnia, mood problems, depression  Endocrine:  No polyuria, polydipsia, cold/heat intolerance  Heme:  No petechiae, ecchymosis, easy bruisability  Skin:  No rash, tattoos, scars, edema      PHYSICAL EXAM  Vital Signs Last 24 Hrs  T(C): 36.3 (02 Mar 2025 04:28), Max: 36.9 (01 Mar 2025 21:55)  T(F): 97.4 (02 Mar 2025 04:28), Max: 98.5 (01 Mar 2025 21:55)  HR: 50 (02 Mar 2025 04:28) (50 - 66)  BP: 174/67 (02 Mar 2025 04:28) (134/51 - 174/67)  BP(mean): --  RR: 16 (02 Mar 2025 04:28) (16 - 18)  SpO2: 96% (02 Mar 2025 04:28) (94% - 96%)    Parameters below as of 02 Mar 2025 04:28  Patient On (Oxygen Delivery Method): room air          GENERAL:  Appears stated age  HEENT:  NC/AT  CHEST:  Full & symmetric excursion  HEART:  Regular rhythm  ABDOMEN:  Soft, non-tender, non-distended  EXTEREMITIES:  no cyanosis  SKIN:  No rash  NEURO:  Alert            LABS:                        9.4    6.72  )-----------( 336      ( 02 Mar 2025 06:28 )             29.6     03-02    138  |  103  |  36[H]  ----------------------------<  68[L]  4.5   |  24  |  1.66[H]    Ca    8.8      02 Mar 2025 06:27      PT/INR - ( 28 Feb 2025 19:55 )   PT: 15.6 sec;   INR: 1.37 ratio         PTT - ( 02 Mar 2025 06:29 )  PTT:78.2 sec      03-01-25 @ 07:01  -  03-02-25 @ 07:00  --------------------------------------------------------  IN: 1388 mL / OUT: 600 mL / NET: 788 mL                    RADIOLOGY & ADDITIONAL TESTS:

## 2025-03-04 NOTE — PROGRESS NOTE ADULT - SUBJECTIVE AND OBJECTIVE BOX
GIANNI MURPHY  MRN-[id number]    Patient is a 85y old  Male who presents with a chief complaint of Referred by Vascular for Weakness Diarrhea (03 Mar 2025 19:24)      Review of System  REVIEW OF SYSTEMS      General:	Denies fatigue, fevers, chills, sweats, decreased appetite.    Skin/Breast: denies pruritis, rash  	  Ophthalmologic: no change in vision or blurring  	  HEENT	Denies dry mouth, oral sores, dysphagia,  change in hearing.    Respiratory and Thorax:  cough, sob, wheeze, hemoptysis  	  Cardiovascular:	no cp , palp, orthopnea    Gastrointestinal:	no n/v/d constipation    Genitourinary:	no dysuria of frequency, no hematuria, no flank pain    Musculoskeletal:	no bone or joint pain. no muscle aches.     Neurological:	no change in sensory or motor function. no headache. no weakness.     Psychiatric:	no depression, no anxiety, insomnia.     Hematology/Lymphatics:	no bleeding or bruising        Current Meds  MEDICATIONS  (STANDING):  aspirin  chewable 81 milliGRAM(s) Oral daily  atorvastatin 20 milliGRAM(s) Oral at bedtime  cefTRIAXone   IVPB 2000 milliGRAM(s) IV Intermittent every 24 hours  cefTRIAXone   IVPB      ferrous    sulfate 325 milliGRAM(s) Oral daily  heparin  Infusion.  Unit(s)/Hr (9 mL/Hr) IV Continuous <Continuous>  metoprolol succinate ER 25 milliGRAM(s) Oral daily  mirtazapine 7.5 milliGRAM(s) Oral daily  NIFEdipine XL 60 milliGRAM(s) Oral daily  pantoprazole    Tablet 40 milliGRAM(s) Oral two times a day  sodium chloride 0.9%. 500 milliLiter(s) (30 mL/Hr) IV Continuous <Continuous>  tamsulosin 0.4 milliGRAM(s) Oral at bedtime    MEDICATIONS  (PRN):  acetaminophen     Tablet .. 650 milliGRAM(s) Oral every 6 hours PRN Temp greater or equal to 38C (100.4F)  heparin   Injectable 4000 Unit(s) IV Push every 6 hours PRN For aPTT less than 40  heparin   Injectable 2000 Unit(s) IV Push every 6 hours PRN For aPTT between 40 - 57      Vitals  Vital Signs Last 24 Hrs  T(C): 36.5 (04 Mar 2025 05:29), Max: 36.7 (03 Mar 2025 16:03)  T(F): 97.7 (04 Mar 2025 05:29), Max: 98 (03 Mar 2025 16:03)  HR: 56 (04 Mar 2025 05:29) (51 - 93)  BP: 127/64 (04 Mar 2025 05:29) (117/58 - 198/76)  BP(mean): 98 (03 Mar 2025 16:15) (98 - 98)  RR: 18 (04 Mar 2025 05:29) (15 - 19)  SpO2: 97% (04 Mar 2025 05:29) (92% - 100%)    Parameters below as of 04 Mar 2025 05:29  Patient On (Oxygen Delivery Method): room air        Physical Exam  PHYSICAL EXAM:      Constitutional: NAD    Eyes: PERRLA EOMI, anicteric sclera    Heent :No oral sores, no pharyngeal injection. moist mucosa.    Neck: supple, no jvd, no LAD    Respiratory: CTA b/l     Cardiovascular: s1s2, no m/g/r    Gastrointestinal: soft, nt, nd, + BS    Extremities: no c/c/e    Neurological:A&O x 3 moves all ext.    Skin: no rash on exposed skin    Lymph Nodes: no lymphadenopathy.              Lab  CBC Full  -  ( 04 Mar 2025 00:49 )  WBC Count : 5.74 K/uL  RBC Count : 3.29 M/uL  Hemoglobin : 9.1 g/dL  Hematocrit : 29.0 %  Platelet Count - Automated : 284 K/uL  Mean Cell Volume : 88.1 fl  Mean Cell Hemoglobin : 27.7 pg  Mean Cell Hemoglobin Concentration : 31.4 g/dL  Auto Neutrophil # : x  Auto Lymphocyte # : x  Auto Monocyte # : x  Auto Eosinophil # : x  Auto Basophil # : x  Auto Neutrophil % : x  Auto Lymphocyte % : x  Auto Monocyte % : x  Auto Eosinophil % : x  Auto Basophil % : x    03-03    142  |  108  |  28[H]  ----------------------------<  95  5.3   |  22  |  1.49[H]    Ca    9.0      03 Mar 2025 07:05      PT/INR - ( 03 Mar 2025 07:05 )   PT: 14.5 sec;   INR: 1.26 ratio         PTT - ( 04 Mar 2025 00:49 )  PTT:89.1 sec    Rad:    Assessment/Plan   GIANNI MURPHY  MRN-[id number]    Patient is a 85y old  Male who presents with a chief complaint of Referred by Vascular for Weakness Diarrhea (03 Mar 2025 19:24)      Review of System    resting comfortably    Current Meds  MEDICATIONS  (STANDING):  aspirin  chewable 81 milliGRAM(s) Oral daily  atorvastatin 20 milliGRAM(s) Oral at bedtime  cefTRIAXone   IVPB 2000 milliGRAM(s) IV Intermittent every 24 hours  cefTRIAXone   IVPB      ferrous    sulfate 325 milliGRAM(s) Oral daily  heparin  Infusion.  Unit(s)/Hr (9 mL/Hr) IV Continuous <Continuous>  metoprolol succinate ER 25 milliGRAM(s) Oral daily  mirtazapine 7.5 milliGRAM(s) Oral daily  NIFEdipine XL 60 milliGRAM(s) Oral daily  pantoprazole    Tablet 40 milliGRAM(s) Oral two times a day  sodium chloride 0.9%. 500 milliLiter(s) (30 mL/Hr) IV Continuous <Continuous>  tamsulosin 0.4 milliGRAM(s) Oral at bedtime    MEDICATIONS  (PRN):  acetaminophen     Tablet .. 650 milliGRAM(s) Oral every 6 hours PRN Temp greater or equal to 38C (100.4F)  heparin   Injectable 4000 Unit(s) IV Push every 6 hours PRN For aPTT less than 40  heparin   Injectable 2000 Unit(s) IV Push every 6 hours PRN For aPTT between 40 - 57      Vitals  Vital Signs Last 24 Hrs  T(C): 36.5 (04 Mar 2025 05:29), Max: 36.7 (03 Mar 2025 16:03)  T(F): 97.7 (04 Mar 2025 05:29), Max: 98 (03 Mar 2025 16:03)  HR: 56 (04 Mar 2025 05:29) (51 - 93)  BP: 127/64 (04 Mar 2025 05:29) (117/58 - 198/76)  BP(mean): 98 (03 Mar 2025 16:15) (98 - 98)  RR: 18 (04 Mar 2025 05:29) (15 - 19)  SpO2: 97% (04 Mar 2025 05:29) (92% - 100%)    Parameters below as of 04 Mar 2025 05:29  Patient On (Oxygen Delivery Method): room air      PHYSICAL EXAM:     NAD    Lab  CBC Full  -  ( 04 Mar 2025 00:49 )  WBC Count : 5.74 K/uL  RBC Count : 3.29 M/uL  Hemoglobin : 9.1 g/dL  Hematocrit : 29.0 %  Platelet Count - Automated : 284 K/uL  Mean Cell Volume : 88.1 fl  Mean Cell Hemoglobin : 27.7 pg  Mean Cell Hemoglobin Concentration : 31.4 g/dL  Auto Neutrophil # : x  Auto Lymphocyte # : x  Auto Monocyte # : x  Auto Eosinophil # : x  Auto Basophil # : x  Auto Neutrophil % : x  Auto Lymphocyte % : x  Auto Monocyte % : x  Auto Eosinophil % : x  Auto Basophil % : x    03-03    142  |  108  |  28[H]  ----------------------------<  95  5.3   |  22  |  1.49[H]    Ca    9.0      03 Mar 2025 07:05      PT/INR - ( 03 Mar 2025 07:05 )   PT: 14.5 sec;   INR: 1.26 ratio         PTT - ( 04 Mar 2025 00:49 )  PTT:89.1 sec    Rad:    Assessment/Plan

## 2025-03-04 NOTE — PROGRESS NOTE ADULT - ASSESSMENT
anemia  dvt    s/p egd/colon  reg diet  cbc daily  monitor GI fn  defer capsule unless hgb cont to decline with a/c    I reviewed the overnight course of events on the unit, re-confirming the patient history. I discussed the care with the patient and their family  The plan of care was discussed with the physician assistant and modifications were made to the notation where appropriate.   Differential diagnosis and plan of care discussed with patient after the evaluation  35 minutes spent on total encounter of which more than fifty percent of the encounter was spent counseling and/or coordinating care by the attending physician.  Advanced care planning was discussed with patient and family.  Advanced care planning forms were reviewed and discussed.  Risks, benefits and alternatives of gastroenterologic procedures were discussed in detail and all questions were answered.

## 2025-03-04 NOTE — PROGRESS NOTE ADULT - SUBJECTIVE AND OBJECTIVE BOX
[facility] Attending Follow-up Note     Patient seen and examined at bedside.    Overnight Events:     bp better controlled     REVIEW OF SYSTEMS:  Constitutional:     [x ] negative [ ] fevers [ ] chills [ ] weight loss [ ] weight gain  HEENT:                  [x ] negative [ ] dry eyes [ ] eye irritation [ ] postnasal drip [ ] nasal congestion  CV:                         [ x] negative  [ ] chest pain [ ] orthopnea [ ] palpitations [ ] murmur  Resp:                     [ x] negative [ ] cough [ ] shortness of breath [ ] dyspnea [ ] wheezing [ ] sputum [ ]hemoptysis  GI:                          [ x] negative [ ] nausea [ ] vomiting [ ] diarrhea [ ] constipation [ ] abd pain [ ] dysphagia   :                        [ x] negative [ ] dysuria [ ] nocturia [ ] hematuria [ ] increased urinary frequency  Musculoskeletal: [ x] negative [ ] back pain [ ] myalgias [ ] arthralgias [ ] fracture  Skin:                       [ x] negative [ ] rash [ ] itch  Neurological:        [x ] negative [ ] headache [ ] dizziness [ ] syncope [ ] weakness [ ] numbness  Psychiatric:           [ x] negative [ ] anxiety [ ] depression  Endocrine:            [ x] negative [ ] diabetes [ ] thyroid problem  Heme/Lymph:      [ x] negative [ ] anemia [ ] bleeding problem  Allergic/Immune: [ x] negative [ ] itchy eyes [ ] nasal discharge [ ] hives [ ] angioedema    [ x] All other systems negative  [ ] Unable to assess ROS due to    Current Meds:  acetaminophen     Tablet .. 650 milliGRAM(s) Oral every 6 hours PRN  aspirin  chewable 81 milliGRAM(s) Oral daily  atorvastatin 20 milliGRAM(s) Oral at bedtime  cefTRIAXone   IVPB 2000 milliGRAM(s) IV Intermittent every 24 hours  cefTRIAXone   IVPB      ferrous    sulfate 325 milliGRAM(s) Oral daily  heparin   Injectable 4000 Unit(s) IV Push every 6 hours PRN  heparin   Injectable 2000 Unit(s) IV Push every 6 hours PRN  heparin  Infusion.  Unit(s)/Hr IV Continuous <Continuous>  metoprolol succinate ER 25 milliGRAM(s) Oral daily  mirtazapine 7.5 milliGRAM(s) Oral daily  NIFEdipine XL 60 milliGRAM(s) Oral daily  pantoprazole    Tablet 40 milliGRAM(s) Oral two times a day  sodium chloride 0.9%. 500 milliLiter(s) IV Continuous <Continuous>  tamsulosin 0.4 milliGRAM(s) Oral at bedtime      PAST MEDICAL & SURGICAL HISTORY:  HTN (hypertension)      HLD (hyperlipidemia)      Deep vein thrombosis (DVT)          Vitals:  T(F): 97.8 (03-04), Max: 97.8 (03-04)  HR: 70 (03-04) (56 - 70)  BP: 141/57 (03-04) (127/64 - 160/64)  RR: 18 (03-04)  SpO2: 96% (03-04)  I&O's Summary    03 Mar 2025 07:01  -  04 Mar 2025 07:00  --------------------------------------------------------  IN: 240 mL / OUT: 450 mL / NET: -210 mL        Physical Exam:  Appearance: No acute distress  HENT: No JVD   Cardiovascular: RRR, S1/S2, no murmurs  Respiratory: CTABL  Gastrointestinal: soft, NT ND, +BS  Musculoskeletal: No clubbing, no edema   Neurologic: Non-focal  Skin: No rashes, ecchymoses, or cyanosis                          8.7    5.59  )-----------( 284      ( 04 Mar 2025 07:12 )             28.9     03-04    136  |  104  |  26[H]  ----------------------------<  107[H]  4.3   |  20[L]  |  1.44[H]    Ca    9.0      04 Mar 2025 07:09      PT/INR - ( 04 Mar 2025 07:16 )   PT: 14.4 sec;   INR: 1.27 ratio         PTT - ( 04 Mar 2025 07:16 )  PTT:81.7 sec              Cardiovascular Testings:

## 2025-03-04 NOTE — PROGRESS NOTE ADULT - ASSESSMENT
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 Patient presented to Dr. Landers's office today for followup and evaluation of left leg swelling, but was referred to the ED given reported 3-4 days of weakness, poor PO intake, and diarrhea. He is found to have DVT and DAX.    A/P:  DAX:  Unknown baseline however fluctuating 1.3-1.7 probably with some degree of CKD  OP nephrologist Dr. Rodriguez  FENa>1  DAX workup from 2/15 suggestive of ATN  Losartan on hold  Renal US: Neg for Hydro   sCr worsening at present, pt intermittently febrile and with bradycardia   s/p IVF   FeNa 2% - indeterminate.  Bladder scan 2/19 with 215cc urine, continue to monitor bladder scan as needed   Pt was on Zosyn - CBC neg for eosinophilia.  S.Cr worsened on 2/21 -- possibly d/t worsening anemia.  Pt eating and drinking well; BP acceptable.    S/P NS 0.9% @75mL/hr x1L on 2/21.  SCR fluctuating monitor for now  Repeat Fena inconclusive 1.2%, ECHO shows dilated CVP so no role for fluids unless hypotensive  Renal function fluctuating but stable  Bladder scan negative for retention  Monitor I/O  Monitor renal function closely.    HTN:  Losartan on hold and Amlodipine discontinued  Nifedipine increased to 60 mg daily BP better  Low sodium diet.  Monitor BP    Anemia:  Iron deficient vs GIB.  On PO iron supplements.  S/p prbcs  GI following  Heme/onc and GI following.  S/p EGD colonoscopy Diverticulosis found in Sigmoid colon and internal hemmorhoides  Transfuse for Hgb <8.    Hypocalcemia:  In setting of hypoalbuminemia.  Optimize albumin.  Ca better.  Monitor Ca.    DVT:  Follow up vascular  On Apixaban.    Diarrhea  Resolved.

## 2025-03-04 NOTE — PROGRESS NOTE ADULT - ASSESSMENT
84 m with HTN, HLD, PAD, DVT, sent from vascular office for diarrhea, generalized weakness and poor PO intake.   initially afebrile, no WBC, DAX normal LFT  GI PCR negative  on 2/17 pt had chills then fever and vomiting, negative u/a and blood cx, again fever 100.4 2/19    admitted with diarrhea and DAX on 2/15 with negative GI PCR, no WBC, then developed a fever with vomiting 2/17, WBC slightly higher to 11 and worsening renal function but negative u/a and blood cx, monitored off antibiotics again fever to 100.4 on 2/19 with no symptoms  blood cx on 2/17 showed gemella and blood cx 2/19, also Gemella, r/o endocarditis, TTE showed ASD and PFO but no veg  chest/abd CT but without contrast showed No acute intrathoracic or intra-abdominal pathology identified on noncontrast CT. Soft tissue in the retroperitoneal region may represent collapsed bowel or lymphadenopathy.  dental stated no clinical evidence of infection, s/p EGD/colon 3/3   * repeat blood cx 2/20 negative, so day 13 (did not receive ceftriaxone 3/1 and 3/2 though)  * s/p zosyn 2/19-2/21 and vanco 2/20-2/21, switched to ceftriaxone 2 qd 2/21  * SEBASTIAN   * monitor CBC/diff and CMP    The above assessment and plan was discussed with the primary team    Alissa Forman MD  contact on teams  After 5pm and on weekends call [phone]

## 2025-03-04 NOTE — PROGRESS NOTE ADULT - SUBJECTIVE AND OBJECTIVE BOX
Follow Up:  fever, diarrhea, gemella bacteremia    Interval History/ROS: pt remains afebrile and no cough or vomiting            Allergies  No Known Allergies        ANTIMICROBIALS:  cefTRIAXone   IVPB 2000 every 24 hours  cefTRIAXone   IVPB        OTHER MEDS:  acetaminophen     Tablet .. 650 milliGRAM(s) Oral every 6 hours PRN  aspirin  chewable 81 milliGRAM(s) Oral daily  atorvastatin 20 milliGRAM(s) Oral at bedtime  ferrous    sulfate 325 milliGRAM(s) Oral daily  heparin   Injectable 4000 Unit(s) IV Push every 6 hours PRN  heparin   Injectable 2000 Unit(s) IV Push every 6 hours PRN  heparin  Infusion.  Unit(s)/Hr IV Continuous <Continuous>  metoprolol succinate ER 25 milliGRAM(s) Oral daily  mirtazapine 7.5 milliGRAM(s) Oral daily  NIFEdipine XL 60 milliGRAM(s) Oral daily  pantoprazole    Tablet 40 milliGRAM(s) Oral two times a day  sodium chloride 0.9%. 500 milliLiter(s) IV Continuous <Continuous>  tamsulosin 0.4 milliGRAM(s) Oral at bedtime      Vital Signs Last 24 Hrs  T(C): 36.4 (04 Mar 2025 12:00), Max: 36.7 (03 Mar 2025 16:15)  T(F): 97.5 (04 Mar 2025 12:00), Max: 97.7 (03 Mar 2025 21:35)  HR: 67 (04 Mar 2025 12:00) (55 - 93)  BP: 160/64 (04 Mar 2025 12:00) (117/58 - 187/84)  BP(mean): 98 (03 Mar 2025 16:15) (98 - 98)  RR: 18 (04 Mar 2025 12:00) (15 - 19)  SpO2: 96% (04 Mar 2025 12:00) (92% - 100%)    Parameters below as of 04 Mar 2025 12:00  Patient On (Oxygen Delivery Method): room air        Physical Exam:  General:    NAD,  non toxic  Respiratory:    comfortable on RA  abd:     soft,   no tenderness  :     no  kimbrough  Musculoskeletal:   no joint swelling  vascular: no phlebitis  Skin:    no rash                          8.7    5.59  )-----------( 284      ( 04 Mar 2025 07:12 )             28.9       03-04    136  |  104  |  26[H]  ----------------------------<  107[H]  4.3   |  20[L]  |  1.44[H]    Ca    9.0      04 Mar 2025 07:09        Urinalysis Basic - ( 04 Mar 2025 07:09 )    Color: x / Appearance: x / SG: x / pH: x  Gluc: 107 mg/dL / Ketone: x  / Bili: x / Urobili: x   Blood: x / Protein: x / Nitrite: x   Leuk Esterase: x / RBC: x / WBC x   Sq Epi: x / Non Sq Epi: x / Bacteria: x        MICROBIOLOGY:  v  .Blood Blood-Peripheral  02-20-25   No growth at 5 days  --  --      .Blood Blood-Peripheral  02-20-25   No growth at 5 days  --  --      .Blood Blood-Peripheral  02-19-25   Growth in aerobic and anaerobic bottles: Gemella haemolysans  "Susceptibilities not performed"  --    Growth in aerobic bottle: Gram Positive Cocci in Clusters  Growth in anaerobic bottle: Gram Positive Cocci in Clusters      .Blood Blood-Peripheral  02-19-25   Growth in aerobic and anaerobic bottles: Gemella haemolysans  "Susceptibilities not performed"  --    Growth in aerobic bottle: Gram Positive Cocci in Clusters  Growth in anaerobic bottle: Gram Positive Cocci in Clusters      .Blood BLOOD  02-17-25   No growth at 5 days  --  Blood Culture PCR                RADIOLOGY:  Images independently visualized and reviewed personally, findings as below  < from: CT Abdomen and Pelvis No Cont (02.20.25 @ 13:06) >  IMPRESSION:  No acute intrathoracic or intra-abdominal pathology identified on   noncontrast CT.    Soft tissue in the retroperitoneal region may represent collapsed bowel   or lymphadenopathy.      < end of copied text >  < from: TTE W or WO Ultrasound Enhancing Agent (02.24.25 @ 12:45) >     CONCLUSIONS:      1. Left ventricular cavity is normal in size. Left ventricular systolic function is normal with an ejection fraction visually estimated at 55 to 60 %. There are no regional wall motion abnormalities seen.   2. Normal right ventricular cavity size and normal right ventricular systolic function.   3. Estimated pulmonary artery systolic pressure is 52 mmHg.   4. There is left to right shunting through the interatrial septum.   5. There is calcification of the aortic valve leaflets. Mild aortic stenosis.   6. Moderate aortic regurgitation.   7. Ascending aorta is dilated, measuring 4.10 cm (indexed 2.57 cm/m²).   8. Small pericardial effusion.   9. Compared to the transthoracic echocardiogram performed on 1/27/2024, the estimated RVSP has increased. There is mild aortic stenosis. The ascending aorta measures larger on this study, though that may be because we have imaged more distally.    < end of copied text >

## 2025-03-04 NOTE — PROGRESS NOTE ADULT - ASSESSMENT
85-year-old male with DVT getting IV heparin and anemia with GI blood loss, iron def, renal insufficiency and infection all contributing. Hgb today adequate at 9.4 and continue with oral iron.   s/p EGD and colonoscopy .   Role for capsule?  monitor cbc.  mgmt as per med.

## 2025-03-04 NOTE — PROGRESS NOTE ADULT - SUBJECTIVE AND OBJECTIVE BOX
Patient is a 85y old  Male who presents with a chief complaint of Referred by Vascular for Weakness Diarrhea (01 Mar 2025 22:25)      SUBJECTIVE / OVERNIGHT EVENTS: No events     T(C): 36.6 (03-04-25 @ 20:14), Max: 36.6 (03-04-25 @ 20:14)  HR: 70 (03-04-25 @ 20:14) (67 - 70)  BP: 141/57 (03-04-25 @ 20:14) (141/57 - 160/64)  RR: 18 (03-04-25 @ 20:14) (18 - 18)  SpO2: 96% (03-04-25 @ 20:14) (96% - 96%)      MEDICATIONS  (STANDING):  aspirin  chewable 81 milliGRAM(s) Oral daily  atorvastatin 20 milliGRAM(s) Oral at bedtime  cefTRIAXone   IVPB 2000 milliGRAM(s) IV Intermittent every 24 hours  cefTRIAXone   IVPB      ferrous    sulfate 325 milliGRAM(s) Oral daily  heparin  Infusion.  Unit(s)/Hr (9 mL/Hr) IV Continuous <Continuous>  metoprolol succinate ER 25 milliGRAM(s) Oral daily  mirtazapine 7.5 milliGRAM(s) Oral daily  NIFEdipine XL 60 milliGRAM(s) Oral daily  pantoprazole    Tablet 40 milliGRAM(s) Oral two times a day  sodium chloride 0.9%. 500 milliLiter(s) (30 mL/Hr) IV Continuous <Continuous>  tamsulosin 0.4 milliGRAM(s) Oral at bedtime    MEDICATIONS  (PRN):  acetaminophen     Tablet .. 650 milliGRAM(s) Oral every 6 hours PRN Temp greater or equal to 38C (100.4F)  heparin   Injectable 4000 Unit(s) IV Push every 6 hours PRN For aPTT less than 40  heparin   Injectable 2000 Unit(s) IV Push every 6 hours PRN For aPTT between 40 - 57        PHYSICAL EXAM:  GENERAL: NAD, well-developed  HEAD:  Atraumatic, Normocephalic  EYES: EOMI, PERRLA, conjunctiva and sclera clear  NECK: Supple, No JVD  CHEST/LUNG: Clear to auscultation bilaterally; No wheeze  HEART: Regular rate and rhythm; No murmurs, rubs, or gallops  ABDOMEN: Soft, Nontender, Nondistended; Bowel sounds present  EXTREMITIES:  2+ Peripheral Pulses, No clubbing, cyanosis, or edema  PSYCH: AAOx3  NEUROLOGY: non-focal  SKIN: No rashes or lesions                                                      8.7    5.59  )-----------( 284      ( 04 Mar 2025 07:12 )             28.9           CAPILLARY BLOOD GLUCOSE          RADIOLOGY & ADDITIONAL TESTS:    Imaging Personally Reviewed:    Consultant(s) Notes Reviewed:      Care Discussed with Consultants/Other Providers:

## 2025-03-04 NOTE — PROGRESS NOTE ADULT - ASSESSMENT
84M with PMH of HTN, HLD, PAD here for diarrhea and generalized weakness. Noted to have GIB, pending EGD/Colonoscopy on Monday. Cardiology consulted for preop cardiac clearance     1. GIB, s/p EGD/colonoscopy 3/3   2. moderate pHTN   3. Mild AS and AI   4. Preop cardiac clearance     -on heparin gtt for above knee DVT, Hgb stable, transition to po AC when able   -mod pHTN for OP work up   -cont metoprolol XL 25 mg QD   -cont procardia 60 mg QD   -add hydralazine 25 mg BID PRN for SBP>160    José Manuel Knapp MD [facility]  Attending Interventional Cardiologist, [facility].   Avaliable on [employer] Team

## 2025-03-04 NOTE — PROGRESS NOTE ADULT - SUBJECTIVE AND OBJECTIVE BOX
[facility]    Dr. Gabriel Betancourt     Office [phone] (9 am to 5 pm)  Service: [phone] (5pm to 9am)  Also Available on TEAMS      RENAL PROGRESS NOTE: DATE OF SERVICE 03-04-25 @ 11:20    Patient is a 85y old  Male who presents with a chief complaint of Referred by Vascular for Weakness Diarrhea (04 Mar 2025 10:36)      Patient seen and examined at bedside. No chest pain/sob    VITALS:  T(F): 97.7 (03-04-25 @ 05:29), Max: 98 (03-03-25 @ 16:03)  HR: 56 (03-04-25 @ 05:29)  BP: 127/64 (03-04-25 @ 05:29)  RR: 18 (03-04-25 @ 05:29)  SpO2: 97% (03-04-25 @ 05:29)  Wt(kg): --    03-03 @ 07:01  -  03-04 @ 07:00  --------------------------------------------------------  IN: 240 mL / OUT: 450 mL / NET: -210 mL      Height (cm): 170.2 (03-03 @ 16:15)  Weight (kg): 48.6 (03-03 @ 16:15)  BMI (kg/m2): 16.8 (03-03 @ 16:15)  BSA (m2): 1.55 (03-03 @ 16:15)    PHYSICAL EXAM:  Constitutional: NAD  Neck: No JVD  Respiratory: CTAB, no wheezes, rales or rhonchi  Cardiovascular: S1, S2, RRR  Gastrointestinal: BS+, soft, NT/ND  Extremities: No peripheral edema    Hospital Medications:   MEDICATIONS  (STANDING):  aspirin  chewable 81 milliGRAM(s) Oral daily  atorvastatin 20 milliGRAM(s) Oral at bedtime  cefTRIAXone   IVPB 2000 milliGRAM(s) IV Intermittent every 24 hours  cefTRIAXone   IVPB      ferrous    sulfate 325 milliGRAM(s) Oral daily  heparin  Infusion.  Unit(s)/Hr (9 mL/Hr) IV Continuous <Continuous>  metoprolol succinate ER 25 milliGRAM(s) Oral daily  mirtazapine 7.5 milliGRAM(s) Oral daily  NIFEdipine XL 60 milliGRAM(s) Oral daily  pantoprazole    Tablet 40 milliGRAM(s) Oral two times a day  sodium chloride 0.9%. 500 milliLiter(s) (30 mL/Hr) IV Continuous <Continuous>  tamsulosin 0.4 milliGRAM(s) Oral at bedtime      LABS:  03-04    136  |  104  |  26[H]  ----------------------------<  107[H]  4.3   |  20[L]  |  1.44[H]    Ca    9.0      04 Mar 2025 07:09      Creatinine Trend: 1.44 <--, 1.49 <--, 1.66 <--, 1.59 <--, 1.62 <--, 1.73 <--, 1.44 <--                                8.7    5.59  )-----------( 284      ( 04 Mar 2025 07:12 )             28.9     Urine Studies:  Urinalysis - [03-04-25 @ 07:09]      Color  / Appearance  / SG  / pH       Gluc 107 / Ketone   / Bili  / Urobili        Blood  / Protein  / Leuk Est  / Nitrite       RBC  / WBC  / Hyaline  / Gran  / Sq Epi  / Non Sq Epi  / Bacteria     Urine Creatinine 82      [02-25-25 @ 12:46]  Urine Sodium 75      [02-25-25 @ 12:46]    Iron 25, TIBC 249, %sat 10      [02-17-25 @ 07:23]  Ferritin 76      [02-17-25 @ 07:23]        RADIOLOGY & ADDITIONAL STUDIES:

## 2025-03-05 LAB
ANION GAP SERPL CALC-SCNC: 11 MMOL/L — SIGNIFICANT CHANGE UP (ref 5–17)
APTT BLD: 73.9 SEC — HIGH (ref 24.5–35.6)
BUN SERPL-MCNC: 27 MG/DL — HIGH (ref 7–23)
CALCIUM SERPL-MCNC: 9 MG/DL — SIGNIFICANT CHANGE UP (ref 8.4–10.5)
CHLORIDE SERPL-SCNC: 104 MMOL/L — SIGNIFICANT CHANGE UP (ref 96–108)
CO2 SERPL-SCNC: 22 MMOL/L — SIGNIFICANT CHANGE UP (ref 22–31)
CREAT SERPL-MCNC: 1.68 MG/DL — HIGH (ref 0.5–1.3)
EGFR: 39 ML/MIN/1.73M2 — LOW
EGFR: 39 ML/MIN/1.73M2 — LOW
GLUCOSE SERPL-MCNC: 73 MG/DL — SIGNIFICANT CHANGE UP (ref 70–99)
HCT VFR BLD CALC: 29.8 % — LOW (ref 39–50)
HGB BLD-MCNC: 9.2 G/DL — LOW (ref 13–17)
INR BLD: 1.15 RATIO — SIGNIFICANT CHANGE UP (ref 0.85–1.16)
MCHC RBC-ENTMCNC: 27.7 PG — SIGNIFICANT CHANGE UP (ref 27–34)
MCHC RBC-ENTMCNC: 30.9 G/DL — LOW (ref 32–36)
MCV RBC AUTO: 89.8 FL — SIGNIFICANT CHANGE UP (ref 80–100)
NRBC BLD AUTO-RTO: 0 /100 WBCS — SIGNIFICANT CHANGE UP (ref 0–0)
PLATELET # BLD AUTO: 276 K/UL — SIGNIFICANT CHANGE UP (ref 150–400)
POTASSIUM SERPL-MCNC: 4.7 MMOL/L — SIGNIFICANT CHANGE UP (ref 3.5–5.3)
POTASSIUM SERPL-SCNC: 4.7 MMOL/L — SIGNIFICANT CHANGE UP (ref 3.5–5.3)
PROTHROM AB SERPL-ACNC: 13.1 SEC — SIGNIFICANT CHANGE UP (ref 9.9–13.4)
RBC # BLD: 3.32 M/UL — LOW (ref 4.2–5.8)
RBC # FLD: 18 % — HIGH (ref 10.3–14.5)
SODIUM SERPL-SCNC: 137 MMOL/L — SIGNIFICANT CHANGE UP (ref 135–145)
WBC # BLD: 5.8 K/UL — SIGNIFICANT CHANGE UP (ref 3.8–10.5)
WBC # FLD AUTO: 5.8 K/UL — SIGNIFICANT CHANGE UP (ref 3.8–10.5)

## 2025-03-05 PROCEDURE — 99232 SBSQ HOSP IP/OBS MODERATE 35: CPT

## 2025-03-05 PROCEDURE — G0545: CPT

## 2025-03-05 RX ADMIN — CEFTRIAXONE 100 MILLIGRAM(S): 500 INJECTION, POWDER, FOR SOLUTION INTRAMUSCULAR; INTRAVENOUS at 09:59

## 2025-03-05 RX ADMIN — Medication 325 MILLIGRAM(S): at 13:06

## 2025-03-05 RX ADMIN — Medication 81 MILLIGRAM(S): at 13:06

## 2025-03-05 RX ADMIN — Medication 40 MILLIGRAM(S): at 05:17

## 2025-03-05 RX ADMIN — Medication 40 MILLIGRAM(S): at 17:20

## 2025-03-05 RX ADMIN — HEPARIN SODIUM 900 UNIT(S)/HR: 1000 INJECTION INTRAVENOUS; SUBCUTANEOUS at 19:03

## 2025-03-05 RX ADMIN — MIRTAZAPINE 7.5 MILLIGRAM(S): 30 TABLET, FILM COATED ORAL at 13:06

## 2025-03-05 RX ADMIN — HEPARIN SODIUM 900 UNIT(S)/HR: 1000 INJECTION INTRAVENOUS; SUBCUTANEOUS at 05:17

## 2025-03-05 RX ADMIN — ATORVASTATIN CALCIUM 20 MILLIGRAM(S): 80 TABLET, FILM COATED ORAL at 21:03

## 2025-03-05 RX ADMIN — TAMSULOSIN HYDROCHLORIDE 0.4 MILLIGRAM(S): 0.4 CAPSULE ORAL at 21:03

## 2025-03-05 RX ADMIN — HEPARIN SODIUM 900 UNIT(S)/HR: 1000 INJECTION INTRAVENOUS; SUBCUTANEOUS at 07:06

## 2025-03-05 RX ADMIN — HEPARIN SODIUM 900 UNIT(S)/HR: 1000 INJECTION INTRAVENOUS; SUBCUTANEOUS at 10:04

## 2025-03-05 NOTE — PROGRESS NOTE ADULT - SUBJECTIVE AND OBJECTIVE BOX
Patient is a 85y old  Male who presents with a chief complaint of Referred by Vascular for Weakness Diarrhea (01 Mar 2025 22:25)      SUBJECTIVE / OVERNIGHT EVENTS: No events     T(C): 37.1 (03-05-25 @ 20:31), Max: 37.1 (03-05-25 @ 20:31)  HR: 57 (03-05-25 @ 20:31) (57 - 61)  BP: 156/55 (03-05-25 @ 20:31) (145/67 - 156/55)  RR: 17 (03-05-25 @ 20:31) (17 - 18)  SpO2: 94% (03-05-25 @ 20:31) (94% - 98%)        MEDICATIONS  (STANDING):  aspirin  chewable 81 milliGRAM(s) Oral daily  atorvastatin 20 milliGRAM(s) Oral at bedtime  cefTRIAXone   IVPB 2000 milliGRAM(s) IV Intermittent every 24 hours  cefTRIAXone   IVPB      ferrous    sulfate 325 milliGRAM(s) Oral daily  heparin  Infusion.  Unit(s)/Hr (9 mL/Hr) IV Continuous <Continuous>  metoprolol succinate ER 25 milliGRAM(s) Oral daily  mirtazapine 7.5 milliGRAM(s) Oral daily  NIFEdipine XL 60 milliGRAM(s) Oral daily  pantoprazole    Tablet 40 milliGRAM(s) Oral two times a day  sodium chloride 0.9%. 500 milliLiter(s) (30 mL/Hr) IV Continuous <Continuous>  tamsulosin 0.4 milliGRAM(s) Oral at bedtime    MEDICATIONS  (PRN):  acetaminophen     Tablet .. 650 milliGRAM(s) Oral every 6 hours PRN Temp greater or equal to 38C (100.4F)  heparin   Injectable 4000 Unit(s) IV Push every 6 hours PRN For aPTT less than 40  heparin   Injectable 2000 Unit(s) IV Push every 6 hours PRN For aPTT between 40 - 57      PHYSICAL EXAM:  GENERAL: NAD, well-developed  HEAD:  Atraumatic, Normocephalic  EYES: EOMI, PERRLA, conjunctiva and sclera clear  NECK: Supple, No JVD  CHEST/LUNG: Clear to auscultation bilaterally; No wheeze  HEART: Regular rate and rhythm; No murmurs, rubs, or gallops  ABDOMEN: Soft, Nontender, Nondistended; Bowel sounds present  EXTREMITIES:  2+ Peripheral Pulses, No clubbing, cyanosis, or edema  PSYCH: AAOx3  NEUROLOGY: non-focal  SKIN: No rashes or lesions                                 9.2    5.80  )-----------( 276      ( 05 Mar 2025 06:59 )             29.8             PT/INR - ( 05 Mar 2025 06:57 )   PT: 13.1 sec;   INR: 1.15 ratio         PTT - ( 05 Mar 2025 06:57 )  PTT:73.9 sec  137|104|27<73  4.7|22|1.68  9.0,--,--  03-05 @ 06:55    RADIOLOGY & ADDITIONAL TESTS:    Imaging Personally Reviewed:    Consultant(s) Notes Reviewed:      Care Discussed with Consultants/Other Providers:

## 2025-03-05 NOTE — PROGRESS NOTE ADULT - SUBJECTIVE AND OBJECTIVE BOX
[facility]    Dr. Gabriel Betancourt     Office [phone] (9 am to 5 pm)  Service: [phone] (5pm to 9am)  Also Available on TEAMS      RENAL PROGRESS NOTE: DATE OF SERVICE 03-05-25 @ 08:57    Patient is a 85y old  Male who presents with a chief complaint of Referred by Vascular for Weakness Diarrhea (05 Mar 2025 04:26)      Patient seen and examined at bedside. No chest pain/sob    VITALS:  T(F): 98 (03-05-25 @ 04:32), Max: 98 (03-05-25 @ 00:55)  HR: 56 (03-05-25 @ 04:32)  BP: 140/57 (03-05-25 @ 04:32)  RR: 18 (03-05-25 @ 04:32)  SpO2: 98% (03-05-25 @ 04:32)  Wt(kg): --        PHYSICAL EXAM:  Constitutional: NAD  Neck: No JVD  Respiratory: CTAB, no wheezes, rales or rhonchi  Cardiovascular: S1, S2, RRR  Gastrointestinal: BS+, soft, NT/ND  Extremities: No peripheral edema    Hospital Medications:   MEDICATIONS  (STANDING):  aspirin  chewable 81 milliGRAM(s) Oral daily  atorvastatin 20 milliGRAM(s) Oral at bedtime  cefTRIAXone   IVPB 2000 milliGRAM(s) IV Intermittent every 24 hours  cefTRIAXone   IVPB      ferrous    sulfate 325 milliGRAM(s) Oral daily  heparin  Infusion.  Unit(s)/Hr (9 mL/Hr) IV Continuous <Continuous>  metoprolol succinate ER 25 milliGRAM(s) Oral daily  mirtazapine 7.5 milliGRAM(s) Oral daily  NIFEdipine XL 60 milliGRAM(s) Oral daily  pantoprazole    Tablet 40 milliGRAM(s) Oral two times a day  sodium chloride 0.9%. 500 milliLiter(s) (30 mL/Hr) IV Continuous <Continuous>  tamsulosin 0.4 milliGRAM(s) Oral at bedtime      LABS:  03-05    137  |  104  |  27[H]  ----------------------------<  73  4.7   |  22  |  1.68[H]    Ca    9.0      05 Mar 2025 06:55      Creatinine Trend: 1.68 <--, 1.44 <--, 1.49 <--, 1.66 <--, 1.59 <--, 1.62 <--, 1.73 <--                                9.2    5.80  )-----------( 276      ( 05 Mar 2025 06:59 )             29.8     Urine Studies:  Urinalysis - [03-05-25 @ 06:55]      Color  / Appearance  / SG  / pH       Gluc 73 / Ketone   / Bili  / Urobili        Blood  / Protein  / Leuk Est  / Nitrite       RBC  / WBC  / Hyaline  / Gran  / Sq Epi  / Non Sq Epi  / Bacteria       Iron 25, TIBC 249, %sat 10      [02-17-25 @ 07:23]  Ferritin 76      [02-17-25 @ 07:23]        RADIOLOGY & ADDITIONAL STUDIES:

## 2025-03-05 NOTE — PROGRESS NOTE ADULT - ASSESSMENT
85-year-old male with DVT getting IV heparin and anemia with GI blood loss, iron def, renal insufficiency and infection all contributing. Hgb today adequate at 9.4 and continue with oral iron.   s/p EGD and colonoscopy .   Role for capsule? Gi prefers to hold off for now.   monitor cbc.  mgmt as per med.

## 2025-03-05 NOTE — PROGRESS NOTE ADULT - ASSESSMENT
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 Patient presented to Dr. Landers's office today for followup and evaluation of left leg swelling, but was referred to the ED given reported 3-4 days of weakness, poor PO intake, and diarrhea. He is found to have DVT and DAX.    A/P:  DAX vs Likely CKD 3  Unknown baseline however fluctuating 1.3-1.7 probably with some degree of CKD  OP nephrologist Dr. Rodriguez  FENa>1  DAX workup from 2/15 suggestive of ATN  Losartan on hold  Renal US: Neg for Hydro   sCr worsening at present, pt intermittently febrile and with bradycardia   s/p IVF   FeNa 2% - indeterminate.  Bladder scan 2/19 with 215cc urine, continue to monitor bladder scan as needed   Pt was on Zosyn - CBC neg for eosinophilia.  S.Cr worsened on 2/21 -- possibly d/t worsening anemia.  Pt eating and drinking well; BP acceptable.    S/P NS 0.9% @75mL/hr x1L on 2/21.  SCR fluctuating monitor for now  Repeat Fena inconclusive 1.2%, ECHO shows dilated CVP so no role for fluids unless hypotensive  Renal function fluctuating but stable  Bladder scan negative for retention  Monitor I/O  Monitor renal function closely.    HTN:  Losartan on hold and Amlodipine discontinued  Nifedipine increased to 60 mg daily BP better  Low sodium diet.  Monitor BP    Anemia:  Iron deficient vs GIB.  On PO iron supplements.  S/p prbcs  GI following  Heme/onc and GI following.  S/p EGD colonoscopy Diverticulosis found in Sigmoid colon and internal hemmorhoides  Transfuse for Hgb <8.    Hypocalcemia:  In setting of hypoalbuminemia.  Optimize albumin.  Ca better.  Monitor Ca.    DVT:  Follow up vascular  On Apixaban.    Diarrhea  Resolved.

## 2025-03-05 NOTE — PROGRESS NOTE ADULT - SUBJECTIVE AND OBJECTIVE BOX
[facility]      Nolberto Crow NP    [address] [place] [address]  [phone]      INTERVAL HPI/OVERNIGHT EVENTS:  seen and examined  hgb stable  resumed a/c  moise diet          MEDICATIONS  (STANDING):  aspirin  chewable 81 milliGRAM(s) Oral daily  atorvastatin 20 milliGRAM(s) Oral at bedtime  ferrous    sulfate 325 milliGRAM(s) Oral daily  heparin  Infusion.  Unit(s)/Hr (9 mL/Hr) IV Continuous <Continuous>  metoprolol succinate ER 25 milliGRAM(s) Oral daily  mirtazapine 7.5 milliGRAM(s) Oral daily  NIFEdipine XL 60 milliGRAM(s) Oral daily  pantoprazole    Tablet 40 milliGRAM(s) Oral two times a day  tamsulosin 0.4 milliGRAM(s) Oral at bedtime    MEDICATIONS  (PRN):  acetaminophen     Tablet .. 650 milliGRAM(s) Oral every 6 hours PRN Temp greater or equal to 38C (100.4F)  heparin   Injectable 4000 Unit(s) IV Push every 6 hours PRN For aPTT less than 40  heparin   Injectable 2000 Unit(s) IV Push every 6 hours PRN For aPTT between 40 - 57      Allergies    No Known Allergies    Intolerances                  ROS:   General:  No  fevers, chills, night sweats, fatigue,   Eyes:  Good vision, no reported pain  ENT:  No sore throat, pain, runny nose, dysphagia  CV:  No pain, palpitations, hypo/hypertension  Resp:  No dyspnea, cough, tachypnea, wheezing  GI:  No pain, No nausea, No vomiting, No diarrhea, No constipation, No weight loss, No fever, No pruritis, No rectal bleeding, No tarry stools, No dysphagia,  :  No pain, bleeding, incontinence, nocturia  Muscle:  No pain, weakness  Neuro:  No weakness, tingling, memory problems  Psych:  No fatigue, insomnia, mood problems, depression  Endocrine:  No polyuria, polydipsia, cold/heat intolerance  Heme:  No petechiae, ecchymosis, easy bruisability  Skin:  No rash, tattoos, scars, edema      PHYSICAL EXAM  Vital Signs Last 24 Hrs  T(C): 36.3 (02 Mar 2025 04:28), Max: 36.9 (01 Mar 2025 21:55)  T(F): 97.4 (02 Mar 2025 04:28), Max: 98.5 (01 Mar 2025 21:55)  HR: 50 (02 Mar 2025 04:28) (50 - 66)  BP: 174/67 (02 Mar 2025 04:28) (134/51 - 174/67)  BP(mean): --  RR: 16 (02 Mar 2025 04:28) (16 - 18)  SpO2: 96% (02 Mar 2025 04:28) (94% - 96%)    Parameters below as of 02 Mar 2025 04:28  Patient On (Oxygen Delivery Method): room air          GENERAL:  Appears stated age  HEENT:  NC/AT  CHEST:  Full & symmetric excursion  HEART:  Regular rhythm  ABDOMEN:  Soft, non-tender, non-distended  EXTEREMITIES:  no cyanosis  SKIN:  No rash  NEURO:  Alert            LABS:                        9.4    6.72  )-----------( 336      ( 02 Mar 2025 06:28 )             29.6     03-02    138  |  103  |  36[H]  ----------------------------<  68[L]  4.5   |  24  |  1.66[H]    Ca    8.8      02 Mar 2025 06:27      PT/INR - ( 28 Feb 2025 19:55 )   PT: 15.6 sec;   INR: 1.37 ratio         PTT - ( 02 Mar 2025 06:29 )  PTT:78.2 sec      03-01-25 @ 07:01  -  03-02-25 @ 07:00  --------------------------------------------------------  IN: 1388 mL / OUT: 600 mL / NET: 788 mL                    RADIOLOGY & ADDITIONAL TESTS:

## 2025-03-05 NOTE — PROGRESS NOTE ADULT - SUBJECTIVE AND OBJECTIVE BOX
[0] : 2) Feeling down, depressed, or hopeless: Not at all (0)   Follow Up:  fever, diarrhea, gemella bacteremia    Interval History/ROS: pt remains afebrile and no cough or vomiting, plan for SEBASTIAN tommorrow        Allergies  No Known Allergies        ANTIMICROBIALS:  cefTRIAXone   IVPB 2000 every 24 hours  cefTRIAXone   IVPB        OTHER MEDS:  acetaminophen     Tablet .. 650 milliGRAM(s) Oral every 6 hours PRN  aspirin  chewable 81 milliGRAM(s) Oral daily  atorvastatin 20 milliGRAM(s) Oral at bedtime  ferrous    sulfate 325 milliGRAM(s) Oral daily  heparin   Injectable 4000 Unit(s) IV Push every 6 hours PRN  heparin   Injectable 2000 Unit(s) IV Push every 6 hours PRN  heparin  Infusion.  Unit(s)/Hr IV Continuous <Continuous>  metoprolol succinate ER 25 milliGRAM(s) Oral daily  mirtazapine 7.5 milliGRAM(s) Oral daily  NIFEdipine XL 60 milliGRAM(s) Oral daily  pantoprazole    Tablet 40 milliGRAM(s) Oral two times a day  sodium chloride 0.9%. 500 milliLiter(s) IV Continuous <Continuous>  tamsulosin 0.4 milliGRAM(s) Oral at bedtime      Vital Signs Last 24 Hrs  T(C): 36.5 (05 Mar 2025 11:54), Max: 36.7 (05 Mar 2025 00:55)  T(F): 97.7 (05 Mar 2025 11:54), Max: 98 (05 Mar 2025 00:55)  HR: 61 (05 Mar 2025 11:54) (56 - 70)  BP: 145/67 (05 Mar 2025 11:54) (119/55 - 145/67)  BP(mean): --  RR: 18 (05 Mar 2025 11:54) (18 - 18)  SpO2: 98% (05 Mar 2025 11:54) (95% - 98%)    Parameters below as of 05 Mar 2025 11:54  Patient On (Oxygen Delivery Method): room air        Physical Exam:  General:    NAD,  non toxic  Respiratory:    comfortable on RA  abd:     soft,   no tenderness  :     no  kimbrough  Musculoskeletal:   no joint swelling  vascular: no phlebitis  Skin:    no rash                          9.2    5.80  )-----------( 276      ( 05 Mar 2025 06:59 )             29.8       03-05    137  |  104  |  27[H]  ----------------------------<  73  4.7   |  22  |  1.68[H]    Ca    9.0      05 Mar 2025 06:55        Urinalysis Basic - ( 05 Mar 2025 06:55 )    Color: x / Appearance: x / SG: x / pH: x  Gluc: 73 mg/dL / Ketone: x  / Bili: x / Urobili: x   Blood: x / Protein: x / Nitrite: x   Leuk Esterase: x / RBC: x / WBC x   Sq Epi: x / Non Sq Epi: x / Bacteria: x        MICROBIOLOGY:  v  .Blood Blood-Peripheral  02-20-25   No growth at 5 days  --  --      .Blood Blood-Peripheral  02-20-25   No growth at 5 days  --  --      .Blood Blood-Peripheral  02-19-25   Growth in aerobic and anaerobic bottles: Gemella haemolysans  "Susceptibilities not performed"  --    Growth in aerobic bottle: Gram Positive Cocci in Clusters  Growth in anaerobic bottle: Gram Positive Cocci in Clusters      .Blood Blood-Peripheral  02-19-25   Growth in aerobic and anaerobic bottles: Gemella haemolysans  "Susceptibilities not performed"  --    Growth in aerobic bottle: Gram Positive Cocci in Clusters  Growth in anaerobic bottle: Gram Positive Cocci in Clusters      .Blood BLOOD  02-17-25   No growth at 5 days  --  Blood Culture PCR                RADIOLOGY:  Images independently visualized and reviewed personally, findings as below  < from: CT Abdomen and Pelvis No Cont (02.20.25 @ 13:06) >  IMPRESSION:  No acute intrathoracic or intra-abdominal pathology identified on   noncontrast CT.    Soft tissue in the retroperitoneal region may represent collapsed bowel   or lymphadenopathy.    < end of copied text >  < from: TTE W or WO Ultrasound Enhancing Agent (02.24.25 @ 12:45) >  CONCLUSIONS:      1. Left ventricular cavity is normal in size. Left ventricular systolic function is normal with an ejection fraction visually estimated at 55 to 60 %. There are no regional wall motion abnormalities seen.   2. Normal right ventricular cavity size and normal right ventricular systolic function.   3. Estimated pulmonary artery systolic pressure is 52 mmHg.   4. There is left to right shunting through the interatrial septum.   5. There is calcification of the aortic valve leaflets. Mild aortic stenosis.   6. Moderate aortic regurgitation.   7. Ascending aorta is dilated, measuring 4.10 cm (indexed 2.57 cm/m²).   8. Small pericardial effusion.   9. Compared to the transthoracic echocardiogram performed on 1/27/2024, the estimated RVSP has increased. There is mild aortic stenosis. The ascending aorta measures larger on this study, though that may be because we have imaged more distally.    < end of copied text >   [PHQ-9 Negative - No further assessment needed] : PHQ-9 Negative - No further assessment needed

## 2025-03-05 NOTE — PROGRESS NOTE ADULT - ASSESSMENT
85 y/o M with pmhx htn, hld, dvt, pad presents to the ER from vascular surgery office with diarrhea and generalized weakness over the last few days.     Acute Blood loss Anemia   Transfuse   s/p egd/colon    SEBASTIAN pending   Cards clearance appreciated   Heparin drip   Eliquis on hold     Fevers / Bacteremia   Blood cx blood cx on 2/17 showed GPC on 2/19  * s/p zosyn 2/19-2/21 and a vanco 2/20-2/21, switch to ceftriaxone 2 qd  *ID following   Continue with Ceftriaxone for now     TTE no evegetations     SEBASTIAN   Dental Eval done   Acute DVT left LE  Hep drip    Vascular Surgery Consulted     DAX pre renal /ATN   Renal following     HTN   Hold Olmesartan   Continue with Norvasc and Toprol     HLD   Statins

## 2025-03-05 NOTE — PROGRESS NOTE ADULT - ASSESSMENT
anemia  dvt    s/p egd/colon  reg diet  cbc daily  monitor GI fn  defer capsule unless hgb cont to decline with a/c  no gi objection for alli    I reviewed the overnight course of events on the unit, re-confirming the patient history. I discussed the care with the patient and their family  The plan of care was discussed with the physician assistant and modifications were made to the notation where appropriate.   Differential diagnosis and plan of care discussed with patient after the evaluation  35 minutes spent on total encounter of which more than fifty percent of the encounter was spent counseling and/or coordinating care by the attending physician.  Advanced care planning was discussed with patient and family.  Advanced care planning forms were reviewed and discussed.  Risks, benefits and alternatives of gastroenterologic procedures were discussed in detail and all questions were answered.

## 2025-03-05 NOTE — PROGRESS NOTE ADULT - ASSESSMENT
84 m with HTN, HLD, PAD, DVT, sent from vascular office for diarrhea, generalized weakness and poor PO intake.   initially afebrile, no WBC, DAX normal LFT  GI PCR negative  on 2/17 pt had chills then fever and vomiting, negative u/a and blood cx, again fever 100.4 2/19    admitted with diarrhea and DAX on 2/15 with negative GI PCR, no WBC, then developed a fever with vomiting 2/17, WBC slightly higher to 11 and worsening renal function but negative u/a and blood cx, monitored off antibiotics again fever to 100.4 on 2/19 with no symptoms  blood cx on 2/17 showed gemella and blood cx 2/19, also Gemella, r/o endocarditis, TTE showed ASD and PFO but no veg  chest/abd CT but without contrast showed No acute intrathoracic or intra-abdominal pathology identified on noncontrast CT. Soft tissue in the retroperitoneal region may represent collapsed bowel or lymphadenopathy.  dental stated no clinical evidence of infection, s/p EGD/colon 3/3   * repeat blood cx 2/20 negative, so day 14 (did not receive ceftriaxone 3/1 and 3/2 though)  * s/p zosyn 2/19-2/21 and vanco 2/20-2/21, switched to ceftriaxone 2 qd 2/21  * SEBASTIAN, plan for tomorrow, if SEBASTIAN negative will stop antibiotics      The above assessment and plan was discussed with the primary team    Alissa Forman MD  contact on teams  After 5pm and on weekends call [phone]

## 2025-03-05 NOTE — PROGRESS NOTE ADULT - SUBJECTIVE AND OBJECTIVE BOX
[facility] Attending Follow-up Note     Patient seen and examined at bedside.    Overnight Events:     for SEBASTIAN tmr   bp better controlled     REVIEW OF SYSTEMS:  Constitutional:     [x ] negative [ ] fevers [ ] chills [ ] weight loss [ ] weight gain  HEENT:                  [x ] negative [ ] dry eyes [ ] eye irritation [ ] postnasal drip [ ] nasal congestion  CV:                         [ x] negative  [ ] chest pain [ ] orthopnea [ ] palpitations [ ] murmur  Resp:                     [ x] negative [ ] cough [ ] shortness of breath [ ] dyspnea [ ] wheezing [ ] sputum [ ]hemoptysis  GI:                          [ x] negative [ ] nausea [ ] vomiting [ ] diarrhea [ ] constipation [ ] abd pain [ ] dysphagia   :                        [ x] negative [ ] dysuria [ ] nocturia [ ] hematuria [ ] increased urinary frequency  Musculoskeletal: [ x] negative [ ] back pain [ ] myalgias [ ] arthralgias [ ] fracture  Skin:                       [ x] negative [ ] rash [ ] itch  Neurological:        [x ] negative [ ] headache [ ] dizziness [ ] syncope [ ] weakness [ ] numbness  Psychiatric:           [ x] negative [ ] anxiety [ ] depression  Endocrine:            [ x] negative [ ] diabetes [ ] thyroid problem  Heme/Lymph:      [ x] negative [ ] anemia [ ] bleeding problem  Allergic/Immune: [ x] negative [ ] itchy eyes [ ] nasal discharge [ ] hives [ ] angioedema    [ x] All other systems negative  [ ] Unable to assess ROS due to    Current Meds:  acetaminophen     Tablet .. 650 milliGRAM(s) Oral every 6 hours PRN  aspirin  chewable 81 milliGRAM(s) Oral daily  atorvastatin 20 milliGRAM(s) Oral at bedtime  cefTRIAXone   IVPB 2000 milliGRAM(s) IV Intermittent every 24 hours  cefTRIAXone   IVPB      ferrous    sulfate 325 milliGRAM(s) Oral daily  heparin   Injectable 4000 Unit(s) IV Push every 6 hours PRN  heparin   Injectable 2000 Unit(s) IV Push every 6 hours PRN  heparin  Infusion.  Unit(s)/Hr IV Continuous <Continuous>  metoprolol succinate ER 25 milliGRAM(s) Oral daily  mirtazapine 7.5 milliGRAM(s) Oral daily  NIFEdipine XL 60 milliGRAM(s) Oral daily  pantoprazole    Tablet 40 milliGRAM(s) Oral two times a day  sodium chloride 0.9%. 500 milliLiter(s) IV Continuous <Continuous>  tamsulosin 0.4 milliGRAM(s) Oral at bedtime      PAST MEDICAL & SURGICAL HISTORY:  HTN (hypertension)      HLD (hyperlipidemia)      Deep vein thrombosis (DVT)          Vitals:  T(F): 97.7 (03-06), Max: 98.7 (03-05)  HR: 42 (03-06) (42 - 62)  BP: 137/65 (03-06) (125/60 - 160/85)  RR: 15 (03-06)  SpO2: 100% (03-06)  I&O's Summary    05 Mar 2025 07:01  -  06 Mar 2025 07:00  --------------------------------------------------------  IN: 720 mL / OUT: 1200 mL / NET: -480 mL        Physical Exam:  Appearance: No acute distress  HENT: No JVD   Cardiovascular: RRR, S1/S2, no murmurs  Respiratory: CTABL  Gastrointestinal: soft, NT ND, +BS  Musculoskeletal: No clubbing, no edema   Neurologic: Non-focal  Skin: No rashes, ecchymoses, or cyanosis                          9.3    5.85  )-----------( 257      ( 06 Mar 2025 07:14 )             31.0     03-06    137  |  104  |  32[H]  ----------------------------<  72  4.6   |  20[L]  |  1.72[H]    Ca    9.2      06 Mar 2025 07:14      PT/INR - ( 06 Mar 2025 07:17 )   PT: 13.3 sec;   INR: 1.17 ratio         PTT - ( 06 Mar 2025 07:17 )  PTT:87.9 sec              Cardiovascular Testings:

## 2025-03-05 NOTE — PROGRESS NOTE ADULT - SUBJECTIVE AND OBJECTIVE BOX
GIANNI MURPHY  MRN-[id number]    Patient is a 85y old  Male who presents with a chief complaint of Referred by Vascular for Weakness Diarrhea (04 Mar 2025 20:58)      Review of System    resting comfortably    Current Meds  MEDICATIONS  (STANDING):  aspirin  chewable 81 milliGRAM(s) Oral daily  atorvastatin 20 milliGRAM(s) Oral at bedtime  cefTRIAXone   IVPB 2000 milliGRAM(s) IV Intermittent every 24 hours  cefTRIAXone   IVPB      ferrous    sulfate 325 milliGRAM(s) Oral daily  heparin  Infusion.  Unit(s)/Hr (9 mL/Hr) IV Continuous <Continuous>  metoprolol succinate ER 25 milliGRAM(s) Oral daily  mirtazapine 7.5 milliGRAM(s) Oral daily  NIFEdipine XL 60 milliGRAM(s) Oral daily  pantoprazole    Tablet 40 milliGRAM(s) Oral two times a day  sodium chloride 0.9%. 500 milliLiter(s) (30 mL/Hr) IV Continuous <Continuous>  tamsulosin 0.4 milliGRAM(s) Oral at bedtime    MEDICATIONS  (PRN):  acetaminophen     Tablet .. 650 milliGRAM(s) Oral every 6 hours PRN Temp greater or equal to 38C (100.4F)  heparin   Injectable 4000 Unit(s) IV Push every 6 hours PRN For aPTT less than 40  heparin   Injectable 2000 Unit(s) IV Push every 6 hours PRN For aPTT between 40 - 57    Vital Signs Last 24 Hrs  T(C): 36.7 (05 Mar 2025 00:55), Max: 36.7 (05 Mar 2025 00:55)  T(F): 98 (05 Mar 2025 00:55), Max: 98 (05 Mar 2025 00:55)  HR: 60 (05 Mar 2025 00:55) (56 - 70)  BP: 119/55 (05 Mar 2025 00:55) (119/55 - 160/64)  BP(mean): --  RR: 18 (05 Mar 2025 00:55) (18 - 18)  SpO2: 95% (05 Mar 2025 00:55) (95% - 97%)    Parameters below as of 05 Mar 2025 00:55  Patient On (Oxygen Delivery Method): room air      PHYSICAL EXAM:    NAD    Lab  CBC Full  -  ( 04 Mar 2025 07:12 )  WBC Count : 5.59 K/uL  RBC Count : 3.21 M/uL  Hemoglobin : 8.7 g/dL  Hematocrit : 28.9 %  Platelet Count - Automated : 284 K/uL  Mean Cell Volume : 90.0 fl  Mean Cell Hemoglobin : 27.1 pg  Mean Cell Hemoglobin Concentration : 30.1 g/dL  Auto Neutrophil # : x  Auto Lymphocyte # : x  Auto Monocyte # : x  Auto Eosinophil # : x  Auto Basophil # : x  Auto Neutrophil % : x  Auto Lymphocyte % : x  Auto Monocyte % : x  Auto Eosinophil % : x  Auto Basophil % : x    03-04    136  |  104  |  26[H]  ----------------------------<  107[H]  4.3   |  20[L]  |  1.44[H]    Ca    9.0      04 Mar 2025 07:09      PT/INR - ( 04 Mar 2025 07:16 )   PT: 14.4 sec;   INR: 1.27 ratio         PTT - ( 04 Mar 2025 07:16 )  PTT:81.7 sec    Rad:    Assessment/Plan

## 2025-03-06 ENCOUNTER — RESULT REVIEW (OUTPATIENT)
Age: 85
End: 2025-03-06

## 2025-03-06 LAB
ANION GAP SERPL CALC-SCNC: 13 MMOL/L — SIGNIFICANT CHANGE UP (ref 5–17)
APTT BLD: 87.9 SEC — HIGH (ref 24.5–35.6)
BUN SERPL-MCNC: 32 MG/DL — HIGH (ref 7–23)
CALCIUM SERPL-MCNC: 9.2 MG/DL — SIGNIFICANT CHANGE UP (ref 8.4–10.5)
CHLORIDE SERPL-SCNC: 104 MMOL/L — SIGNIFICANT CHANGE UP (ref 96–108)
CO2 SERPL-SCNC: 20 MMOL/L — LOW (ref 22–31)
CREAT SERPL-MCNC: 1.72 MG/DL — HIGH (ref 0.5–1.3)
EGFR: 38 ML/MIN/1.73M2 — LOW
EGFR: 38 ML/MIN/1.73M2 — LOW
GLUCOSE SERPL-MCNC: 72 MG/DL — SIGNIFICANT CHANGE UP (ref 70–99)
HCT VFR BLD CALC: 31 % — LOW (ref 39–50)
HGB BLD-MCNC: 9.3 G/DL — LOW (ref 13–17)
INR BLD: 1.17 RATIO — HIGH (ref 0.85–1.16)
MCHC RBC-ENTMCNC: 26.9 PG — LOW (ref 27–34)
MCHC RBC-ENTMCNC: 30 G/DL — LOW (ref 32–36)
MCV RBC AUTO: 89.6 FL — SIGNIFICANT CHANGE UP (ref 80–100)
NRBC BLD AUTO-RTO: 0 /100 WBCS — SIGNIFICANT CHANGE UP (ref 0–0)
PLATELET # BLD AUTO: 257 K/UL — SIGNIFICANT CHANGE UP (ref 150–400)
POTASSIUM SERPL-MCNC: 4.6 MMOL/L — SIGNIFICANT CHANGE UP (ref 3.5–5.3)
POTASSIUM SERPL-SCNC: 4.6 MMOL/L — SIGNIFICANT CHANGE UP (ref 3.5–5.3)
PROTHROM AB SERPL-ACNC: 13.3 SEC — SIGNIFICANT CHANGE UP (ref 9.9–13.4)
RBC # BLD: 3.46 M/UL — LOW (ref 4.2–5.8)
RBC # FLD: 18 % — HIGH (ref 10.3–14.5)
SODIUM SERPL-SCNC: 137 MMOL/L — SIGNIFICANT CHANGE UP (ref 135–145)
WBC # BLD: 5.85 K/UL — SIGNIFICANT CHANGE UP (ref 3.8–10.5)
WBC # FLD AUTO: 5.85 K/UL — SIGNIFICANT CHANGE UP (ref 3.8–10.5)

## 2025-03-06 PROCEDURE — 93320 DOPPLER ECHO COMPLETE: CPT | Mod: 26

## 2025-03-06 PROCEDURE — 93325 DOPPLER ECHO COLOR FLOW MAPG: CPT | Mod: 26

## 2025-03-06 PROCEDURE — G0545: CPT

## 2025-03-06 PROCEDURE — 99232 SBSQ HOSP IP/OBS MODERATE 35: CPT

## 2025-03-06 PROCEDURE — 93312 ECHO TRANSESOPHAGEAL: CPT | Mod: 26

## 2025-03-06 RX ADMIN — Medication 325 MILLIGRAM(S): at 12:35

## 2025-03-06 RX ADMIN — CEFTRIAXONE 100 MILLIGRAM(S): 500 INJECTION, POWDER, FOR SOLUTION INTRAMUSCULAR; INTRAVENOUS at 12:35

## 2025-03-06 RX ADMIN — Medication 81 MILLIGRAM(S): at 12:35

## 2025-03-06 RX ADMIN — TAMSULOSIN HYDROCHLORIDE 0.4 MILLIGRAM(S): 0.4 CAPSULE ORAL at 21:01

## 2025-03-06 RX ADMIN — METOPROLOL SUCCINATE 25 MILLIGRAM(S): 50 TABLET, EXTENDED RELEASE ORAL at 05:08

## 2025-03-06 RX ADMIN — MIRTAZAPINE 7.5 MILLIGRAM(S): 30 TABLET, FILM COATED ORAL at 12:35

## 2025-03-06 RX ADMIN — Medication 40 MILLIGRAM(S): at 05:08

## 2025-03-06 RX ADMIN — ATORVASTATIN CALCIUM 20 MILLIGRAM(S): 80 TABLET, FILM COATED ORAL at 21:01

## 2025-03-06 RX ADMIN — Medication 40 MILLIGRAM(S): at 18:24

## 2025-03-06 RX ADMIN — HEPARIN SODIUM 900 UNIT(S)/HR: 1000 INJECTION INTRAVENOUS; SUBCUTANEOUS at 18:58

## 2025-03-06 RX ADMIN — HEPARIN SODIUM 900 UNIT(S)/HR: 1000 INJECTION INTRAVENOUS; SUBCUTANEOUS at 07:07

## 2025-03-06 RX ADMIN — HEPARIN SODIUM 900 UNIT(S)/HR: 1000 INJECTION INTRAVENOUS; SUBCUTANEOUS at 07:28

## 2025-03-06 RX ADMIN — Medication 60 MILLIGRAM(S): at 05:09

## 2025-03-06 NOTE — PROGRESS NOTE ADULT - SUBJECTIVE AND OBJECTIVE BOX
[facility]      Nolberto Crow NP    [address] [place] [address]  [phone]      INTERVAL HPI/OVERNIGHT EVENTS:  seen and examined  hgb stable  tolerating a/c  moise diet          MEDICATIONS  (STANDING):  aspirin  chewable 81 milliGRAM(s) Oral daily  atorvastatin 20 milliGRAM(s) Oral at bedtime  ferrous    sulfate 325 milliGRAM(s) Oral daily  heparin  Infusion.  Unit(s)/Hr (9 mL/Hr) IV Continuous <Continuous>  metoprolol succinate ER 25 milliGRAM(s) Oral daily  mirtazapine 7.5 milliGRAM(s) Oral daily  NIFEdipine XL 60 milliGRAM(s) Oral daily  pantoprazole    Tablet 40 milliGRAM(s) Oral two times a day  tamsulosin 0.4 milliGRAM(s) Oral at bedtime    MEDICATIONS  (PRN):  acetaminophen     Tablet .. 650 milliGRAM(s) Oral every 6 hours PRN Temp greater or equal to 38C (100.4F)  heparin   Injectable 4000 Unit(s) IV Push every 6 hours PRN For aPTT less than 40  heparin   Injectable 2000 Unit(s) IV Push every 6 hours PRN For aPTT between 40 - 57      Allergies    No Known Allergies    Intolerances                  ROS:   General:  No  fevers, chills, night sweats, fatigue,   Eyes:  Good vision, no reported pain  ENT:  No sore throat, pain, runny nose, dysphagia  CV:  No pain, palpitations, hypo/hypertension  Resp:  No dyspnea, cough, tachypnea, wheezing  GI:  No pain, No nausea, No vomiting, No diarrhea, No constipation, No weight loss, No fever, No pruritis, No rectal bleeding, No tarry stools, No dysphagia,  :  No pain, bleeding, incontinence, nocturia  Muscle:  No pain, weakness  Neuro:  No weakness, tingling, memory problems  Psych:  No fatigue, insomnia, mood problems, depression  Endocrine:  No polyuria, polydipsia, cold/heat intolerance  Heme:  No petechiae, ecchymosis, easy bruisability  Skin:  No rash, tattoos, scars, edema      PHYSICAL EXAM  Vital Signs Last 24 Hrs  T(C): 36.3 (02 Mar 2025 04:28), Max: 36.9 (01 Mar 2025 21:55)  T(F): 97.4 (02 Mar 2025 04:28), Max: 98.5 (01 Mar 2025 21:55)  HR: 50 (02 Mar 2025 04:28) (50 - 66)  BP: 174/67 (02 Mar 2025 04:28) (134/51 - 174/67)  BP(mean): --  RR: 16 (02 Mar 2025 04:28) (16 - 18)  SpO2: 96% (02 Mar 2025 04:28) (94% - 96%)    Parameters below as of 02 Mar 2025 04:28  Patient On (Oxygen Delivery Method): room air          GENERAL:  Appears stated age  HEENT:  NC/AT  CHEST:  Full & symmetric excursion  HEART:  Regular rhythm  ABDOMEN:  Soft, non-tender, non-distended  EXTEREMITIES:  no cyanosis  SKIN:  No rash  NEURO:  Alert            LABS:                        9.4    6.72  )-----------( 336      ( 02 Mar 2025 06:28 )             29.6     03-02    138  |  103  |  36[H]  ----------------------------<  68[L]  4.5   |  24  |  1.66[H]    Ca    8.8      02 Mar 2025 06:27      PT/INR - ( 28 Feb 2025 19:55 )   PT: 15.6 sec;   INR: 1.37 ratio         PTT - ( 02 Mar 2025 06:29 )  PTT:78.2 sec      03-01-25 @ 07:01  -  03-02-25 @ 07:00  --------------------------------------------------------  IN: 1388 mL / OUT: 600 mL / NET: 788 mL                    RADIOLOGY & ADDITIONAL TESTS:

## 2025-03-06 NOTE — PROGRESS NOTE ADULT - ASSESSMENT
84M with PMH of HTN, HLD, PAD here for diarrhea and generalized weakness. Noted to have GIB, pending EGD/Colonoscopy on Monday. Cardiology consulted for preop cardiac clearance     1. GIB, s/p EGD/colonoscopy 3/3   2. moderate pHTN   3. Mild AS and AI   4. Preop cardiac clearance     -for SEBASTIAN 3/6 per ID request   -on heparin gtt for above knee DVT, Hgb stable, transition to po AC when able   -mod pHTN for OP work up   -cont metoprolol XL 25 mg QD   -cont procardia 60 mg QD   -add hydralazine 25 mg BID PRN for SBP>160    José Manuel Knapp MD [facility]  Attending Interventional Cardiologist, [facility].   Avaliable on [employer] Team   fair balance

## 2025-03-06 NOTE — PROGRESS NOTE ADULT - ASSESSMENT
84 m with HTN, HLD, PAD, DVT, sent from vascular office for diarrhea, generalized weakness and poor PO intake.   initially afebrile, no WBC, DAX normal LFT  GI PCR negative  on 2/17 pt had chills then fever and vomiting, negative u/a and blood cx, again fever 100.4 2/19    admitted with diarrhea and DAX on 2/15 with negative GI PCR, no WBC, then developed a fever with vomiting 2/17, WBC slightly higher to 11 and worsening renal function but negative u/a and blood cx, monitored off antibiotics again fever to 100.4 on 2/19 with no symptoms  blood cx on 2/17 showed gemella and blood cx 2/19, also Gemella, r/o endocarditis, TTE showed ASD and PFO but no veg, SEBASTIAN 3/6 no vegetation  chest/abd CT but without contrast showed No acute intrathoracic or intra-abdominal pathology identified on noncontrast CT. Soft tissue in the retroperitoneal region may represent collapsed bowel or lymphadenopathy.  dental stated no clinical evidence of infection, s/p EGD/colon 3/3     * s/p zosyn 2/19-2/21 and vanco 2/20-2/21, switched to ceftriaxone 2 qd 2/21  * repeat blood cx 2/20 negative, so day 15 (did not receive ceftriaxone 3/1 and 3/2 though)  * now that SEBASTIAN negative and pt remains afebrile s/p 15 days of antibiotics, will discontinue ceftriaxone  * please call with questions      The above assessment and plan was discussed with the primary team    Alissa Forman MD  contact on teams  After 5pm and on weekends call [phone]

## 2025-03-06 NOTE — PROGRESS NOTE ADULT - ASSESSMENT
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 Patient presented to Dr. Landers's office today for followup and evaluation of left leg swelling, but was referred to the ED given reported 3-4 days of weakness, poor PO intake, and diarrhea. He is found to have DVT and DAX.    A/P:  DAX vs Likely CKD 3  Unknown baseline however fluctuating 1.3-1.7 probably with some degree of CKD  OP nephrologist Dr. Rodriguez  FENa>1  DAX workup from 2/15 suggestive of ATN  Losartan on hold  Renal US: Neg for Hydro   sCr worsening at present, pt intermittently febrile and with bradycardia   s/p IVF   FeNa 2% - indeterminate.  Bladder scan 2/19 with 215cc urine, continue to monitor bladder scan as needed   Pt was on Zosyn - CBC neg for eosinophilia.  S.Cr worsened on 2/21 -- possibly d/t worsening anemia.  Pt eating and drinking well; BP acceptable.    S/P NS 0.9% @75mL/hr x1L on 2/21.  SCR fluctuating monitor for now  Repeat Fena inconclusive 1.2%, ECHO shows dilated CVP so no role for fluids unless hypotensive  Renal function fluctuating but stable  Bladder scan negative for retention  Monitor I/O  Monitor renal function closely.    HTN:  Losartan on hold and Amlodipine discontinued  Nifedipine increased to 60 mg daily BP better  Low sodium diet.  Monitor BP    Anemia:  Iron deficient vs GIB.  On PO iron supplements.  S/p prbcs  GI following  Heme/onc and GI following.  S/p EGD colonoscopy Diverticulosis found in Sigmoid colon and internal Hemmorrhoids  Transfuse for Hgb <8.    Hypocalcemia:  In setting of hypoalbuminemia.  Optimize albumin.  Ca better.  Monitor Ca.    DVT:  Follow up vascular  On Apixaban.    Diarrhea  Resolved.    Bacteremia  S/p Abx  Undergoing SEBASTIAN

## 2025-03-06 NOTE — PROGRESS NOTE ADULT - SUBJECTIVE AND OBJECTIVE BOX
Patient is a 85y old  Male who presents with a chief complaint of Referred by Vascular for Weakness Diarrhea (05 Mar 2025 19:49)      Medication:   acetaminophen     Tablet .. 650 milliGRAM(s) Oral every 6 hours PRN  aspirin  chewable 81 milliGRAM(s) Oral daily  atorvastatin 20 milliGRAM(s) Oral at bedtime  cefTRIAXone   IVPB 2000 milliGRAM(s) IV Intermittent every 24 hours  cefTRIAXone   IVPB      ferrous    sulfate 325 milliGRAM(s) Oral daily  heparin   Injectable 4000 Unit(s) IV Push every 6 hours PRN  heparin   Injectable 2000 Unit(s) IV Push every 6 hours PRN  heparin  Infusion.  Unit(s)/Hr IV Continuous <Continuous>  metoprolol succinate ER 25 milliGRAM(s) Oral daily  mirtazapine 7.5 milliGRAM(s) Oral daily  NIFEdipine XL 60 milliGRAM(s) Oral daily  pantoprazole    Tablet 40 milliGRAM(s) Oral two times a day  sodium chloride 0.9%. 500 milliLiter(s) IV Continuous <Continuous>  tamsulosin 0.4 milliGRAM(s) Oral at bedtime      Physical exam    T(C): 36.7 (03-06-25 @ 05:05), Max: 37.1 (03-05-25 @ 20:31)  HR: 62 (03-06-25 @ 05:05) (57 - 62)  BP: 160/85 (03-06-25 @ 05:05) (145/67 - 160/85)  RR: 17 (03-06-25 @ 05:05) (17 - 18)  SpO2: 93% (03-06-25 @ 05:05) (93% - 98%)  Wt(kg): --    resting NAD  Resp non labored    Labs                        9.2    5.80  )-----------( 276      ( 05 Mar 2025 06:59 )             29.8       03-05    137  |  104  |  27[H]  ----------------------------<  73  4.7   |  22  |  1.68[H]    Ca    9.0      05 Mar 2025 06:55            [id number]

## 2025-03-06 NOTE — PROGRESS NOTE ADULT - SUBJECTIVE AND OBJECTIVE BOX
[facility] Attending Follow-up Note     Patient seen and examined at bedside.    Overnight Events:     events noted     REVIEW OF SYSTEMS:  Constitutional:     [x ] negative [ ] fevers [ ] chills [ ] weight loss [ ] weight gain  HEENT:                  [x ] negative [ ] dry eyes [ ] eye irritation [ ] postnasal drip [ ] nasal congestion  CV:                         [ x] negative  [ ] chest pain [ ] orthopnea [ ] palpitations [ ] murmur  Resp:                     [ x] negative [ ] cough [ ] shortness of breath [ ] dyspnea [ ] wheezing [ ] sputum [ ]hemoptysis  GI:                          [ x] negative [ ] nausea [ ] vomiting [ ] diarrhea [ ] constipation [ ] abd pain [ ] dysphagia   :                        [ x] negative [ ] dysuria [ ] nocturia [ ] hematuria [ ] increased urinary frequency  Musculoskeletal: [ x] negative [ ] back pain [ ] myalgias [ ] arthralgias [ ] fracture  Skin:                       [ x] negative [ ] rash [ ] itch  Neurological:        [x ] negative [ ] headache [ ] dizziness [ ] syncope [ ] weakness [ ] numbness  Psychiatric:           [ x] negative [ ] anxiety [ ] depression  Endocrine:            [ x] negative [ ] diabetes [ ] thyroid problem  Heme/Lymph:      [ x] negative [ ] anemia [ ] bleeding problem  Allergic/Immune: [ x] negative [ ] itchy eyes [ ] nasal discharge [ ] hives [ ] angioedema    [ x] All other systems negative  [ ] Unable to assess ROS due to    Current Meds:  acetaminophen     Tablet .. 650 milliGRAM(s) Oral every 6 hours PRN  apixaban 5 milliGRAM(s) Oral every 12 hours  aspirin  chewable 81 milliGRAM(s) Oral daily  atorvastatin 20 milliGRAM(s) Oral at bedtime  benzocaine/menthol Lozenge 1 Lozenge Oral four times a day  ferrous    sulfate 325 milliGRAM(s) Oral daily  mirtazapine 7.5 milliGRAM(s) Oral daily  NIFEdipine XL 60 milliGRAM(s) Oral daily  pantoprazole    Tablet 40 milliGRAM(s) Oral two times a day  sodium chloride 0.9%. 500 milliLiter(s) IV Continuous <Continuous>  tamsulosin 0.4 milliGRAM(s) Oral at bedtime      PAST MEDICAL & SURGICAL HISTORY:  HTN (hypertension)      HLD (hyperlipidemia)      Deep vein thrombosis (DVT)          Vitals:  T(F): 97.7 (03-07), Max: 97.9 (03-07)  HR: 59 (03-07) (50 - 59)  BP: 125/59 (03-07) (122/53 - 166/57)  RR: 17 (03-07)  SpO2: 97% (03-07)  I&O's Summary    06 Mar 2025 07:01  -  07 Mar 2025 07:00  --------------------------------------------------------  IN: 720 mL / OUT: 950 mL / NET: -230 mL    07 Mar 2025 07:01  -  07 Mar 2025 22:20  --------------------------------------------------------  IN: 240 mL / OUT: 0 mL / NET: 240 mL        Physical Exam:  Appearance: No acute distress  HENT: No JVD   Cardiovascular: RRR, S1/S2, no murmurs  Respiratory: CTABL  Gastrointestinal: soft, NT ND, +BS  Musculoskeletal: No clubbing, no edema   Neurologic: Non-focal  Skin: No rashes, ecchymoses, or cyanosis                          9.9    8.35  )-----------( 257      ( 07 Mar 2025 07:05 )             32.2     03-07    136  |  101  |  28[H]  ----------------------------<  69[L]  4.3   |  22  |  1.56[H]    Ca    9.6      07 Mar 2025 07:03      PT/INR - ( 07 Mar 2025 07:05 )   PT: 14.0 sec;   INR: 1.23 ratio         PTT - ( 07 Mar 2025 07:05 )  PTT:84.9 sec              Cardiovascular Testings:

## 2025-03-06 NOTE — PROGRESS NOTE ADULT - SUBJECTIVE AND OBJECTIVE BOX
Patient is a 85y old  Male who presents with a chief complaint of Referred by Vascular for Weakness Diarrhea (01 Mar 2025 22:25)      SUBJECTIVE / OVERNIGHT EVENTS: No events     T(C): 36.9 (03-06-25 @ 19:55), Max: 36.9 (03-06-25 @ 19:55)  HR: 53 (03-06-25 @ 19:55) (52 - 53)  BP: 177/69 (03-06-25 @ 19:55) (122/55 - 177/69)  RR: 18 (03-06-25 @ 19:55) (17 - 18)  SpO2: 94% (03-06-25 @ 19:55) (94% - 97%)      MEDICATIONS  (STANDING):  aspirin  chewable 81 milliGRAM(s) Oral daily  atorvastatin 20 milliGRAM(s) Oral at bedtime  cefTRIAXone   IVPB 2000 milliGRAM(s) IV Intermittent every 24 hours  cefTRIAXone   IVPB      ferrous    sulfate 325 milliGRAM(s) Oral daily  heparin  Infusion.  Unit(s)/Hr (9 mL/Hr) IV Continuous <Continuous>  metoprolol succinate ER 25 milliGRAM(s) Oral daily  mirtazapine 7.5 milliGRAM(s) Oral daily  NIFEdipine XL 60 milliGRAM(s) Oral daily  pantoprazole    Tablet 40 milliGRAM(s) Oral two times a day  sodium chloride 0.9%. 500 milliLiter(s) (30 mL/Hr) IV Continuous <Continuous>  tamsulosin 0.4 milliGRAM(s) Oral at bedtime    MEDICATIONS  (PRN):  acetaminophen     Tablet .. 650 milliGRAM(s) Oral every 6 hours PRN Temp greater or equal to 38C (100.4F)  heparin   Injectable 4000 Unit(s) IV Push every 6 hours PRN For aPTT less than 40  heparin   Injectable 2000 Unit(s) IV Push every 6 hours PRN For aPTT between 40 - 57    PHYSICAL EXAM:  GENERAL: NAD, well-developed  HEAD:  Atraumatic, Normocephalic  EYES: EOMI, PERRLA, conjunctiva and sclera clear  NECK: Supple, No JVD  CHEST/LUNG: Clear to auscultation bilaterally; No wheeze  HEART: Regular rate and rhythm; No murmurs, rubs, or gallops  ABDOMEN: Soft, Nontender, Nondistended; Bowel sounds present  EXTREMITIES:  2+ Peripheral Pulses, No clubbing, cyanosis, or edema  PSYCH: AAOx3  NEUROLOGY: non-focal  SKIN: No rashes or lesions                                                  9.3    5.85  )-----------( 257      ( 06 Mar 2025 07:14 )             31.0             PT/INR - ( 06 Mar 2025 07:17 )   PT: 13.3 sec;   INR: 1.17 ratio         PTT - ( 06 Mar 2025 07:17 )  PTT:87.9 sec  137|104|32<72  4.6|20|1.72  9.2,--,--  03-06 @ 07:14  RADIOLOGY & ADDITIONAL TESTS:    Imaging Personally Reviewed:    Consultant(s) Notes Reviewed:      Care Discussed with Consultants/Other Providers:

## 2025-03-06 NOTE — PROGRESS NOTE ADULT - ASSESSMENT
85-year-old male with DVT and getting IV Heparin and care per medicine.    Anemia due to kidney disease, infection and iron def. GI blood loss. EGD reported gastritis with erosions. HGb more stable and adequate last few days. Monitor and continue with oral iron.

## 2025-03-06 NOTE — PROGRESS NOTE ADULT - SUBJECTIVE AND OBJECTIVE BOX
[facility]    Dr. Gabriel Betancourt     Office [phone] (9 am to 5 pm)  Service: [phone] (5pm to 9am)  Also Available on TEAMS      RENAL PROGRESS NOTE: DATE OF SERVICE 03-06-25 @ 11:15    Patient is a 85y old  Male who presents with a chief complaint of Referred by Vascular for Weakness Diarrhea (06 Mar 2025 08:04)      Patient seen and examined at bedside. No chest pain/sob    VITALS:  T(F): 97.7 (03-06-25 @ 09:55), Max: 98.7 (03-05-25 @ 20:31)  HR: 44 (03-06-25 @ 10:55)  BP: 137/80 (03-06-25 @ 10:55)  RR: 14 (03-06-25 @ 10:55)  SpO2: 96% (03-06-25 @ 10:55)  Wt(kg): --    03-05 @ 07:01  -  03-06 @ 07:00  --------------------------------------------------------  IN: 720 mL / OUT: 1200 mL / NET: -480 mL      Height (cm): 170.2 (03-06 @ 09:20)  Weight (kg): 48.6 (03-06 @ 09:20)  BMI (kg/m2): 16.8 (03-06 @ 09:20)  BSA (m2): 1.55 (03-06 @ 09:20)    PHYSICAL EXAM:  Constitutional: NAD  Neck: No JVD  Respiratory: CTAB, no wheezes, rales or rhonchi  Cardiovascular: S1, S2, RRR  Gastrointestinal: BS+, soft, NT/ND  Extremities: No peripheral edema    Hospital Medications:   MEDICATIONS  (STANDING):  aspirin  chewable 81 milliGRAM(s) Oral daily  atorvastatin 20 milliGRAM(s) Oral at bedtime  cefTRIAXone   IVPB 2000 milliGRAM(s) IV Intermittent every 24 hours  cefTRIAXone   IVPB      ferrous    sulfate 325 milliGRAM(s) Oral daily  heparin  Infusion.  Unit(s)/Hr (9 mL/Hr) IV Continuous <Continuous>  metoprolol succinate ER 25 milliGRAM(s) Oral daily  mirtazapine 7.5 milliGRAM(s) Oral daily  NIFEdipine XL 60 milliGRAM(s) Oral daily  pantoprazole    Tablet 40 milliGRAM(s) Oral two times a day  sodium chloride 0.9%. 500 milliLiter(s) (30 mL/Hr) IV Continuous <Continuous>  tamsulosin 0.4 milliGRAM(s) Oral at bedtime      LABS:  03-06    137  |  104  |  32[H]  ----------------------------<  72  4.6   |  20[L]  |  1.72[H]    Ca    9.2      06 Mar 2025 07:14      Creatinine Trend: 1.72 <--, 1.68 <--, 1.44 <--, 1.49 <--, 1.66 <--, 1.59 <--, 1.62 <--                                9.3    5.85  )-----------( 257      ( 06 Mar 2025 07:14 )             31.0     Urine Studies:  Urinalysis - [03-06-25 @ 07:14]      Color  / Appearance  / SG  / pH       Gluc 72 / Ketone   / Bili  / Urobili        Blood  / Protein  / Leuk Est  / Nitrite       RBC  / WBC  / Hyaline  / Gran  / Sq Epi  / Non Sq Epi  / Bacteria       Iron 25, TIBC 249, %sat 10      [02-17-25 @ 07:23]  Ferritin 76      [02-17-25 @ 07:23]        RADIOLOGY & ADDITIONAL STUDIES:

## 2025-03-06 NOTE — PROGRESS NOTE ADULT - SUBJECTIVE AND OBJECTIVE BOX
Follow Up:  fever, diarrhea, gemella bacteremia    Interval History/ROS: pt remains afebrile and no cough or vomiting, SEBASTIAN did not show any vegetation        Allergies  No Known Allergies        ANTIMICROBIALS:  cefTRIAXone   IVPB 2000 every 24 hours  cefTRIAXone   IVPB        OTHER MEDS:  acetaminophen     Tablet .. 650 milliGRAM(s) Oral every 6 hours PRN  aspirin  chewable 81 milliGRAM(s) Oral daily  atorvastatin 20 milliGRAM(s) Oral at bedtime  ferrous    sulfate 325 milliGRAM(s) Oral daily  heparin   Injectable 4000 Unit(s) IV Push every 6 hours PRN  heparin   Injectable 2000 Unit(s) IV Push every 6 hours PRN  heparin  Infusion.  Unit(s)/Hr IV Continuous <Continuous>  metoprolol succinate ER 25 milliGRAM(s) Oral daily  mirtazapine 7.5 milliGRAM(s) Oral daily  NIFEdipine XL 60 milliGRAM(s) Oral daily  pantoprazole    Tablet 40 milliGRAM(s) Oral two times a day  sodium chloride 0.9%. 500 milliLiter(s) IV Continuous <Continuous>  tamsulosin 0.4 milliGRAM(s) Oral at bedtime      Vital Signs Last 24 Hrs  T(C): 36.3 (06 Mar 2025 12:22), Max: 37.1 (05 Mar 2025 20:31)  T(F): 97.3 (06 Mar 2025 12:22), Max: 98.7 (05 Mar 2025 20:31)  HR: 52 (06 Mar 2025 12:22) (41 - 62)  BP: 122/55 (06 Mar 2025 12:22) (122/55 - 160/85)  BP(mean): 98 (06 Mar 2025 09:20) (98 - 98)  RR: 17 (06 Mar 2025 12:22) (14 - 17)  SpO2: 97% (06 Mar 2025 12:22) (93% - 100%)    Parameters below as of 06 Mar 2025 12:22  Patient On (Oxygen Delivery Method): room air        Physical Exam:  General:    NAD,  non toxic  Respiratory:    comfortable on RA  abd:     soft,   no tenderness  :     no  kimbrough  Musculoskeletal:   no joint swelling  vascular: no phlebitis  Skin:    no rash                        9.3    5.85  )-----------( 257      ( 06 Mar 2025 07:14 )             31.0       03-06    137  |  104  |  32[H]  ----------------------------<  72  4.6   |  20[L]  |  1.72[H]    Ca    9.2      06 Mar 2025 07:14        Urinalysis Basic - ( 06 Mar 2025 07:14 )    Color: x / Appearance: x / SG: x / pH: x  Gluc: 72 mg/dL / Ketone: x  / Bili: x / Urobili: x   Blood: x / Protein: x / Nitrite: x   Leuk Esterase: x / RBC: x / WBC x   Sq Epi: x / Non Sq Epi: x / Bacteria: x        MICROBIOLOGY:  v  .Blood Blood-Peripheral  02-20-25   No growth at 5 days  --  --      .Blood Blood-Peripheral  02-20-25   No growth at 5 days  --  --      .Blood Blood-Peripheral  02-19-25   Growth in aerobic and anaerobic bottles: Gemella haemolysans  "Susceptibilities not performed"  --    Growth in aerobic bottle: Gram Positive Cocci in Clusters  Growth in anaerobic bottle: Gram Positive Cocci in Clusters      .Blood Blood-Peripheral  02-19-25   Growth in aerobic and anaerobic bottles: Gemella haemolysans  "Susceptibilities not performed"  --    Growth in aerobic bottle: Gram Positive Cocci in Clusters  Growth in anaerobic bottle: Gram Positive Cocci in Clusters      .Blood BLOOD  02-17-25   No growth at 5 days  --  Blood Culture PCR                RADIOLOGY:  Images independently visualized and reviewed personally, findings as below  < from: CT Abdomen and Pelvis No Cont (02.20.25 @ 13:06) >  IMPRESSION:  No acute intrathoracic or intra-abdominal pathology identified on   noncontrast CT.    Soft tissue in the retroperitoneal region may represent collapsed bowel   or lymphadenopathy.    < end of copied text >  < from: SEBASTIAN W or WO Ultrasound Enhancing Agent (03.06.25 @ 08:52) >  CONCLUSIONS:      1. Left ventricular systolic function is normal with an ejection fraction visually estimated at 55 to 60 %.   2. Patent foramen ovale by color flow Doppler.   3. Aortic annulus is dilated, measuring 3.7 cm. Ascending aorta is aneurysmal, measuring 4.14 cm (indexed 2.61 cm/m²).   4. No evidence of left atrial or left atrial appendage thrombus. The left atrial appendage emptying velocity is normal at 66 cm/s.   5. Small pericardial effusion with no echocardiographic evidence of tamponade physiology.   6. Compared to the transthoracic echocardiogram performed on 2/24/2025, there havebeen no significant interval changes.   7. Mild to moderate aortic regurgitation.   8. Minimal (grade 1) spontaneous echo contrast located in the left atrial apendage, no spontaneous echo contrast in the left atrial appendage and no spontaneous echo contrast in the left atrium.   9. No echocardiographic evidence of endocarditis.    < end of copied text >

## 2025-03-07 VITALS
OXYGEN SATURATION: 97 % | TEMPERATURE: 98 F | RESPIRATION RATE: 17 BRPM | DIASTOLIC BLOOD PRESSURE: 59 MMHG | HEART RATE: 59 BPM | SYSTOLIC BLOOD PRESSURE: 125 MMHG

## 2025-03-07 LAB
ANION GAP SERPL CALC-SCNC: 13 MMOL/L — SIGNIFICANT CHANGE UP (ref 5–17)
APTT BLD: 84.9 SEC — HIGH (ref 24.5–35.6)
BUN SERPL-MCNC: 28 MG/DL — HIGH (ref 7–23)
CALCIUM SERPL-MCNC: 9.6 MG/DL — SIGNIFICANT CHANGE UP (ref 8.4–10.5)
CHLORIDE SERPL-SCNC: 101 MMOL/L — SIGNIFICANT CHANGE UP (ref 96–108)
CO2 SERPL-SCNC: 22 MMOL/L — SIGNIFICANT CHANGE UP (ref 22–31)
CREAT SERPL-MCNC: 1.56 MG/DL — HIGH (ref 0.5–1.3)
EGFR: 43 ML/MIN/1.73M2 — LOW
EGFR: 43 ML/MIN/1.73M2 — LOW
GLUCOSE SERPL-MCNC: 69 MG/DL — LOW (ref 70–99)
HCT VFR BLD CALC: 32.2 % — LOW (ref 39–50)
HGB BLD-MCNC: 9.9 G/DL — LOW (ref 13–17)
INR BLD: 1.23 RATIO — HIGH (ref 0.85–1.16)
MCHC RBC-ENTMCNC: 27.4 PG — SIGNIFICANT CHANGE UP (ref 27–34)
MCHC RBC-ENTMCNC: 30.7 G/DL — LOW (ref 32–36)
MCV RBC AUTO: 89.2 FL — SIGNIFICANT CHANGE UP (ref 80–100)
NRBC BLD AUTO-RTO: 0 /100 WBCS — SIGNIFICANT CHANGE UP (ref 0–0)
PLATELET # BLD AUTO: 257 K/UL — SIGNIFICANT CHANGE UP (ref 150–400)
POTASSIUM SERPL-MCNC: 4.3 MMOL/L — SIGNIFICANT CHANGE UP (ref 3.5–5.3)
POTASSIUM SERPL-SCNC: 4.3 MMOL/L — SIGNIFICANT CHANGE UP (ref 3.5–5.3)
PROTHROM AB SERPL-ACNC: 14 SEC — HIGH (ref 9.9–13.4)
RBC # BLD: 3.61 M/UL — LOW (ref 4.2–5.8)
RBC # FLD: 18 % — HIGH (ref 10.3–14.5)
SODIUM SERPL-SCNC: 136 MMOL/L — SIGNIFICANT CHANGE UP (ref 135–145)
WBC # BLD: 8.35 K/UL — SIGNIFICANT CHANGE UP (ref 3.8–10.5)
WBC # FLD AUTO: 8.35 K/UL — SIGNIFICANT CHANGE UP (ref 3.8–10.5)

## 2025-03-07 PROCEDURE — 86850 RBC ANTIBODY SCREEN: CPT

## 2025-03-07 PROCEDURE — 93356 MYOCRD STRAIN IMG SPCKL TRCK: CPT

## 2025-03-07 PROCEDURE — 83605 ASSAY OF LACTIC ACID: CPT

## 2025-03-07 PROCEDURE — 84100 ASSAY OF PHOSPHORUS: CPT

## 2025-03-07 PROCEDURE — 82746 ASSAY OF FOLIC ACID SERUM: CPT

## 2025-03-07 PROCEDURE — 71250 CT THORAX DX C-: CPT | Mod: MC

## 2025-03-07 PROCEDURE — 85027 COMPLETE CBC AUTOMATED: CPT

## 2025-03-07 PROCEDURE — 83010 ASSAY OF HAPTOGLOBIN QUANT: CPT

## 2025-03-07 PROCEDURE — 85730 THROMBOPLASTIN TIME PARTIAL: CPT

## 2025-03-07 PROCEDURE — 84145 PROCALCITONIN (PCT): CPT

## 2025-03-07 PROCEDURE — 82803 BLOOD GASES ANY COMBINATION: CPT

## 2025-03-07 PROCEDURE — 93312 ECHO TRANSESOPHAGEAL: CPT

## 2025-03-07 PROCEDURE — 85025 COMPLETE CBC W/AUTO DIFF WBC: CPT

## 2025-03-07 PROCEDURE — 99285 EMERGENCY DEPT VISIT HI MDM: CPT

## 2025-03-07 PROCEDURE — 86923 COMPATIBILITY TEST ELECTRIC: CPT

## 2025-03-07 PROCEDURE — 81003 URINALYSIS AUTO W/O SCOPE: CPT

## 2025-03-07 PROCEDURE — G0378: CPT

## 2025-03-07 PROCEDURE — 82947 ASSAY GLUCOSE BLOOD QUANT: CPT

## 2025-03-07 PROCEDURE — P9040: CPT

## 2025-03-07 PROCEDURE — 82435 ASSAY OF BLOOD CHLORIDE: CPT

## 2025-03-07 PROCEDURE — 82272 OCCULT BLD FECES 1-3 TESTS: CPT

## 2025-03-07 PROCEDURE — 87150 DNA/RNA AMPLIFIED PROBE: CPT

## 2025-03-07 PROCEDURE — 87077 CULTURE AEROBIC IDENTIFY: CPT

## 2025-03-07 PROCEDURE — 99232 SBSQ HOSP IP/OBS MODERATE 35: CPT

## 2025-03-07 PROCEDURE — 86900 BLOOD TYPING SEROLOGIC ABO: CPT

## 2025-03-07 PROCEDURE — 83690 ASSAY OF LIPASE: CPT

## 2025-03-07 PROCEDURE — 82607 VITAMIN B-12: CPT

## 2025-03-07 PROCEDURE — 85610 PROTHROMBIN TIME: CPT

## 2025-03-07 PROCEDURE — 84540 ASSAY OF URINE/UREA-N: CPT

## 2025-03-07 PROCEDURE — 71045 X-RAY EXAM CHEST 1 VIEW: CPT

## 2025-03-07 PROCEDURE — 83735 ASSAY OF MAGNESIUM: CPT

## 2025-03-07 PROCEDURE — 83550 IRON BINDING TEST: CPT

## 2025-03-07 PROCEDURE — 74176 CT ABD & PELVIS W/O CONTRAST: CPT | Mod: MC

## 2025-03-07 PROCEDURE — 80202 ASSAY OF VANCOMYCIN: CPT

## 2025-03-07 PROCEDURE — 87040 BLOOD CULTURE FOR BACTERIA: CPT

## 2025-03-07 PROCEDURE — 93005 ELECTROCARDIOGRAM TRACING: CPT

## 2025-03-07 PROCEDURE — 93325 DOPPLER ECHO COLOR FLOW MAPG: CPT

## 2025-03-07 PROCEDURE — 76775 US EXAM ABDO BACK WALL LIM: CPT

## 2025-03-07 PROCEDURE — 87637 SARSCOV2&INF A&B&RSV AMP PRB: CPT

## 2025-03-07 PROCEDURE — 80053 COMPREHEN METABOLIC PANEL: CPT

## 2025-03-07 PROCEDURE — 93926 LOWER EXTREMITY STUDY: CPT

## 2025-03-07 PROCEDURE — 36430 TRANSFUSION BLD/BLD COMPNT: CPT

## 2025-03-07 PROCEDURE — 93971 EXTREMITY STUDY: CPT

## 2025-03-07 PROCEDURE — 81001 URINALYSIS AUTO W/SCOPE: CPT

## 2025-03-07 PROCEDURE — 80048 BASIC METABOLIC PNL TOTAL CA: CPT

## 2025-03-07 PROCEDURE — 85014 HEMATOCRIT: CPT

## 2025-03-07 PROCEDURE — 82330 ASSAY OF CALCIUM: CPT

## 2025-03-07 PROCEDURE — 87507 IADNA-DNA/RNA PROBE TQ 12-25: CPT

## 2025-03-07 PROCEDURE — 84132 ASSAY OF SERUM POTASSIUM: CPT

## 2025-03-07 PROCEDURE — 93320 DOPPLER ECHO COMPLETE: CPT

## 2025-03-07 PROCEDURE — 83540 ASSAY OF IRON: CPT

## 2025-03-07 PROCEDURE — 0225U NFCT DS DNA&RNA 21 SARSCOV2: CPT

## 2025-03-07 PROCEDURE — 36415 COLL VENOUS BLD VENIPUNCTURE: CPT

## 2025-03-07 PROCEDURE — 82570 ASSAY OF URINE CREATININE: CPT

## 2025-03-07 PROCEDURE — 84300 ASSAY OF URINE SODIUM: CPT

## 2025-03-07 PROCEDURE — 86901 BLOOD TYPING SEROLOGIC RH(D): CPT

## 2025-03-07 PROCEDURE — 93306 TTE W/DOPPLER COMPLETE: CPT

## 2025-03-07 PROCEDURE — 36000 PLACE NEEDLE IN VEIN: CPT

## 2025-03-07 PROCEDURE — 83615 LACTATE (LD) (LDH) ENZYME: CPT

## 2025-03-07 PROCEDURE — 82728 ASSAY OF FERRITIN: CPT

## 2025-03-07 PROCEDURE — 85018 HEMOGLOBIN: CPT

## 2025-03-07 PROCEDURE — 97161 PT EVAL LOW COMPLEX 20 MIN: CPT

## 2025-03-07 PROCEDURE — 84295 ASSAY OF SERUM SODIUM: CPT

## 2025-03-07 RX ORDER — APIXABAN 2.5 MG/1
5 TABLET, FILM COATED ORAL EVERY 12 HOURS
Refills: 0 | Status: DISCONTINUED | OUTPATIENT
Start: 2025-03-07 | End: 2025-03-07

## 2025-03-07 RX ORDER — APIXABAN 2.5 MG/1
1 TABLET, FILM COATED ORAL
Qty: 60 | Refills: 0
Start: 2025-03-07 | End: 2025-04-05

## 2025-03-07 RX ORDER — FERROUS SULFATE 137(45) MG
1 TABLET, EXTENDED RELEASE ORAL
Qty: 30 | Refills: 0
Start: 2025-03-07 | End: 2025-04-05

## 2025-03-07 RX ORDER — NIFEDIPINE 30 MG
1 TABLET, EXTENDED RELEASE 24 HR ORAL
Qty: 30 | Refills: 0
Start: 2025-03-07 | End: 2025-04-05

## 2025-03-07 RX ADMIN — METOPROLOL SUCCINATE 25 MILLIGRAM(S): 50 TABLET, EXTENDED RELEASE ORAL at 05:04

## 2025-03-07 RX ADMIN — HEPARIN SODIUM 900 UNIT(S)/HR: 1000 INJECTION INTRAVENOUS; SUBCUTANEOUS at 07:06

## 2025-03-07 RX ADMIN — Medication 60 MILLIGRAM(S): at 05:04

## 2025-03-07 RX ADMIN — MIRTAZAPINE 7.5 MILLIGRAM(S): 30 TABLET, FILM COATED ORAL at 08:22

## 2025-03-07 RX ADMIN — APIXABAN 5 MILLIGRAM(S): 2.5 TABLET, FILM COATED ORAL at 08:21

## 2025-03-07 RX ADMIN — Medication 325 MILLIGRAM(S): at 08:22

## 2025-03-07 RX ADMIN — Medication 1 LOZENGE: at 12:58

## 2025-03-07 RX ADMIN — Medication 81 MILLIGRAM(S): at 08:22

## 2025-03-07 RX ADMIN — Medication 40 MILLIGRAM(S): at 05:04

## 2025-03-07 NOTE — PROGRESS NOTE ADULT - REASON FOR ADMISSION
Referred by Vascular for Weakness Diarrhea
Statement Selected

## 2025-03-07 NOTE — PROGRESS NOTE ADULT - ASSESSMENT
anemia  dvt    s/p egd/colon  reg diet  cbc daily  monitor GI fn  defer capsule unless hgb cont to decline with a/c  s/p alli  cepacol lozenge prn    I reviewed the overnight course of events on the unit, re-confirming the patient history. I discussed the care with the patient and their family  The plan of care was discussed with the physician assistant and modifications were made to the notation where appropriate.   Differential diagnosis and plan of care discussed with patient after the evaluation  35 minutes spent on total encounter of which more than fifty percent of the encounter was spent counseling and/or coordinating care by the attending physician.  Advanced care planning was discussed with patient and family.  Advanced care planning forms were reviewed and discussed.  Risks, benefits and alternatives of gastroenterologic procedures were discussed in detail and all questions were answered.

## 2025-03-07 NOTE — DISCHARGE NOTE NURSING/CASE MANAGEMENT/SOCIAL WORK - NSDCPEFALRISK_GEN_ALL_CORE
For information on Fall & Injury Prevention, visit: [email or web address] OR  [email or web address] OR  https://www.cdc.gov/steadi/patient.html

## 2025-03-07 NOTE — PHYSICAL THERAPY INITIAL EVALUATION ADULT - GENERAL OBSERVATIONS, REHAB EVAL
Rec'd supine in bed sleeping, in NAD, +IVL, A&O x3.
Pt. rec' d in bed, NAD, +IVL, agreeable to PT sabas.

## 2025-03-07 NOTE — PROGRESS NOTE ADULT - PROVIDER SPECIALTY LIST ADULT
Cardiology
Gastroenterology
Heme/Onc
Hospitalist
Hospitalist
Infectious Disease
Nephrology
Cardiology
Gastroenterology
Gastroenterology
Heme/Onc
Hospitalist
Hospitalist
Infectious Disease
Infectious Disease
Internal Medicine
Nephrology
Cardiology
Gastroenterology
Gastroenterology
Heme/Onc
Heme/Onc
Hospitalist
Hospitalist
Infectious Disease
Internal Medicine
Nephrology
Cardiology
Gastroenterology
Heme/Onc
Hospitalist
Infectious Disease
Internal Medicine
Internal Medicine
Nephrology
Hospitalist
Hospitalist

## 2025-03-07 NOTE — PROGRESS NOTE ADULT - SUBJECTIVE AND OBJECTIVE BOX
Patient is a 85y old  Male who presents with a chief complaint of Referred by Vascular for Weakness Diarrhea (07 Mar 2025 11:00)    he says that earlier when he stood up to go to the bathroom he felt weak and dizzy so he went back to bed. He did not eat well last night. No BM today. No N/V. No CP, SOB, cough. No HA. No fever    Medication:   acetaminophen     Tablet .. 650 milliGRAM(s) Oral every 6 hours PRN  apixaban 5 milliGRAM(s) Oral every 12 hours  aspirin  chewable 81 milliGRAM(s) Oral daily  atorvastatin 20 milliGRAM(s) Oral at bedtime  benzocaine/menthol Lozenge 1 Lozenge Oral four times a day  ferrous    sulfate 325 milliGRAM(s) Oral daily  metoprolol succinate ER 25 milliGRAM(s) Oral daily  mirtazapine 7.5 milliGRAM(s) Oral daily  NIFEdipine XL 60 milliGRAM(s) Oral daily  pantoprazole    Tablet 40 milliGRAM(s) Oral two times a day  sodium chloride 0.9%. 500 milliLiter(s) IV Continuous <Continuous>  tamsulosin 0.4 milliGRAM(s) Oral at bedtime      Physical exam    T(C): 36.6 (03-07-25 @ 04:20), Max: 36.9 (03-06-25 @ 19:55)  HR: 55 (03-07-25 @ 04:20) (53 - 55)  BP: 166/57 (03-07-25 @ 04:20) (166/57 - 177/69)  RR: 18 (03-07-25 @ 04:20) (18 - 18)  SpO2: 93% (03-07-25 @ 04:20) (93% - 94%)  Wt(kg): --    alert NAD  EOMI anicteric sclera  Cv s1 S2 RRR  Lungs clear B/L  abd soft NT ND +BS  No LE edema or tenderness    Labs                        9.9    8.35  )-----------( 257      ( 07 Mar 2025 07:05 )             32.2       03-07    136  |  101  |  28[H]  ----------------------------<  69[L]  4.3   |  22  |  1.56[H]    Ca    9.6      07 Mar 2025 07:03            [id number]

## 2025-03-07 NOTE — PROGRESS NOTE ADULT - ASSESSMENT
84M with PMH of HTN, HLD, PAD here for diarrhea and generalized weakness. Noted to have GIB, pending EGD/Colonoscopy on Monday. Cardiology consulted for preop cardiac clearance     1. GIB, s/p EGD/colonoscopy 3/3   2. moderate pHTN   3. Mild AS and AI   4. Preop cardiac clearance     -for SEBASTIAN 3/6 per ID request- + PFO normal EF   -cont eliquis for AC and DVT   -mod pHTN for OP work up   -hold toprol XL for bradycardia    -cont procardia 60 mg QD     José Manuel Knapp MD [facility]  Attending Interventional Cardiologist, [facility].   Avaliable on [employer] Team

## 2025-03-07 NOTE — PROGRESS NOTE ADULT - ASSESSMENT
85-year-old male with DVT and getting Eliquis.    Anemia due to infection, GIB, iron deficiency, renal insufficiency. Hgb stable to higher last few days. Monitor and continue with oral iron.

## 2025-03-07 NOTE — PROGRESS NOTE ADULT - SUBJECTIVE AND OBJECTIVE BOX
[facility] Cardiology Attending Follow-up Note     Patient seen and examined at bedside.    Overnight Events:     events noted     REVIEW OF SYSTEMS:  Constitutional:     [x ] negative [ ] fevers [ ] chills [ ] weight loss [ ] weight gain  HEENT:                  [x ] negative [ ] dry eyes [ ] eye irritation [ ] postnasal drip [ ] nasal congestion  CV:                         [ x] negative  [ ] chest pain [ ] orthopnea [ ] palpitations [ ] murmur  Resp:                     [ x] negative [ ] cough [ ] shortness of breath [ ] dyspnea [ ] wheezing [ ] sputum [ ]hemoptysis  GI:                          [ x] negative [ ] nausea [ ] vomiting [ ] diarrhea [ ] constipation [ ] abd pain [ ] dysphagia   :                        [ x] negative [ ] dysuria [ ] nocturia [ ] hematuria [ ] increased urinary frequency  Musculoskeletal: [ x] negative [ ] back pain [ ] myalgias [ ] arthralgias [ ] fracture  Skin:                       [ x] negative [ ] rash [ ] itch  Neurological:        [x ] negative [ ] headache [ ] dizziness [ ] syncope [ ] weakness [ ] numbness  Psychiatric:           [ x] negative [ ] anxiety [ ] depression  Endocrine:            [ x] negative [ ] diabetes [ ] thyroid problem  Heme/Lymph:      [ x] negative [ ] anemia [ ] bleeding problem  Allergic/Immune: [ x] negative [ ] itchy eyes [ ] nasal discharge [ ] hives [ ] angioedema    [ x] All other systems negative  [ ] Unable to assess ROS due to    Current Meds:  acetaminophen     Tablet .. 650 milliGRAM(s) Oral every 6 hours PRN  apixaban 5 milliGRAM(s) Oral every 12 hours  aspirin  chewable 81 milliGRAM(s) Oral daily  atorvastatin 20 milliGRAM(s) Oral at bedtime  benzocaine/menthol Lozenge 1 Lozenge Oral four times a day  ferrous    sulfate 325 milliGRAM(s) Oral daily  mirtazapine 7.5 milliGRAM(s) Oral daily  NIFEdipine XL 60 milliGRAM(s) Oral daily  pantoprazole    Tablet 40 milliGRAM(s) Oral two times a day  sodium chloride 0.9%. 500 milliLiter(s) IV Continuous <Continuous>  tamsulosin 0.4 milliGRAM(s) Oral at bedtime      PAST MEDICAL & SURGICAL HISTORY:  HTN (hypertension)      HLD (hyperlipidemia)      Deep vein thrombosis (DVT)          Vitals:  T(F): 97.7 (03-07), Max: 97.9 (03-07)  HR: 59 (03-07) (50 - 59)  BP: 125/59 (03-07) (122/53 - 166/57)  RR: 17 (03-07)  SpO2: 97% (03-07)  I&O's Summary    06 Mar 2025 07:01  -  07 Mar 2025 07:00  --------------------------------------------------------  IN: 720 mL / OUT: 950 mL / NET: -230 mL    07 Mar 2025 07:01  -  07 Mar 2025 22:20  --------------------------------------------------------  IN: 240 mL / OUT: 0 mL / NET: 240 mL        Physical Exam:  Appearance: No acute distress  HENT: No JVD   Cardiovascular: RRR, S1/S2, no murmurs  Respiratory: CTABL  Gastrointestinal: soft, NT ND, +BS  Musculoskeletal: No clubbing, no edema   Neurologic: Non-focal  Skin: No rashes, ecchymoses, or cyanosis                          9.9    8.35  )-----------( 257      ( 07 Mar 2025 07:05 )             32.2     03-07    136  |  101  |  28[H]  ----------------------------<  69[L]  4.3   |  22  |  1.56[H]    Ca    9.6      07 Mar 2025 07:03      PT/INR - ( 07 Mar 2025 07:05 )   PT: 14.0 sec;   INR: 1.23 ratio         PTT - ( 07 Mar 2025 07:05 )  PTT:84.9 sec              Cardiovascular Testings:

## 2025-03-07 NOTE — PHYSICAL THERAPY INITIAL EVALUATION ADULT - PERTINENT HX OF CURRENT PROBLEM, REHAB EVAL
85 y/o M with pmhx htn, hld, dvt, pad presents to the ER from vascular surgery office with diarrhea and generalized weakness over the last few days.   Patient reports that he has been having diarrhea x few days, associated with weakness, went to see [facility] Vascular surgeon as he noticed LLE swelling who referred him to the ED.   No hx fevers/ nausea/ vomiting.   Able to tolerate PO, No sick contacts.     US Renal: No hydronephrosis. Bilateral renal cysts, larger on the left, some of which demonstrate thin septations. Diffuse bladder wall thickening, out of proportion to the level of underdistention; etiologies include chronic bladder outlet obstruction if patient has an enlarged prostate versus underlying cystitis. Urinalysis and clinical correlation may be helpful for clarification. VA Duplex LLE: Acute deep venous thrombosis: above the knee. 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 Occluded left femoral bypass and popliteal artery. This will be further evaluated with dedicated lower extremity arterial ultrasound.
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 Patient presented to Dr. Landers's office today for followup and evaluation of left leg swelling, but was referred to the ED given reported 3-4 days of weakness, poor PO intake, and diarrhea. He is found to have DVT and DAX. Hospital course: (2/20) CT Abd/Pelvis: No acute intrathoracic or intra-abdominal pathology identified on noncontrast CT. Soft tissue in the retroperitoneal region may represent collapsed bowel or lymphadenopathy.

## 2025-03-07 NOTE — DISCHARGE NOTE NURSING/CASE MANAGEMENT/SOCIAL WORK - PATIENT PORTAL LINK FT
You can access the FollowMyHealth Patient Portal offered by [facility] by registering at the following website: [email or web address]. By joining [facility]’s FollowMyHealth portal, you will also be able to view your health information using other applications (apps) compatible with our system.

## 2025-03-07 NOTE — DISCHARGE NOTE NURSING/CASE MANAGEMENT/SOCIAL WORK - FINANCIAL ASSISTANCE
[facility] provides services at a reduced cost to those who are determined to be eligible through [facility]’s financial assistance program. Information regarding [facility]’s financial assistance program can be found by going to [email or web address] or by calling [phone].

## 2025-03-07 NOTE — PHYSICAL THERAPY INITIAL EVALUATION ADULT - ADDITIONAL COMMENTS
Pt lives alone in a co-op with no steps to enter. Prior to admission, independent with ADLs and ambulation. +driving
Pt lives alone in a co-op with no steps to enter. Prior to admission, independent with ADLs and ambulation w/o use of AD; denies any hx of fall in the past 6 months.

## 2025-03-07 NOTE — CHART NOTE - NSCHARTNOTEFT_GEN_A_CORE
Request from Dr. Juan to facilitate patient discharge.  Discussed discharge blood pressure meds with cardiology, Dr. Knapp, who recommends to stop metoprolol, continue with procardia 60mg daily, and follow up with a cardiologist after discharge.  Medication reconciliation reviewed, revised, and resolved with Dr. Juan, who has medically cleared patient for discharge with follow up as advised.  Please refer to discharge note for detailed hospital course.

## 2025-03-07 NOTE — PROGRESS NOTE ADULT - ASSESSMENT
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 Patient presented to Dr. Landers's office today for followup and evaluation of left leg swelling, but was referred to the ED given reported 3-4 days of weakness, poor PO intake, and diarrhea. He is found to have DVT and DAX.    A/P:  DAX vs Likely CKD 3  Unknown baseline however fluctuating 1.3-1.7 probably with some degree of CKD  OP nephrologist Dr. Rodriguez  FENa>1  DAX workup from 2/15 suggestive of ATN  Losartan on hold  Renal US: Neg for Hydro   s/p IVF   FeNa 2% - indeterminate. Repeat Fena inconclusive 1.2%, ECHO shows dilated CVP so no role for fluids unless hypotensive  Bladder scan 2/19 with 215cc urine, continue to monitor bladder scan as needed   Pt was on Zosyn - CBC neg for eosinophilia.  S.Cr worsened on 2/21 -- possibly d/t worsening anemia.  Renal function fluctuating but stable  Monitor I/O  Monitor renal function closely.    HTN:  Losartan on hold and Amlodipine discontinued  Nifedipine increased to 60 mg daily BP better  Low sodium diet.  Monitor BP    Anemia:  Iron deficient vs GIB.  On PO iron supplements.  S/p prbcs  GI following  Heme/onc and GI following.  S/p EGD colonoscopy Diverticulosis found in Sigmoid colon and internal Hemmorrhoids  Transfuse for Hgb <8.    Hypocalcemia:  In setting of hypoalbuminemia.  Optimize albumin.  Ca better.  Monitor Ca.    DVT:  Follow up vascular  On Apixaban.    Diarrhea  Resolved.    Bacteremia  S/p Abx  SEBASTIAN negative for endocarditis

## 2025-03-07 NOTE — PROGRESS NOTE ADULT - SUBJECTIVE AND OBJECTIVE BOX
[facility]    Dr. Gabriel Betancourt     Office [phone] (9 am to 5 pm)  Service: [phone] (5pm to 9am)  Also Available on TEAMS      RENAL PROGRESS NOTE: DATE OF SERVICE 03-07-25 @ 11:00    Patient is a 85y old  Male who presents with a chief complaint of Referred by Vascular for Weakness Diarrhea (06 Mar 2025 19:20)      Patient seen and examined at bedside. No chest pain/sob    VITALS:  T(F): 97.9 (03-07-25 @ 04:20), Max: 98.5 (03-06-25 @ 19:55)  HR: 55 (03-07-25 @ 04:20)  BP: 166/57 (03-07-25 @ 04:20)  RR: 18 (03-07-25 @ 04:20)  SpO2: 93% (03-07-25 @ 04:20)  Wt(kg): --    03-06 @ 07:01  -  03-07 @ 07:00  --------------------------------------------------------  IN: 720 mL / OUT: 950 mL / NET: -230 mL          PHYSICAL EXAM:  Constitutional: NAD  Neck: No JVD  Respiratory: CTAB, no wheezes, rales or rhonchi  Cardiovascular: S1, S2, RRR  Gastrointestinal: BS+, soft, NT/ND  Extremities: No peripheral edema    Hospital Medications:   MEDICATIONS  (STANDING):  apixaban 5 milliGRAM(s) Oral every 12 hours  aspirin  chewable 81 milliGRAM(s) Oral daily  atorvastatin 20 milliGRAM(s) Oral at bedtime  ferrous    sulfate 325 milliGRAM(s) Oral daily  metoprolol succinate ER 25 milliGRAM(s) Oral daily  mirtazapine 7.5 milliGRAM(s) Oral daily  NIFEdipine XL 60 milliGRAM(s) Oral daily  pantoprazole    Tablet 40 milliGRAM(s) Oral two times a day  sodium chloride 0.9%. 500 milliLiter(s) (30 mL/Hr) IV Continuous <Continuous>  tamsulosin 0.4 milliGRAM(s) Oral at bedtime      LABS:  03-07    136  |  101  |  28[H]  ----------------------------<  69[L]  4.3   |  22  |  1.56[H]    Ca    9.6      07 Mar 2025 07:03      Creatinine Trend: 1.56 <--, 1.72 <--, 1.68 <--, 1.44 <--, 1.49 <--, 1.66 <--, 1.59 <--                                9.9    8.35  )-----------( 257      ( 07 Mar 2025 07:05 )             32.2     Urine Studies:  Urinalysis - [03-07-25 @ 07:03]      Color  / Appearance  / SG  / pH       Gluc 69 / Ketone   / Bili  / Urobili        Blood  / Protein  / Leuk Est  / Nitrite       RBC  / WBC  / Hyaline  / Gran  / Sq Epi  / Non Sq Epi  / Bacteria       Iron 25, TIBC 249, %sat 10      [02-17-25 @ 07:23]  Ferritin 76      [02-17-25 @ 07:23]        RADIOLOGY & ADDITIONAL STUDIES:

## 2025-03-07 NOTE — DISCHARGE NOTE NURSING/CASE MANAGEMENT/SOCIAL WORK - NSDCFUADDAPPT_GEN_ALL_CORE_FT
You must follow up with your primary medical doctor within 2-3 days of discharge - please call to make an appointment.  If you do not have one, you can follow up at the [facility] - please call [phone] to make an appointment.  At this appointment, you must discuss your current medication regimen and if any changes need to be made.    You must follow up with your Vascular surgeon, Dr. Landers, within one week of discharge - please call to make an appointment.    You must follow up with your cardiologist, Dr. Caruso, within one week of discharge - please call to make an appointment.  At this appointment, you must discuss your current medication regimen and if any changes need to be made.    You must follow up with your Hematologist within one week of discharge - please call to make an appointment.        APPTS ARE READY TO BE MADE: [X] YES    Best Family or Patient Contact (if needed):    Additional Information about above appointments (if needed):    1: pcp  2: vascular  3: hematology  4: cardiologist    Other comments or requests:   Patient was outreached but did not answer nor could a voicemail be left.

## 2025-03-12 RX ORDER — NIFEDIPINE 30 MG
1 TABLET, EXTENDED RELEASE 24 HR ORAL
Refills: 0 | DISCHARGE

## 2025-03-12 RX ORDER — MIRTAZAPINE 30 MG/1
1 TABLET, FILM COATED ORAL
Refills: 0 | DISCHARGE

## 2025-03-12 RX ORDER — METOPROLOL SUCCINATE 50 MG/1
1 TABLET, EXTENDED RELEASE ORAL
Refills: 0 | DISCHARGE

## 2025-03-12 RX ORDER — TAMSULOSIN HYDROCHLORIDE 0.4 MG/1
1 CAPSULE ORAL
Refills: 0 | DISCHARGE

## 2025-03-12 RX ORDER — OXYBUTYNIN CHLORIDE 5 MG/1
1 TABLET, FILM COATED, EXTENDED RELEASE ORAL
Refills: 0 | DISCHARGE

## 2025-03-12 RX ORDER — ASPIRIN 325 MG
1 TABLET ORAL
Refills: 0 | DISCHARGE

## 2025-03-12 RX ORDER — OLMESARTAN MEDOXOMIL 5 MG/1
0.5 TABLET, FILM COATED ORAL
Refills: 0 | DISCHARGE

## 2025-03-12 NOTE — CHART NOTE - NSCHARTNOTEFT_GEN_A_CORE
Post-Discharge Medication Review: Completed	  	  Patient's preferred pharmacy was updated in [facility]: [facility] [place], [place]  	  Patient contacted to offer medication counseling post-discharge. Medication reconciliation completed. Per patient, medications include:	  	  1.	apixaban 5 mg oral tablet 1 tab(s) orally every 12 hours  2.	aspirin 81 mg oral tablet 1 tab(s) orally once a day  3.	ferrous sulfate 325 mg (65 mg elemental iron) oral tablet 1 tab(s) orally once a day  4.	mirtazapine 7.5 mg oral tablet 1 tab(s) orally once a day  5.	NIFEdipine 60 mg oral tablet, extended release 1 tab(s) orally once a day  6.	oxyBUTYnin 10 mg/24 hr oral tablet, extended release 1 tab(s) orally once a day  7.	pantoprazole 40 mg oral delayed release tablet 1 tab(s) orally once a day  8.	simvastatin 20 mg oral tablet 1 tab(s) orally once a day  9.	tamsulosin 0.4 mg oral capsule 1 cap(s) orally once a day  10.	valACYclovir 500 mg oral tablet 1 tab(s) orally once a day   	  Medication name, indication, administration, side effect, and monitoring reviewed for new medications during post discharge counseling visit with patient. Patient demonstrated understanding. Counseling offered for all medications.	  	  NIFEdipine 60 mg prescription was not picked up from pharmacy at discharge, patient states taking  NIFEdipine 30 mg since discharge. Reached out to [facility] to set up delivery.   	  	  Yael Reynaga, Matthew	  Clinical Pharmacy Specialist, Pharmacy Telehealth Team	  Can be reached via [employer] Teams or [phone]

## 2025-03-12 NOTE — CHART NOTE - NSCHARTNOTESELECT_GEN_ALL_CORE
Discussion regarding anticoagulation
Event Note
Discharge Note
Nutrition Services
Post-Discharge Note

## 2025-03-13 ENCOUNTER — APPOINTMENT (OUTPATIENT)
Dept: VASCULAR SURGERY | Facility: CLINIC | Age: 85
End: 2025-03-13
Payer: MEDICARE

## 2025-03-13 VITALS
WEIGHT: 112 LBS | SYSTOLIC BLOOD PRESSURE: 161 MMHG | TEMPERATURE: 97.7 F | DIASTOLIC BLOOD PRESSURE: 67 MMHG | HEART RATE: 53 BPM | BODY MASS INDEX: 17.58 KG/M2 | HEIGHT: 67 IN

## 2025-03-13 VITALS — HEART RATE: 56 BPM | DIASTOLIC BLOOD PRESSURE: 65 MMHG | SYSTOLIC BLOOD PRESSURE: 161 MMHG

## 2025-03-13 PROCEDURE — 93971 EXTREMITY STUDY: CPT | Mod: LT

## 2025-03-13 PROCEDURE — 99212 OFFICE O/P EST SF 10 MIN: CPT

## 2025-03-21 NOTE — ED ADULT TRIAGE NOTE - CCCP TRG CHIEF CMPLNT
Level of Care: Telemetry [5]   Diagnosis: Closed right hip fracture [[id number]]   Admitting Physician: CORIN TENA [[id number]]   Certification: I Certify That Inpatient Hospital Services Are Medically Necessary For Greater Than 2 Midnights   fall

## 2025-04-08 PROBLEM — I82.492 DEEP VEIN THROMBOSIS (DVT) OF OTHER VEIN OF LEFT LOWER EXTREMITY: Status: ACTIVE | Noted: 2025-04-08

## 2025-04-08 PROBLEM — T14.8XXA HEMATOMA: Status: ACTIVE | Noted: 2025-04-08

## 2025-08-07 ENCOUNTER — APPOINTMENT (OUTPATIENT)
Dept: VASCULAR SURGERY | Facility: CLINIC | Age: 85
End: 2025-08-07
Payer: MEDICARE

## 2025-08-07 ENCOUNTER — NON-APPOINTMENT (OUTPATIENT)
Age: 85
End: 2025-08-07

## 2025-08-07 VITALS — SYSTOLIC BLOOD PRESSURE: 174 MMHG | HEART RATE: 56 BPM | DIASTOLIC BLOOD PRESSURE: 74 MMHG

## 2025-08-07 VITALS
SYSTOLIC BLOOD PRESSURE: 180 MMHG | WEIGHT: 112 LBS | TEMPERATURE: 97.5 F | DIASTOLIC BLOOD PRESSURE: 76 MMHG | HEIGHT: 67 IN | BODY MASS INDEX: 17.58 KG/M2 | HEART RATE: 56 BPM

## 2025-08-07 PROCEDURE — 93926 LOWER EXTREMITY STUDY: CPT | Mod: LT

## 2025-08-07 PROCEDURE — 93922 UPR/L XTREMITY ART 2 LEVELS: CPT

## 2025-08-07 PROCEDURE — 99213 OFFICE O/P EST LOW 20 MIN: CPT

## 2025-09-05 ENCOUNTER — APPOINTMENT (OUTPATIENT)
Dept: VASCULAR SURGERY | Facility: CLINIC | Age: 85
End: 2025-09-05

## 2025-09-05 ENCOUNTER — APPOINTMENT (OUTPATIENT)
Dept: VASCULAR SURGERY | Facility: CLINIC | Age: 85
End: 2025-09-05
Payer: MEDICARE

## 2025-09-05 VITALS
SYSTOLIC BLOOD PRESSURE: 192 MMHG | WEIGHT: 112 LBS | DIASTOLIC BLOOD PRESSURE: 83 MMHG | HEART RATE: 49 BPM | BODY MASS INDEX: 17.58 KG/M2 | HEIGHT: 67 IN

## 2025-09-05 LAB
ANION GAP SERPL CALC-SCNC: 13 MMOL/L
BUN SERPL-MCNC: 37 MG/DL
CALCIUM SERPL-MCNC: 9.3 MG/DL
CHLORIDE SERPL-SCNC: 105 MMOL/L
CO2 SERPL-SCNC: 22 MMOL/L
CREAT SERPL-MCNC: 1.67 MG/DL
EGFRCR SERPLBLD CKD-EPI 2021: 39 ML/MIN/1.73M2
GLUCOSE SERPL-MCNC: 78 MG/DL
POTASSIUM SERPL-SCNC: 4.7 MMOL/L
SODIUM SERPL-SCNC: 140 MMOL/L

## 2025-09-05 PROCEDURE — 93926 LOWER EXTREMITY STUDY: CPT | Mod: LT

## 2025-09-05 PROCEDURE — 99213 OFFICE O/P EST LOW 20 MIN: CPT

## 2025-09-05 PROCEDURE — 93922 UPR/L XTREMITY ART 2 LEVELS: CPT

## 2025-09-11 ENCOUNTER — APPOINTMENT (OUTPATIENT)
Dept: VASCULAR SURGERY | Facility: CLINIC | Age: 85
End: 2025-09-11

## 2025-09-11 VITALS
WEIGHT: 112 LBS | BODY MASS INDEX: 17.58 KG/M2 | SYSTOLIC BLOOD PRESSURE: 195 MMHG | DIASTOLIC BLOOD PRESSURE: 84 MMHG | HEIGHT: 67 IN | HEART RATE: 49 BPM | TEMPERATURE: 97.6 F

## 2025-09-11 VITALS — HEART RATE: 48 BPM | DIASTOLIC BLOOD PRESSURE: 72 MMHG | SYSTOLIC BLOOD PRESSURE: 195 MMHG

## 2025-09-11 DIAGNOSIS — I73.9 PERIPHERAL VASCULAR DISEASE, UNSPECIFIED: ICD-10-CM

## 2025-09-11 DIAGNOSIS — I99.8 OTHER DISORDER OF CIRCULATORY SYSTEM: ICD-10-CM

## 2025-09-18 ENCOUNTER — APPOINTMENT (OUTPATIENT)
Dept: CT IMAGING | Facility: HOSPITAL | Age: 85
End: 2025-09-18
Payer: MEDICARE

## 2025-09-18 PROCEDURE — 75635 CT ANGIO ABDOMINAL ARTERIES: CPT | Mod: 26

## (undated) DEVICE — POLY TRAP ETRAP

## (undated) DEVICE — SYR ALLIANCE II INFLATION 60ML

## (undated) DEVICE — PACK IV START WITH CHG

## (undated) DEVICE — BITE BLOCK ADULT 20 X 27MM (GREEN)

## (undated) DEVICE — SENSOR O2 FINGER ADULT

## (undated) DEVICE — TUBING IV SET GRAVITY 3Y 100" MACRO

## (undated) DEVICE — SYR LUER LOK 50CC

## (undated) DEVICE — BRUSH COLONOSCOPY CYTOLOGY

## (undated) DEVICE — CATH IV SAFE BC 20G X 1.16" (PINK)

## (undated) DEVICE — TUBING SUCTION CONN 6FT STERILE

## (undated) DEVICE — FOLEY HOLDER STATLOCK 2 WAY ADULT

## (undated) DEVICE — BALLOON US ENDO

## (undated) DEVICE — CATH IV SAFE BC 22G X 1" (BLUE)

## (undated) DEVICE — SUCTION YANKAUER NO CONTROL VENT

## (undated) DEVICE — BIOPSY FORCEP RADIAL JAW 4 STANDARD WITH NEEDLE

## (undated) DEVICE — TUBING SUCTION 20FT

## (undated) DEVICE — SOL INJ NS 0.9% 500ML 2 PORT

## (undated) DEVICE — ELCTR GROUNDING PAD ADULT COVIDIEN

## (undated) DEVICE — IRRIGATOR BIO SHIELD

## (undated) DEVICE — CLAMP BX HOT RAD JAW 3

## (undated) DEVICE — FORCEP RADIAL JAW 4 JUMBO 2.8MM 3.2MM 240CM ORANGE DISP